# Patient Record
Sex: MALE | Race: ASIAN | NOT HISPANIC OR LATINO | URBAN - METROPOLITAN AREA
[De-identification: names, ages, dates, MRNs, and addresses within clinical notes are randomized per-mention and may not be internally consistent; named-entity substitution may affect disease eponyms.]

---

## 2017-01-15 ENCOUNTER — EMERGENCY (EMERGENCY)
Facility: HOSPITAL | Age: 57
LOS: 1 days | Discharge: HOME | End: 2017-01-15
Attending: EMERGENCY MEDICINE
Payer: COMMERCIAL

## 2017-01-15 VITALS
HEART RATE: 89 BPM | RESPIRATION RATE: 18 BRPM | SYSTOLIC BLOOD PRESSURE: 139 MMHG | OXYGEN SATURATION: 99 % | TEMPERATURE: 98 F | DIASTOLIC BLOOD PRESSURE: 89 MMHG

## 2017-01-15 VITALS
OXYGEN SATURATION: 100 % | RESPIRATION RATE: 20 BRPM | DIASTOLIC BLOOD PRESSURE: 79 MMHG | HEIGHT: 66 IN | WEIGHT: 229.94 LBS | SYSTOLIC BLOOD PRESSURE: 108 MMHG | TEMPERATURE: 97 F | HEART RATE: 83 BPM

## 2017-01-15 DIAGNOSIS — Z79.84 LONG TERM (CURRENT) USE OF ORAL HYPOGLYCEMIC DRUGS: ICD-10-CM

## 2017-01-15 DIAGNOSIS — E78.5 HYPERLIPIDEMIA, UNSPECIFIED: ICD-10-CM

## 2017-01-15 DIAGNOSIS — M79.89 OTHER SPECIFIED SOFT TISSUE DISORDERS: ICD-10-CM

## 2017-01-15 LAB
ALBUMIN SERPL ELPH-MCNC: 3.1 G/DL — LOW (ref 3.5–5)
ALP SERPL-CCNC: 96 U/L — SIGNIFICANT CHANGE UP (ref 40–120)
ALT FLD-CCNC: 22 U/L DA — SIGNIFICANT CHANGE UP (ref 10–60)
ANION GAP SERPL CALC-SCNC: 8 MMOL/L — SIGNIFICANT CHANGE UP (ref 5–17)
APPEARANCE UR: CLEAR — SIGNIFICANT CHANGE UP
AST SERPL-CCNC: 16 U/L — SIGNIFICANT CHANGE UP (ref 10–40)
BASOPHILS # BLD AUTO: 0.1 K/UL — SIGNIFICANT CHANGE UP (ref 0–0.2)
BASOPHILS NFR BLD AUTO: 0.8 % — SIGNIFICANT CHANGE UP (ref 0–2)
BILIRUB SERPL-MCNC: 0.5 MG/DL — SIGNIFICANT CHANGE UP (ref 0.2–1.2)
BILIRUB UR-MCNC: NEGATIVE — SIGNIFICANT CHANGE UP
BUN SERPL-MCNC: 16 MG/DL — SIGNIFICANT CHANGE UP (ref 7–18)
CALCIUM SERPL-MCNC: 8.6 MG/DL — SIGNIFICANT CHANGE UP (ref 8.4–10.5)
CHLORIDE SERPL-SCNC: 105 MMOL/L — SIGNIFICANT CHANGE UP (ref 96–108)
CO2 SERPL-SCNC: 26 MMOL/L — SIGNIFICANT CHANGE UP (ref 22–31)
COLOR SPEC: YELLOW — SIGNIFICANT CHANGE UP
CREAT SERPL-MCNC: 0.9 MG/DL — SIGNIFICANT CHANGE UP (ref 0.5–1.3)
DIFF PNL FLD: NEGATIVE — SIGNIFICANT CHANGE UP
EOSINOPHIL # BLD AUTO: 0.3 K/UL — SIGNIFICANT CHANGE UP (ref 0–0.5)
EOSINOPHIL NFR BLD AUTO: 2.8 % — SIGNIFICANT CHANGE UP (ref 0–6)
GLUCOSE SERPL-MCNC: 96 MG/DL — SIGNIFICANT CHANGE UP (ref 70–99)
GLUCOSE UR QL: NEGATIVE — SIGNIFICANT CHANGE UP
HCT VFR BLD CALC: 41 % — SIGNIFICANT CHANGE UP (ref 39–50)
HGB BLD-MCNC: 13.5 G/DL — SIGNIFICANT CHANGE UP (ref 13–17)
KETONES UR-MCNC: NEGATIVE — SIGNIFICANT CHANGE UP
LEUKOCYTE ESTERASE UR-ACNC: NEGATIVE — SIGNIFICANT CHANGE UP
LYMPHOCYTES # BLD AUTO: 1.8 K/UL — SIGNIFICANT CHANGE UP (ref 1–3.3)
LYMPHOCYTES # BLD AUTO: 20 % — SIGNIFICANT CHANGE UP (ref 13–44)
MCHC RBC-ENTMCNC: 29.7 PG — SIGNIFICANT CHANGE UP (ref 27–34)
MCHC RBC-ENTMCNC: 32.9 GM/DL — SIGNIFICANT CHANGE UP (ref 32–36)
MCV RBC AUTO: 90.3 FL — SIGNIFICANT CHANGE UP (ref 80–100)
MONOCYTES # BLD AUTO: 0.6 K/UL — SIGNIFICANT CHANGE UP (ref 0–0.9)
MONOCYTES NFR BLD AUTO: 6.9 % — SIGNIFICANT CHANGE UP (ref 2–14)
NEUTROPHILS # BLD AUTO: 6.1 K/UL — SIGNIFICANT CHANGE UP (ref 1.8–7.4)
NEUTROPHILS NFR BLD AUTO: 69.4 % — SIGNIFICANT CHANGE UP (ref 43–77)
NITRITE UR-MCNC: NEGATIVE — SIGNIFICANT CHANGE UP
PH UR: 6 — SIGNIFICANT CHANGE UP (ref 4.8–8)
PLATELET # BLD AUTO: 304 K/UL — SIGNIFICANT CHANGE UP (ref 150–400)
POTASSIUM SERPL-MCNC: 3.9 MMOL/L — SIGNIFICANT CHANGE UP (ref 3.5–5.3)
POTASSIUM SERPL-SCNC: 3.9 MMOL/L — SIGNIFICANT CHANGE UP (ref 3.5–5.3)
PROT SERPL-MCNC: 7.5 G/DL — SIGNIFICANT CHANGE UP (ref 6–8.3)
PROT UR-MCNC: NEGATIVE — SIGNIFICANT CHANGE UP
RBC # BLD: 4.55 M/UL — SIGNIFICANT CHANGE UP (ref 4.2–5.8)
RBC # FLD: 11.2 % — SIGNIFICANT CHANGE UP (ref 10.3–14.5)
SODIUM SERPL-SCNC: 139 MMOL/L — SIGNIFICANT CHANGE UP (ref 135–145)
SP GR SPEC: 1.01 — SIGNIFICANT CHANGE UP (ref 1.01–1.02)
UROBILINOGEN FLD QL: NEGATIVE — SIGNIFICANT CHANGE UP
WBC # BLD: 8.8 K/UL — SIGNIFICANT CHANGE UP (ref 3.8–10.5)
WBC # FLD AUTO: 8.8 K/UL — SIGNIFICANT CHANGE UP (ref 3.8–10.5)

## 2017-01-15 PROCEDURE — 85027 COMPLETE CBC AUTOMATED: CPT

## 2017-01-15 PROCEDURE — 81003 URINALYSIS AUTO W/O SCOPE: CPT

## 2017-01-15 PROCEDURE — 80053 COMPREHEN METABOLIC PANEL: CPT

## 2017-01-15 PROCEDURE — 93005 ELECTROCARDIOGRAM TRACING: CPT

## 2017-01-15 PROCEDURE — 99053 MED SERV 10PM-8AM 24 HR FAC: CPT

## 2017-01-15 PROCEDURE — 36000 PLACE NEEDLE IN VEIN: CPT

## 2017-01-15 PROCEDURE — 99284 EMERGENCY DEPT VISIT MOD MDM: CPT | Mod: 25

## 2017-01-15 PROCEDURE — 99283 EMERGENCY DEPT VISIT LOW MDM: CPT

## 2017-01-15 NOTE — ED PROVIDER NOTE - MEDICAL DECISION MAKING DETAILS
56 year-old male, presents with bilateral leg swelling x 3 weeks. Well-appearing, vital signs within normal limits, afebrile. Plan: labs, urine, re-assess.

## 2017-01-15 NOTE — ED PROVIDER NOTE - OBJECTIVE STATEMENT
56 year-old male, history of dyslipidemia, borderline DM (on Metformin) presents with cc bilateral leg swelling x 3 weeks. Gradual onset of bilateral lower extremity swelling (below knee), continuous, improved in the morning, worsened at the end of the day, associated with bilateral ankle sharp pain that worsens towards the end of the day. Denies chest pain, dizziness, palpitations, SOB, abdominal pain/bloating, fever, chills, diaphoresis or any other complaints. Was evaluated by his PMD 2 weeks ago, reports that has had blood work done and cardiac echo (which was normal as per patient), was started on a trial of diuretic which improved the swelling slightly but still having pain/swelling. Was started on Diclofenac for pain without relief.

## 2017-01-15 NOTE — ED ADULT NURSE NOTE - SKIN INTEGRITY
scar/discoloration to medial aspect of right ankle pt states that it is from healed old wound/intact

## 2017-01-15 NOTE — ED PROVIDER NOTE - ATTENDING CONTRIBUTION TO CARE
Case of a 56 year-old male with past medical history of dyslipidemia, borderline DM (on Metformin) presenting with bilateral leg swelling x 3 weeks. Well-appearing, vital signs within normal limits, afebrile. Will do labs, urine, re-assess. Agree with NP's physical exam, assessment and documentation

## 2017-01-15 NOTE — ED PROVIDER NOTE - PROGRESS NOTE DETAILS
Labs, urine unremarkable. ECG shows NSR. Patient already has a follow up with PMD. Presentation unlikely due to DVT or infection. Patient will need to follow up with the PMD in 1-2 days. Pt is well appearing walking with steady gait, stable for discharge and follow up without fail with medical doctor. I had a detailed discussion with the patient and/or guardian regarding the historical points, exam findings, and any diagnostic results supporting the discharge diagnosis. Pt educated on care and need for follow up. Strict return instructions and red flag signs and symptoms discussed with patient. Questions answered. Pt shows understanding of discharge information and agrees to follow.

## 2020-08-07 NOTE — ED PROVIDER NOTE - NSTIMEPROVIDERCAREINITIATE_GEN_ER
15-Javier-2017 17:07 Detail Level: Detailed Render Note In Bullet Format When Appropriate: No Consent: The patient's consent was obtained including but not limited to risks of crusting, scabbing, blistering, scarring, darker or lighter pigmentary change, recurrence, incomplete removal and infection. Post-Care Instructions: I reviewed with the patient in detail post-care instructions. Patient is to wear sunprotection, and avoid picking at any of the treated lesions. Pt may apply Vaseline to crusted or scabbing areas. Duration Of Freeze Thaw-Cycle (Seconds): 1 Number Of Freeze-Thaw Cycles: 2 freeze-thaw cycles Medical Necessity Clause: This procedure was medically necessary because the lesions that were treated were: Medical Necessity Information: It is in your best interest to select a reason for this procedure from the list below. All of these items fulfill various CMS LCD requirements except the new and changing color options.

## 2022-01-01 ENCOUNTER — NON-APPOINTMENT (OUTPATIENT)
Age: 62
End: 2022-01-01

## 2022-01-01 ENCOUNTER — APPOINTMENT (OUTPATIENT)
Dept: PULMONOLOGY | Facility: CLINIC | Age: 62
End: 2022-01-01
Payer: MEDICAID

## 2022-01-01 ENCOUNTER — APPOINTMENT (OUTPATIENT)
Dept: PULMONOLOGY | Facility: CLINIC | Age: 62
End: 2022-01-01

## 2022-01-01 ENCOUNTER — TRANSCRIPTION ENCOUNTER (OUTPATIENT)
Age: 62
End: 2022-01-01

## 2022-01-01 ENCOUNTER — LABORATORY RESULT (OUTPATIENT)
Age: 62
End: 2022-01-01

## 2022-01-01 ENCOUNTER — INPATIENT (INPATIENT)
Facility: HOSPITAL | Age: 62
LOS: 3 days | Discharge: HOME | End: 2022-10-13
Attending: INTERNAL MEDICINE | Admitting: INTERNAL MEDICINE

## 2022-01-01 ENCOUNTER — INPATIENT (INPATIENT)
Facility: HOSPITAL | Age: 62
LOS: 35 days | Discharge: HOME HEALTH | DRG: 196 | End: 2022-12-29
Attending: INTERNAL MEDICINE | Admitting: STUDENT IN AN ORGANIZED HEALTH CARE EDUCATION/TRAINING PROGRAM
Payer: MEDICAID

## 2022-01-01 VITALS
DIASTOLIC BLOOD PRESSURE: 80 MMHG | HEIGHT: 65 IN | OXYGEN SATURATION: 95 % | HEART RATE: 102 BPM | BODY MASS INDEX: 35.82 KG/M2 | SYSTOLIC BLOOD PRESSURE: 130 MMHG | TEMPERATURE: 98.1 F | WEIGHT: 215 LBS

## 2022-01-01 VITALS
SYSTOLIC BLOOD PRESSURE: 112 MMHG | OXYGEN SATURATION: 95 % | TEMPERATURE: 98 F | DIASTOLIC BLOOD PRESSURE: 76 MMHG | HEART RATE: 86 BPM

## 2022-01-01 VITALS
HEART RATE: 71 BPM | OXYGEN SATURATION: 98 % | RESPIRATION RATE: 18 BRPM | TEMPERATURE: 98 F | DIASTOLIC BLOOD PRESSURE: 66 MMHG | SYSTOLIC BLOOD PRESSURE: 103 MMHG

## 2022-01-01 VITALS
WEIGHT: 216.05 LBS | OXYGEN SATURATION: 97 % | RESPIRATION RATE: 44 BRPM | DIASTOLIC BLOOD PRESSURE: 84 MMHG | HEART RATE: 115 BPM | SYSTOLIC BLOOD PRESSURE: 132 MMHG | TEMPERATURE: 99 F

## 2022-01-01 VITALS
HEART RATE: 107 BPM | WEIGHT: 214 LBS | SYSTOLIC BLOOD PRESSURE: 109 MMHG | BODY MASS INDEX: 36.54 KG/M2 | OXYGEN SATURATION: 96 % | TEMPERATURE: 98.4 F | HEIGHT: 64 IN | DIASTOLIC BLOOD PRESSURE: 75 MMHG

## 2022-01-01 VITALS
SYSTOLIC BLOOD PRESSURE: 140 MMHG | WEIGHT: 234 LBS | OXYGEN SATURATION: 96 % | BODY MASS INDEX: 39.95 KG/M2 | TEMPERATURE: 97.8 F | DIASTOLIC BLOOD PRESSURE: 82 MMHG | HEART RATE: 103 BPM | HEIGHT: 64 IN

## 2022-01-01 VITALS
BODY MASS INDEX: 38.07 KG/M2 | TEMPERATURE: 98 F | DIASTOLIC BLOOD PRESSURE: 87 MMHG | RESPIRATION RATE: 18 BRPM | SYSTOLIC BLOOD PRESSURE: 143 MMHG | HEIGHT: 64 IN | HEART RATE: 87 BPM | WEIGHT: 223 LBS | OXYGEN SATURATION: 90 %

## 2022-01-01 VITALS
SYSTOLIC BLOOD PRESSURE: 123 MMHG | BODY MASS INDEX: 35.32 KG/M2 | OXYGEN SATURATION: 99 % | WEIGHT: 212 LBS | HEIGHT: 65 IN | DIASTOLIC BLOOD PRESSURE: 79 MMHG | TEMPERATURE: 98.4 F | HEART RATE: 98 BPM

## 2022-01-01 VITALS — OXYGEN SATURATION: 88 % | HEART RATE: 112 BPM | TEMPERATURE: 96.6 F

## 2022-01-01 VITALS — TEMPERATURE: 99 F

## 2022-01-01 VITALS
SYSTOLIC BLOOD PRESSURE: 116 MMHG | HEART RATE: 95 BPM | OXYGEN SATURATION: 97 % | TEMPERATURE: 96.6 F | DIASTOLIC BLOOD PRESSURE: 74 MMHG

## 2022-01-01 VITALS
TEMPERATURE: 97.1 F | DIASTOLIC BLOOD PRESSURE: 80 MMHG | OXYGEN SATURATION: 96 % | SYSTOLIC BLOOD PRESSURE: 116 MMHG | HEART RATE: 107 BPM

## 2022-01-01 VITALS — OXYGEN SATURATION: 90 % | HEART RATE: 98 BPM

## 2022-01-01 VITALS
SYSTOLIC BLOOD PRESSURE: 149 MMHG | OXYGEN SATURATION: 68 % | HEART RATE: 120 BPM | DIASTOLIC BLOOD PRESSURE: 71 MMHG | HEIGHT: 66 IN | RESPIRATION RATE: 26 BRPM

## 2022-01-01 DIAGNOSIS — J18.9 PNEUMONIA, UNSPECIFIED ORGANISM: ICD-10-CM

## 2022-01-01 DIAGNOSIS — D72.829 ELEVATED WHITE BLOOD CELL COUNT, UNSPECIFIED: ICD-10-CM

## 2022-01-01 DIAGNOSIS — J84.10 PULMONARY FIBROSIS, UNSPECIFIED: ICD-10-CM

## 2022-01-01 DIAGNOSIS — Z29.9 ENCOUNTER FOR PROPHYLACTIC MEASURES, UNSPECIFIED: ICD-10-CM

## 2022-01-01 DIAGNOSIS — Z98.890 OTHER SPECIFIED POSTPROCEDURAL STATES: Chronic | ICD-10-CM

## 2022-01-01 DIAGNOSIS — J84.9 INTERSTITIAL PULMONARY DISEASE, UNSPECIFIED: ICD-10-CM

## 2022-01-01 DIAGNOSIS — R06.02 SHORTNESS OF BREATH: ICD-10-CM

## 2022-01-01 DIAGNOSIS — Z78.9 OTHER SPECIFIED HEALTH STATUS: ICD-10-CM

## 2022-01-01 DIAGNOSIS — Z01.818 ENCOUNTER FOR OTHER PREPROCEDURAL EXAMINATION: ICD-10-CM

## 2022-01-01 DIAGNOSIS — J96.21 ACUTE AND CHRONIC RESPIRATORY FAILURE WITH HYPOXIA: ICD-10-CM

## 2022-01-01 DIAGNOSIS — J96.11 CHRONIC RESPIRATORY FAILURE WITH HYPOXIA: ICD-10-CM

## 2022-01-01 DIAGNOSIS — R05.9 COUGH, UNSPECIFIED: ICD-10-CM

## 2022-01-01 DIAGNOSIS — U09.9 POST COVID-19 CONDITION, UNSPECIFIED: ICD-10-CM

## 2022-01-01 DIAGNOSIS — E87.1 HYPO-OSMOLALITY AND HYPONATREMIA: ICD-10-CM

## 2022-01-01 DIAGNOSIS — B37.0 CANDIDAL STOMATITIS: ICD-10-CM

## 2022-01-01 DIAGNOSIS — R73.03 PREDIABETES: ICD-10-CM

## 2022-01-01 DIAGNOSIS — I27.20 PULMONARY HYPERTENSION, UNSPECIFIED: ICD-10-CM

## 2022-01-01 DIAGNOSIS — Z01.812 ENCOUNTER FOR PREPROCEDURAL LABORATORY EXAMINATION: ICD-10-CM

## 2022-01-01 LAB
A1C WITH ESTIMATED AVERAGE GLUCOSE RESULT: 6 % — HIGH (ref 4–5.6)
ACE BLD-CCNC: 34 U/L
ALBUMIN SERPL ELPH-MCNC: 3.1 G/DL — LOW (ref 3.3–5)
ALBUMIN SERPL ELPH-MCNC: 3.2 G/DL — LOW (ref 3.3–5)
ALBUMIN SERPL ELPH-MCNC: 3.3 G/DL — SIGNIFICANT CHANGE UP (ref 3.3–5)
ALBUMIN SERPL ELPH-MCNC: 3.4 G/DL — SIGNIFICANT CHANGE UP (ref 3.3–5)
ALBUMIN SERPL ELPH-MCNC: 3.5 G/DL — SIGNIFICANT CHANGE UP (ref 3.3–5)
ALBUMIN SERPL ELPH-MCNC: 3.7 G/DL — SIGNIFICANT CHANGE UP (ref 3.3–5)
ALBUMIN SERPL ELPH-MCNC: 3.8 G/DL — SIGNIFICANT CHANGE UP (ref 3.3–5)
ALBUMIN SERPL ELPH-MCNC: 4 G/DL
ALP BLD-CCNC: 103 U/L
ALP SERPL-CCNC: 103 U/L — SIGNIFICANT CHANGE UP (ref 40–120)
ALP SERPL-CCNC: 108 U/L — SIGNIFICANT CHANGE UP (ref 40–120)
ALP SERPL-CCNC: 115 U/L — SIGNIFICANT CHANGE UP (ref 40–120)
ALP SERPL-CCNC: 64 U/L — SIGNIFICANT CHANGE UP (ref 40–120)
ALP SERPL-CCNC: 69 U/L — SIGNIFICANT CHANGE UP (ref 40–120)
ALP SERPL-CCNC: 70 U/L — SIGNIFICANT CHANGE UP (ref 40–120)
ALP SERPL-CCNC: 71 U/L — SIGNIFICANT CHANGE UP (ref 40–120)
ALP SERPL-CCNC: 71 U/L — SIGNIFICANT CHANGE UP (ref 40–120)
ALP SERPL-CCNC: 72 U/L — SIGNIFICANT CHANGE UP (ref 40–120)
ALP SERPL-CCNC: 73 U/L — SIGNIFICANT CHANGE UP (ref 40–120)
ALP SERPL-CCNC: 74 U/L — SIGNIFICANT CHANGE UP (ref 40–120)
ALP SERPL-CCNC: 74 U/L — SIGNIFICANT CHANGE UP (ref 40–120)
ALP SERPL-CCNC: 76 U/L — SIGNIFICANT CHANGE UP (ref 40–120)
ALP SERPL-CCNC: 79 U/L — SIGNIFICANT CHANGE UP (ref 40–120)
ALP SERPL-CCNC: 84 U/L — SIGNIFICANT CHANGE UP (ref 40–120)
ALP SERPL-CCNC: 84 U/L — SIGNIFICANT CHANGE UP (ref 40–120)
ALP SERPL-CCNC: 86 U/L — SIGNIFICANT CHANGE UP (ref 40–120)
ALP SERPL-CCNC: 87 U/L — SIGNIFICANT CHANGE UP (ref 40–120)
ALP SERPL-CCNC: 90 U/L — SIGNIFICANT CHANGE UP (ref 40–120)
ALP SERPL-CCNC: 93 U/L — SIGNIFICANT CHANGE UP (ref 40–120)
ALP SERPL-CCNC: 95 U/L — SIGNIFICANT CHANGE UP (ref 40–120)
ALP SERPL-CCNC: 98 U/L — SIGNIFICANT CHANGE UP (ref 40–120)
ALT FLD-CCNC: 12 U/L — SIGNIFICANT CHANGE UP (ref 4–41)
ALT FLD-CCNC: 16 U/L — SIGNIFICANT CHANGE UP (ref 10–45)
ALT FLD-CCNC: 17 U/L — SIGNIFICANT CHANGE UP (ref 10–45)
ALT FLD-CCNC: 18 U/L — SIGNIFICANT CHANGE UP (ref 10–45)
ALT FLD-CCNC: 19 U/L — SIGNIFICANT CHANGE UP (ref 10–45)
ALT FLD-CCNC: 19 U/L — SIGNIFICANT CHANGE UP (ref 10–45)
ALT FLD-CCNC: 21 U/L — SIGNIFICANT CHANGE UP (ref 10–45)
ALT FLD-CCNC: 22 U/L — SIGNIFICANT CHANGE UP (ref 10–45)
ALT FLD-CCNC: 22 U/L — SIGNIFICANT CHANGE UP (ref 10–45)
ALT FLD-CCNC: 23 U/L — SIGNIFICANT CHANGE UP (ref 10–45)
ALT FLD-CCNC: 24 U/L — SIGNIFICANT CHANGE UP (ref 10–45)
ALT FLD-CCNC: 24 U/L — SIGNIFICANT CHANGE UP (ref 10–45)
ALT FLD-CCNC: 25 U/L — SIGNIFICANT CHANGE UP (ref 10–45)
ALT FLD-CCNC: 26 U/L — SIGNIFICANT CHANGE UP (ref 10–45)
ALT FLD-CCNC: 28 U/L — SIGNIFICANT CHANGE UP (ref 10–45)
ALT FLD-CCNC: 29 U/L — SIGNIFICANT CHANGE UP (ref 10–45)
ALT FLD-CCNC: 30 U/L — SIGNIFICANT CHANGE UP (ref 10–45)
ALT FLD-CCNC: 31 U/L — SIGNIFICANT CHANGE UP (ref 10–45)
ALT FLD-CCNC: 32 U/L — SIGNIFICANT CHANGE UP (ref 10–45)
ALT FLD-CCNC: 32 U/L — SIGNIFICANT CHANGE UP (ref 10–45)
ALT FLD-CCNC: 35 U/L — SIGNIFICANT CHANGE UP (ref 10–45)
ALT FLD-CCNC: 9 U/L — SIGNIFICANT CHANGE UP (ref 4–41)
ALT FLD-CCNC: SIGNIFICANT CHANGE UP (ref 10–45)
ALT FLD-CCNC: SIGNIFICANT CHANGE UP (ref 10–45)
ALT SERPL-CCNC: 13 U/L
ANA SER IF-ACNC: NEGATIVE
ANA TITR SER: NEGATIVE — SIGNIFICANT CHANGE UP
ANION GAP SERPL CALC-SCNC: 1 MMOL/L — LOW (ref 5–17)
ANION GAP SERPL CALC-SCNC: 10 MMOL/L — SIGNIFICANT CHANGE UP (ref 5–17)
ANION GAP SERPL CALC-SCNC: 11 MMOL/L — SIGNIFICANT CHANGE UP (ref 7–14)
ANION GAP SERPL CALC-SCNC: 11 MMOL/L — SIGNIFICANT CHANGE UP (ref 7–14)
ANION GAP SERPL CALC-SCNC: 12 MMOL/L
ANION GAP SERPL CALC-SCNC: 12 MMOL/L — SIGNIFICANT CHANGE UP (ref 7–14)
ANION GAP SERPL CALC-SCNC: 2 MMOL/L — LOW (ref 5–17)
ANION GAP SERPL CALC-SCNC: 2 MMOL/L — LOW (ref 5–17)
ANION GAP SERPL CALC-SCNC: 3 MMOL/L — LOW (ref 5–17)
ANION GAP SERPL CALC-SCNC: 3 MMOL/L — LOW (ref 5–17)
ANION GAP SERPL CALC-SCNC: 4 MMOL/L — LOW (ref 5–17)
ANION GAP SERPL CALC-SCNC: 5 MMOL/L — SIGNIFICANT CHANGE UP (ref 5–17)
ANION GAP SERPL CALC-SCNC: 6 MMOL/L — SIGNIFICANT CHANGE UP (ref 5–17)
ANION GAP SERPL CALC-SCNC: 7 MMOL/L — SIGNIFICANT CHANGE UP (ref 5–17)
ANION GAP SERPL CALC-SCNC: 9 MMOL/L — SIGNIFICANT CHANGE UP (ref 5–17)
ANION GAP SERPL CALC-SCNC: 9 MMOL/L — SIGNIFICANT CHANGE UP (ref 7–14)
APPEARANCE UR: ABNORMAL
APPEARANCE UR: CLEAR — SIGNIFICANT CHANGE UP
APTT BLD: 24.9 SEC — LOW (ref 27.5–35.5)
APTT BLD: 27.7 SEC — SIGNIFICANT CHANGE UP (ref 27.5–35.5)
APTT BLD: 29.6 SEC — SIGNIFICANT CHANGE UP (ref 27.5–35.5)
APTT BLD: 29.8 SEC — SIGNIFICANT CHANGE UP (ref 27.5–35.5)
APTT BLD: 35.9 SEC — SIGNIFICANT CHANGE UP (ref 27–36.3)
AST SERPL-CCNC: 10 U/L — SIGNIFICANT CHANGE UP (ref 10–40)
AST SERPL-CCNC: 13 U/L — SIGNIFICANT CHANGE UP (ref 10–40)
AST SERPL-CCNC: 15 U/L — SIGNIFICANT CHANGE UP (ref 10–40)
AST SERPL-CCNC: 16 U/L — SIGNIFICANT CHANGE UP (ref 10–40)
AST SERPL-CCNC: 16 U/L — SIGNIFICANT CHANGE UP (ref 10–40)
AST SERPL-CCNC: 17 U/L — SIGNIFICANT CHANGE UP (ref 10–40)
AST SERPL-CCNC: 17 U/L — SIGNIFICANT CHANGE UP (ref 4–40)
AST SERPL-CCNC: 18 U/L — SIGNIFICANT CHANGE UP (ref 10–40)
AST SERPL-CCNC: 18 U/L — SIGNIFICANT CHANGE UP (ref 4–40)
AST SERPL-CCNC: 19 U/L
AST SERPL-CCNC: 21 U/L — SIGNIFICANT CHANGE UP (ref 10–40)
AST SERPL-CCNC: 22 U/L — SIGNIFICANT CHANGE UP (ref 10–40)
AST SERPL-CCNC: 23 U/L — SIGNIFICANT CHANGE UP (ref 10–40)
AST SERPL-CCNC: 24 U/L — SIGNIFICANT CHANGE UP (ref 10–40)
AST SERPL-CCNC: 25 U/L — SIGNIFICANT CHANGE UP (ref 10–40)
AST SERPL-CCNC: 25 U/L — SIGNIFICANT CHANGE UP (ref 10–40)
AST SERPL-CCNC: 27 U/L — SIGNIFICANT CHANGE UP (ref 10–40)
AST SERPL-CCNC: 28 U/L — SIGNIFICANT CHANGE UP (ref 10–40)
AST SERPL-CCNC: 29 U/L — SIGNIFICANT CHANGE UP (ref 10–40)
AST SERPL-CCNC: 29 U/L — SIGNIFICANT CHANGE UP (ref 10–40)
AST SERPL-CCNC: 31 U/L — SIGNIFICANT CHANGE UP (ref 10–40)
AST SERPL-CCNC: 32 U/L — SIGNIFICANT CHANGE UP (ref 10–40)
AST SERPL-CCNC: SIGNIFICANT CHANGE UP (ref 10–40)
AST SERPL-CCNC: SIGNIFICANT CHANGE UP (ref 10–40)
B PERT DNA SPEC QL NAA+PROBE: SIGNIFICANT CHANGE UP
B PERT+PARAPERT DNA PNL SPEC NAA+PROBE: SIGNIFICANT CHANGE UP
BACTERIA # UR AUTO: SIGNIFICANT CHANGE UP /HPF
BASE EXCESS BLDA CALC-SCNC: 10.6 MMOL/L — HIGH (ref -2–3)
BASE EXCESS BLDA CALC-SCNC: 10.6 MMOL/L — HIGH (ref -2–3)
BASE EXCESS BLDA CALC-SCNC: 11 MMOL/L — HIGH (ref -2–3)
BASE EXCESS BLDA CALC-SCNC: 11.1 MMOL/L — HIGH (ref -2–3)
BASE EXCESS BLDA CALC-SCNC: 15 MMOL/L — HIGH (ref -2–3)
BASE EXCESS BLDA CALC-SCNC: 15.5 MMOL/L — HIGH (ref -2–3)
BASE EXCESS BLDA CALC-SCNC: 17.6 MMOL/L — HIGH (ref -2–3)
BASE EXCESS BLDA CALC-SCNC: 8 MMOL/L — HIGH (ref -2–3)
BASE EXCESS BLDMV CALC-SCNC: 3.2 MMOL/L — SIGNIFICANT CHANGE UP
BASOPHILS # BLD AUTO: 0 K/UL — SIGNIFICANT CHANGE UP (ref 0–0.2)
BASOPHILS # BLD AUTO: 0.01 K/UL — SIGNIFICANT CHANGE UP (ref 0–0.2)
BASOPHILS # BLD AUTO: 0.02 K/UL — SIGNIFICANT CHANGE UP (ref 0–0.2)
BASOPHILS # BLD AUTO: 0.03 K/UL — SIGNIFICANT CHANGE UP (ref 0–0.2)
BASOPHILS # BLD AUTO: 0.04 K/UL — SIGNIFICANT CHANGE UP (ref 0–0.2)
BASOPHILS # BLD AUTO: 0.05 K/UL
BASOPHILS NFR BLD AUTO: 0 % — SIGNIFICANT CHANGE UP (ref 0–2)
BASOPHILS NFR BLD AUTO: 0.1 % — SIGNIFICANT CHANGE UP (ref 0–2)
BASOPHILS NFR BLD AUTO: 0.2 % — SIGNIFICANT CHANGE UP (ref 0–2)
BASOPHILS NFR BLD AUTO: 0.3 % — SIGNIFICANT CHANGE UP (ref 0–2)
BASOPHILS NFR BLD AUTO: 0.5 %
BILIRUB DIRECT SERPL-MCNC: <0.2 MG/DL — SIGNIFICANT CHANGE UP (ref 0–0.3)
BILIRUB INDIRECT FLD-MCNC: SIGNIFICANT CHANGE UP MG/DL (ref 0.2–1)
BILIRUB SERPL-MCNC: 0.3 MG/DL — SIGNIFICANT CHANGE UP (ref 0.2–1.2)
BILIRUB SERPL-MCNC: 0.4 MG/DL — SIGNIFICANT CHANGE UP (ref 0.2–1.2)
BILIRUB SERPL-MCNC: 0.5 MG/DL — SIGNIFICANT CHANGE UP (ref 0.2–1.2)
BILIRUB SERPL-MCNC: 0.6 MG/DL — SIGNIFICANT CHANGE UP (ref 0.2–1.2)
BILIRUB SERPL-MCNC: 0.7 MG/DL
BILIRUB SERPL-MCNC: 0.7 MG/DL — SIGNIFICANT CHANGE UP (ref 0.2–1.2)
BILIRUB SERPL-MCNC: 0.8 MG/DL — SIGNIFICANT CHANGE UP (ref 0.2–1.2)
BILIRUB SERPL-MCNC: 0.9 MG/DL — SIGNIFICANT CHANGE UP (ref 0.2–1.2)
BILIRUB UR-MCNC: NEGATIVE — SIGNIFICANT CHANGE UP
BILIRUB UR-MCNC: NEGATIVE — SIGNIFICANT CHANGE UP
BLD GP AB SCN SERPL QL: NEGATIVE — SIGNIFICANT CHANGE UP
BLOOD GAS VENOUS COMPREHENSIVE RESULT: SIGNIFICANT CHANGE UP
BLOOD GAS VENOUS COMPREHENSIVE RESULT: SIGNIFICANT CHANGE UP
BORDETELLA PARAPERTUSSIS (RAPRVP): SIGNIFICANT CHANGE UP
BUN SERPL-MCNC: 10 MG/DL — SIGNIFICANT CHANGE UP (ref 7–23)
BUN SERPL-MCNC: 12 MG/DL
BUN SERPL-MCNC: 13 MG/DL — SIGNIFICANT CHANGE UP (ref 7–23)
BUN SERPL-MCNC: 13 MG/DL — SIGNIFICANT CHANGE UP (ref 7–23)
BUN SERPL-MCNC: 17 MG/DL — SIGNIFICANT CHANGE UP (ref 7–23)
BUN SERPL-MCNC: 18 MG/DL — SIGNIFICANT CHANGE UP (ref 7–23)
BUN SERPL-MCNC: 19 MG/DL — SIGNIFICANT CHANGE UP (ref 7–23)
BUN SERPL-MCNC: 19 MG/DL — SIGNIFICANT CHANGE UP (ref 7–23)
BUN SERPL-MCNC: 20 MG/DL — SIGNIFICANT CHANGE UP (ref 7–23)
BUN SERPL-MCNC: 21 MG/DL — SIGNIFICANT CHANGE UP (ref 7–23)
BUN SERPL-MCNC: 22 MG/DL — SIGNIFICANT CHANGE UP (ref 7–23)
BUN SERPL-MCNC: 23 MG/DL — SIGNIFICANT CHANGE UP (ref 7–23)
BUN SERPL-MCNC: 23 MG/DL — SIGNIFICANT CHANGE UP (ref 7–23)
BUN SERPL-MCNC: 24 MG/DL — HIGH (ref 7–23)
BUN SERPL-MCNC: 24 MG/DL — HIGH (ref 7–23)
BUN SERPL-MCNC: 25 MG/DL — HIGH (ref 7–23)
BUN SERPL-MCNC: 28 MG/DL — HIGH (ref 7–23)
BUN SERPL-MCNC: 8 MG/DL — SIGNIFICANT CHANGE UP (ref 7–23)
BUN SERPL-MCNC: 9 MG/DL — SIGNIFICANT CHANGE UP (ref 7–23)
C PNEUM DNA SPEC QL NAA+PROBE: SIGNIFICANT CHANGE UP
CALCIUM SERPL-MCNC: 8.3 MG/DL — LOW (ref 8.4–10.5)
CALCIUM SERPL-MCNC: 8.4 MG/DL — SIGNIFICANT CHANGE UP (ref 8.4–10.5)
CALCIUM SERPL-MCNC: 8.5 MG/DL — SIGNIFICANT CHANGE UP (ref 8.4–10.5)
CALCIUM SERPL-MCNC: 8.6 MG/DL — SIGNIFICANT CHANGE UP (ref 8.4–10.5)
CALCIUM SERPL-MCNC: 8.7 MG/DL — SIGNIFICANT CHANGE UP (ref 8.4–10.5)
CALCIUM SERPL-MCNC: 8.8 MG/DL — SIGNIFICANT CHANGE UP (ref 8.4–10.5)
CALCIUM SERPL-MCNC: 8.9 MG/DL — SIGNIFICANT CHANGE UP (ref 8.4–10.5)
CALCIUM SERPL-MCNC: 9 MG/DL
CALCIUM SERPL-MCNC: 9 MG/DL — SIGNIFICANT CHANGE UP (ref 8.4–10.5)
CALCIUM SERPL-MCNC: 9.1 MG/DL — SIGNIFICANT CHANGE UP (ref 8.4–10.5)
CALCIUM SERPL-MCNC: 9.2 MG/DL — SIGNIFICANT CHANGE UP (ref 8.4–10.5)
CCP IGG SERPL-ACNC: <8 UNITS — SIGNIFICANT CHANGE UP
CENTROMERE IGG SER-ACNC: <0.2 CD:130001892
CHLORIDE SERPL-SCNC: 88 MMOL/L — LOW (ref 96–108)
CHLORIDE SERPL-SCNC: 89 MMOL/L — LOW (ref 96–108)
CHLORIDE SERPL-SCNC: 89 MMOL/L — LOW (ref 96–108)
CHLORIDE SERPL-SCNC: 90 MMOL/L — LOW (ref 96–108)
CHLORIDE SERPL-SCNC: 91 MMOL/L — LOW (ref 96–108)
CHLORIDE SERPL-SCNC: 91 MMOL/L — LOW (ref 96–108)
CHLORIDE SERPL-SCNC: 92 MMOL/L — LOW (ref 96–108)
CHLORIDE SERPL-SCNC: 93 MMOL/L — LOW (ref 96–108)
CHLORIDE SERPL-SCNC: 94 MMOL/L — LOW (ref 96–108)
CHLORIDE SERPL-SCNC: 95 MMOL/L — LOW (ref 96–108)
CHLORIDE SERPL-SCNC: 95 MMOL/L — LOW (ref 96–108)
CHLORIDE SERPL-SCNC: 95 MMOL/L — LOW (ref 98–107)
CHLORIDE SERPL-SCNC: 95 MMOL/L — LOW (ref 98–107)
CHLORIDE SERPL-SCNC: 96 MMOL/L — LOW (ref 98–107)
CHLORIDE SERPL-SCNC: 96 MMOL/L — LOW (ref 98–107)
CHLORIDE SERPL-SCNC: 96 MMOL/L — SIGNIFICANT CHANGE UP (ref 96–108)
CHLORIDE SERPL-SCNC: 97 MMOL/L — SIGNIFICANT CHANGE UP (ref 96–108)
CHLORIDE SERPL-SCNC: 98 MMOL/L
CHOLEST SERPL-MCNC: 175 MG/DL — SIGNIFICANT CHANGE UP
CK MB CFR SERPL CALC: 5.5 NG/ML — SIGNIFICANT CHANGE UP (ref 0–6.7)
CK SERPL-CCNC: 82 U/L — SIGNIFICANT CHANGE UP (ref 30–200)
CMV IGG FLD QL: 4.6 U/ML — HIGH
CMV IGG FLD QL: 6.7 U/ML — HIGH
CMV IGG SERPL-IMP: POSITIVE
CMV IGG SERPL-IMP: POSITIVE
CMV IGM FLD-ACNC: <8 AU/ML — SIGNIFICANT CHANGE UP
CMV IGM SERPL QL: NEGATIVE — SIGNIFICANT CHANGE UP
CO2 BLDA-SCNC: 36 MMOL/L — HIGH (ref 19–24)
CO2 BLDA-SCNC: 38 MMOL/L — HIGH (ref 19–24)
CO2 BLDA-SCNC: 40 MMOL/L — HIGH (ref 19–24)
CO2 BLDA-SCNC: 41 MMOL/L — HIGH (ref 19–24)
CO2 BLDA-SCNC: 41 MMOL/L — HIGH (ref 19–24)
CO2 BLDA-SCNC: 45 MMOL/L — CRITICAL HIGH (ref 19–24)
CO2 BLDA-SCNC: 46 MMOL/L — CRITICAL HIGH (ref 19–24)
CO2 BLDA-SCNC: 48 MMOL/L — CRITICAL HIGH (ref 19–24)
CO2 BLDMV-SCNC: 31.5 MMOL/L — SIGNIFICANT CHANGE UP
CO2 SERPL-SCNC: 27 MMOL/L — SIGNIFICANT CHANGE UP (ref 22–31)
CO2 SERPL-SCNC: 28 MMOL/L — SIGNIFICANT CHANGE UP (ref 22–31)
CO2 SERPL-SCNC: 29 MMOL/L
CO2 SERPL-SCNC: 31 MMOL/L — SIGNIFICANT CHANGE UP (ref 22–31)
CO2 SERPL-SCNC: 32 MMOL/L — HIGH (ref 22–31)
CO2 SERPL-SCNC: 33 MMOL/L — HIGH (ref 22–31)
CO2 SERPL-SCNC: 33 MMOL/L — HIGH (ref 22–31)
CO2 SERPL-SCNC: 34 MMOL/L — HIGH (ref 22–31)
CO2 SERPL-SCNC: 35 MMOL/L — HIGH (ref 22–31)
CO2 SERPL-SCNC: 36 MMOL/L — HIGH (ref 22–31)
CO2 SERPL-SCNC: 37 MMOL/L — HIGH (ref 22–31)
CO2 SERPL-SCNC: 38 MMOL/L — HIGH (ref 22–31)
CO2 SERPL-SCNC: 38 MMOL/L — HIGH (ref 22–31)
CO2 SERPL-SCNC: 39 MMOL/L — HIGH (ref 22–31)
CO2 SERPL-SCNC: 42 MMOL/L — HIGH (ref 22–31)
CO2 SERPL-SCNC: 42 MMOL/L — HIGH (ref 22–31)
COHGB MFR BLDV: 1.6 % — SIGNIFICANT CHANGE UP
COHGB MFR BLDV: 1.9 % — SIGNIFICANT CHANGE UP
COHGB MFR BLDV: 2.2 % — SIGNIFICANT CHANGE UP
COLOR SPEC: ABNORMAL
COLOR SPEC: SIGNIFICANT CHANGE UP
CREAT SERPL-MCNC: 0.64 MG/DL — SIGNIFICANT CHANGE UP (ref 0.5–1.3)
CREAT SERPL-MCNC: 0.64 MG/DL — SIGNIFICANT CHANGE UP (ref 0.5–1.3)
CREAT SERPL-MCNC: 0.66 MG/DL — SIGNIFICANT CHANGE UP (ref 0.5–1.3)
CREAT SERPL-MCNC: 0.66 MG/DL — SIGNIFICANT CHANGE UP (ref 0.5–1.3)
CREAT SERPL-MCNC: 0.67 MG/DL — SIGNIFICANT CHANGE UP (ref 0.5–1.3)
CREAT SERPL-MCNC: 0.7 MG/DL — SIGNIFICANT CHANGE UP (ref 0.5–1.3)
CREAT SERPL-MCNC: 0.71 MG/DL — SIGNIFICANT CHANGE UP (ref 0.5–1.3)
CREAT SERPL-MCNC: 0.71 MG/DL — SIGNIFICANT CHANGE UP (ref 0.5–1.3)
CREAT SERPL-MCNC: 0.73 MG/DL — SIGNIFICANT CHANGE UP (ref 0.5–1.3)
CREAT SERPL-MCNC: 0.73 MG/DL — SIGNIFICANT CHANGE UP (ref 0.5–1.3)
CREAT SERPL-MCNC: 0.74 MG/DL — SIGNIFICANT CHANGE UP (ref 0.5–1.3)
CREAT SERPL-MCNC: 0.74 MG/DL — SIGNIFICANT CHANGE UP (ref 0.5–1.3)
CREAT SERPL-MCNC: 0.75 MG/DL — SIGNIFICANT CHANGE UP (ref 0.5–1.3)
CREAT SERPL-MCNC: 0.76 MG/DL — SIGNIFICANT CHANGE UP (ref 0.5–1.3)
CREAT SERPL-MCNC: 0.76 MG/DL — SIGNIFICANT CHANGE UP (ref 0.5–1.3)
CREAT SERPL-MCNC: 0.77 MG/DL — SIGNIFICANT CHANGE UP (ref 0.5–1.3)
CREAT SERPL-MCNC: 0.77 MG/DL — SIGNIFICANT CHANGE UP (ref 0.5–1.3)
CREAT SERPL-MCNC: 0.78 MG/DL — SIGNIFICANT CHANGE UP (ref 0.5–1.3)
CREAT SERPL-MCNC: 0.78 MG/DL — SIGNIFICANT CHANGE UP (ref 0.5–1.3)
CREAT SERPL-MCNC: 0.79 MG/DL — SIGNIFICANT CHANGE UP (ref 0.5–1.3)
CREAT SERPL-MCNC: 0.82 MG/DL — SIGNIFICANT CHANGE UP (ref 0.5–1.3)
CREAT SERPL-MCNC: 0.82 MG/DL — SIGNIFICANT CHANGE UP (ref 0.5–1.3)
CREAT SERPL-MCNC: 0.83 MG/DL — SIGNIFICANT CHANGE UP (ref 0.5–1.3)
CREAT SERPL-MCNC: 0.83 MG/DL — SIGNIFICANT CHANGE UP (ref 0.5–1.3)
CREAT SERPL-MCNC: 0.84 MG/DL
CREAT SERPL-MCNC: 0.84 MG/DL — SIGNIFICANT CHANGE UP (ref 0.5–1.3)
CREAT SERPL-MCNC: 0.86 MG/DL — SIGNIFICANT CHANGE UP (ref 0.5–1.3)
CREAT SERPL-MCNC: 0.88 MG/DL — SIGNIFICANT CHANGE UP (ref 0.5–1.3)
CREAT SERPL-MCNC: 0.9 MG/DL — SIGNIFICANT CHANGE UP (ref 0.5–1.3)
CREAT SERPL-MCNC: 0.91 MG/DL — SIGNIFICANT CHANGE UP (ref 0.5–1.3)
CREAT SERPL-MCNC: 0.91 MG/DL — SIGNIFICANT CHANGE UP (ref 0.5–1.3)
CREAT SERPL-MCNC: 0.97 MG/DL — SIGNIFICANT CHANGE UP (ref 0.5–1.3)
CREAT SERPL-MCNC: 1.04 MG/DL — SIGNIFICANT CHANGE UP (ref 0.5–1.3)
CREAT SERPL-MCNC: 1.06 MG/DL — SIGNIFICANT CHANGE UP (ref 0.5–1.3)
CRP SERPL-MCNC: 16 MG/L
CULTURE RESULTS: SIGNIFICANT CHANGE UP
D DIMER BLD IA.RAPID-MCNC: 291 NG/ML DDU — HIGH
DIFF PNL FLD: ABNORMAL
DIFF PNL FLD: NEGATIVE — SIGNIFICANT CHANGE UP
DSDNA AB SER-ACNC: <12 IU/ML
EBV EA AB SER IA-ACNC: <5 U/ML — SIGNIFICANT CHANGE UP
EBV EA AB TITR SER IF: POSITIVE
EBV EA IGG SER-ACNC: NEGATIVE — SIGNIFICANT CHANGE UP
EBV NA IGG SER IA-ACNC: 80.6 U/ML — HIGH
EBV PATRN SPEC IB-IMP: SIGNIFICANT CHANGE UP
EBV VCA IGG AVIDITY SER QL IA: POSITIVE
EBV VCA IGM SER IA-ACNC: 63.2 U/ML — HIGH
EBV VCA IGM SER IA-ACNC: <10 U/ML — SIGNIFICANT CHANGE UP
EBV VCA IGM TITR FLD: NEGATIVE — SIGNIFICANT CHANGE UP
EGFR: 100 ML/MIN/1.73M2 — SIGNIFICANT CHANGE UP
EGFR: 101 ML/MIN/1.73M2 — SIGNIFICANT CHANGE UP
EGFR: 102 ML/MIN/1.73M2 — SIGNIFICANT CHANGE UP
EGFR: 103 ML/MIN/1.73M2 — SIGNIFICANT CHANGE UP
EGFR: 104 ML/MIN/1.73M2 — SIGNIFICANT CHANGE UP
EGFR: 105 ML/MIN/1.73M2 — SIGNIFICANT CHANGE UP
EGFR: 106 ML/MIN/1.73M2 — SIGNIFICANT CHANGE UP
EGFR: 107 ML/MIN/1.73M2 — SIGNIFICANT CHANGE UP
EGFR: 108 ML/MIN/1.73M2 — SIGNIFICANT CHANGE UP
EGFR: 108 ML/MIN/1.73M2 — SIGNIFICANT CHANGE UP
EGFR: 80 ML/MIN/1.73M2 — SIGNIFICANT CHANGE UP
EGFR: 82 ML/MIN/1.73M2 — SIGNIFICANT CHANGE UP
EGFR: 89 ML/MIN/1.73M2 — SIGNIFICANT CHANGE UP
EGFR: 96 ML/MIN/1.73M2 — SIGNIFICANT CHANGE UP
EGFR: 96 ML/MIN/1.73M2 — SIGNIFICANT CHANGE UP
EGFR: 97 ML/MIN/1.73M2 — SIGNIFICANT CHANGE UP
EGFR: 98 ML/MIN/1.73M2 — SIGNIFICANT CHANGE UP
EGFR: 99 ML/MIN/1.73M2 — SIGNIFICANT CHANGE UP
EGFR: 99 ML/MIN/1.73M2 — SIGNIFICANT CHANGE UP
EOSINOPHIL # BLD AUTO: 0 K/UL — SIGNIFICANT CHANGE UP (ref 0–0.5)
EOSINOPHIL # BLD AUTO: 0.01 K/UL — SIGNIFICANT CHANGE UP (ref 0–0.5)
EOSINOPHIL # BLD AUTO: 0.02 K/UL — SIGNIFICANT CHANGE UP (ref 0–0.5)
EOSINOPHIL # BLD AUTO: 0.03 K/UL — SIGNIFICANT CHANGE UP (ref 0–0.5)
EOSINOPHIL # BLD AUTO: 0.03 K/UL — SIGNIFICANT CHANGE UP (ref 0–0.5)
EOSINOPHIL # BLD AUTO: 0.04 K/UL — SIGNIFICANT CHANGE UP (ref 0–0.5)
EOSINOPHIL # BLD AUTO: 0.16 K/UL — SIGNIFICANT CHANGE UP (ref 0–0.5)
EOSINOPHIL # BLD AUTO: 0.18 K/UL — SIGNIFICANT CHANGE UP (ref 0–0.5)
EOSINOPHIL # BLD AUTO: 0.25 K/UL — SIGNIFICANT CHANGE UP (ref 0–0.5)
EOSINOPHIL # BLD AUTO: 0.31 K/UL — SIGNIFICANT CHANGE UP (ref 0–0.5)
EOSINOPHIL # BLD AUTO: 0.38 K/UL — SIGNIFICANT CHANGE UP (ref 0–0.5)
EOSINOPHIL # BLD AUTO: 0.4 K/UL — SIGNIFICANT CHANGE UP (ref 0–0.5)
EOSINOPHIL # BLD AUTO: 0.46 K/UL — SIGNIFICANT CHANGE UP (ref 0–0.5)
EOSINOPHIL # BLD AUTO: 0.55 K/UL — HIGH (ref 0–0.5)
EOSINOPHIL # BLD AUTO: 0.57 K/UL — HIGH (ref 0–0.5)
EOSINOPHIL # BLD AUTO: 0.72 K/UL
EOSINOPHIL # BLD AUTO: 0.73 K/UL — HIGH (ref 0–0.5)
EOSINOPHIL NFR BLD AUTO: 0 % — SIGNIFICANT CHANGE UP (ref 0–6)
EOSINOPHIL NFR BLD AUTO: 0.1 % — SIGNIFICANT CHANGE UP (ref 0–6)
EOSINOPHIL NFR BLD AUTO: 0.2 % — SIGNIFICANT CHANGE UP (ref 0–6)
EOSINOPHIL NFR BLD AUTO: 0.4 % — SIGNIFICANT CHANGE UP (ref 0–6)
EOSINOPHIL NFR BLD AUTO: 1.4 % — SIGNIFICANT CHANGE UP (ref 0–6)
EOSINOPHIL NFR BLD AUTO: 1.7 % — SIGNIFICANT CHANGE UP (ref 0–6)
EOSINOPHIL NFR BLD AUTO: 2.1 % — SIGNIFICANT CHANGE UP (ref 0–6)
EOSINOPHIL NFR BLD AUTO: 2.4 % — SIGNIFICANT CHANGE UP (ref 0–6)
EOSINOPHIL NFR BLD AUTO: 2.8 % — SIGNIFICANT CHANGE UP (ref 0–6)
EOSINOPHIL NFR BLD AUTO: 2.9 % — SIGNIFICANT CHANGE UP (ref 0–6)
EOSINOPHIL NFR BLD AUTO: 3.1 % — SIGNIFICANT CHANGE UP (ref 0–6)
EOSINOPHIL NFR BLD AUTO: 4.1 % — SIGNIFICANT CHANGE UP (ref 0–6)
EOSINOPHIL NFR BLD AUTO: 5.8 % — SIGNIFICANT CHANGE UP (ref 0–6)
EOSINOPHIL NFR BLD AUTO: 6.3 % — HIGH (ref 0–6)
EOSINOPHIL NFR BLD AUTO: 7.1 %
EPI CELLS # UR: SIGNIFICANT CHANGE UP /HPF (ref 0–5)
ERYTHROCYTE [SEDIMENTATION RATE] IN BLOOD BY WESTERGREN METHOD: 88 MM/HR
ESTIMATED AVERAGE GLUCOSE: 126 MG/DL — HIGH (ref 68–114)
FLUAV SUBTYP SPEC NAA+PROBE: SIGNIFICANT CHANGE UP
FLUBV RNA SPEC QL NAA+PROBE: SIGNIFICANT CHANGE UP
FUNGITELL: <31 PG/ML — SIGNIFICANT CHANGE UP
GAMMA INTERFERON BACKGROUND BLD IA-ACNC: 0.01 IU/ML — SIGNIFICANT CHANGE UP
GAMMA INTERFERON BACKGROUND BLD IA-ACNC: 0.02 IU/ML — SIGNIFICANT CHANGE UP
GAS PNL BLDA: SIGNIFICANT CHANGE UP
GAS PNL BLDA: SIGNIFICANT CHANGE UP
GAS PNL BLDMV: SIGNIFICANT CHANGE UP
GLUCOSE BLDC GLUCOMTR-MCNC: 101 MG/DL — HIGH (ref 70–99)
GLUCOSE BLDC GLUCOMTR-MCNC: 102 MG/DL — HIGH (ref 70–99)
GLUCOSE BLDC GLUCOMTR-MCNC: 103 MG/DL — HIGH (ref 70–99)
GLUCOSE BLDC GLUCOMTR-MCNC: 107 MG/DL — HIGH (ref 70–99)
GLUCOSE BLDC GLUCOMTR-MCNC: 110 MG/DL — HIGH (ref 70–99)
GLUCOSE BLDC GLUCOMTR-MCNC: 110 MG/DL — HIGH (ref 70–99)
GLUCOSE BLDC GLUCOMTR-MCNC: 112 MG/DL — HIGH (ref 70–99)
GLUCOSE BLDC GLUCOMTR-MCNC: 113 MG/DL — HIGH (ref 70–99)
GLUCOSE BLDC GLUCOMTR-MCNC: 116 MG/DL — HIGH (ref 70–99)
GLUCOSE BLDC GLUCOMTR-MCNC: 117 MG/DL — HIGH (ref 70–99)
GLUCOSE BLDC GLUCOMTR-MCNC: 118 MG/DL — HIGH (ref 70–99)
GLUCOSE BLDC GLUCOMTR-MCNC: 118 MG/DL — HIGH (ref 70–99)
GLUCOSE BLDC GLUCOMTR-MCNC: 119 MG/DL — HIGH (ref 70–99)
GLUCOSE BLDC GLUCOMTR-MCNC: 119 MG/DL — HIGH (ref 70–99)
GLUCOSE BLDC GLUCOMTR-MCNC: 120 MG/DL — HIGH (ref 70–99)
GLUCOSE BLDC GLUCOMTR-MCNC: 120 MG/DL — HIGH (ref 70–99)
GLUCOSE BLDC GLUCOMTR-MCNC: 121 MG/DL — HIGH (ref 70–99)
GLUCOSE BLDC GLUCOMTR-MCNC: 123 MG/DL — HIGH (ref 70–99)
GLUCOSE BLDC GLUCOMTR-MCNC: 123 MG/DL — HIGH (ref 70–99)
GLUCOSE BLDC GLUCOMTR-MCNC: 124 MG/DL — HIGH (ref 70–99)
GLUCOSE BLDC GLUCOMTR-MCNC: 125 MG/DL — HIGH (ref 70–99)
GLUCOSE BLDC GLUCOMTR-MCNC: 127 MG/DL — HIGH (ref 70–99)
GLUCOSE BLDC GLUCOMTR-MCNC: 127 MG/DL — HIGH (ref 70–99)
GLUCOSE BLDC GLUCOMTR-MCNC: 128 MG/DL — HIGH (ref 70–99)
GLUCOSE BLDC GLUCOMTR-MCNC: 129 MG/DL — HIGH (ref 70–99)
GLUCOSE BLDC GLUCOMTR-MCNC: 130 MG/DL — HIGH (ref 70–99)
GLUCOSE BLDC GLUCOMTR-MCNC: 131 MG/DL — HIGH (ref 70–99)
GLUCOSE BLDC GLUCOMTR-MCNC: 131 MG/DL — HIGH (ref 70–99)
GLUCOSE BLDC GLUCOMTR-MCNC: 134 MG/DL — HIGH (ref 70–99)
GLUCOSE BLDC GLUCOMTR-MCNC: 137 MG/DL — HIGH (ref 70–99)
GLUCOSE BLDC GLUCOMTR-MCNC: 138 MG/DL — HIGH (ref 70–99)
GLUCOSE BLDC GLUCOMTR-MCNC: 138 MG/DL — HIGH (ref 70–99)
GLUCOSE BLDC GLUCOMTR-MCNC: 140 MG/DL — HIGH (ref 70–99)
GLUCOSE BLDC GLUCOMTR-MCNC: 141 MG/DL — HIGH (ref 70–99)
GLUCOSE BLDC GLUCOMTR-MCNC: 143 MG/DL — HIGH (ref 70–99)
GLUCOSE BLDC GLUCOMTR-MCNC: 144 MG/DL — HIGH (ref 70–99)
GLUCOSE BLDC GLUCOMTR-MCNC: 145 MG/DL — HIGH (ref 70–99)
GLUCOSE BLDC GLUCOMTR-MCNC: 149 MG/DL — HIGH (ref 70–99)
GLUCOSE BLDC GLUCOMTR-MCNC: 150 MG/DL — HIGH (ref 70–99)
GLUCOSE BLDC GLUCOMTR-MCNC: 151 MG/DL — HIGH (ref 70–99)
GLUCOSE BLDC GLUCOMTR-MCNC: 154 MG/DL — HIGH (ref 70–99)
GLUCOSE BLDC GLUCOMTR-MCNC: 155 MG/DL — HIGH (ref 70–99)
GLUCOSE BLDC GLUCOMTR-MCNC: 156 MG/DL — HIGH (ref 70–99)
GLUCOSE BLDC GLUCOMTR-MCNC: 156 MG/DL — HIGH (ref 70–99)
GLUCOSE BLDC GLUCOMTR-MCNC: 159 MG/DL — HIGH (ref 70–99)
GLUCOSE BLDC GLUCOMTR-MCNC: 159 MG/DL — HIGH (ref 70–99)
GLUCOSE BLDC GLUCOMTR-MCNC: 161 MG/DL — HIGH (ref 70–99)
GLUCOSE BLDC GLUCOMTR-MCNC: 161 MG/DL — HIGH (ref 70–99)
GLUCOSE BLDC GLUCOMTR-MCNC: 163 MG/DL — HIGH (ref 70–99)
GLUCOSE BLDC GLUCOMTR-MCNC: 164 MG/DL — HIGH (ref 70–99)
GLUCOSE BLDC GLUCOMTR-MCNC: 165 MG/DL — HIGH (ref 70–99)
GLUCOSE BLDC GLUCOMTR-MCNC: 166 MG/DL — HIGH (ref 70–99)
GLUCOSE BLDC GLUCOMTR-MCNC: 167 MG/DL — HIGH (ref 70–99)
GLUCOSE BLDC GLUCOMTR-MCNC: 168 MG/DL — HIGH (ref 70–99)
GLUCOSE BLDC GLUCOMTR-MCNC: 171 MG/DL — HIGH (ref 70–99)
GLUCOSE BLDC GLUCOMTR-MCNC: 173 MG/DL — HIGH (ref 70–99)
GLUCOSE BLDC GLUCOMTR-MCNC: 173 MG/DL — HIGH (ref 70–99)
GLUCOSE BLDC GLUCOMTR-MCNC: 176 MG/DL — HIGH (ref 70–99)
GLUCOSE BLDC GLUCOMTR-MCNC: 177 MG/DL — HIGH (ref 70–99)
GLUCOSE BLDC GLUCOMTR-MCNC: 179 MG/DL — HIGH (ref 70–99)
GLUCOSE BLDC GLUCOMTR-MCNC: 180 MG/DL — HIGH (ref 70–99)
GLUCOSE BLDC GLUCOMTR-MCNC: 180 MG/DL — HIGH (ref 70–99)
GLUCOSE BLDC GLUCOMTR-MCNC: 185 MG/DL — HIGH (ref 70–99)
GLUCOSE BLDC GLUCOMTR-MCNC: 186 MG/DL — HIGH (ref 70–99)
GLUCOSE BLDC GLUCOMTR-MCNC: 188 MG/DL — HIGH (ref 70–99)
GLUCOSE BLDC GLUCOMTR-MCNC: 188 MG/DL — HIGH (ref 70–99)
GLUCOSE BLDC GLUCOMTR-MCNC: 190 MG/DL — HIGH (ref 70–99)
GLUCOSE BLDC GLUCOMTR-MCNC: 191 MG/DL — HIGH (ref 70–99)
GLUCOSE BLDC GLUCOMTR-MCNC: 195 MG/DL — HIGH (ref 70–99)
GLUCOSE BLDC GLUCOMTR-MCNC: 196 MG/DL — HIGH (ref 70–99)
GLUCOSE BLDC GLUCOMTR-MCNC: 196 MG/DL — HIGH (ref 70–99)
GLUCOSE BLDC GLUCOMTR-MCNC: 197 MG/DL — HIGH (ref 70–99)
GLUCOSE BLDC GLUCOMTR-MCNC: 199 MG/DL — HIGH (ref 70–99)
GLUCOSE BLDC GLUCOMTR-MCNC: 200 MG/DL — HIGH (ref 70–99)
GLUCOSE BLDC GLUCOMTR-MCNC: 201 MG/DL — HIGH (ref 70–99)
GLUCOSE BLDC GLUCOMTR-MCNC: 201 MG/DL — HIGH (ref 70–99)
GLUCOSE BLDC GLUCOMTR-MCNC: 202 MG/DL — HIGH (ref 70–99)
GLUCOSE BLDC GLUCOMTR-MCNC: 203 MG/DL — HIGH (ref 70–99)
GLUCOSE BLDC GLUCOMTR-MCNC: 204 MG/DL — HIGH (ref 70–99)
GLUCOSE BLDC GLUCOMTR-MCNC: 205 MG/DL — HIGH (ref 70–99)
GLUCOSE BLDC GLUCOMTR-MCNC: 205 MG/DL — HIGH (ref 70–99)
GLUCOSE BLDC GLUCOMTR-MCNC: 208 MG/DL — HIGH (ref 70–99)
GLUCOSE BLDC GLUCOMTR-MCNC: 209 MG/DL — HIGH (ref 70–99)
GLUCOSE BLDC GLUCOMTR-MCNC: 214 MG/DL — HIGH (ref 70–99)
GLUCOSE BLDC GLUCOMTR-MCNC: 217 MG/DL — HIGH (ref 70–99)
GLUCOSE BLDC GLUCOMTR-MCNC: 219 MG/DL — HIGH (ref 70–99)
GLUCOSE BLDC GLUCOMTR-MCNC: 220 MG/DL — HIGH (ref 70–99)
GLUCOSE BLDC GLUCOMTR-MCNC: 223 MG/DL — HIGH (ref 70–99)
GLUCOSE BLDC GLUCOMTR-MCNC: 226 MG/DL — HIGH (ref 70–99)
GLUCOSE BLDC GLUCOMTR-MCNC: 227 MG/DL — HIGH (ref 70–99)
GLUCOSE BLDC GLUCOMTR-MCNC: 228 MG/DL — HIGH (ref 70–99)
GLUCOSE BLDC GLUCOMTR-MCNC: 234 MG/DL — HIGH (ref 70–99)
GLUCOSE BLDC GLUCOMTR-MCNC: 235 MG/DL — HIGH (ref 70–99)
GLUCOSE BLDC GLUCOMTR-MCNC: 235 MG/DL — HIGH (ref 70–99)
GLUCOSE BLDC GLUCOMTR-MCNC: 236 MG/DL — HIGH (ref 70–99)
GLUCOSE BLDC GLUCOMTR-MCNC: 236 MG/DL — HIGH (ref 70–99)
GLUCOSE BLDC GLUCOMTR-MCNC: 237 MG/DL — HIGH (ref 70–99)
GLUCOSE BLDC GLUCOMTR-MCNC: 238 MG/DL — HIGH (ref 70–99)
GLUCOSE BLDC GLUCOMTR-MCNC: 238 MG/DL — HIGH (ref 70–99)
GLUCOSE BLDC GLUCOMTR-MCNC: 239 MG/DL — HIGH (ref 70–99)
GLUCOSE BLDC GLUCOMTR-MCNC: 240 MG/DL — HIGH (ref 70–99)
GLUCOSE BLDC GLUCOMTR-MCNC: 242 MG/DL — HIGH (ref 70–99)
GLUCOSE BLDC GLUCOMTR-MCNC: 246 MG/DL — HIGH (ref 70–99)
GLUCOSE BLDC GLUCOMTR-MCNC: 250 MG/DL — HIGH (ref 70–99)
GLUCOSE BLDC GLUCOMTR-MCNC: 258 MG/DL — HIGH (ref 70–99)
GLUCOSE BLDC GLUCOMTR-MCNC: 258 MG/DL — HIGH (ref 70–99)
GLUCOSE BLDC GLUCOMTR-MCNC: 260 MG/DL — HIGH (ref 70–99)
GLUCOSE BLDC GLUCOMTR-MCNC: 267 MG/DL — HIGH (ref 70–99)
GLUCOSE BLDC GLUCOMTR-MCNC: 272 MG/DL — HIGH (ref 70–99)
GLUCOSE BLDC GLUCOMTR-MCNC: 281 MG/DL — HIGH (ref 70–99)
GLUCOSE BLDC GLUCOMTR-MCNC: 284 MG/DL — HIGH (ref 70–99)
GLUCOSE BLDC GLUCOMTR-MCNC: 285 MG/DL — HIGH (ref 70–99)
GLUCOSE BLDC GLUCOMTR-MCNC: 294 MG/DL — HIGH (ref 70–99)
GLUCOSE BLDC GLUCOMTR-MCNC: 308 MG/DL — HIGH (ref 70–99)
GLUCOSE BLDC GLUCOMTR-MCNC: 310 MG/DL — HIGH (ref 70–99)
GLUCOSE BLDC GLUCOMTR-MCNC: 312 MG/DL — HIGH (ref 70–99)
GLUCOSE BLDC GLUCOMTR-MCNC: 340 MG/DL — HIGH (ref 70–99)
GLUCOSE BLDC GLUCOMTR-MCNC: 357 MG/DL — HIGH (ref 70–99)
GLUCOSE BLDC GLUCOMTR-MCNC: 370 MG/DL — HIGH (ref 70–99)
GLUCOSE BLDC GLUCOMTR-MCNC: 87 MG/DL — SIGNIFICANT CHANGE UP (ref 70–99)
GLUCOSE BLDC GLUCOMTR-MCNC: 88 MG/DL — SIGNIFICANT CHANGE UP (ref 70–99)
GLUCOSE BLDC GLUCOMTR-MCNC: 94 MG/DL — SIGNIFICANT CHANGE UP (ref 70–99)
GLUCOSE BLDC GLUCOMTR-MCNC: 97 MG/DL — SIGNIFICANT CHANGE UP (ref 70–99)
GLUCOSE BLDC GLUCOMTR-MCNC: 98 MG/DL — SIGNIFICANT CHANGE UP (ref 70–99)
GLUCOSE SERPL-MCNC: 105 MG/DL — HIGH (ref 70–99)
GLUCOSE SERPL-MCNC: 107 MG/DL — HIGH (ref 70–99)
GLUCOSE SERPL-MCNC: 107 MG/DL — HIGH (ref 70–99)
GLUCOSE SERPL-MCNC: 108 MG/DL — HIGH (ref 70–99)
GLUCOSE SERPL-MCNC: 118 MG/DL
GLUCOSE SERPL-MCNC: 119 MG/DL — HIGH (ref 70–99)
GLUCOSE SERPL-MCNC: 121 MG/DL — HIGH (ref 70–99)
GLUCOSE SERPL-MCNC: 122 MG/DL — HIGH (ref 70–99)
GLUCOSE SERPL-MCNC: 123 MG/DL — HIGH (ref 70–99)
GLUCOSE SERPL-MCNC: 126 MG/DL — HIGH (ref 70–99)
GLUCOSE SERPL-MCNC: 128 MG/DL — HIGH (ref 70–99)
GLUCOSE SERPL-MCNC: 128 MG/DL — HIGH (ref 70–99)
GLUCOSE SERPL-MCNC: 131 MG/DL — HIGH (ref 70–99)
GLUCOSE SERPL-MCNC: 135 MG/DL — HIGH (ref 70–99)
GLUCOSE SERPL-MCNC: 137 MG/DL — HIGH (ref 70–99)
GLUCOSE SERPL-MCNC: 137 MG/DL — HIGH (ref 70–99)
GLUCOSE SERPL-MCNC: 140 MG/DL — HIGH (ref 70–99)
GLUCOSE SERPL-MCNC: 140 MG/DL — HIGH (ref 70–99)
GLUCOSE SERPL-MCNC: 144 MG/DL — HIGH (ref 70–99)
GLUCOSE SERPL-MCNC: 145 MG/DL — HIGH (ref 70–99)
GLUCOSE SERPL-MCNC: 150 MG/DL — HIGH (ref 70–99)
GLUCOSE SERPL-MCNC: 157 MG/DL — HIGH (ref 70–99)
GLUCOSE SERPL-MCNC: 158 MG/DL — HIGH (ref 70–99)
GLUCOSE SERPL-MCNC: 161 MG/DL — HIGH (ref 70–99)
GLUCOSE SERPL-MCNC: 162 MG/DL — HIGH (ref 70–99)
GLUCOSE SERPL-MCNC: 168 MG/DL — HIGH (ref 70–99)
GLUCOSE SERPL-MCNC: 175 MG/DL — HIGH (ref 70–99)
GLUCOSE SERPL-MCNC: 176 MG/DL — HIGH (ref 70–99)
GLUCOSE SERPL-MCNC: 196 MG/DL — HIGH (ref 70–99)
GLUCOSE SERPL-MCNC: 201 MG/DL — HIGH (ref 70–99)
GLUCOSE SERPL-MCNC: 216 MG/DL — HIGH (ref 70–99)
GLUCOSE SERPL-MCNC: 227 MG/DL — HIGH (ref 70–99)
GLUCOSE SERPL-MCNC: 227 MG/DL — HIGH (ref 70–99)
GLUCOSE SERPL-MCNC: 266 MG/DL — HIGH (ref 70–99)
GLUCOSE SERPL-MCNC: 354 MG/DL — HIGH (ref 70–99)
GLUCOSE SERPL-MCNC: 94 MG/DL — SIGNIFICANT CHANGE UP (ref 70–99)
GLUCOSE SERPL-MCNC: 95 MG/DL — SIGNIFICANT CHANGE UP (ref 70–99)
GLUCOSE SERPL-MCNC: 97 MG/DL — SIGNIFICANT CHANGE UP (ref 70–99)
GLUCOSE SERPL-MCNC: 98 MG/DL — SIGNIFICANT CHANGE UP (ref 70–99)
GLUCOSE UR QL: NEGATIVE — SIGNIFICANT CHANGE UP
GLUCOSE UR QL: NEGATIVE — SIGNIFICANT CHANGE UP
GRAM STN FLD: SIGNIFICANT CHANGE UP
GRAM STN FLD: SIGNIFICANT CHANGE UP
HADV DNA SPEC QL NAA+PROBE: SIGNIFICANT CHANGE UP
HAV IGM SER-ACNC: SIGNIFICANT CHANGE UP
HBV CORE IGM SER-ACNC: SIGNIFICANT CHANGE UP
HBV SURFACE AB SER-ACNC: SIGNIFICANT CHANGE UP
HBV SURFACE AG SER-ACNC: SIGNIFICANT CHANGE UP
HCO3 BLDA-SCNC: 35 MMOL/L — HIGH (ref 21–28)
HCO3 BLDA-SCNC: 36 MMOL/L — HIGH (ref 21–28)
HCO3 BLDA-SCNC: 38 MMOL/L — HIGH (ref 21–28)
HCO3 BLDA-SCNC: 39 MMOL/L — HIGH (ref 21–28)
HCO3 BLDA-SCNC: 39 MMOL/L — HIGH (ref 21–28)
HCO3 BLDA-SCNC: 43 MMOL/L — HIGH (ref 21–28)
HCO3 BLDA-SCNC: 44 MMOL/L — HIGH (ref 21–28)
HCO3 BLDA-SCNC: 46 MMOL/L — HIGH (ref 21–28)
HCO3 BLDMV-SCNC: 30 MMOL/L — SIGNIFICANT CHANGE UP
HCOV 229E RNA SPEC QL NAA+PROBE: SIGNIFICANT CHANGE UP
HCOV HKU1 RNA SPEC QL NAA+PROBE: SIGNIFICANT CHANGE UP
HCOV NL63 RNA SPEC QL NAA+PROBE: SIGNIFICANT CHANGE UP
HCOV OC43 RNA SPEC QL NAA+PROBE: SIGNIFICANT CHANGE UP
HCT VFR BLD CALC: 35.2 % — LOW (ref 39–50)
HCT VFR BLD CALC: 35.3 % — LOW (ref 39–50)
HCT VFR BLD CALC: 35.6 % — LOW (ref 39–50)
HCT VFR BLD CALC: 35.7 % — LOW (ref 39–50)
HCT VFR BLD CALC: 36.6 % — LOW (ref 39–50)
HCT VFR BLD CALC: 37.3 % — LOW (ref 39–50)
HCT VFR BLD CALC: 37.4 % — LOW (ref 39–50)
HCT VFR BLD CALC: 37.4 % — LOW (ref 39–50)
HCT VFR BLD CALC: 37.5 % — LOW (ref 39–50)
HCT VFR BLD CALC: 37.5 % — LOW (ref 39–50)
HCT VFR BLD CALC: 37.6 % — LOW (ref 39–50)
HCT VFR BLD CALC: 37.7 % — LOW (ref 39–50)
HCT VFR BLD CALC: 37.7 % — LOW (ref 39–50)
HCT VFR BLD CALC: 37.8 % — LOW (ref 39–50)
HCT VFR BLD CALC: 37.9 % — LOW (ref 39–50)
HCT VFR BLD CALC: 38.4 % — LOW (ref 39–50)
HCT VFR BLD CALC: 38.6 % — LOW (ref 39–50)
HCT VFR BLD CALC: 38.8 % — LOW (ref 39–50)
HCT VFR BLD CALC: 38.8 % — LOW (ref 39–50)
HCT VFR BLD CALC: 39 % — SIGNIFICANT CHANGE UP (ref 39–50)
HCT VFR BLD CALC: 39.2 % — SIGNIFICANT CHANGE UP (ref 39–50)
HCT VFR BLD CALC: 39.4 % — SIGNIFICANT CHANGE UP (ref 39–50)
HCT VFR BLD CALC: 39.7 % — SIGNIFICANT CHANGE UP (ref 39–50)
HCT VFR BLD CALC: 39.8 % — SIGNIFICANT CHANGE UP (ref 39–50)
HCT VFR BLD CALC: 39.9 % — SIGNIFICANT CHANGE UP (ref 39–50)
HCT VFR BLD CALC: 40.2 % — SIGNIFICANT CHANGE UP (ref 39–50)
HCT VFR BLD CALC: 40.2 % — SIGNIFICANT CHANGE UP (ref 39–50)
HCT VFR BLD CALC: 40.3 % — SIGNIFICANT CHANGE UP (ref 39–50)
HCT VFR BLD CALC: 40.4 % — SIGNIFICANT CHANGE UP (ref 39–50)
HCT VFR BLD CALC: 41.1 %
HCT VFR BLD CALC: 41.6 % — SIGNIFICANT CHANGE UP (ref 39–50)
HCV AB S/CO SERPL IA: 0.04 S/CO — SIGNIFICANT CHANGE UP
HCV AB S/CO SERPL IA: 0.26 S/CO — SIGNIFICANT CHANGE UP (ref 0–0.99)
HCV AB SERPL-IMP: SIGNIFICANT CHANGE UP
HCV AB SERPL-IMP: SIGNIFICANT CHANGE UP
HDLC SERPL-MCNC: 31 MG/DL — LOW
HGB BLD CALC-MCNC: 13.1 G/DL — SIGNIFICANT CHANGE UP (ref 12.6–17.4)
HGB BLD CALC-MCNC: 13.3 G/DL — SIGNIFICANT CHANGE UP (ref 12.6–17.4)
HGB BLD CALC-MCNC: 13.6 G/DL — SIGNIFICANT CHANGE UP (ref 12.6–17.4)
HGB BLD-MCNC: 11.4 G/DL — LOW (ref 13–17)
HGB BLD-MCNC: 11.4 G/DL — LOW (ref 13–17)
HGB BLD-MCNC: 11.5 G/DL — LOW (ref 13–17)
HGB BLD-MCNC: 11.5 G/DL — LOW (ref 13–17)
HGB BLD-MCNC: 11.8 G/DL — LOW (ref 13–17)
HGB BLD-MCNC: 12 G/DL — LOW (ref 13–17)
HGB BLD-MCNC: 12.1 G/DL — LOW (ref 13–17)
HGB BLD-MCNC: 12.3 G/DL — LOW (ref 13–17)
HGB BLD-MCNC: 12.3 G/DL — LOW (ref 13–17)
HGB BLD-MCNC: 12.4 G/DL — LOW (ref 13–17)
HGB BLD-MCNC: 12.5 G/DL — LOW (ref 13–17)
HGB BLD-MCNC: 12.7 G/DL — LOW (ref 13–17)
HGB BLD-MCNC: 12.9 G/DL — LOW (ref 13–17)
HGB BLD-MCNC: 13.1 G/DL — SIGNIFICANT CHANGE UP (ref 13–17)
HGB BLD-MCNC: 13.2 G/DL
HGB BLD-MCNC: 13.3 G/DL — SIGNIFICANT CHANGE UP (ref 13–17)
HGB BLD-MCNC: 13.3 G/DL — SIGNIFICANT CHANGE UP (ref 13–17)
HGB BLD-MCNC: 13.4 G/DL — SIGNIFICANT CHANGE UP (ref 13–17)
HIV 1+2 AB+HIV1 P24 AG SERPL QL IA: SIGNIFICANT CHANGE UP
HMPV RNA SPEC QL NAA+PROBE: SIGNIFICANT CHANGE UP
HPIV1 RNA SPEC QL NAA+PROBE: SIGNIFICANT CHANGE UP
HPIV2 RNA SPEC QL NAA+PROBE: SIGNIFICANT CHANGE UP
HPIV3 RNA SPEC QL NAA+PROBE: SIGNIFICANT CHANGE UP
HPIV4 RNA SPEC QL NAA+PROBE: SIGNIFICANT CHANGE UP
IANC: 6.9 K/UL — SIGNIFICANT CHANGE UP (ref 1.8–7.4)
IMM GRANULOCYTES NFR BLD AUTO: 0.4 %
IMM GRANULOCYTES NFR BLD AUTO: 0.4 % — SIGNIFICANT CHANGE UP (ref 0–0.9)
IMM GRANULOCYTES NFR BLD AUTO: 0.5 % — SIGNIFICANT CHANGE UP (ref 0–0.9)
IMM GRANULOCYTES NFR BLD AUTO: 0.5 % — SIGNIFICANT CHANGE UP (ref 0–0.9)
IMM GRANULOCYTES NFR BLD AUTO: 0.6 % — SIGNIFICANT CHANGE UP (ref 0–0.9)
IMM GRANULOCYTES NFR BLD AUTO: 0.7 % — SIGNIFICANT CHANGE UP (ref 0–0.9)
IMM GRANULOCYTES NFR BLD AUTO: 0.9 % — SIGNIFICANT CHANGE UP (ref 0–0.9)
IMM GRANULOCYTES NFR BLD AUTO: 1 % — HIGH (ref 0–0.9)
IMM GRANULOCYTES NFR BLD AUTO: 1.1 % — HIGH (ref 0–0.9)
IMM GRANULOCYTES NFR BLD AUTO: 1.3 % — HIGH (ref 0–0.9)
IMM GRANULOCYTES NFR BLD AUTO: 1.3 % — HIGH (ref 0–0.9)
IMM GRANULOCYTES NFR BLD AUTO: 1.4 % — HIGH (ref 0–0.9)
IMM GRANULOCYTES NFR BLD AUTO: 1.5 % — HIGH (ref 0–0.9)
IMM GRANULOCYTES NFR BLD AUTO: 1.6 % — HIGH (ref 0–0.9)
IMM GRANULOCYTES NFR BLD AUTO: 1.7 % — HIGH (ref 0–0.9)
INR BLD: 1.11 — SIGNIFICANT CHANGE UP (ref 0.88–1.16)
INR BLD: 1.19 RATIO — HIGH (ref 0.88–1.16)
INR BLD: 1.28 — HIGH (ref 0.88–1.16)
INR BLD: 1.41 — HIGH (ref 0.88–1.16)
INR BLD: 1.51 — HIGH (ref 0.88–1.16)
KETONES UR-MCNC: NEGATIVE — SIGNIFICANT CHANGE UP
KETONES UR-MCNC: NEGATIVE — SIGNIFICANT CHANGE UP
LACTATE SERPL-SCNC: 1.1 MMOL/L — SIGNIFICANT CHANGE UP (ref 0.5–2)
LACTATE SERPL-SCNC: 1.2 MMOL/L — SIGNIFICANT CHANGE UP (ref 0.5–2)
LACTATE SERPL-SCNC: 1.4 MMOL/L — SIGNIFICANT CHANGE UP (ref 0.5–2)
LACTATE SERPL-SCNC: 1.4 MMOL/L — SIGNIFICANT CHANGE UP (ref 0.5–2)
LACTATE SERPL-SCNC: 2.6 MMOL/L — HIGH (ref 0.5–2)
LACTATE SERPL-SCNC: 2.6 MMOL/L — HIGH (ref 0.5–2)
LDH SERPL L TO P-CCNC: 411 U/L — HIGH (ref 50–242)
LEGIONELLA AG UR QL: NEGATIVE — SIGNIFICANT CHANGE UP
LEUKOCYTE ESTERASE UR-ACNC: ABNORMAL
LEUKOCYTE ESTERASE UR-ACNC: NEGATIVE — SIGNIFICANT CHANGE UP
LIPID PNL WITH DIRECT LDL SERPL: 123 MG/DL — HIGH
LYMPHOCYTES # BLD AUTO: 0.56 K/UL — LOW (ref 1–3.3)
LYMPHOCYTES # BLD AUTO: 0.6 K/UL — LOW (ref 1–3.3)
LYMPHOCYTES # BLD AUTO: 0.78 K/UL — LOW (ref 1–3.3)
LYMPHOCYTES # BLD AUTO: 0.97 K/UL — LOW (ref 1–3.3)
LYMPHOCYTES # BLD AUTO: 0.98 K/UL — LOW (ref 1–3.3)
LYMPHOCYTES # BLD AUTO: 1.21 K/UL — SIGNIFICANT CHANGE UP (ref 1–3.3)
LYMPHOCYTES # BLD AUTO: 1.22 K/UL — SIGNIFICANT CHANGE UP (ref 1–3.3)
LYMPHOCYTES # BLD AUTO: 1.38 K/UL — SIGNIFICANT CHANGE UP (ref 1–3.3)
LYMPHOCYTES # BLD AUTO: 1.44 K/UL — SIGNIFICANT CHANGE UP (ref 1–3.3)
LYMPHOCYTES # BLD AUTO: 1.47 K/UL — SIGNIFICANT CHANGE UP (ref 1–3.3)
LYMPHOCYTES # BLD AUTO: 1.48 K/UL — SIGNIFICANT CHANGE UP (ref 1–3.3)
LYMPHOCYTES # BLD AUTO: 1.52 K/UL — SIGNIFICANT CHANGE UP (ref 1–3.3)
LYMPHOCYTES # BLD AUTO: 1.53 K/UL — SIGNIFICANT CHANGE UP (ref 1–3.3)
LYMPHOCYTES # BLD AUTO: 1.73 K/UL — SIGNIFICANT CHANGE UP (ref 1–3.3)
LYMPHOCYTES # BLD AUTO: 1.73 K/UL — SIGNIFICANT CHANGE UP (ref 1–3.3)
LYMPHOCYTES # BLD AUTO: 1.77 K/UL — SIGNIFICANT CHANGE UP (ref 1–3.3)
LYMPHOCYTES # BLD AUTO: 1.83 K/UL — SIGNIFICANT CHANGE UP (ref 1–3.3)
LYMPHOCYTES # BLD AUTO: 1.91 K/UL — SIGNIFICANT CHANGE UP (ref 1–3.3)
LYMPHOCYTES # BLD AUTO: 1.95 K/UL
LYMPHOCYTES # BLD AUTO: 10.5 % — LOW (ref 13–44)
LYMPHOCYTES # BLD AUTO: 10.5 % — LOW (ref 13–44)
LYMPHOCYTES # BLD AUTO: 11.6 % — LOW (ref 13–44)
LYMPHOCYTES # BLD AUTO: 12.6 % — LOW (ref 13–44)
LYMPHOCYTES # BLD AUTO: 13.3 % — SIGNIFICANT CHANGE UP (ref 13–44)
LYMPHOCYTES # BLD AUTO: 15.5 % — SIGNIFICANT CHANGE UP (ref 13–44)
LYMPHOCYTES # BLD AUTO: 16 % — SIGNIFICANT CHANGE UP (ref 13–44)
LYMPHOCYTES # BLD AUTO: 16.1 % — SIGNIFICANT CHANGE UP (ref 13–44)
LYMPHOCYTES # BLD AUTO: 17 % — SIGNIFICANT CHANGE UP (ref 13–44)
LYMPHOCYTES # BLD AUTO: 18.1 % — SIGNIFICANT CHANGE UP (ref 13–44)
LYMPHOCYTES # BLD AUTO: 18.2 % — SIGNIFICANT CHANGE UP (ref 13–44)
LYMPHOCYTES # BLD AUTO: 19.5 % — SIGNIFICANT CHANGE UP (ref 13–44)
LYMPHOCYTES # BLD AUTO: 2.1 K/UL — SIGNIFICANT CHANGE UP (ref 1–3.3)
LYMPHOCYTES # BLD AUTO: 2.18 K/UL — SIGNIFICANT CHANGE UP (ref 1–3.3)
LYMPHOCYTES # BLD AUTO: 2.35 K/UL — SIGNIFICANT CHANGE UP (ref 1–3.3)
LYMPHOCYTES # BLD AUTO: 2.54 K/UL — SIGNIFICANT CHANGE UP (ref 1–3.3)
LYMPHOCYTES # BLD AUTO: 2.69 K/UL — SIGNIFICANT CHANGE UP (ref 1–3.3)
LYMPHOCYTES # BLD AUTO: 2.81 K/UL — SIGNIFICANT CHANGE UP (ref 1–3.3)
LYMPHOCYTES # BLD AUTO: 21 % — SIGNIFICANT CHANGE UP (ref 13–44)
LYMPHOCYTES # BLD AUTO: 23 % — SIGNIFICANT CHANGE UP (ref 13–44)
LYMPHOCYTES # BLD AUTO: 23.2 % — SIGNIFICANT CHANGE UP (ref 13–44)
LYMPHOCYTES # BLD AUTO: 25.5 % — SIGNIFICANT CHANGE UP (ref 13–44)
LYMPHOCYTES # BLD AUTO: 3.1 K/UL — SIGNIFICANT CHANGE UP (ref 1–3.3)
LYMPHOCYTES # BLD AUTO: 3.21 K/UL — SIGNIFICANT CHANGE UP (ref 1–3.3)
LYMPHOCYTES # BLD AUTO: 5.6 % — LOW (ref 13–44)
LYMPHOCYTES # BLD AUTO: 6.2 % — LOW (ref 13–44)
LYMPHOCYTES # BLD AUTO: 7.3 % — LOW (ref 13–44)
LYMPHOCYTES # BLD AUTO: 7.7 % — LOW (ref 13–44)
LYMPHOCYTES # BLD AUTO: 8 % — LOW (ref 13–44)
LYMPHOCYTES # BLD AUTO: 8.2 % — LOW (ref 13–44)
LYMPHOCYTES # BLD AUTO: 9 % — LOW (ref 13–44)
LYMPHOCYTES # BLD AUTO: 9.3 % — LOW (ref 13–44)
LYMPHOCYTES # BLD AUTO: 9.8 % — LOW (ref 13–44)
LYMPHOCYTES # BLD AUTO: 9.8 % — LOW (ref 13–44)
LYMPHOCYTES NFR BLD AUTO: 19.3 %
M PNEUMO DNA SPEC QL NAA+PROBE: SIGNIFICANT CHANGE UP
M TB IFN-G BLD-IMP: ABNORMAL
M TB IFN-G BLD-IMP: ABNORMAL
M TB IFN-G CD4+ BCKGRND COR BLD-ACNC: 0 IU/ML — SIGNIFICANT CHANGE UP
M TB IFN-G CD4+ BCKGRND COR BLD-ACNC: 0 IU/ML — SIGNIFICANT CHANGE UP
M TB IFN-G CD4+CD8+ BCKGRND COR BLD-ACNC: 0 IU/ML — SIGNIFICANT CHANGE UP
M TB IFN-G CD4+CD8+ BCKGRND COR BLD-ACNC: 0.01 IU/ML — SIGNIFICANT CHANGE UP
MAGNESIUM SERPL-MCNC: 2 MG/DL — SIGNIFICANT CHANGE UP (ref 1.6–2.6)
MAGNESIUM SERPL-MCNC: 2 MG/DL — SIGNIFICANT CHANGE UP (ref 1.6–2.6)
MAGNESIUM SERPL-MCNC: 2.1 MG/DL — SIGNIFICANT CHANGE UP (ref 1.6–2.6)
MAGNESIUM SERPL-MCNC: 2.2 MG/DL — SIGNIFICANT CHANGE UP (ref 1.6–2.6)
MAGNESIUM SERPL-MCNC: 2.3 MG/DL — SIGNIFICANT CHANGE UP (ref 1.6–2.6)
MAGNESIUM SERPL-MCNC: 2.4 MG/DL — SIGNIFICANT CHANGE UP (ref 1.6–2.6)
MAGNESIUM SERPL-MCNC: 2.5 MG/DL — SIGNIFICANT CHANGE UP (ref 1.6–2.6)
MAGNESIUM SERPL-MCNC: 2.5 MG/DL — SIGNIFICANT CHANGE UP (ref 1.6–2.6)
MAGNESIUM SERPL-MCNC: 2.6 MG/DL — SIGNIFICANT CHANGE UP (ref 1.6–2.6)
MAN DIFF?: NORMAL
MCHC RBC-ENTMCNC: 29 PG — SIGNIFICANT CHANGE UP (ref 27–34)
MCHC RBC-ENTMCNC: 29.3 PG — SIGNIFICANT CHANGE UP (ref 27–34)
MCHC RBC-ENTMCNC: 29.3 PG — SIGNIFICANT CHANGE UP (ref 27–34)
MCHC RBC-ENTMCNC: 29.4 PG — SIGNIFICANT CHANGE UP (ref 27–34)
MCHC RBC-ENTMCNC: 29.5 PG — SIGNIFICANT CHANGE UP (ref 27–34)
MCHC RBC-ENTMCNC: 29.6 PG — SIGNIFICANT CHANGE UP (ref 27–34)
MCHC RBC-ENTMCNC: 29.7 PG — SIGNIFICANT CHANGE UP (ref 27–34)
MCHC RBC-ENTMCNC: 29.8 PG — SIGNIFICANT CHANGE UP (ref 27–34)
MCHC RBC-ENTMCNC: 29.9 PG — SIGNIFICANT CHANGE UP (ref 27–34)
MCHC RBC-ENTMCNC: 30 PG — SIGNIFICANT CHANGE UP (ref 27–34)
MCHC RBC-ENTMCNC: 30.2 PG — SIGNIFICANT CHANGE UP (ref 27–34)
MCHC RBC-ENTMCNC: 31.2 GM/DL — LOW (ref 32–36)
MCHC RBC-ENTMCNC: 31.2 PG
MCHC RBC-ENTMCNC: 31.3 GM/DL — LOW (ref 32–36)
MCHC RBC-ENTMCNC: 31.7 GM/DL — LOW (ref 32–36)
MCHC RBC-ENTMCNC: 31.8 GM/DL — LOW (ref 32–36)
MCHC RBC-ENTMCNC: 31.8 GM/DL — LOW (ref 32–36)
MCHC RBC-ENTMCNC: 31.9 GM/DL — LOW (ref 32–36)
MCHC RBC-ENTMCNC: 31.9 GM/DL — LOW (ref 32–36)
MCHC RBC-ENTMCNC: 32 GM/DL — SIGNIFICANT CHANGE UP (ref 32–36)
MCHC RBC-ENTMCNC: 32.1 GM/DL
MCHC RBC-ENTMCNC: 32.1 GM/DL — SIGNIFICANT CHANGE UP (ref 32–36)
MCHC RBC-ENTMCNC: 32.2 GM/DL — SIGNIFICANT CHANGE UP (ref 32–36)
MCHC RBC-ENTMCNC: 32.3 GM/DL — SIGNIFICANT CHANGE UP (ref 32–36)
MCHC RBC-ENTMCNC: 32.4 GM/DL — SIGNIFICANT CHANGE UP (ref 32–36)
MCHC RBC-ENTMCNC: 32.5 GM/DL — SIGNIFICANT CHANGE UP (ref 32–36)
MCHC RBC-ENTMCNC: 32.6 GM/DL — SIGNIFICANT CHANGE UP (ref 32–36)
MCHC RBC-ENTMCNC: 32.7 GM/DL — SIGNIFICANT CHANGE UP (ref 32–36)
MCHC RBC-ENTMCNC: 32.9 GM/DL — SIGNIFICANT CHANGE UP (ref 32–36)
MCHC RBC-ENTMCNC: 33.1 GM/DL — SIGNIFICANT CHANGE UP (ref 32–36)
MCHC RBC-ENTMCNC: 33.2 GM/DL — SIGNIFICANT CHANGE UP (ref 32–36)
MCV RBC AUTO: 90.2 FL — SIGNIFICANT CHANGE UP (ref 80–100)
MCV RBC AUTO: 90.3 FL — SIGNIFICANT CHANGE UP (ref 80–100)
MCV RBC AUTO: 91 FL — SIGNIFICANT CHANGE UP (ref 80–100)
MCV RBC AUTO: 91 FL — SIGNIFICANT CHANGE UP (ref 80–100)
MCV RBC AUTO: 91.1 FL — SIGNIFICANT CHANGE UP (ref 80–100)
MCV RBC AUTO: 91.2 FL — SIGNIFICANT CHANGE UP (ref 80–100)
MCV RBC AUTO: 91.2 FL — SIGNIFICANT CHANGE UP (ref 80–100)
MCV RBC AUTO: 91.4 FL — SIGNIFICANT CHANGE UP (ref 80–100)
MCV RBC AUTO: 91.6 FL — SIGNIFICANT CHANGE UP (ref 80–100)
MCV RBC AUTO: 91.9 FL — SIGNIFICANT CHANGE UP (ref 80–100)
MCV RBC AUTO: 92.1 FL — SIGNIFICANT CHANGE UP (ref 80–100)
MCV RBC AUTO: 92.2 FL — SIGNIFICANT CHANGE UP (ref 80–100)
MCV RBC AUTO: 92.2 FL — SIGNIFICANT CHANGE UP (ref 80–100)
MCV RBC AUTO: 92.3 FL — SIGNIFICANT CHANGE UP (ref 80–100)
MCV RBC AUTO: 92.4 FL — SIGNIFICANT CHANGE UP (ref 80–100)
MCV RBC AUTO: 92.6 FL — SIGNIFICANT CHANGE UP (ref 80–100)
MCV RBC AUTO: 92.6 FL — SIGNIFICANT CHANGE UP (ref 80–100)
MCV RBC AUTO: 92.7 FL — SIGNIFICANT CHANGE UP (ref 80–100)
MCV RBC AUTO: 92.8 FL — SIGNIFICANT CHANGE UP (ref 80–100)
MCV RBC AUTO: 93.1 FL — SIGNIFICANT CHANGE UP (ref 80–100)
MCV RBC AUTO: 93.3 FL — SIGNIFICANT CHANGE UP (ref 80–100)
MCV RBC AUTO: 93.4 FL — SIGNIFICANT CHANGE UP (ref 80–100)
MCV RBC AUTO: 93.8 FL — SIGNIFICANT CHANGE UP (ref 80–100)
MCV RBC AUTO: 94 FL — SIGNIFICANT CHANGE UP (ref 80–100)
MCV RBC AUTO: 97.2 FL
METHGB MFR BLDV: 0.7 % — SIGNIFICANT CHANGE UP
METHGB MFR BLDV: 0.9 % — SIGNIFICANT CHANGE UP
METHGB MFR BLDV: 1.1 % — SIGNIFICANT CHANGE UP
MEV IGG SER-ACNC: 285 AU/ML — SIGNIFICANT CHANGE UP
MEV IGG+IGM SER-IMP: POSITIVE — SIGNIFICANT CHANGE UP
MONOCYTES # BLD AUTO: 0.05 K/UL — SIGNIFICANT CHANGE UP (ref 0–0.9)
MONOCYTES # BLD AUTO: 0.28 K/UL — SIGNIFICANT CHANGE UP (ref 0–0.9)
MONOCYTES # BLD AUTO: 0.39 K/UL — SIGNIFICANT CHANGE UP (ref 0–0.9)
MONOCYTES # BLD AUTO: 0.45 K/UL — SIGNIFICANT CHANGE UP (ref 0–0.9)
MONOCYTES # BLD AUTO: 0.46 K/UL — SIGNIFICANT CHANGE UP (ref 0–0.9)
MONOCYTES # BLD AUTO: 0.54 K/UL — SIGNIFICANT CHANGE UP (ref 0–0.9)
MONOCYTES # BLD AUTO: 0.56 K/UL — SIGNIFICANT CHANGE UP (ref 0–0.9)
MONOCYTES # BLD AUTO: 0.57 K/UL — SIGNIFICANT CHANGE UP (ref 0–0.9)
MONOCYTES # BLD AUTO: 0.6 K/UL — SIGNIFICANT CHANGE UP (ref 0–0.9)
MONOCYTES # BLD AUTO: 0.61 K/UL — SIGNIFICANT CHANGE UP (ref 0–0.9)
MONOCYTES # BLD AUTO: 0.63 K/UL — SIGNIFICANT CHANGE UP (ref 0–0.9)
MONOCYTES # BLD AUTO: 0.64 K/UL
MONOCYTES # BLD AUTO: 0.75 K/UL — SIGNIFICANT CHANGE UP (ref 0–0.9)
MONOCYTES # BLD AUTO: 0.76 K/UL — SIGNIFICANT CHANGE UP (ref 0–0.9)
MONOCYTES # BLD AUTO: 0.77 K/UL — SIGNIFICANT CHANGE UP (ref 0–0.9)
MONOCYTES # BLD AUTO: 0.79 K/UL — SIGNIFICANT CHANGE UP (ref 0–0.9)
MONOCYTES # BLD AUTO: 0.79 K/UL — SIGNIFICANT CHANGE UP (ref 0–0.9)
MONOCYTES # BLD AUTO: 0.83 K/UL — SIGNIFICANT CHANGE UP (ref 0–0.9)
MONOCYTES # BLD AUTO: 0.83 K/UL — SIGNIFICANT CHANGE UP (ref 0–0.9)
MONOCYTES # BLD AUTO: 0.84 K/UL — SIGNIFICANT CHANGE UP (ref 0–0.9)
MONOCYTES # BLD AUTO: 0.84 K/UL — SIGNIFICANT CHANGE UP (ref 0–0.9)
MONOCYTES # BLD AUTO: 0.98 K/UL — HIGH (ref 0–0.9)
MONOCYTES # BLD AUTO: 1 K/UL — HIGH (ref 0–0.9)
MONOCYTES # BLD AUTO: 1.08 K/UL — HIGH (ref 0–0.9)
MONOCYTES # BLD AUTO: 1.09 K/UL — HIGH (ref 0–0.9)
MONOCYTES # BLD AUTO: 1.2 K/UL — HIGH (ref 0–0.9)
MONOCYTES # BLD AUTO: 1.21 K/UL — HIGH (ref 0–0.9)
MONOCYTES NFR BLD AUTO: 0.7 % — LOW (ref 2–14)
MONOCYTES NFR BLD AUTO: 3.2 % — SIGNIFICANT CHANGE UP (ref 2–14)
MONOCYTES NFR BLD AUTO: 3.7 % — SIGNIFICANT CHANGE UP (ref 2–14)
MONOCYTES NFR BLD AUTO: 4 % — SIGNIFICANT CHANGE UP (ref 2–14)
MONOCYTES NFR BLD AUTO: 4.7 % — SIGNIFICANT CHANGE UP (ref 2–14)
MONOCYTES NFR BLD AUTO: 4.8 % — SIGNIFICANT CHANGE UP (ref 2–14)
MONOCYTES NFR BLD AUTO: 5.1 % — SIGNIFICANT CHANGE UP (ref 2–14)
MONOCYTES NFR BLD AUTO: 5.3 % — SIGNIFICANT CHANGE UP (ref 2–14)
MONOCYTES NFR BLD AUTO: 5.4 % — SIGNIFICANT CHANGE UP (ref 2–14)
MONOCYTES NFR BLD AUTO: 5.4 % — SIGNIFICANT CHANGE UP (ref 2–14)
MONOCYTES NFR BLD AUTO: 5.5 % — SIGNIFICANT CHANGE UP (ref 2–14)
MONOCYTES NFR BLD AUTO: 5.5 % — SIGNIFICANT CHANGE UP (ref 2–14)
MONOCYTES NFR BLD AUTO: 5.6 % — SIGNIFICANT CHANGE UP (ref 2–14)
MONOCYTES NFR BLD AUTO: 5.7 % — SIGNIFICANT CHANGE UP (ref 2–14)
MONOCYTES NFR BLD AUTO: 5.8 % — SIGNIFICANT CHANGE UP (ref 2–14)
MONOCYTES NFR BLD AUTO: 6.2 % — SIGNIFICANT CHANGE UP (ref 2–14)
MONOCYTES NFR BLD AUTO: 6.3 %
MONOCYTES NFR BLD AUTO: 6.4 % — SIGNIFICANT CHANGE UP (ref 2–14)
MONOCYTES NFR BLD AUTO: 6.6 % — SIGNIFICANT CHANGE UP (ref 2–14)
MONOCYTES NFR BLD AUTO: 6.7 % — SIGNIFICANT CHANGE UP (ref 2–14)
MONOCYTES NFR BLD AUTO: 6.8 % — SIGNIFICANT CHANGE UP (ref 2–14)
MONOCYTES NFR BLD AUTO: 7 % — SIGNIFICANT CHANGE UP (ref 2–14)
MONOCYTES NFR BLD AUTO: 7.1 % — SIGNIFICANT CHANGE UP (ref 2–14)
MONOCYTES NFR BLD AUTO: 7.3 % — SIGNIFICANT CHANGE UP (ref 2–14)
MONOCYTES NFR BLD AUTO: 8 % — SIGNIFICANT CHANGE UP (ref 2–14)
MRSA PCR RESULT.: POSITIVE
MUV AB SER-ACNC: POSITIVE — SIGNIFICANT CHANGE UP
MUV IGG FLD-ACNC: 88.8 AU/ML — SIGNIFICANT CHANGE UP
NEUTROPHILS # BLD AUTO: 10.55 K/UL — HIGH (ref 1.8–7.4)
NEUTROPHILS # BLD AUTO: 10.56 K/UL — HIGH (ref 1.8–7.4)
NEUTROPHILS # BLD AUTO: 10.96 K/UL — HIGH (ref 1.8–7.4)
NEUTROPHILS # BLD AUTO: 11.01 K/UL — HIGH (ref 1.8–7.4)
NEUTROPHILS # BLD AUTO: 11.59 K/UL — HIGH (ref 1.8–7.4)
NEUTROPHILS # BLD AUTO: 11.61 K/UL — HIGH (ref 1.8–7.4)
NEUTROPHILS # BLD AUTO: 11.97 K/UL — HIGH (ref 1.8–7.4)
NEUTROPHILS # BLD AUTO: 12.04 K/UL — HIGH (ref 1.8–7.4)
NEUTROPHILS # BLD AUTO: 12.18 K/UL — HIGH (ref 1.8–7.4)
NEUTROPHILS # BLD AUTO: 12.66 K/UL — HIGH (ref 1.8–7.4)
NEUTROPHILS # BLD AUTO: 13.3 K/UL — HIGH (ref 1.8–7.4)
NEUTROPHILS # BLD AUTO: 13.81 K/UL — HIGH (ref 1.8–7.4)
NEUTROPHILS # BLD AUTO: 14.36 K/UL — HIGH (ref 1.8–7.4)
NEUTROPHILS # BLD AUTO: 15.57 K/UL — HIGH (ref 1.8–7.4)
NEUTROPHILS # BLD AUTO: 6.18 K/UL — SIGNIFICANT CHANGE UP (ref 1.8–7.4)
NEUTROPHILS # BLD AUTO: 6.71 K/UL
NEUTROPHILS # BLD AUTO: 6.9 K/UL — SIGNIFICANT CHANGE UP (ref 1.8–7.4)
NEUTROPHILS # BLD AUTO: 7.55 K/UL — HIGH (ref 1.8–7.4)
NEUTROPHILS # BLD AUTO: 7.68 K/UL — HIGH (ref 1.8–7.4)
NEUTROPHILS # BLD AUTO: 7.71 K/UL — HIGH (ref 1.8–7.4)
NEUTROPHILS # BLD AUTO: 7.86 K/UL — HIGH (ref 1.8–7.4)
NEUTROPHILS # BLD AUTO: 7.88 K/UL — HIGH (ref 1.8–7.4)
NEUTROPHILS # BLD AUTO: 8.04 K/UL — HIGH (ref 1.8–7.4)
NEUTROPHILS # BLD AUTO: 8.22 K/UL — HIGH (ref 1.8–7.4)
NEUTROPHILS # BLD AUTO: 8.37 K/UL — HIGH (ref 1.8–7.4)
NEUTROPHILS # BLD AUTO: 8.63 K/UL — HIGH (ref 1.8–7.4)
NEUTROPHILS # BLD AUTO: 9.19 K/UL — HIGH (ref 1.8–7.4)
NEUTROPHILS NFR BLD AUTO: 63.5 % — SIGNIFICANT CHANGE UP (ref 43–77)
NEUTROPHILS NFR BLD AUTO: 66.4 %
NEUTROPHILS NFR BLD AUTO: 66.5 % — SIGNIFICANT CHANGE UP (ref 43–77)
NEUTROPHILS NFR BLD AUTO: 67.1 % — SIGNIFICANT CHANGE UP (ref 43–77)
NEUTROPHILS NFR BLD AUTO: 70.5 % — SIGNIFICANT CHANGE UP (ref 43–77)
NEUTROPHILS NFR BLD AUTO: 70.9 % — SIGNIFICANT CHANGE UP (ref 43–77)
NEUTROPHILS NFR BLD AUTO: 71.9 % — SIGNIFICANT CHANGE UP (ref 43–77)
NEUTROPHILS NFR BLD AUTO: 72.3 % — SIGNIFICANT CHANGE UP (ref 43–77)
NEUTROPHILS NFR BLD AUTO: 72.6 % — SIGNIFICANT CHANGE UP (ref 43–77)
NEUTROPHILS NFR BLD AUTO: 73 % — SIGNIFICANT CHANGE UP (ref 43–77)
NEUTROPHILS NFR BLD AUTO: 73.3 % — SIGNIFICANT CHANGE UP (ref 43–77)
NEUTROPHILS NFR BLD AUTO: 76 % — SIGNIFICANT CHANGE UP (ref 43–77)
NEUTROPHILS NFR BLD AUTO: 80 % — HIGH (ref 43–77)
NEUTROPHILS NFR BLD AUTO: 80.1 % — HIGH (ref 43–77)
NEUTROPHILS NFR BLD AUTO: 80.7 % — HIGH (ref 43–77)
NEUTROPHILS NFR BLD AUTO: 81 % — HIGH (ref 43–77)
NEUTROPHILS NFR BLD AUTO: 81 % — HIGH (ref 43–77)
NEUTROPHILS NFR BLD AUTO: 81.9 % — HIGH (ref 43–77)
NEUTROPHILS NFR BLD AUTO: 82.1 % — HIGH (ref 43–77)
NEUTROPHILS NFR BLD AUTO: 82.4 % — HIGH (ref 43–77)
NEUTROPHILS NFR BLD AUTO: 86.5 % — HIGH (ref 43–77)
NEUTROPHILS NFR BLD AUTO: 87.1 % — HIGH (ref 43–77)
NEUTROPHILS NFR BLD AUTO: 87.2 % — HIGH (ref 43–77)
NEUTROPHILS NFR BLD AUTO: 87.3 % — HIGH (ref 43–77)
NEUTROPHILS NFR BLD AUTO: 88.8 % — HIGH (ref 43–77)
NEUTROPHILS NFR BLD AUTO: 89 % — HIGH (ref 43–77)
NEUTROPHILS NFR BLD AUTO: 90.5 % — HIGH (ref 43–77)
NITRITE UR-MCNC: NEGATIVE — SIGNIFICANT CHANGE UP
NITRITE UR-MCNC: NEGATIVE — SIGNIFICANT CHANGE UP
NON HDL CHOLESTEROL: 144 MG/DL — HIGH
NRBC # BLD: 0 /100 WBCS — SIGNIFICANT CHANGE UP (ref 0–0)
NRBC # FLD: 0 K/UL — SIGNIFICANT CHANGE UP (ref 0–0)
NT-PROBNP SERPL-SCNC: 260 PG/ML — SIGNIFICANT CHANGE UP (ref 0–300)
NT-PROBNP SERPL-SCNC: 5051 PG/ML — HIGH (ref 0–300)
NT-PROBNP SERPL-SCNC: 99 PG/ML — SIGNIFICANT CHANGE UP
O2 CT VFR BLD CALC: 46 MMHG — SIGNIFICANT CHANGE UP
P JIROVECII DNA L RESP QL NAA+NON-PROBE: NEGATIVE — SIGNIFICANT CHANGE UP
PCO2 BLDA: 51 MMHG — HIGH (ref 35–48)
PCO2 BLDA: 56 MMHG — HIGH (ref 35–48)
PCO2 BLDA: 62 MMHG — HIGH (ref 35–48)
PCO2 BLDA: 67 MMHG — HIGH (ref 35–48)
PCO2 BLDA: 68 MMHG — HIGH (ref 35–48)
PCO2 BLDA: 68 MMHG — HIGH (ref 35–48)
PCO2 BLDA: 71 MMHG — CRITICAL HIGH (ref 35–48)
PCO2 BLDA: 73 MMHG — CRITICAL HIGH (ref 35–48)
PCO2 BLDMV: 53 MMHG — SIGNIFICANT CHANGE UP
PH BLDA: 7.37 — SIGNIFICANT CHANGE UP (ref 7.35–7.45)
PH BLDA: 7.37 — SIGNIFICANT CHANGE UP (ref 7.35–7.45)
PH BLDA: 7.4 — SIGNIFICANT CHANGE UP (ref 7.35–7.45)
PH BLDA: 7.41 — SIGNIFICANT CHANGE UP (ref 7.35–7.45)
PH BLDA: 7.41 — SIGNIFICANT CHANGE UP (ref 7.35–7.45)
PH BLDA: 7.46 — HIGH (ref 7.35–7.45)
PH BLDMV: 7.36 — SIGNIFICANT CHANGE UP
PH UR: 6 — SIGNIFICANT CHANGE UP (ref 5–8)
PH UR: 7 — SIGNIFICANT CHANGE UP (ref 5–8)
PHOSPHATE SERPL-MCNC: 2.3 MG/DL — LOW (ref 2.5–4.5)
PHOSPHATE SERPL-MCNC: 2.5 MG/DL — SIGNIFICANT CHANGE UP (ref 2.5–4.5)
PHOSPHATE SERPL-MCNC: 2.5 MG/DL — SIGNIFICANT CHANGE UP (ref 2.5–4.5)
PHOSPHATE SERPL-MCNC: 2.6 MG/DL — SIGNIFICANT CHANGE UP (ref 2.5–4.5)
PHOSPHATE SERPL-MCNC: 2.6 MG/DL — SIGNIFICANT CHANGE UP (ref 2.5–4.5)
PHOSPHATE SERPL-MCNC: 2.7 MG/DL — SIGNIFICANT CHANGE UP (ref 2.5–4.5)
PHOSPHATE SERPL-MCNC: 2.7 MG/DL — SIGNIFICANT CHANGE UP (ref 2.5–4.5)
PHOSPHATE SERPL-MCNC: 2.8 MG/DL — SIGNIFICANT CHANGE UP (ref 2.5–4.5)
PHOSPHATE SERPL-MCNC: 2.9 MG/DL — SIGNIFICANT CHANGE UP (ref 2.5–4.5)
PHOSPHATE SERPL-MCNC: 3 MG/DL — SIGNIFICANT CHANGE UP (ref 2.5–4.5)
PHOSPHATE SERPL-MCNC: 3.1 MG/DL — SIGNIFICANT CHANGE UP (ref 2.5–4.5)
PHOSPHATE SERPL-MCNC: 3.2 MG/DL — SIGNIFICANT CHANGE UP (ref 2.5–4.5)
PHOSPHATE SERPL-MCNC: 3.2 MG/DL — SIGNIFICANT CHANGE UP (ref 2.5–4.5)
PHOSPHATE SERPL-MCNC: 3.3 MG/DL — SIGNIFICANT CHANGE UP (ref 2.5–4.5)
PHOSPHATE SERPL-MCNC: 3.3 MG/DL — SIGNIFICANT CHANGE UP (ref 2.5–4.5)
PHOSPHATE SERPL-MCNC: 3.4 MG/DL — SIGNIFICANT CHANGE UP (ref 2.5–4.5)
PHOSPHATE SERPL-MCNC: 3.5 MG/DL — SIGNIFICANT CHANGE UP (ref 2.5–4.5)
PHOSPHATE SERPL-MCNC: 3.6 MG/DL — SIGNIFICANT CHANGE UP (ref 2.5–4.5)
PHOSPHATE SERPL-MCNC: 3.7 MG/DL — SIGNIFICANT CHANGE UP (ref 2.5–4.5)
PHOSPHATE SERPL-MCNC: 3.8 MG/DL — SIGNIFICANT CHANGE UP (ref 2.5–4.5)
PHOSPHATE SERPL-MCNC: 3.9 MG/DL — SIGNIFICANT CHANGE UP (ref 2.5–4.5)
PHOSPHATE SERPL-MCNC: 4 MG/DL — SIGNIFICANT CHANGE UP (ref 2.5–4.5)
PHOSPHATE SERPL-MCNC: 4.5 MG/DL — SIGNIFICANT CHANGE UP (ref 2.5–4.5)
PLATELET # BLD AUTO: 145 K/UL — LOW (ref 150–400)
PLATELET # BLD AUTO: 152 K/UL — SIGNIFICANT CHANGE UP (ref 150–400)
PLATELET # BLD AUTO: 152 K/UL — SIGNIFICANT CHANGE UP (ref 150–400)
PLATELET # BLD AUTO: 153 K/UL — SIGNIFICANT CHANGE UP (ref 150–400)
PLATELET # BLD AUTO: 162 K/UL — SIGNIFICANT CHANGE UP (ref 150–400)
PLATELET # BLD AUTO: 167 K/UL — SIGNIFICANT CHANGE UP (ref 150–400)
PLATELET # BLD AUTO: 169 K/UL — SIGNIFICANT CHANGE UP (ref 150–400)
PLATELET # BLD AUTO: 175 K/UL — SIGNIFICANT CHANGE UP (ref 150–400)
PLATELET # BLD AUTO: 177 K/UL — SIGNIFICANT CHANGE UP (ref 150–400)
PLATELET # BLD AUTO: 185 K/UL — SIGNIFICANT CHANGE UP (ref 150–400)
PLATELET # BLD AUTO: 204 K/UL — SIGNIFICANT CHANGE UP (ref 150–400)
PLATELET # BLD AUTO: 210 K/UL — SIGNIFICANT CHANGE UP (ref 150–400)
PLATELET # BLD AUTO: 220 K/UL — SIGNIFICANT CHANGE UP (ref 150–400)
PLATELET # BLD AUTO: 225 K/UL — SIGNIFICANT CHANGE UP (ref 150–400)
PLATELET # BLD AUTO: 229 K/UL
PLATELET # BLD AUTO: 234 K/UL — SIGNIFICANT CHANGE UP (ref 150–400)
PLATELET # BLD AUTO: 235 K/UL — SIGNIFICANT CHANGE UP (ref 150–400)
PLATELET # BLD AUTO: 237 K/UL — SIGNIFICANT CHANGE UP (ref 150–400)
PLATELET # BLD AUTO: 250 K/UL — SIGNIFICANT CHANGE UP (ref 150–400)
PLATELET # BLD AUTO: 251 K/UL — SIGNIFICANT CHANGE UP (ref 150–400)
PLATELET # BLD AUTO: 252 K/UL — SIGNIFICANT CHANGE UP (ref 150–400)
PLATELET # BLD AUTO: 256 K/UL — SIGNIFICANT CHANGE UP (ref 150–400)
PLATELET # BLD AUTO: 260 K/UL — SIGNIFICANT CHANGE UP (ref 150–400)
PLATELET # BLD AUTO: 269 K/UL — SIGNIFICANT CHANGE UP (ref 150–400)
PLATELET # BLD AUTO: 282 K/UL — SIGNIFICANT CHANGE UP (ref 150–400)
PLATELET # BLD AUTO: 299 K/UL — SIGNIFICANT CHANGE UP (ref 150–400)
PLATELET # BLD AUTO: 299 K/UL — SIGNIFICANT CHANGE UP (ref 150–400)
PLATELET # BLD AUTO: 300 K/UL — SIGNIFICANT CHANGE UP (ref 150–400)
PLATELET # BLD AUTO: 312 K/UL — SIGNIFICANT CHANGE UP (ref 150–400)
PLATELET # BLD AUTO: 313 K/UL — SIGNIFICANT CHANGE UP (ref 150–400)
PLATELET # BLD AUTO: 347 K/UL — SIGNIFICANT CHANGE UP (ref 150–400)
PLATELET # BLD AUTO: 367 K/UL — SIGNIFICANT CHANGE UP (ref 150–400)
PLATELET # BLD AUTO: 372 K/UL — SIGNIFICANT CHANGE UP (ref 150–400)
PO2 BLDA: 107 MMHG — SIGNIFICANT CHANGE UP (ref 83–108)
PO2 BLDA: 115 MMHG — HIGH (ref 83–108)
PO2 BLDA: 116 MMHG — HIGH (ref 83–108)
PO2 BLDA: 116 MMHG — HIGH (ref 83–108)
PO2 BLDA: 124 MMHG — HIGH (ref 83–108)
PO2 BLDA: 125 MMHG — HIGH (ref 83–108)
PO2 BLDA: 143 MMHG — HIGH (ref 83–108)
PO2 BLDA: 90 MMHG — SIGNIFICANT CHANGE UP (ref 83–108)
POTASSIUM SERPL-MCNC: 3.8 MMOL/L — SIGNIFICANT CHANGE UP (ref 3.5–5.3)
POTASSIUM SERPL-MCNC: 3.9 MMOL/L — SIGNIFICANT CHANGE UP (ref 3.5–5.3)
POTASSIUM SERPL-MCNC: 4 MMOL/L — SIGNIFICANT CHANGE UP (ref 3.5–5.3)
POTASSIUM SERPL-MCNC: 4.1 MMOL/L — SIGNIFICANT CHANGE UP (ref 3.5–5.3)
POTASSIUM SERPL-MCNC: 4.2 MMOL/L — SIGNIFICANT CHANGE UP (ref 3.5–5.3)
POTASSIUM SERPL-MCNC: 4.3 MMOL/L — SIGNIFICANT CHANGE UP (ref 3.5–5.3)
POTASSIUM SERPL-MCNC: 4.4 MMOL/L — SIGNIFICANT CHANGE UP (ref 3.5–5.3)
POTASSIUM SERPL-MCNC: 4.4 MMOL/L — SIGNIFICANT CHANGE UP (ref 3.5–5.3)
POTASSIUM SERPL-MCNC: 4.5 MMOL/L — SIGNIFICANT CHANGE UP (ref 3.5–5.3)
POTASSIUM SERPL-MCNC: 4.6 MMOL/L — SIGNIFICANT CHANGE UP (ref 3.5–5.3)
POTASSIUM SERPL-MCNC: 4.7 MMOL/L — SIGNIFICANT CHANGE UP (ref 3.5–5.3)
POTASSIUM SERPL-MCNC: 4.7 MMOL/L — SIGNIFICANT CHANGE UP (ref 3.5–5.3)
POTASSIUM SERPL-MCNC: 4.8 MMOL/L — SIGNIFICANT CHANGE UP (ref 3.5–5.3)
POTASSIUM SERPL-MCNC: 4.9 MMOL/L — SIGNIFICANT CHANGE UP (ref 3.5–5.3)
POTASSIUM SERPL-MCNC: 5 MMOL/L — SIGNIFICANT CHANGE UP (ref 3.5–5.3)
POTASSIUM SERPL-MCNC: 5.6 MMOL/L — HIGH (ref 3.5–5.3)
POTASSIUM SERPL-MCNC: SIGNIFICANT CHANGE UP (ref 3.5–5.3)
POTASSIUM SERPL-MCNC: SIGNIFICANT CHANGE UP (ref 3.5–5.3)
POTASSIUM SERPL-SCNC: 3.8 MMOL/L — SIGNIFICANT CHANGE UP (ref 3.5–5.3)
POTASSIUM SERPL-SCNC: 3.9 MMOL/L — SIGNIFICANT CHANGE UP (ref 3.5–5.3)
POTASSIUM SERPL-SCNC: 4 MMOL/L — SIGNIFICANT CHANGE UP (ref 3.5–5.3)
POTASSIUM SERPL-SCNC: 4.1 MMOL/L
POTASSIUM SERPL-SCNC: 4.1 MMOL/L — SIGNIFICANT CHANGE UP (ref 3.5–5.3)
POTASSIUM SERPL-SCNC: 4.2 MMOL/L — SIGNIFICANT CHANGE UP (ref 3.5–5.3)
POTASSIUM SERPL-SCNC: 4.3 MMOL/L — SIGNIFICANT CHANGE UP (ref 3.5–5.3)
POTASSIUM SERPL-SCNC: 4.4 MMOL/L — SIGNIFICANT CHANGE UP (ref 3.5–5.3)
POTASSIUM SERPL-SCNC: 4.4 MMOL/L — SIGNIFICANT CHANGE UP (ref 3.5–5.3)
POTASSIUM SERPL-SCNC: 4.5 MMOL/L — SIGNIFICANT CHANGE UP (ref 3.5–5.3)
POTASSIUM SERPL-SCNC: 4.6 MMOL/L — SIGNIFICANT CHANGE UP (ref 3.5–5.3)
POTASSIUM SERPL-SCNC: 4.7 MMOL/L — SIGNIFICANT CHANGE UP (ref 3.5–5.3)
POTASSIUM SERPL-SCNC: 4.7 MMOL/L — SIGNIFICANT CHANGE UP (ref 3.5–5.3)
POTASSIUM SERPL-SCNC: 4.8 MMOL/L — SIGNIFICANT CHANGE UP (ref 3.5–5.3)
POTASSIUM SERPL-SCNC: 4.9 MMOL/L — SIGNIFICANT CHANGE UP (ref 3.5–5.3)
POTASSIUM SERPL-SCNC: 5 MMOL/L — SIGNIFICANT CHANGE UP (ref 3.5–5.3)
POTASSIUM SERPL-SCNC: 5.6 MMOL/L — HIGH (ref 3.5–5.3)
POTASSIUM SERPL-SCNC: SIGNIFICANT CHANGE UP (ref 3.5–5.3)
POTASSIUM SERPL-SCNC: SIGNIFICANT CHANGE UP (ref 3.5–5.3)
PROCALCITONIN SERPL-MCNC: 0.06 NG/ML — SIGNIFICANT CHANGE UP (ref 0.02–0.1)
PROCALCITONIN SERPL-MCNC: 0.69 NG/ML — HIGH (ref 0.02–0.1)
PROT SERPL-MCNC: 6.2 G/DL — SIGNIFICANT CHANGE UP (ref 6–8.3)
PROT SERPL-MCNC: 6.2 G/DL — SIGNIFICANT CHANGE UP (ref 6–8.3)
PROT SERPL-MCNC: 6.5 G/DL — SIGNIFICANT CHANGE UP (ref 6–8.3)
PROT SERPL-MCNC: 6.6 G/DL — SIGNIFICANT CHANGE UP (ref 6–8.3)
PROT SERPL-MCNC: 6.6 G/DL — SIGNIFICANT CHANGE UP (ref 6–8.3)
PROT SERPL-MCNC: 6.8 G/DL — SIGNIFICANT CHANGE UP (ref 6–8.3)
PROT SERPL-MCNC: 6.8 G/DL — SIGNIFICANT CHANGE UP (ref 6–8.3)
PROT SERPL-MCNC: 6.9 G/DL — SIGNIFICANT CHANGE UP (ref 6–8.3)
PROT SERPL-MCNC: 7 G/DL — SIGNIFICANT CHANGE UP (ref 6–8.3)
PROT SERPL-MCNC: 7.1 G/DL — SIGNIFICANT CHANGE UP (ref 6–8.3)
PROT SERPL-MCNC: 7.1 G/DL — SIGNIFICANT CHANGE UP (ref 6–8.3)
PROT SERPL-MCNC: 7.4 G/DL — SIGNIFICANT CHANGE UP (ref 6–8.3)
PROT SERPL-MCNC: 7.6 G/DL — SIGNIFICANT CHANGE UP (ref 6–8.3)
PROT SERPL-MCNC: 7.8 G/DL — SIGNIFICANT CHANGE UP (ref 6–8.3)
PROT SERPL-MCNC: 7.9 G/DL
PROT SERPL-MCNC: 7.9 G/DL — SIGNIFICANT CHANGE UP (ref 6–8.3)
PROT SERPL-MCNC: 8 G/DL — SIGNIFICANT CHANGE UP (ref 6–8.3)
PROT SERPL-MCNC: 8.1 G/DL — SIGNIFICANT CHANGE UP (ref 6–8.3)
PROT SERPL-MCNC: 8.2 G/DL — SIGNIFICANT CHANGE UP (ref 6–8.3)
PROT SERPL-MCNC: 8.4 G/DL — HIGH (ref 6–8.3)
PROT SERPL-MCNC: 8.7 G/DL — HIGH (ref 6–8.3)
PROT UR-MCNC: 30 MG/DL
PROT UR-MCNC: ABNORMAL
PROTHROM AB SERPL-ACNC: 13.2 SEC — SIGNIFICANT CHANGE UP (ref 10.5–13.4)
PROTHROM AB SERPL-ACNC: 13.8 SEC — HIGH (ref 10.5–13.4)
PROTHROM AB SERPL-ACNC: 15.3 SEC — HIGH (ref 10.5–13.4)
PROTHROM AB SERPL-ACNC: 16.8 SEC — HIGH (ref 10.5–13.4)
PROTHROM AB SERPL-ACNC: 18.1 SEC — HIGH (ref 10.5–13.4)
QUANT TB PLUS MITOGEN MINUS NIL: 0.11 IU/ML — SIGNIFICANT CHANGE UP
QUANT TB PLUS MITOGEN MINUS NIL: 0.18 IU/ML — SIGNIFICANT CHANGE UP
RAPID RVP RESULT: DETECTED
RAPID RVP RESULT: SIGNIFICANT CHANGE UP
RBC # BLD: 3.83 M/UL — LOW (ref 4.2–5.8)
RBC # BLD: 3.84 M/UL — LOW (ref 4.2–5.8)
RBC # BLD: 3.86 M/UL — LOW (ref 4.2–5.8)
RBC # BLD: 3.87 M/UL — LOW (ref 4.2–5.8)
RBC # BLD: 4.01 M/UL — LOW (ref 4.2–5.8)
RBC # BLD: 4.02 M/UL — LOW (ref 4.2–5.8)
RBC # BLD: 4.03 M/UL — LOW (ref 4.2–5.8)
RBC # BLD: 4.04 M/UL — LOW (ref 4.2–5.8)
RBC # BLD: 4.05 M/UL — LOW (ref 4.2–5.8)
RBC # BLD: 4.05 M/UL — LOW (ref 4.2–5.8)
RBC # BLD: 4.06 M/UL — LOW (ref 4.2–5.8)
RBC # BLD: 4.06 M/UL — LOW (ref 4.2–5.8)
RBC # BLD: 4.08 M/UL — LOW (ref 4.2–5.8)
RBC # BLD: 4.14 M/UL — LOW (ref 4.2–5.8)
RBC # BLD: 4.18 M/UL — LOW (ref 4.2–5.8)
RBC # BLD: 4.21 M/UL — SIGNIFICANT CHANGE UP (ref 4.2–5.8)
RBC # BLD: 4.22 M/UL — SIGNIFICANT CHANGE UP (ref 4.2–5.8)
RBC # BLD: 4.23 M/UL
RBC # BLD: 4.27 M/UL — SIGNIFICANT CHANGE UP (ref 4.2–5.8)
RBC # BLD: 4.27 M/UL — SIGNIFICANT CHANGE UP (ref 4.2–5.8)
RBC # BLD: 4.28 M/UL — SIGNIFICANT CHANGE UP (ref 4.2–5.8)
RBC # BLD: 4.3 M/UL — SIGNIFICANT CHANGE UP (ref 4.2–5.8)
RBC # BLD: 4.34 M/UL — SIGNIFICANT CHANGE UP (ref 4.2–5.8)
RBC # BLD: 4.36 M/UL — SIGNIFICANT CHANGE UP (ref 4.2–5.8)
RBC # BLD: 4.37 M/UL — SIGNIFICANT CHANGE UP (ref 4.2–5.8)
RBC # BLD: 4.4 M/UL — SIGNIFICANT CHANGE UP (ref 4.2–5.8)
RBC # BLD: 4.41 M/UL — SIGNIFICANT CHANGE UP (ref 4.2–5.8)
RBC # BLD: 4.57 M/UL — SIGNIFICANT CHANGE UP (ref 4.2–5.8)
RBC # FLD: 11.9 % — SIGNIFICANT CHANGE UP (ref 10.3–14.5)
RBC # FLD: 12 % — SIGNIFICANT CHANGE UP (ref 10.3–14.5)
RBC # FLD: 12.1 % — SIGNIFICANT CHANGE UP (ref 10.3–14.5)
RBC # FLD: 12.1 % — SIGNIFICANT CHANGE UP (ref 10.3–14.5)
RBC # FLD: 12.2 % — SIGNIFICANT CHANGE UP (ref 10.3–14.5)
RBC # FLD: 12.3 % — SIGNIFICANT CHANGE UP (ref 10.3–14.5)
RBC # FLD: 12.3 % — SIGNIFICANT CHANGE UP (ref 10.3–14.5)
RBC # FLD: 12.4 % — SIGNIFICANT CHANGE UP (ref 10.3–14.5)
RBC # FLD: 12.5 %
RBC # FLD: 12.5 % — SIGNIFICANT CHANGE UP (ref 10.3–14.5)
RBC # FLD: 12.6 % — SIGNIFICANT CHANGE UP (ref 10.3–14.5)
RBC # FLD: 12.7 % — SIGNIFICANT CHANGE UP (ref 10.3–14.5)
RBC # FLD: 12.7 % — SIGNIFICANT CHANGE UP (ref 10.3–14.5)
RBC # FLD: 12.8 % — SIGNIFICANT CHANGE UP (ref 10.3–14.5)
RBC # FLD: 12.9 % — SIGNIFICANT CHANGE UP (ref 10.3–14.5)
RBC # FLD: 13.1 % — SIGNIFICANT CHANGE UP (ref 10.3–14.5)
RBC # FLD: 13.2 % — SIGNIFICANT CHANGE UP (ref 10.3–14.5)
RBC # FLD: 13.3 % — SIGNIFICANT CHANGE UP (ref 10.3–14.5)
RBC # FLD: 13.4 % — SIGNIFICANT CHANGE UP (ref 10.3–14.5)
RBC # FLD: 13.4 % — SIGNIFICANT CHANGE UP (ref 10.3–14.5)
RBC # FLD: 13.6 % — SIGNIFICANT CHANGE UP (ref 10.3–14.5)
RBC # FLD: 13.7 % — SIGNIFICANT CHANGE UP (ref 10.3–14.5)
RBC # FLD: 13.7 % — SIGNIFICANT CHANGE UP (ref 10.3–14.5)
RBC # FLD: 13.9 % — SIGNIFICANT CHANGE UP (ref 10.3–14.5)
RBC CASTS # UR COMP ASSIST: ABNORMAL /HPF
RF+CCP IGG SER-IMP: NEGATIVE — SIGNIFICANT CHANGE UP
RH IG SCN BLD-IMP: POSITIVE — SIGNIFICANT CHANGE UP
RHEUMATOID FACT SER QL: <10 IU/ML
RHEUMATOID FACT SERPL-ACNC: <10 IU/ML — SIGNIFICANT CHANGE UP (ref 0–13)
RSV RNA SPEC QL NAA+PROBE: SIGNIFICANT CHANGE UP
RUBV IGG SER-ACNC: 13.7 INDEX — SIGNIFICANT CHANGE UP
RUBV IGG SER-IMP: POSITIVE — SIGNIFICANT CHANGE UP
RV+EV RNA SPEC QL NAA+PROBE: DETECTED
S AUREUS DNA NOSE QL NAA+PROBE: POSITIVE
SAO2 % BLDA: 98.2 % — HIGH (ref 94–98)
SAO2 % BLDA: 98.3 % — HIGH (ref 94–98)
SAO2 % BLDA: 99.1 % — HIGH (ref 94–98)
SAO2 % BLDA: 99.2 % — HIGH (ref 94–98)
SAO2 % BLDA: 99.3 % — HIGH (ref 94–98)
SAO2 % BLDA: 99.3 % — HIGH (ref 94–98)
SAO2 % BLDA: 99.5 % — HIGH (ref 94–98)
SAO2 % BLDA: 99.5 % — HIGH (ref 94–98)
SAO2 % BLDMV: 80 % — SIGNIFICANT CHANGE UP
SAO2 % BLDV: 78 % — SIGNIFICANT CHANGE UP (ref 67–88)
SAO2 % BLDV: 79 % — SIGNIFICANT CHANGE UP (ref 67–88)
SAO2 % BLDV: 83 % — SIGNIFICANT CHANGE UP (ref 67–88)
SARS-COV-2 N GENE NPH QL NAA+PROBE: NOT DETECTED
SARS-COV-2 N GENE NPH QL NAA+PROBE: NOT DETECTED
SARS-COV-2 RNA SPEC QL NAA+PROBE: SIGNIFICANT CHANGE UP
SODIUM SERPL-SCNC: 130 MMOL/L — LOW (ref 135–145)
SODIUM SERPL-SCNC: 131 MMOL/L — LOW (ref 135–145)
SODIUM SERPL-SCNC: 132 MMOL/L — LOW (ref 135–145)
SODIUM SERPL-SCNC: 133 MMOL/L — LOW (ref 135–145)
SODIUM SERPL-SCNC: 134 MMOL/L — LOW (ref 135–145)
SODIUM SERPL-SCNC: 135 MMOL/L — SIGNIFICANT CHANGE UP (ref 135–145)
SODIUM SERPL-SCNC: 136 MMOL/L — SIGNIFICANT CHANGE UP (ref 135–145)
SODIUM SERPL-SCNC: 139 MMOL/L — SIGNIFICANT CHANGE UP (ref 135–145)
SODIUM SERPL-SCNC: 140 MMOL/L
SP GR SPEC: 1.01 — SIGNIFICANT CHANGE UP (ref 1.01–1.05)
SP GR SPEC: 1.01 — SIGNIFICANT CHANGE UP (ref 1–1.03)
SPECIMEN SOURCE: SIGNIFICANT CHANGE UP
STRONGYLOIDES AB SER-ACNC: NEGATIVE — SIGNIFICANT CHANGE UP
T GONDII IGG SER QL: 10.9 IU/ML — HIGH
T GONDII IGG SER QL: POSITIVE
TRIGL SERPL-MCNC: 106 MG/DL — SIGNIFICANT CHANGE UP
TROPONIN T SERPL-MCNC: 0.01 NG/ML — SIGNIFICANT CHANGE UP (ref 0–0.01)
TSH SERPL-MCNC: 1.24 UIU/ML — SIGNIFICANT CHANGE UP (ref 0.27–4.2)
UROBILINOGEN FLD QL: 0.2 E.U./DL — SIGNIFICANT CHANGE UP
UROBILINOGEN FLD QL: SIGNIFICANT CHANGE UP
VANCOMYCIN TROUGH SERPL-MCNC: 28 UG/ML — CRITICAL HIGH (ref 10–20)
VZV IGG FLD QL IA: 1869 INDEX — SIGNIFICANT CHANGE UP
VZV IGG FLD QL IA: POSITIVE — SIGNIFICANT CHANGE UP
WBC # BLD: 10.52 K/UL — HIGH (ref 3.8–10.5)
WBC # BLD: 11.01 K/UL — HIGH (ref 3.8–10.5)
WBC # BLD: 11.18 K/UL — HIGH (ref 3.8–10.5)
WBC # BLD: 11.18 K/UL — HIGH (ref 3.8–10.5)
WBC # BLD: 11.36 K/UL — HIGH (ref 3.8–10.5)
WBC # BLD: 11.59 K/UL — HIGH (ref 3.8–10.5)
WBC # BLD: 11.71 K/UL — HIGH (ref 3.8–10.5)
WBC # BLD: 12.15 K/UL — HIGH (ref 3.8–10.5)
WBC # BLD: 12.19 K/UL — HIGH (ref 3.8–10.5)
WBC # BLD: 13.07 K/UL — HIGH (ref 3.8–10.5)
WBC # BLD: 13.7 K/UL — HIGH (ref 3.8–10.5)
WBC # BLD: 13.77 K/UL — HIGH (ref 3.8–10.5)
WBC # BLD: 13.83 K/UL — HIGH (ref 3.8–10.5)
WBC # BLD: 14.15 K/UL — HIGH (ref 3.8–10.5)
WBC # BLD: 14.54 K/UL — HIGH (ref 3.8–10.5)
WBC # BLD: 15.05 K/UL — HIGH (ref 3.8–10.5)
WBC # BLD: 15.43 K/UL — HIGH (ref 3.8–10.5)
WBC # BLD: 15.81 K/UL — HIGH (ref 3.8–10.5)
WBC # BLD: 15.83 K/UL — HIGH (ref 3.8–10.5)
WBC # BLD: 16.42 K/UL — HIGH (ref 3.8–10.5)
WBC # BLD: 16.48 K/UL — HIGH (ref 3.8–10.5)
WBC # BLD: 16.57 K/UL — HIGH (ref 3.8–10.5)
WBC # BLD: 17.11 K/UL — HIGH (ref 3.8–10.5)
WBC # BLD: 17.5 K/UL — HIGH (ref 3.8–10.5)
WBC # BLD: 6.83 K/UL — SIGNIFICANT CHANGE UP (ref 3.8–10.5)
WBC # BLD: 7.96 K/UL — SIGNIFICANT CHANGE UP (ref 3.8–10.5)
WBC # BLD: 8.66 K/UL — SIGNIFICANT CHANGE UP (ref 3.8–10.5)
WBC # BLD: 9.07 K/UL — SIGNIFICANT CHANGE UP (ref 3.8–10.5)
WBC # BLD: 9.54 K/UL — SIGNIFICANT CHANGE UP (ref 3.8–10.5)
WBC # BLD: 9.54 K/UL — SIGNIFICANT CHANGE UP (ref 3.8–10.5)
WBC # BLD: 9.67 K/UL — SIGNIFICANT CHANGE UP (ref 3.8–10.5)
WBC # BLD: 9.77 K/UL — SIGNIFICANT CHANGE UP (ref 3.8–10.5)
WBC # FLD AUTO: 10.11 K/UL
WBC # FLD AUTO: 10.52 K/UL — HIGH (ref 3.8–10.5)
WBC # FLD AUTO: 11.01 K/UL — HIGH (ref 3.8–10.5)
WBC # FLD AUTO: 11.18 K/UL — HIGH (ref 3.8–10.5)
WBC # FLD AUTO: 11.18 K/UL — HIGH (ref 3.8–10.5)
WBC # FLD AUTO: 11.36 K/UL — HIGH (ref 3.8–10.5)
WBC # FLD AUTO: 11.59 K/UL — HIGH (ref 3.8–10.5)
WBC # FLD AUTO: 11.71 K/UL — HIGH (ref 3.8–10.5)
WBC # FLD AUTO: 12.15 K/UL — HIGH (ref 3.8–10.5)
WBC # FLD AUTO: 12.19 K/UL — HIGH (ref 3.8–10.5)
WBC # FLD AUTO: 13.07 K/UL — HIGH (ref 3.8–10.5)
WBC # FLD AUTO: 13.7 K/UL — HIGH (ref 3.8–10.5)
WBC # FLD AUTO: 13.77 K/UL — HIGH (ref 3.8–10.5)
WBC # FLD AUTO: 13.83 K/UL — HIGH (ref 3.8–10.5)
WBC # FLD AUTO: 14.15 K/UL — HIGH (ref 3.8–10.5)
WBC # FLD AUTO: 14.54 K/UL — HIGH (ref 3.8–10.5)
WBC # FLD AUTO: 15.05 K/UL — HIGH (ref 3.8–10.5)
WBC # FLD AUTO: 15.43 K/UL — HIGH (ref 3.8–10.5)
WBC # FLD AUTO: 15.81 K/UL — HIGH (ref 3.8–10.5)
WBC # FLD AUTO: 15.83 K/UL — HIGH (ref 3.8–10.5)
WBC # FLD AUTO: 16.42 K/UL — HIGH (ref 3.8–10.5)
WBC # FLD AUTO: 16.48 K/UL — HIGH (ref 3.8–10.5)
WBC # FLD AUTO: 16.57 K/UL — HIGH (ref 3.8–10.5)
WBC # FLD AUTO: 17.11 K/UL — HIGH (ref 3.8–10.5)
WBC # FLD AUTO: 17.5 K/UL — HIGH (ref 3.8–10.5)
WBC # FLD AUTO: 6.83 K/UL — SIGNIFICANT CHANGE UP (ref 3.8–10.5)
WBC # FLD AUTO: 7.96 K/UL — SIGNIFICANT CHANGE UP (ref 3.8–10.5)
WBC # FLD AUTO: 8.66 K/UL — SIGNIFICANT CHANGE UP (ref 3.8–10.5)
WBC # FLD AUTO: 9.07 K/UL — SIGNIFICANT CHANGE UP (ref 3.8–10.5)
WBC # FLD AUTO: 9.54 K/UL — SIGNIFICANT CHANGE UP (ref 3.8–10.5)
WBC # FLD AUTO: 9.54 K/UL — SIGNIFICANT CHANGE UP (ref 3.8–10.5)
WBC # FLD AUTO: 9.67 K/UL — SIGNIFICANT CHANGE UP (ref 3.8–10.5)
WBC # FLD AUTO: 9.77 K/UL — SIGNIFICANT CHANGE UP (ref 3.8–10.5)
WBC UR QL: < 5 /HPF — SIGNIFICANT CHANGE UP

## 2022-01-01 PROCEDURE — 93306 TTE W/DOPPLER COMPLETE: CPT | Mod: 26

## 2022-01-01 PROCEDURE — 99223 1ST HOSP IP/OBS HIGH 75: CPT

## 2022-01-01 PROCEDURE — 82553 CREATINE MB FRACTION: CPT

## 2022-01-01 PROCEDURE — 93321 DOPPLER ECHO F-UP/LMTD STD: CPT

## 2022-01-01 PROCEDURE — 71045 X-RAY EXAM CHEST 1 VIEW: CPT | Mod: 26

## 2022-01-01 PROCEDURE — C1894: CPT

## 2022-01-01 PROCEDURE — 82550 ASSAY OF CK (CPK): CPT

## 2022-01-01 PROCEDURE — 71045 X-RAY EXAM CHEST 1 VIEW: CPT

## 2022-01-01 PROCEDURE — 80048 BASIC METABOLIC PNL TOTAL CA: CPT

## 2022-01-01 PROCEDURE — 99233 SBSQ HOSP IP/OBS HIGH 50: CPT | Mod: GC

## 2022-01-01 PROCEDURE — 99233 SBSQ HOSP IP/OBS HIGH 50: CPT

## 2022-01-01 PROCEDURE — 99232 SBSQ HOSP IP/OBS MODERATE 35: CPT

## 2022-01-01 PROCEDURE — 94660 CPAP INITIATION&MGMT: CPT

## 2022-01-01 PROCEDURE — 99233 SBSQ HOSP IP/OBS HIGH 50: CPT | Mod: GC,25

## 2022-01-01 PROCEDURE — 99232 SBSQ HOSP IP/OBS MODERATE 35: CPT | Mod: GC

## 2022-01-01 PROCEDURE — 94010 BREATHING CAPACITY TEST: CPT

## 2022-01-01 PROCEDURE — 84443 ASSAY THYROID STIM HORMONE: CPT

## 2022-01-01 PROCEDURE — 93321 DOPPLER ECHO F-UP/LMTD STD: CPT | Mod: 26

## 2022-01-01 PROCEDURE — 71045 X-RAY EXAM CHEST 1 VIEW: CPT | Mod: 26,77,76

## 2022-01-01 PROCEDURE — 83605 ASSAY OF LACTIC ACID: CPT

## 2022-01-01 PROCEDURE — 82803 BLOOD GASES ANY COMBINATION: CPT

## 2022-01-01 PROCEDURE — 97116 GAIT TRAINING THERAPY: CPT

## 2022-01-01 PROCEDURE — 86777 TOXOPLASMA ANTIBODY: CPT

## 2022-01-01 PROCEDURE — 94799 UNLISTED PULMONARY SVC/PX: CPT

## 2022-01-01 PROCEDURE — 93010 ELECTROCARDIOGRAM REPORT: CPT

## 2022-01-01 PROCEDURE — 86431 RHEUMATOID FACTOR QUANT: CPT

## 2022-01-01 PROCEDURE — 99291 CRITICAL CARE FIRST HOUR: CPT

## 2022-01-01 PROCEDURE — 93306 TTE W/DOPPLER COMPLETE: CPT

## 2022-01-01 PROCEDURE — 87640 STAPH A DNA AMP PROBE: CPT

## 2022-01-01 PROCEDURE — 86665 EPSTEIN-BARR CAPSID VCA: CPT

## 2022-01-01 PROCEDURE — 12345: CPT | Mod: NC

## 2022-01-01 PROCEDURE — 71250 CT THORAX DX C-: CPT | Mod: 26

## 2022-01-01 PROCEDURE — 94060 EVALUATION OF WHEEZING: CPT

## 2022-01-01 PROCEDURE — C1887: CPT

## 2022-01-01 PROCEDURE — U0005: CPT

## 2022-01-01 PROCEDURE — 84100 ASSAY OF PHOSPHORUS: CPT

## 2022-01-01 PROCEDURE — 97110 THERAPEUTIC EXERCISES: CPT

## 2022-01-01 PROCEDURE — 86762 RUBELLA ANTIBODY: CPT

## 2022-01-01 PROCEDURE — 99284 EMERGENCY DEPT VISIT MOD MDM: CPT

## 2022-01-01 PROCEDURE — 0225U NFCT DS DNA&RNA 21 SARSCOV2: CPT

## 2022-01-01 PROCEDURE — 93451 RIGHT HEART CATH: CPT | Mod: 26,GC

## 2022-01-01 PROCEDURE — 87040 BLOOD CULTURE FOR BACTERIA: CPT

## 2022-01-01 PROCEDURE — 99215 OFFICE O/P EST HI 40 MIN: CPT

## 2022-01-01 PROCEDURE — 86706 HEP B SURFACE ANTIBODY: CPT

## 2022-01-01 PROCEDURE — 99231 SBSQ HOSP IP/OBS SF/LOW 25: CPT | Mod: GC

## 2022-01-01 PROCEDURE — 93308 TTE F-UP OR LMTD: CPT | Mod: 26

## 2022-01-01 PROCEDURE — 86664 EPSTEIN-BARR NUCLEAR ANTIGEN: CPT

## 2022-01-01 PROCEDURE — 99231 SBSQ HOSP IP/OBS SF/LOW 25: CPT

## 2022-01-01 PROCEDURE — U0003: CPT

## 2022-01-01 PROCEDURE — 86900 BLOOD TYPING SEROLOGIC ABO: CPT

## 2022-01-01 PROCEDURE — 71045 X-RAY EXAM CHEST 1 VIEW: CPT | Mod: 26,77

## 2022-01-01 PROCEDURE — 86663 EPSTEIN-BARR ANTIBODY: CPT

## 2022-01-01 PROCEDURE — 80202 ASSAY OF VANCOMYCIN: CPT

## 2022-01-01 PROCEDURE — 99214 OFFICE O/P EST MOD 30 MIN: CPT | Mod: 25

## 2022-01-01 PROCEDURE — 99213 OFFICE O/P EST LOW 20 MIN: CPT

## 2022-01-01 PROCEDURE — 71275 CT ANGIOGRAPHY CHEST: CPT

## 2022-01-01 PROCEDURE — 99214 OFFICE O/P EST MOD 30 MIN: CPT

## 2022-01-01 PROCEDURE — 86038 ANTINUCLEAR ANTIBODIES: CPT

## 2022-01-01 PROCEDURE — 80061 LIPID PANEL: CPT

## 2022-01-01 PROCEDURE — 85730 THROMBOPLASTIN TIME PARTIAL: CPT

## 2022-01-01 PROCEDURE — 93005 ELECTROCARDIOGRAM TRACING: CPT

## 2022-01-01 PROCEDURE — 97535 SELF CARE MNGMENT TRAINING: CPT

## 2022-01-01 PROCEDURE — 80053 COMPREHEN METABOLIC PANEL: CPT

## 2022-01-01 PROCEDURE — 85027 COMPLETE CBC AUTOMATED: CPT

## 2022-01-01 PROCEDURE — 82248 BILIRUBIN DIRECT: CPT

## 2022-01-01 PROCEDURE — 87449 NOS EACH ORGANISM AG IA: CPT

## 2022-01-01 PROCEDURE — 86735 MUMPS ANTIBODY: CPT

## 2022-01-01 PROCEDURE — 80074 ACUTE HEPATITIS PANEL: CPT

## 2022-01-01 PROCEDURE — 97530 THERAPEUTIC ACTIVITIES: CPT

## 2022-01-01 PROCEDURE — 86901 BLOOD TYPING SEROLOGIC RH(D): CPT

## 2022-01-01 PROCEDURE — 86787 VARICELLA-ZOSTER ANTIBODY: CPT

## 2022-01-01 PROCEDURE — 36415 COLL VENOUS BLD VENIPUNCTURE: CPT

## 2022-01-01 PROCEDURE — 87389 HIV-1 AG W/HIV-1&-2 AB AG IA: CPT

## 2022-01-01 PROCEDURE — 97165 OT EVAL LOW COMPLEX 30 MIN: CPT

## 2022-01-01 PROCEDURE — 86850 RBC ANTIBODY SCREEN: CPT

## 2022-01-01 PROCEDURE — 94640 AIRWAY INHALATION TREATMENT: CPT | Mod: 59

## 2022-01-01 PROCEDURE — C8929: CPT

## 2022-01-01 PROCEDURE — 99203 OFFICE O/P NEW LOW 30 MIN: CPT

## 2022-01-01 PROCEDURE — 86200 CCP ANTIBODY: CPT

## 2022-01-01 PROCEDURE — 83880 ASSAY OF NATRIURETIC PEPTIDE: CPT

## 2022-01-01 PROCEDURE — 86645 CMV ANTIBODY IGM: CPT

## 2022-01-01 PROCEDURE — 86480 TB TEST CELL IMMUN MEASURE: CPT

## 2022-01-01 PROCEDURE — 87641 MR-STAPH DNA AMP PROBE: CPT

## 2022-01-01 PROCEDURE — 94640 AIRWAY INHALATION TREATMENT: CPT

## 2022-01-01 PROCEDURE — 70486 CT MAXILLOFACIAL W/O DYE: CPT | Mod: 26

## 2022-01-01 PROCEDURE — 99222 1ST HOSP IP/OBS MODERATE 55: CPT | Mod: GC,25

## 2022-01-01 PROCEDURE — 99223 1ST HOSP IP/OBS HIGH 75: CPT | Mod: GC

## 2022-01-01 PROCEDURE — 99152 MOD SED SAME PHYS/QHP 5/>YRS: CPT | Mod: GC

## 2022-01-01 PROCEDURE — 86682 HELMINTH ANTIBODY: CPT

## 2022-01-01 PROCEDURE — 70486 CT MAXILLOFACIAL W/O DYE: CPT

## 2022-01-01 PROCEDURE — 87070 CULTURE OTHR SPECIMN AEROBIC: CPT

## 2022-01-01 PROCEDURE — 86765 RUBEOLA ANTIBODY: CPT

## 2022-01-01 PROCEDURE — 94618 PULMONARY STRESS TESTING: CPT

## 2022-01-01 PROCEDURE — 99221 1ST HOSP IP/OBS SF/LOW 40: CPT

## 2022-01-01 PROCEDURE — 99239 HOSP IP/OBS DSCHRG MGMT >30: CPT

## 2022-01-01 PROCEDURE — 71275 CT ANGIOGRAPHY CHEST: CPT | Mod: 26

## 2022-01-01 PROCEDURE — 83735 ASSAY OF MAGNESIUM: CPT

## 2022-01-01 PROCEDURE — 87594 PNEUMCYSTS JIROVECII AMP PRB: CPT

## 2022-01-01 PROCEDURE — 93010 ELECTROCARDIOGRAM REPORT: CPT | Mod: 76

## 2022-01-01 PROCEDURE — C1889: CPT

## 2022-01-01 PROCEDURE — 82962 GLUCOSE BLOOD TEST: CPT

## 2022-01-01 PROCEDURE — 83036 HEMOGLOBIN GLYCOSYLATED A1C: CPT

## 2022-01-01 PROCEDURE — 97162 PT EVAL MOD COMPLEX 30 MIN: CPT

## 2022-01-01 PROCEDURE — 84484 ASSAY OF TROPONIN QUANT: CPT

## 2022-01-01 PROCEDURE — 83615 LACTATE (LD) (LDH) ENZYME: CPT

## 2022-01-01 PROCEDURE — 86644 CMV ANTIBODY: CPT

## 2022-01-01 PROCEDURE — 84145 PROCALCITONIN (PCT): CPT

## 2022-01-01 PROCEDURE — 85025 COMPLETE CBC W/AUTO DIFF WBC: CPT

## 2022-01-01 PROCEDURE — 87635 SARS-COV-2 COVID-19 AMP PRB: CPT

## 2022-01-01 PROCEDURE — 81001 URINALYSIS AUTO W/SCOPE: CPT

## 2022-01-01 PROCEDURE — C1769: CPT

## 2022-01-01 PROCEDURE — 85610 PROTHROMBIN TIME: CPT

## 2022-01-01 RX ORDER — FUROSEMIDE 40 MG
40 TABLET ORAL ONCE
Refills: 0 | Status: COMPLETED | OUTPATIENT
Start: 2022-01-01 | End: 2022-01-01

## 2022-01-01 RX ORDER — VANCOMYCIN HCL 1 G
1500 VIAL (EA) INTRAVENOUS EVERY 24 HOURS
Refills: 0 | Status: DISCONTINUED | OUTPATIENT
Start: 2022-01-01 | End: 2022-01-01

## 2022-01-01 RX ORDER — DEXTROSE 50 % IN WATER 50 %
15 SYRINGE (ML) INTRAVENOUS ONCE
Refills: 0 | Status: ACTIVE | OUTPATIENT
Start: 2022-01-01 | End: 2023-10-22

## 2022-01-01 RX ORDER — FUROSEMIDE 40 MG
80 TABLET ORAL ONCE
Refills: 0 | Status: COMPLETED | OUTPATIENT
Start: 2022-01-01 | End: 2022-01-01

## 2022-01-01 RX ORDER — BUDESONIDE, MICRONIZED 100 %
0.5 POWDER (GRAM) MISCELLANEOUS
Refills: 0 | Status: DISCONTINUED | OUTPATIENT
Start: 2022-01-01 | End: 2022-01-01

## 2022-01-01 RX ORDER — INSULIN GLARGINE 100 [IU]/ML
4 INJECTION, SOLUTION SUBCUTANEOUS AT BEDTIME
Refills: 0 | Status: DISCONTINUED | OUTPATIENT
Start: 2022-01-01 | End: 2022-01-01

## 2022-01-01 RX ORDER — PIPERACILLIN AND TAZOBACTAM 4; .5 G/20ML; G/20ML
4.5 INJECTION, POWDER, LYOPHILIZED, FOR SOLUTION INTRAVENOUS ONCE
Refills: 0 | Status: COMPLETED | OUTPATIENT
Start: 2022-01-01 | End: 2022-01-01

## 2022-01-01 RX ORDER — ALBUTEROL SULFATE 90 UG/1
108 (90 BASE) INHALANT RESPIRATORY (INHALATION)
Qty: 1 | Refills: 3 | Status: ACTIVE | COMMUNITY
Start: 2022-01-01 | End: 1900-01-01

## 2022-01-01 RX ORDER — LANOLIN ALCOHOL/MO/W.PET/CERES
3 CREAM (GRAM) TOPICAL ONCE
Refills: 0 | Status: COMPLETED | OUTPATIENT
Start: 2022-01-01 | End: 2022-01-01

## 2022-01-01 RX ORDER — FLUTICASONE PROPIONATE 50 MCG
2 SPRAY, SUSPENSION NASAL
Refills: 0 | Status: ACTIVE | OUTPATIENT
Start: 2022-01-01 | End: 2023-10-25

## 2022-01-01 RX ORDER — DEXTROSE 50 % IN WATER 50 %
25 SYRINGE (ML) INTRAVENOUS ONCE
Refills: 0 | Status: ACTIVE | OUTPATIENT
Start: 2022-01-01

## 2022-01-01 RX ORDER — ALBUTEROL 90 UG/1
2.5 AEROSOL, METERED ORAL EVERY 6 HOURS
Refills: 0 | Status: DISCONTINUED | OUTPATIENT
Start: 2022-01-01 | End: 2022-01-01

## 2022-01-01 RX ORDER — SODIUM ZIRCONIUM CYCLOSILICATE 10 G/10G
5 POWDER, FOR SUSPENSION ORAL ONCE
Refills: 0 | Status: COMPLETED | OUTPATIENT
Start: 2022-01-01 | End: 2022-01-01

## 2022-01-01 RX ORDER — SODIUM CHLORIDE 9 MG/ML
4 INJECTION INTRAMUSCULAR; INTRAVENOUS; SUBCUTANEOUS ONCE
Refills: 0 | Status: COMPLETED | OUTPATIENT
Start: 2022-01-01 | End: 2022-01-01

## 2022-01-01 RX ORDER — TREPROSTINIL 16-32-48
16 & 32 & 48 KIT INHALATION
Qty: 1 | Refills: 0 | Status: ACTIVE | COMMUNITY
Start: 2022-01-01

## 2022-01-01 RX ORDER — FUROSEMIDE 40 MG
80 TABLET ORAL ONCE
Refills: 0 | Status: DISCONTINUED | OUTPATIENT
Start: 2022-01-01 | End: 2022-01-01

## 2022-01-01 RX ORDER — ALBUTEROL 90 UG/1
0 AEROSOL, METERED ORAL
Qty: 0 | Refills: 0 | DISCHARGE

## 2022-01-01 RX ORDER — PETROLATUM,WHITE
1 JELLY (GRAM) TOPICAL
Refills: 0 | Status: ACTIVE | OUTPATIENT
Start: 2022-01-01 | End: 2023-10-26

## 2022-01-01 RX ORDER — ALBUTEROL 90 UG/1
3 AEROSOL, METERED ORAL
Qty: 180 | Refills: 0
Start: 2022-01-01 | End: 2022-01-01

## 2022-01-01 RX ORDER — BUDESONIDE, MICRONIZED 100 %
0 POWDER (GRAM) MISCELLANEOUS
Qty: 0 | Refills: 0 | DISCHARGE

## 2022-01-01 RX ORDER — ENOXAPARIN SODIUM 100 MG/ML
40 INJECTION SUBCUTANEOUS EVERY 24 HOURS
Refills: 0 | Status: DISCONTINUED | OUTPATIENT
Start: 2022-01-01 | End: 2022-01-01

## 2022-01-01 RX ORDER — FUROSEMIDE 40 MG
20 TABLET ORAL ONCE
Refills: 0 | Status: COMPLETED | OUTPATIENT
Start: 2022-01-01 | End: 2022-01-01

## 2022-01-01 RX ORDER — LANOLIN ALCOHOL/MO/W.PET/CERES
5 CREAM (GRAM) TOPICAL AT BEDTIME
Refills: 0 | Status: ACTIVE | OUTPATIENT
Start: 2022-01-01 | End: 2023-11-02

## 2022-01-01 RX ORDER — ALBUTEROL 90 UG/1
3 AEROSOL, METERED ORAL
Qty: 360 | Refills: 0
Start: 2022-01-01 | End: 2022-01-01

## 2022-01-01 RX ORDER — BENZOCAINE AND MENTHOL 5; 1 G/100ML; G/100ML
2 LIQUID ORAL
Qty: 30 | Refills: 0
Start: 2022-01-01

## 2022-01-01 RX ORDER — LACTULOSE 10 G/15ML
10 SOLUTION ORAL ONCE
Refills: 0 | Status: COMPLETED | OUTPATIENT
Start: 2022-01-01 | End: 2022-01-01

## 2022-01-01 RX ORDER — IPRATROPIUM BROMIDE 0.2 MG/ML
500 SOLUTION, NON-ORAL INHALATION EVERY 6 HOURS
Refills: 0 | Status: ACTIVE | OUTPATIENT
Start: 2022-01-01 | End: 2023-10-25

## 2022-01-01 RX ORDER — TAMSULOSIN HYDROCHLORIDE 0.4 MG/1
0.4 CAPSULE ORAL AT BEDTIME
Refills: 0 | Status: ACTIVE | OUTPATIENT
Start: 2022-01-01 | End: 2023-10-28

## 2022-01-01 RX ORDER — BUDESONIDE 0.5 MG/2ML
0.5 INHALANT ORAL
Qty: 180 | Refills: 3 | Status: ACTIVE | COMMUNITY
Start: 2022-01-01 | End: 1900-01-01

## 2022-01-01 RX ORDER — PANTOPRAZOLE SODIUM 20 MG/1
40 TABLET, DELAYED RELEASE ORAL DAILY
Refills: 0 | Status: DISCONTINUED | OUTPATIENT
Start: 2022-01-01 | End: 2022-01-01

## 2022-01-01 RX ORDER — FUROSEMIDE 40 MG
60 TABLET ORAL ONCE
Refills: 0 | Status: COMPLETED | OUTPATIENT
Start: 2022-01-01 | End: 2022-01-01

## 2022-01-01 RX ORDER — BUDESONIDE, MICRONIZED 100 %
0.5 POWDER (GRAM) MISCELLANEOUS ONCE
Refills: 0 | Status: COMPLETED | OUTPATIENT
Start: 2022-01-01 | End: 2022-01-01

## 2022-01-01 RX ORDER — TAMSULOSIN HYDROCHLORIDE 0.4 MG/1
1 CAPSULE ORAL
Qty: 0 | Refills: 0 | DISCHARGE
Start: 2022-01-01

## 2022-01-01 RX ORDER — OXYMETAZOLINE HYDROCHLORIDE 0.5 MG/ML
1 SPRAY NASAL
Refills: 0 | Status: COMPLETED | OUTPATIENT
Start: 2022-01-01 | End: 2022-01-01

## 2022-01-01 RX ORDER — PANTOPRAZOLE SODIUM 20 MG/1
1 TABLET, DELAYED RELEASE ORAL
Qty: 30 | Refills: 0
Start: 2022-01-01

## 2022-01-01 RX ORDER — SODIUM CHLORIDE 9 MG/ML
4 INJECTION INTRAMUSCULAR; INTRAVENOUS; SUBCUTANEOUS EVERY 6 HOURS
Refills: 0 | Status: DISCONTINUED | OUTPATIENT
Start: 2022-01-01 | End: 2022-01-01

## 2022-01-01 RX ORDER — ENOXAPARIN SODIUM 100 MG/ML
40 INJECTION SUBCUTANEOUS EVERY 24 HOURS
Refills: 0 | Status: ACTIVE | OUTPATIENT
Start: 2022-01-01 | End: 2023-10-27

## 2022-01-01 RX ORDER — VANCOMYCIN HCL 1 G
1500 VIAL (EA) INTRAVENOUS EVERY 24 HOURS
Refills: 0 | Status: COMPLETED | OUTPATIENT
Start: 2022-01-01 | End: 2022-01-01

## 2022-01-01 RX ORDER — BUDESONIDE, MICRONIZED 100 %
2 POWDER (GRAM) MISCELLANEOUS
Qty: 0 | Refills: 0 | DISCHARGE

## 2022-01-01 RX ORDER — BUDESONIDE, MICRONIZED 100 %
4 POWDER (GRAM) MISCELLANEOUS
Qty: 240 | Refills: 0
Start: 2022-01-01 | End: 2022-01-01

## 2022-01-01 RX ORDER — CHLORHEXIDINE GLUCONATE 213 G/1000ML
1 SOLUTION TOPICAL DAILY
Refills: 0 | Status: DISCONTINUED | OUTPATIENT
Start: 2022-01-01 | End: 2022-01-01

## 2022-01-01 RX ORDER — INSULIN LISPRO 100/ML
VIAL (ML) SUBCUTANEOUS
Refills: 0 | Status: DISCONTINUED | OUTPATIENT
Start: 2022-01-01 | End: 2022-01-01

## 2022-01-01 RX ORDER — ACETAZOLAMIDE 250 MG/1
250 TABLET ORAL ONCE
Refills: 0 | Status: COMPLETED | OUTPATIENT
Start: 2022-01-01 | End: 2022-01-01

## 2022-01-01 RX ORDER — IPRATROPIUM/ALBUTEROL SULFATE 18-103MCG
3 AEROSOL WITH ADAPTER (GRAM) INHALATION ONCE
Refills: 0 | Status: COMPLETED | OUTPATIENT
Start: 2022-01-01 | End: 2022-01-01

## 2022-01-01 RX ORDER — SODIUM CHLORIDE 9 MG/ML
250 INJECTION INTRAMUSCULAR; INTRAVENOUS; SUBCUTANEOUS
Refills: 0 | Status: DISCONTINUED | OUTPATIENT
Start: 2022-01-01 | End: 2022-01-01

## 2022-01-01 RX ORDER — GLUCAGON INJECTION, SOLUTION 0.5 MG/.1ML
1 INJECTION, SOLUTION SUBCUTANEOUS ONCE
Refills: 0 | Status: ACTIVE | OUTPATIENT
Start: 2022-01-01 | End: 2023-10-22

## 2022-01-01 RX ORDER — HYDROCODONE BITARTRATE AND HOMATROPINE METHYLBROMIDE 5; 1.5 MG/5ML; MG/5ML
5 SOLUTION ORAL
Qty: 100 | Refills: 0
Start: 2022-01-01 | End: 2022-01-01

## 2022-01-01 RX ORDER — PIPERACILLIN AND TAZOBACTAM 4; .5 G/20ML; G/20ML
4.5 INJECTION, POWDER, LYOPHILIZED, FOR SOLUTION INTRAVENOUS EVERY 8 HOURS
Refills: 0 | Status: DISCONTINUED | OUTPATIENT
Start: 2022-01-01 | End: 2022-01-01

## 2022-01-01 RX ORDER — SODIUM CHLORIDE 9 MG/ML
1000 INJECTION, SOLUTION INTRAVENOUS
Refills: 0 | Status: ACTIVE | OUTPATIENT
Start: 2022-01-01 | End: 2023-10-22

## 2022-01-01 RX ORDER — PANTOPRAZOLE SODIUM 20 MG/1
40 TABLET, DELAYED RELEASE ORAL
Refills: 0 | Status: ACTIVE | OUTPATIENT
Start: 2022-01-01 | End: 2023-10-25

## 2022-01-01 RX ORDER — ASPIRIN/CALCIUM CARB/MAGNESIUM 324 MG
1 TABLET ORAL
Qty: 0 | Refills: 0 | DISCHARGE

## 2022-01-01 RX ORDER — POTASSIUM CHLORIDE 20 MEQ
20 PACKET (EA) ORAL ONCE
Refills: 0 | Status: COMPLETED | OUTPATIENT
Start: 2022-01-01 | End: 2022-01-01

## 2022-01-01 RX ORDER — MILRINONE LACTATE 1 MG/ML
0.12 INJECTION, SOLUTION INTRAVENOUS
Qty: 20 | Refills: 0 | Status: DISCONTINUED | OUTPATIENT
Start: 2022-01-01 | End: 2022-01-01

## 2022-01-01 RX ORDER — NYSTATIN 500MM UNIT
500000 POWDER (EA) MISCELLANEOUS THREE TIMES A DAY
Refills: 0 | Status: DISCONTINUED | OUTPATIENT
Start: 2022-01-01 | End: 2022-01-01

## 2022-01-01 RX ORDER — BUDESONIDE, GLYCOPYRROLATE, AND FORMOTEROL FUMARATE 160; 9; 4.8 UG/1; UG/1; UG/1
160-9-4.8 AEROSOL, METERED RESPIRATORY (INHALATION)
Refills: 0 | Status: ACTIVE | COMMUNITY
Start: 2022-01-01

## 2022-01-01 RX ORDER — DEXTROSE 50 % IN WATER 50 %
12.5 SYRINGE (ML) INTRAVENOUS ONCE
Refills: 0 | Status: ACTIVE | OUTPATIENT
Start: 2022-01-01

## 2022-01-01 RX ORDER — SENNA PLUS 8.6 MG/1
2 TABLET ORAL AT BEDTIME
Refills: 0 | Status: ACTIVE | OUTPATIENT
Start: 2022-01-01 | End: 2023-10-25

## 2022-01-01 RX ORDER — ALBUTEROL SULFATE 2.5 MG/3ML
(2.5 MG/3ML) SOLUTION RESPIRATORY (INHALATION)
Refills: 0 | Status: ACTIVE | COMMUNITY
Start: 2022-01-01

## 2022-01-01 RX ORDER — VANCOMYCIN HCL 1 G
1500 VIAL (EA) INTRAVENOUS EVERY 12 HOURS
Refills: 0 | Status: COMPLETED | OUTPATIENT
Start: 2022-01-01 | End: 2022-01-01

## 2022-01-01 RX ORDER — BENZOCAINE AND MENTHOL 5; 1 G/100ML; G/100ML
2 LIQUID ORAL EVERY 6 HOURS
Refills: 0 | Status: ACTIVE | OUTPATIENT
Start: 2022-01-01 | End: 2023-10-25

## 2022-01-01 RX ORDER — ALBUTEROL SULFATE 2.5 MG/3ML
(2.5 MG/3ML) SOLUTION RESPIRATORY (INHALATION)
Qty: 3 | Refills: 3 | Status: ACTIVE | COMMUNITY
Start: 2022-01-01 | End: 1900-01-01

## 2022-01-01 RX ORDER — INSULIN LISPRO 100/ML
6 VIAL (ML) SUBCUTANEOUS ONCE
Refills: 0 | Status: COMPLETED | OUTPATIENT
Start: 2022-01-01 | End: 2022-01-01

## 2022-01-01 RX ORDER — HYDROCODONE BITARTRATE AND HOMATROPINE METHYLBROMIDE 1.5; 5 MG/5ML; MG/5ML
5-1.5 SOLUTION ORAL 3 TIMES DAILY
Qty: 200 | Refills: 0 | Status: ACTIVE | COMMUNITY
Start: 2022-01-01 | End: 1900-01-01

## 2022-01-01 RX ORDER — INSULIN LISPRO 100/ML
VIAL (ML) SUBCUTANEOUS
Refills: 0 | Status: ACTIVE | OUTPATIENT
Start: 2022-01-01 | End: 2023-10-22

## 2022-01-01 RX ORDER — POLYETHYLENE GLYCOL 3350 17 G/17G
17 POWDER, FOR SOLUTION ORAL DAILY
Refills: 0 | Status: ACTIVE | OUTPATIENT
Start: 2022-01-01 | End: 2023-10-25

## 2022-01-01 RX ORDER — FUROSEMIDE 40 MG
60 TABLET ORAL ONCE
Refills: 0 | Status: DISCONTINUED | OUTPATIENT
Start: 2022-01-01 | End: 2022-01-01

## 2022-01-01 RX ORDER — FUROSEMIDE 40 MG
20 TABLET ORAL EVERY 24 HOURS
Refills: 0 | Status: ACTIVE | OUTPATIENT
Start: 2022-01-01 | End: 2023-11-21

## 2022-01-01 RX ORDER — IPRATROPIUM/ALBUTEROL SULFATE 18-103MCG
3 AEROSOL WITH ADAPTER (GRAM) INHALATION EVERY 6 HOURS
Refills: 0 | Status: DISCONTINUED | OUTPATIENT
Start: 2022-01-01 | End: 2022-01-01

## 2022-01-01 RX ORDER — INFLUENZA VIRUS VACCINE 15; 15; 15; 15 UG/.5ML; UG/.5ML; UG/.5ML; UG/.5ML
0.5 SUSPENSION INTRAMUSCULAR ONCE
Refills: 0 | Status: ACTIVE | OUTPATIENT
Start: 2022-01-01 | End: 2023-10-22

## 2022-01-01 RX ORDER — ACETAMINOPHEN 500 MG
650 TABLET ORAL EVERY 6 HOURS
Refills: 0 | Status: ACTIVE | OUTPATIENT
Start: 2022-01-01 | End: 2023-10-29

## 2022-01-01 RX ADMIN — Medication 30 MILLIGRAM(S): at 18:01

## 2022-01-01 RX ADMIN — PIPERACILLIN AND TAZOBACTAM 25 GRAM(S): 4; .5 INJECTION, POWDER, LYOPHILIZED, FOR SOLUTION INTRAVENOUS at 21:13

## 2022-01-01 RX ADMIN — Medication 2 SPRAY(S): at 06:22

## 2022-01-01 RX ADMIN — Medication 30 MILLIGRAM(S): at 05:11

## 2022-01-01 RX ADMIN — Medication 2 SPRAY(S): at 05:40

## 2022-01-01 RX ADMIN — SENNA PLUS 2 TABLET(S): 8.6 TABLET ORAL at 21:09

## 2022-01-01 RX ADMIN — Medication 500 MICROGRAM(S): at 06:30

## 2022-01-01 RX ADMIN — Medication 4: at 11:47

## 2022-01-01 RX ADMIN — Medication 1 APPLICATION(S): at 17:14

## 2022-01-01 RX ADMIN — Medication 1 APPLICATION(S): at 05:56

## 2022-01-01 RX ADMIN — Medication 500 MICROGRAM(S): at 11:45

## 2022-01-01 RX ADMIN — Medication 500 MICROGRAM(S): at 04:06

## 2022-01-01 RX ADMIN — Medication 200 MILLIGRAM(S): at 14:48

## 2022-01-01 RX ADMIN — Medication 0.5 MILLIGRAM(S): at 22:59

## 2022-01-01 RX ADMIN — Medication 200 MILLIGRAM(S): at 19:18

## 2022-01-01 RX ADMIN — Medication 0.5 MILLIGRAM(S): at 21:50

## 2022-01-01 RX ADMIN — Medication 500 MICROGRAM(S): at 07:18

## 2022-01-01 RX ADMIN — Medication 2: at 12:06

## 2022-01-01 RX ADMIN — Medication 500 MICROGRAM(S): at 03:25

## 2022-01-01 RX ADMIN — BENZOCAINE AND MENTHOL 2 LOZENGE: 5; 1 LIQUID ORAL at 17:59

## 2022-01-01 RX ADMIN — BENZOCAINE AND MENTHOL 2 LOZENGE: 5; 1 LIQUID ORAL at 13:13

## 2022-01-01 RX ADMIN — Medication 50 MILLIGRAM(S): at 18:16

## 2022-01-01 RX ADMIN — Medication 200 MILLIGRAM(S): at 12:14

## 2022-01-01 RX ADMIN — PIPERACILLIN AND TAZOBACTAM 25 GRAM(S): 4; .5 INJECTION, POWDER, LYOPHILIZED, FOR SOLUTION INTRAVENOUS at 05:09

## 2022-01-01 RX ADMIN — Medication 500 MICROGRAM(S): at 22:05

## 2022-01-01 RX ADMIN — Medication 200 MILLIGRAM(S): at 21:26

## 2022-01-01 RX ADMIN — BENZOCAINE AND MENTHOL 2 LOZENGE: 5; 1 LIQUID ORAL at 00:24

## 2022-01-01 RX ADMIN — Medication 1 TABLET(S): at 19:18

## 2022-01-01 RX ADMIN — Medication 500 MICROGRAM(S): at 17:02

## 2022-01-01 RX ADMIN — Medication 500 MICROGRAM(S): at 09:55

## 2022-01-01 RX ADMIN — BENZOCAINE AND MENTHOL 2 LOZENGE: 5; 1 LIQUID ORAL at 11:12

## 2022-01-01 RX ADMIN — Medication 4: at 21:11

## 2022-01-01 RX ADMIN — Medication 1 TABLET(S): at 12:03

## 2022-01-01 RX ADMIN — Medication 2 SPRAY(S): at 18:36

## 2022-01-01 RX ADMIN — Medication 20 MILLIGRAM(S): at 08:46

## 2022-01-01 RX ADMIN — Medication 200 MILLIGRAM(S): at 22:32

## 2022-01-01 RX ADMIN — BENZOCAINE AND MENTHOL 2 LOZENGE: 5; 1 LIQUID ORAL at 12:49

## 2022-01-01 RX ADMIN — Medication 2: at 17:41

## 2022-01-01 RX ADMIN — BENZOCAINE AND MENTHOL 2 LOZENGE: 5; 1 LIQUID ORAL at 05:07

## 2022-01-01 RX ADMIN — Medication 200 MILLIGRAM(S): at 11:20

## 2022-01-01 RX ADMIN — Medication 2 SPRAY(S): at 05:21

## 2022-01-01 RX ADMIN — Medication 2: at 17:50

## 2022-01-01 RX ADMIN — CHLORHEXIDINE GLUCONATE 1 APPLICATION(S): 213 SOLUTION TOPICAL at 11:23

## 2022-01-01 RX ADMIN — Medication 200 MILLIGRAM(S): at 22:07

## 2022-01-01 RX ADMIN — TAMSULOSIN HYDROCHLORIDE 0.4 MILLIGRAM(S): 0.4 CAPSULE ORAL at 22:41

## 2022-01-01 RX ADMIN — Medication 30 MILLIGRAM(S): at 05:51

## 2022-01-01 RX ADMIN — Medication 500 MICROGRAM(S): at 10:35

## 2022-01-01 RX ADMIN — Medication 40 MILLIGRAM(S): at 11:32

## 2022-01-01 RX ADMIN — Medication 200 MILLIGRAM(S): at 18:33

## 2022-01-01 RX ADMIN — Medication 200 MILLIGRAM(S): at 17:03

## 2022-01-01 RX ADMIN — Medication 1 APPLICATION(S): at 05:55

## 2022-01-01 RX ADMIN — Medication 40 MILLIGRAM(S): at 00:11

## 2022-01-01 RX ADMIN — Medication 40 MILLIGRAM(S): at 20:01

## 2022-01-01 RX ADMIN — SENNA PLUS 2 TABLET(S): 8.6 TABLET ORAL at 21:12

## 2022-01-01 RX ADMIN — Medication 40 MILLIGRAM(S): at 10:42

## 2022-01-01 RX ADMIN — INSULIN GLARGINE 4 UNIT(S): 100 INJECTION, SOLUTION SUBCUTANEOUS at 21:42

## 2022-01-01 RX ADMIN — Medication 500 MICROGRAM(S): at 06:40

## 2022-01-01 RX ADMIN — Medication 500 MICROGRAM(S): at 11:31

## 2022-01-01 RX ADMIN — Medication 50 MILLIGRAM(S): at 05:54

## 2022-01-01 RX ADMIN — PANTOPRAZOLE SODIUM 40 MILLIGRAM(S): 20 TABLET, DELAYED RELEASE ORAL at 07:12

## 2022-01-01 RX ADMIN — BENZOCAINE AND MENTHOL 2 LOZENGE: 5; 1 LIQUID ORAL at 05:18

## 2022-01-01 RX ADMIN — SENNA PLUS 2 TABLET(S): 8.6 TABLET ORAL at 21:18

## 2022-01-01 RX ADMIN — Medication 1 APPLICATION(S): at 06:39

## 2022-01-01 RX ADMIN — Medication 80 MILLIGRAM(S): at 18:16

## 2022-01-01 RX ADMIN — Medication 2 SPRAY(S): at 06:30

## 2022-01-01 RX ADMIN — Medication 1 APPLICATION(S): at 06:24

## 2022-01-01 RX ADMIN — PANTOPRAZOLE SODIUM 40 MILLIGRAM(S): 20 TABLET, DELAYED RELEASE ORAL at 06:32

## 2022-01-01 RX ADMIN — PANTOPRAZOLE SODIUM 40 MILLIGRAM(S): 20 TABLET, DELAYED RELEASE ORAL at 06:03

## 2022-01-01 RX ADMIN — Medication 200 MILLIGRAM(S): at 06:39

## 2022-01-01 RX ADMIN — Medication 500 MICROGRAM(S): at 09:07

## 2022-01-01 RX ADMIN — BENZOCAINE AND MENTHOL 2 LOZENGE: 5; 1 LIQUID ORAL at 23:03

## 2022-01-01 RX ADMIN — Medication 500 MICROGRAM(S): at 16:24

## 2022-01-01 RX ADMIN — Medication 2 SPRAY(S): at 17:59

## 2022-01-01 RX ADMIN — Medication 500 MICROGRAM(S): at 05:43

## 2022-01-01 RX ADMIN — Medication 500 MICROGRAM(S): at 18:33

## 2022-01-01 RX ADMIN — Medication 50 MILLIGRAM(S): at 17:04

## 2022-01-01 RX ADMIN — Medication 500 MICROGRAM(S): at 22:32

## 2022-01-01 RX ADMIN — BENZOCAINE AND MENTHOL 2 LOZENGE: 5; 1 LIQUID ORAL at 00:11

## 2022-01-01 RX ADMIN — Medication 40 MILLIGRAM(S): at 06:33

## 2022-01-01 RX ADMIN — BENZOCAINE AND MENTHOL 2 LOZENGE: 5; 1 LIQUID ORAL at 17:49

## 2022-01-01 RX ADMIN — BENZOCAINE AND MENTHOL 2 LOZENGE: 5; 1 LIQUID ORAL at 12:44

## 2022-01-01 RX ADMIN — Medication 4: at 22:34

## 2022-01-01 RX ADMIN — MILRINONE LACTATE 3.68 MICROGRAM(S)/KG/MIN: 1 INJECTION, SOLUTION INTRAVENOUS at 19:12

## 2022-01-01 RX ADMIN — SENNA PLUS 2 TABLET(S): 8.6 TABLET ORAL at 22:24

## 2022-01-01 RX ADMIN — Medication 500 MICROGRAM(S): at 06:21

## 2022-01-01 RX ADMIN — Medication 200 MILLIGRAM(S): at 01:23

## 2022-01-01 RX ADMIN — Medication 200 MILLIGRAM(S): at 00:05

## 2022-01-01 RX ADMIN — PIPERACILLIN AND TAZOBACTAM 25 GRAM(S): 4; .5 INJECTION, POWDER, LYOPHILIZED, FOR SOLUTION INTRAVENOUS at 21:16

## 2022-01-01 RX ADMIN — Medication 2 SPRAY(S): at 19:00

## 2022-01-01 RX ADMIN — Medication 500 MICROGRAM(S): at 09:50

## 2022-01-01 RX ADMIN — Medication 4: at 11:58

## 2022-01-01 RX ADMIN — ALBUTEROL 2.5 MILLIGRAM(S): 90 AEROSOL, METERED ORAL at 23:35

## 2022-01-01 RX ADMIN — Medication 500 MICROGRAM(S): at 16:00

## 2022-01-01 RX ADMIN — Medication 100 MILLIGRAM(S): at 12:31

## 2022-01-01 RX ADMIN — Medication 200 MILLIGRAM(S): at 06:20

## 2022-01-01 RX ADMIN — Medication 500 MICROGRAM(S): at 10:02

## 2022-01-01 RX ADMIN — Medication 40 MILLIGRAM(S): at 05:16

## 2022-01-01 RX ADMIN — Medication 20 MILLIGRAM(S): at 05:05

## 2022-01-01 RX ADMIN — Medication 1 APPLICATION(S): at 18:20

## 2022-01-01 RX ADMIN — BENZOCAINE AND MENTHOL 2 LOZENGE: 5; 1 LIQUID ORAL at 11:29

## 2022-01-01 RX ADMIN — Medication 1 APPLICATION(S): at 05:10

## 2022-01-01 RX ADMIN — POLYETHYLENE GLYCOL 3350 17 GRAM(S): 17 POWDER, FOR SOLUTION ORAL at 11:56

## 2022-01-01 RX ADMIN — ENOXAPARIN SODIUM 40 MILLIGRAM(S): 100 INJECTION SUBCUTANEOUS at 22:09

## 2022-01-01 RX ADMIN — Medication 4: at 12:35

## 2022-01-01 RX ADMIN — Medication 2 SPRAY(S): at 06:16

## 2022-01-01 RX ADMIN — Medication 500 MICROGRAM(S): at 17:42

## 2022-01-01 RX ADMIN — Medication 2: at 06:53

## 2022-01-01 RX ADMIN — PANTOPRAZOLE SODIUM 40 MILLIGRAM(S): 20 TABLET, DELAYED RELEASE ORAL at 06:26

## 2022-01-01 RX ADMIN — Medication 2 SPRAY(S): at 17:02

## 2022-01-01 RX ADMIN — BENZOCAINE AND MENTHOL 2 LOZENGE: 5; 1 LIQUID ORAL at 23:27

## 2022-01-01 RX ADMIN — TAMSULOSIN HYDROCHLORIDE 0.4 MILLIGRAM(S): 0.4 CAPSULE ORAL at 21:10

## 2022-01-01 RX ADMIN — Medication 2: at 16:48

## 2022-01-01 RX ADMIN — Medication 40 MILLIGRAM(S): at 19:17

## 2022-01-01 RX ADMIN — Medication 1 TABLET(S): at 18:27

## 2022-01-01 RX ADMIN — Medication 4: at 11:53

## 2022-01-01 RX ADMIN — SODIUM ZIRCONIUM CYCLOSILICATE 5 GRAM(S): 10 POWDER, FOR SUSPENSION ORAL at 07:04

## 2022-01-01 RX ADMIN — BENZOCAINE AND MENTHOL 2 LOZENGE: 5; 1 LIQUID ORAL at 12:12

## 2022-01-01 RX ADMIN — Medication 500000 UNIT(S): at 05:58

## 2022-01-01 RX ADMIN — Medication 500000 UNIT(S): at 21:20

## 2022-01-01 RX ADMIN — Medication 2 SPRAY(S): at 06:45

## 2022-01-01 RX ADMIN — Medication 200 MILLIGRAM(S): at 22:01

## 2022-01-01 RX ADMIN — Medication 40 MILLIGRAM(S): at 06:42

## 2022-01-01 RX ADMIN — Medication 1 APPLICATION(S): at 17:19

## 2022-01-01 RX ADMIN — Medication 500 MICROGRAM(S): at 06:38

## 2022-01-01 RX ADMIN — Medication 2: at 22:56

## 2022-01-01 RX ADMIN — Medication 2: at 11:00

## 2022-01-01 RX ADMIN — Medication 500 MICROGRAM(S): at 17:38

## 2022-01-01 RX ADMIN — Medication 500 MICROGRAM(S): at 16:11

## 2022-01-01 RX ADMIN — Medication 200 MILLIGRAM(S): at 11:42

## 2022-01-01 RX ADMIN — Medication 2 SPRAY(S): at 06:03

## 2022-01-01 RX ADMIN — CHLORHEXIDINE GLUCONATE 1 APPLICATION(S): 213 SOLUTION TOPICAL at 11:07

## 2022-01-01 RX ADMIN — Medication 200 MILLIGRAM(S): at 00:09

## 2022-01-01 RX ADMIN — BENZOCAINE AND MENTHOL 2 LOZENGE: 5; 1 LIQUID ORAL at 06:07

## 2022-01-01 RX ADMIN — Medication 200 MILLIGRAM(S): at 12:09

## 2022-01-01 RX ADMIN — Medication 200 MILLIGRAM(S): at 02:28

## 2022-01-01 RX ADMIN — PANTOPRAZOLE SODIUM 40 MILLIGRAM(S): 20 TABLET, DELAYED RELEASE ORAL at 06:25

## 2022-01-01 RX ADMIN — Medication 1 APPLICATION(S): at 18:01

## 2022-01-01 RX ADMIN — Medication 500 MICROGRAM(S): at 09:05

## 2022-01-01 RX ADMIN — Medication 500 MICROGRAM(S): at 21:32

## 2022-01-01 RX ADMIN — Medication 500 MICROGRAM(S): at 16:35

## 2022-01-01 RX ADMIN — Medication 40 MILLIGRAM(S): at 00:16

## 2022-01-01 RX ADMIN — Medication 200 MILLIGRAM(S): at 00:00

## 2022-01-01 RX ADMIN — Medication 500 MICROGRAM(S): at 21:59

## 2022-01-01 RX ADMIN — Medication 200 MILLIGRAM(S): at 06:14

## 2022-01-01 RX ADMIN — Medication 200 MILLIGRAM(S): at 04:39

## 2022-01-01 RX ADMIN — Medication 200 MILLIGRAM(S): at 20:15

## 2022-01-01 RX ADMIN — ENOXAPARIN SODIUM 40 MILLIGRAM(S): 100 INJECTION SUBCUTANEOUS at 21:31

## 2022-01-01 RX ADMIN — Medication 1 APPLICATION(S): at 18:03

## 2022-01-01 RX ADMIN — ENOXAPARIN SODIUM 40 MILLIGRAM(S): 100 INJECTION SUBCUTANEOUS at 22:05

## 2022-01-01 RX ADMIN — TAMSULOSIN HYDROCHLORIDE 0.4 MILLIGRAM(S): 0.4 CAPSULE ORAL at 22:33

## 2022-01-01 RX ADMIN — ENOXAPARIN SODIUM 40 MILLIGRAM(S): 100 INJECTION SUBCUTANEOUS at 21:11

## 2022-01-01 RX ADMIN — Medication 200 MILLIGRAM(S): at 17:36

## 2022-01-01 RX ADMIN — Medication 500 MICROGRAM(S): at 05:51

## 2022-01-01 RX ADMIN — Medication 1 TABLET(S): at 01:22

## 2022-01-01 RX ADMIN — Medication 500000 UNIT(S): at 21:07

## 2022-01-01 RX ADMIN — Medication 500 MICROGRAM(S): at 22:24

## 2022-01-01 RX ADMIN — TAMSULOSIN HYDROCHLORIDE 0.4 MILLIGRAM(S): 0.4 CAPSULE ORAL at 21:28

## 2022-01-01 RX ADMIN — Medication 20 MILLIGRAM(S): at 12:16

## 2022-01-01 RX ADMIN — Medication 500 MICROGRAM(S): at 04:38

## 2022-01-01 RX ADMIN — TAMSULOSIN HYDROCHLORIDE 0.4 MILLIGRAM(S): 0.4 CAPSULE ORAL at 22:09

## 2022-01-01 RX ADMIN — Medication 2 SPRAY(S): at 18:20

## 2022-01-01 RX ADMIN — Medication 40 MILLIGRAM(S): at 11:50

## 2022-01-01 RX ADMIN — Medication 2 SPRAY(S): at 17:31

## 2022-01-01 RX ADMIN — Medication 1 TABLET(S): at 12:44

## 2022-01-01 RX ADMIN — BENZOCAINE AND MENTHOL 2 LOZENGE: 5; 1 LIQUID ORAL at 00:10

## 2022-01-01 RX ADMIN — PIPERACILLIN AND TAZOBACTAM 25 GRAM(S): 4; .5 INJECTION, POWDER, LYOPHILIZED, FOR SOLUTION INTRAVENOUS at 21:05

## 2022-01-01 RX ADMIN — Medication 40 MILLIGRAM(S): at 17:58

## 2022-01-01 RX ADMIN — Medication 3 MILLIGRAM(S): at 04:45

## 2022-01-01 RX ADMIN — BENZOCAINE AND MENTHOL 2 LOZENGE: 5; 1 LIQUID ORAL at 12:08

## 2022-01-01 RX ADMIN — Medication 500 MICROGRAM(S): at 01:54

## 2022-01-01 RX ADMIN — Medication 4: at 17:03

## 2022-01-01 RX ADMIN — Medication 200 MILLIGRAM(S): at 12:44

## 2022-01-01 RX ADMIN — BENZOCAINE AND MENTHOL 2 LOZENGE: 5; 1 LIQUID ORAL at 23:08

## 2022-01-01 RX ADMIN — Medication 1 TABLET(S): at 11:50

## 2022-01-01 RX ADMIN — Medication 2 SPRAY(S): at 18:34

## 2022-01-01 RX ADMIN — SENNA PLUS 2 TABLET(S): 8.6 TABLET ORAL at 22:01

## 2022-01-01 RX ADMIN — Medication 2 SPRAY(S): at 17:00

## 2022-01-01 RX ADMIN — Medication 2 SPRAY(S): at 06:35

## 2022-01-01 RX ADMIN — TAMSULOSIN HYDROCHLORIDE 0.4 MILLIGRAM(S): 0.4 CAPSULE ORAL at 21:47

## 2022-01-01 RX ADMIN — Medication 2 SPRAY(S): at 05:19

## 2022-01-01 RX ADMIN — SODIUM CHLORIDE 4 MILLILITER(S): 9 INJECTION INTRAMUSCULAR; INTRAVENOUS; SUBCUTANEOUS at 23:49

## 2022-01-01 RX ADMIN — Medication 500 MICROGRAM(S): at 04:03

## 2022-01-01 RX ADMIN — Medication 6: at 18:02

## 2022-01-01 RX ADMIN — TAMSULOSIN HYDROCHLORIDE 0.4 MILLIGRAM(S): 0.4 CAPSULE ORAL at 21:45

## 2022-01-01 RX ADMIN — Medication 8: at 11:49

## 2022-01-01 RX ADMIN — Medication 2: at 07:23

## 2022-01-01 RX ADMIN — Medication 200 MILLIGRAM(S): at 23:30

## 2022-01-01 RX ADMIN — Medication 650 MILLIGRAM(S): at 21:51

## 2022-01-01 RX ADMIN — Medication 200 MILLIGRAM(S): at 17:58

## 2022-01-01 RX ADMIN — MILRINONE LACTATE 3.68 MICROGRAM(S)/KG/MIN: 1 INJECTION, SOLUTION INTRAVENOUS at 21:14

## 2022-01-01 RX ADMIN — BENZOCAINE AND MENTHOL 2 LOZENGE: 5; 1 LIQUID ORAL at 17:47

## 2022-01-01 RX ADMIN — ENOXAPARIN SODIUM 40 MILLIGRAM(S): 100 INJECTION SUBCUTANEOUS at 22:01

## 2022-01-01 RX ADMIN — Medication 500000 UNIT(S): at 21:27

## 2022-01-01 RX ADMIN — Medication 200 MILLIGRAM(S): at 04:19

## 2022-01-01 RX ADMIN — Medication 500 MICROGRAM(S): at 05:19

## 2022-01-01 RX ADMIN — Medication 1 APPLICATION(S): at 17:36

## 2022-01-01 RX ADMIN — POLYETHYLENE GLYCOL 3350 17 GRAM(S): 17 POWDER, FOR SOLUTION ORAL at 11:31

## 2022-01-01 RX ADMIN — Medication 2: at 17:26

## 2022-01-01 RX ADMIN — ENOXAPARIN SODIUM 40 MILLIGRAM(S): 100 INJECTION SUBCUTANEOUS at 21:47

## 2022-01-01 RX ADMIN — Medication 500 MICROGRAM(S): at 17:50

## 2022-01-01 RX ADMIN — ENOXAPARIN SODIUM 40 MILLIGRAM(S): 100 INJECTION SUBCUTANEOUS at 22:41

## 2022-01-01 RX ADMIN — Medication 20 MILLIGRAM(S): at 09:25

## 2022-01-01 RX ADMIN — Medication 500 MICROGRAM(S): at 05:09

## 2022-01-01 RX ADMIN — POLYETHYLENE GLYCOL 3350 17 GRAM(S): 17 POWDER, FOR SOLUTION ORAL at 11:38

## 2022-01-01 RX ADMIN — Medication 500 MICROGRAM(S): at 04:26

## 2022-01-01 RX ADMIN — Medication 1 TABLET(S): at 11:49

## 2022-01-01 RX ADMIN — Medication 500000 UNIT(S): at 14:20

## 2022-01-01 RX ADMIN — BENZOCAINE AND MENTHOL 2 LOZENGE: 5; 1 LIQUID ORAL at 06:42

## 2022-01-01 RX ADMIN — MILRINONE LACTATE 3.68 MICROGRAM(S)/KG/MIN: 1 INJECTION, SOLUTION INTRAVENOUS at 19:28

## 2022-01-01 RX ADMIN — Medication 500 MICROGRAM(S): at 15:40

## 2022-01-01 RX ADMIN — Medication 500 MICROGRAM(S): at 06:05

## 2022-01-01 RX ADMIN — Medication 200 MILLIGRAM(S): at 05:56

## 2022-01-01 RX ADMIN — Medication 1 APPLICATION(S): at 17:32

## 2022-01-01 RX ADMIN — Medication 60 MILLIGRAM(S): at 06:32

## 2022-01-01 RX ADMIN — BENZOCAINE AND MENTHOL 2 LOZENGE: 5; 1 LIQUID ORAL at 18:02

## 2022-01-01 RX ADMIN — BENZOCAINE AND MENTHOL 2 LOZENGE: 5; 1 LIQUID ORAL at 17:50

## 2022-01-01 RX ADMIN — Medication 500 MICROGRAM(S): at 00:29

## 2022-01-01 RX ADMIN — Medication 2: at 07:13

## 2022-01-01 RX ADMIN — BENZOCAINE AND MENTHOL 2 LOZENGE: 5; 1 LIQUID ORAL at 23:26

## 2022-01-01 RX ADMIN — Medication 200 MILLIGRAM(S): at 06:18

## 2022-01-01 RX ADMIN — Medication 200 MILLIGRAM(S): at 11:32

## 2022-01-01 RX ADMIN — Medication 2: at 11:11

## 2022-01-01 RX ADMIN — BENZOCAINE AND MENTHOL 2 LOZENGE: 5; 1 LIQUID ORAL at 17:22

## 2022-01-01 RX ADMIN — Medication 40 MILLIGRAM(S): at 17:47

## 2022-01-01 RX ADMIN — BENZOCAINE AND MENTHOL 2 LOZENGE: 5; 1 LIQUID ORAL at 17:18

## 2022-01-01 RX ADMIN — POLYETHYLENE GLYCOL 3350 17 GRAM(S): 17 POWDER, FOR SOLUTION ORAL at 13:13

## 2022-01-01 RX ADMIN — BENZOCAINE AND MENTHOL 2 LOZENGE: 5; 1 LIQUID ORAL at 05:58

## 2022-01-01 RX ADMIN — ENOXAPARIN SODIUM 40 MILLIGRAM(S): 100 INJECTION SUBCUTANEOUS at 21:41

## 2022-01-01 RX ADMIN — Medication 4: at 11:57

## 2022-01-01 RX ADMIN — Medication 40 MILLIGRAM(S): at 00:00

## 2022-01-01 RX ADMIN — Medication 200 MILLIGRAM(S): at 19:04

## 2022-01-01 RX ADMIN — CHLORHEXIDINE GLUCONATE 1 APPLICATION(S): 213 SOLUTION TOPICAL at 11:20

## 2022-01-01 RX ADMIN — Medication 2 SPRAY(S): at 05:14

## 2022-01-01 RX ADMIN — Medication 20 MILLIGRAM(S): at 06:45

## 2022-01-01 RX ADMIN — POLYETHYLENE GLYCOL 3350 17 GRAM(S): 17 POWDER, FOR SOLUTION ORAL at 10:30

## 2022-01-01 RX ADMIN — Medication 500 MICROGRAM(S): at 22:07

## 2022-01-01 RX ADMIN — TAMSULOSIN HYDROCHLORIDE 0.4 MILLIGRAM(S): 0.4 CAPSULE ORAL at 21:20

## 2022-01-01 RX ADMIN — Medication 1 TABLET(S): at 17:02

## 2022-01-01 RX ADMIN — SENNA PLUS 2 TABLET(S): 8.6 TABLET ORAL at 22:33

## 2022-01-01 RX ADMIN — Medication 200 MILLIGRAM(S): at 17:22

## 2022-01-01 RX ADMIN — Medication 200 MILLIGRAM(S): at 19:05

## 2022-01-01 RX ADMIN — BENZOCAINE AND MENTHOL 2 LOZENGE: 5; 1 LIQUID ORAL at 11:47

## 2022-01-01 RX ADMIN — Medication 6: at 10:39

## 2022-01-01 RX ADMIN — BENZOCAINE AND MENTHOL 2 LOZENGE: 5; 1 LIQUID ORAL at 11:42

## 2022-01-01 RX ADMIN — Medication 500000 UNIT(S): at 05:13

## 2022-01-01 RX ADMIN — Medication 2 SPRAY(S): at 05:05

## 2022-01-01 RX ADMIN — Medication 6: at 12:07

## 2022-01-01 RX ADMIN — Medication 2: at 21:34

## 2022-01-01 RX ADMIN — Medication 500 MICROGRAM(S): at 17:24

## 2022-01-01 RX ADMIN — BENZOCAINE AND MENTHOL 2 LOZENGE: 5; 1 LIQUID ORAL at 20:38

## 2022-01-01 RX ADMIN — Medication 500 MICROGRAM(S): at 05:18

## 2022-01-01 RX ADMIN — Medication 200 MILLIGRAM(S): at 13:37

## 2022-01-01 RX ADMIN — ALBUTEROL 2.5 MILLIGRAM(S): 90 AEROSOL, METERED ORAL at 17:16

## 2022-01-01 RX ADMIN — Medication 1 TABLET(S): at 17:49

## 2022-01-01 RX ADMIN — Medication 40 MILLIGRAM(S): at 10:30

## 2022-01-01 RX ADMIN — Medication 2 SPRAY(S): at 06:52

## 2022-01-01 RX ADMIN — PANTOPRAZOLE SODIUM 40 MILLIGRAM(S): 20 TABLET, DELAYED RELEASE ORAL at 06:35

## 2022-01-01 RX ADMIN — Medication 500 MICROGRAM(S): at 17:23

## 2022-01-01 RX ADMIN — Medication 200 MILLIGRAM(S): at 06:40

## 2022-01-01 RX ADMIN — BENZOCAINE AND MENTHOL 2 LOZENGE: 5; 1 LIQUID ORAL at 11:52

## 2022-01-01 RX ADMIN — ENOXAPARIN SODIUM 40 MILLIGRAM(S): 100 INJECTION SUBCUTANEOUS at 21:33

## 2022-01-01 RX ADMIN — Medication 3 MILLILITER(S): at 11:25

## 2022-01-01 RX ADMIN — ENOXAPARIN SODIUM 40 MILLIGRAM(S): 100 INJECTION SUBCUTANEOUS at 21:36

## 2022-01-01 RX ADMIN — Medication 100 MILLIGRAM(S): at 12:32

## 2022-01-01 RX ADMIN — Medication 6: at 21:28

## 2022-01-01 RX ADMIN — Medication 2: at 22:52

## 2022-01-01 RX ADMIN — BENZOCAINE AND MENTHOL 2 LOZENGE: 5; 1 LIQUID ORAL at 11:30

## 2022-01-01 RX ADMIN — SODIUM CHLORIDE 4 MILLILITER(S): 9 INJECTION INTRAMUSCULAR; INTRAVENOUS; SUBCUTANEOUS at 21:55

## 2022-01-01 RX ADMIN — Medication 8: at 12:12

## 2022-01-01 RX ADMIN — Medication 40 MILLIGRAM(S): at 05:50

## 2022-01-01 RX ADMIN — Medication 500 MICROGRAM(S): at 12:07

## 2022-01-01 RX ADMIN — BENZOCAINE AND MENTHOL 2 LOZENGE: 5; 1 LIQUID ORAL at 00:55

## 2022-01-01 RX ADMIN — PANTOPRAZOLE SODIUM 40 MILLIGRAM(S): 20 TABLET, DELAYED RELEASE ORAL at 06:49

## 2022-01-01 RX ADMIN — Medication 4: at 22:21

## 2022-01-01 RX ADMIN — Medication 200 MILLIGRAM(S): at 11:49

## 2022-01-01 RX ADMIN — OXYMETAZOLINE HYDROCHLORIDE 1 SPRAY(S): 0.5 SPRAY NASAL at 06:39

## 2022-01-01 RX ADMIN — BENZOCAINE AND MENTHOL 2 LOZENGE: 5; 1 LIQUID ORAL at 17:25

## 2022-01-01 RX ADMIN — Medication 500 MICROGRAM(S): at 09:09

## 2022-01-01 RX ADMIN — BENZOCAINE AND MENTHOL 2 LOZENGE: 5; 1 LIQUID ORAL at 06:38

## 2022-01-01 RX ADMIN — PANTOPRAZOLE SODIUM 40 MILLIGRAM(S): 20 TABLET, DELAYED RELEASE ORAL at 11:25

## 2022-01-01 RX ADMIN — BENZOCAINE AND MENTHOL 2 LOZENGE: 5; 1 LIQUID ORAL at 06:32

## 2022-01-01 RX ADMIN — Medication 200 MILLIGRAM(S): at 13:18

## 2022-01-01 RX ADMIN — Medication 2 SPRAY(S): at 17:30

## 2022-01-01 RX ADMIN — Medication 200 MILLIGRAM(S): at 17:49

## 2022-01-01 RX ADMIN — PANTOPRAZOLE SODIUM 40 MILLIGRAM(S): 20 TABLET, DELAYED RELEASE ORAL at 06:01

## 2022-01-01 RX ADMIN — Medication 1 APPLICATION(S): at 17:49

## 2022-01-01 RX ADMIN — ENOXAPARIN SODIUM 40 MILLIGRAM(S): 100 INJECTION SUBCUTANEOUS at 21:13

## 2022-01-01 RX ADMIN — Medication 200 MILLIGRAM(S): at 19:51

## 2022-01-01 RX ADMIN — Medication 500 MICROGRAM(S): at 04:41

## 2022-01-01 RX ADMIN — Medication 500 MICROGRAM(S): at 05:56

## 2022-01-01 RX ADMIN — Medication 1 APPLICATION(S): at 06:02

## 2022-01-01 RX ADMIN — ALBUTEROL 2.5 MILLIGRAM(S): 90 AEROSOL, METERED ORAL at 09:27

## 2022-01-01 RX ADMIN — Medication 2 SPRAY(S): at 17:07

## 2022-01-01 RX ADMIN — Medication 200 MILLIGRAM(S): at 12:12

## 2022-01-01 RX ADMIN — Medication 30 MILLIGRAM(S): at 09:19

## 2022-01-01 RX ADMIN — Medication 4: at 11:55

## 2022-01-01 RX ADMIN — BENZOCAINE AND MENTHOL 2 LOZENGE: 5; 1 LIQUID ORAL at 00:04

## 2022-01-01 RX ADMIN — INSULIN GLARGINE 4 UNIT(S): 100 INJECTION, SOLUTION SUBCUTANEOUS at 22:15

## 2022-01-01 RX ADMIN — Medication 20 MILLIGRAM(S): at 05:55

## 2022-01-01 RX ADMIN — BENZOCAINE AND MENTHOL 2 LOZENGE: 5; 1 LIQUID ORAL at 00:03

## 2022-01-01 RX ADMIN — BENZOCAINE AND MENTHOL 2 LOZENGE: 5; 1 LIQUID ORAL at 18:03

## 2022-01-01 RX ADMIN — Medication 20 MILLIGRAM(S): at 06:31

## 2022-01-01 RX ADMIN — Medication 6: at 22:13

## 2022-01-01 RX ADMIN — Medication 1 APPLICATION(S): at 06:45

## 2022-01-01 RX ADMIN — Medication 2 SPRAY(S): at 17:42

## 2022-01-01 RX ADMIN — Medication 500 MICROGRAM(S): at 18:58

## 2022-01-01 RX ADMIN — Medication 2: at 22:35

## 2022-01-01 RX ADMIN — Medication 1 TABLET(S): at 17:58

## 2022-01-01 RX ADMIN — BENZOCAINE AND MENTHOL 2 LOZENGE: 5; 1 LIQUID ORAL at 18:58

## 2022-01-01 RX ADMIN — Medication 200 MILLIGRAM(S): at 05:50

## 2022-01-01 RX ADMIN — PANTOPRAZOLE SODIUM 40 MILLIGRAM(S): 20 TABLET, DELAYED RELEASE ORAL at 07:19

## 2022-01-01 RX ADMIN — Medication 500 MICROGRAM(S): at 09:46

## 2022-01-01 RX ADMIN — Medication 6: at 11:00

## 2022-01-01 RX ADMIN — Medication 30 MILLIGRAM(S): at 05:59

## 2022-01-01 RX ADMIN — Medication 200 MILLIGRAM(S): at 19:25

## 2022-01-01 RX ADMIN — Medication 500 MICROGRAM(S): at 04:14

## 2022-01-01 RX ADMIN — Medication 200 MILLIGRAM(S): at 16:13

## 2022-01-01 RX ADMIN — BENZOCAINE AND MENTHOL 2 LOZENGE: 5; 1 LIQUID ORAL at 17:01

## 2022-01-01 RX ADMIN — PANTOPRAZOLE SODIUM 40 MILLIGRAM(S): 20 TABLET, DELAYED RELEASE ORAL at 06:42

## 2022-01-01 RX ADMIN — Medication 500 MICROGRAM(S): at 21:47

## 2022-01-01 RX ADMIN — Medication 10 MILLIGRAM(S): at 05:08

## 2022-01-01 RX ADMIN — Medication 40 MILLIGRAM(S): at 18:28

## 2022-01-01 RX ADMIN — Medication 200 MILLIGRAM(S): at 14:20

## 2022-01-01 RX ADMIN — Medication 40 MILLIGRAM(S): at 23:52

## 2022-01-01 RX ADMIN — BENZOCAINE AND MENTHOL 2 LOZENGE: 5; 1 LIQUID ORAL at 17:20

## 2022-01-01 RX ADMIN — BENZOCAINE AND MENTHOL 2 LOZENGE: 5; 1 LIQUID ORAL at 06:25

## 2022-01-01 RX ADMIN — SENNA PLUS 2 TABLET(S): 8.6 TABLET ORAL at 21:21

## 2022-01-01 RX ADMIN — Medication 40 MILLIGRAM(S): at 11:48

## 2022-01-01 RX ADMIN — Medication 200 MILLIGRAM(S): at 23:03

## 2022-01-01 RX ADMIN — Medication 2: at 06:21

## 2022-01-01 RX ADMIN — Medication 2 SPRAY(S): at 06:26

## 2022-01-01 RX ADMIN — Medication 40 MILLIGRAM(S): at 05:03

## 2022-01-01 RX ADMIN — Medication 200 MILLIGRAM(S): at 11:47

## 2022-01-01 RX ADMIN — Medication 30 MILLIGRAM(S): at 05:18

## 2022-01-01 RX ADMIN — Medication 500 MICROGRAM(S): at 11:58

## 2022-01-01 RX ADMIN — Medication 500 MICROGRAM(S): at 16:14

## 2022-01-01 RX ADMIN — BENZOCAINE AND MENTHOL 2 LOZENGE: 5; 1 LIQUID ORAL at 05:55

## 2022-01-01 RX ADMIN — Medication 200 MILLIGRAM(S): at 06:15

## 2022-01-01 RX ADMIN — Medication 20 MILLIGRAM(S): at 10:38

## 2022-01-01 RX ADMIN — PANTOPRAZOLE SODIUM 40 MILLIGRAM(S): 20 TABLET, DELAYED RELEASE ORAL at 06:40

## 2022-01-01 RX ADMIN — SODIUM CHLORIDE 4 MILLILITER(S): 9 INJECTION INTRAMUSCULAR; INTRAVENOUS; SUBCUTANEOUS at 16:08

## 2022-01-01 RX ADMIN — SENNA PLUS 2 TABLET(S): 8.6 TABLET ORAL at 21:48

## 2022-01-01 RX ADMIN — Medication 40 MILLIGRAM(S): at 18:16

## 2022-01-01 RX ADMIN — Medication 20 MILLIGRAM(S): at 07:13

## 2022-01-01 RX ADMIN — Medication 500 MICROGRAM(S): at 16:23

## 2022-01-01 RX ADMIN — Medication 200 MILLIGRAM(S): at 11:59

## 2022-01-01 RX ADMIN — Medication 200 MILLIGRAM(S): at 04:58

## 2022-01-01 RX ADMIN — Medication 2 SPRAY(S): at 19:17

## 2022-01-01 RX ADMIN — PANTOPRAZOLE SODIUM 40 MILLIGRAM(S): 20 TABLET, DELAYED RELEASE ORAL at 06:31

## 2022-01-01 RX ADMIN — POLYETHYLENE GLYCOL 3350 17 GRAM(S): 17 POWDER, FOR SOLUTION ORAL at 12:10

## 2022-01-01 RX ADMIN — Medication 200 MILLIGRAM(S): at 12:10

## 2022-01-01 RX ADMIN — BENZOCAINE AND MENTHOL 2 LOZENGE: 5; 1 LIQUID ORAL at 06:20

## 2022-01-01 RX ADMIN — Medication 2 SPRAY(S): at 18:07

## 2022-01-01 RX ADMIN — Medication 2 SPRAY(S): at 14:12

## 2022-01-01 RX ADMIN — Medication 2 SPRAY(S): at 06:24

## 2022-01-01 RX ADMIN — BENZOCAINE AND MENTHOL 2 LOZENGE: 5; 1 LIQUID ORAL at 11:45

## 2022-01-01 RX ADMIN — PANTOPRAZOLE SODIUM 40 MILLIGRAM(S): 20 TABLET, DELAYED RELEASE ORAL at 06:36

## 2022-01-01 RX ADMIN — Medication 4: at 23:20

## 2022-01-01 RX ADMIN — Medication 200 MILLIGRAM(S): at 16:29

## 2022-01-01 RX ADMIN — Medication 200 MILLIGRAM(S): at 05:20

## 2022-01-01 RX ADMIN — Medication 2: at 12:08

## 2022-01-01 RX ADMIN — Medication 2 SPRAY(S): at 17:05

## 2022-01-01 RX ADMIN — ALBUTEROL 2.5 MILLIGRAM(S): 90 AEROSOL, METERED ORAL at 22:45

## 2022-01-01 RX ADMIN — Medication 500000 UNIT(S): at 05:50

## 2022-01-01 RX ADMIN — Medication 200 MILLIGRAM(S): at 05:00

## 2022-01-01 RX ADMIN — Medication 200 MILLIGRAM(S): at 18:48

## 2022-01-01 RX ADMIN — Medication 200 MILLIGRAM(S): at 06:31

## 2022-01-01 RX ADMIN — Medication 500 MICROGRAM(S): at 17:44

## 2022-01-01 RX ADMIN — ALBUTEROL 2.5 MILLIGRAM(S): 90 AEROSOL, METERED ORAL at 04:30

## 2022-01-01 RX ADMIN — Medication 200 MILLIGRAM(S): at 14:01

## 2022-01-01 RX ADMIN — BENZOCAINE AND MENTHOL 2 LOZENGE: 5; 1 LIQUID ORAL at 11:59

## 2022-01-01 RX ADMIN — Medication 40 MILLIGRAM(S): at 18:49

## 2022-01-01 RX ADMIN — Medication 2: at 12:35

## 2022-01-01 RX ADMIN — Medication 1 APPLICATION(S): at 18:56

## 2022-01-01 RX ADMIN — PIPERACILLIN AND TAZOBACTAM 25 GRAM(S): 4; .5 INJECTION, POWDER, LYOPHILIZED, FOR SOLUTION INTRAVENOUS at 13:17

## 2022-01-01 RX ADMIN — Medication 20 MILLIGRAM(S): at 14:22

## 2022-01-01 RX ADMIN — Medication 100 MILLIGRAM(S): at 07:13

## 2022-01-01 RX ADMIN — SODIUM CHLORIDE 250 MILLILITER(S): 9 INJECTION INTRAMUSCULAR; INTRAVENOUS; SUBCUTANEOUS at 10:19

## 2022-01-01 RX ADMIN — TAMSULOSIN HYDROCHLORIDE 0.4 MILLIGRAM(S): 0.4 CAPSULE ORAL at 21:36

## 2022-01-01 RX ADMIN — Medication 2: at 21:53

## 2022-01-01 RX ADMIN — Medication 200 MILLIGRAM(S): at 05:18

## 2022-01-01 RX ADMIN — Medication 200 MILLIGRAM(S): at 19:27

## 2022-01-01 RX ADMIN — Medication 4: at 12:08

## 2022-01-01 RX ADMIN — Medication 3 MILLILITER(S): at 05:03

## 2022-01-01 RX ADMIN — Medication 500 MICROGRAM(S): at 21:48

## 2022-01-01 RX ADMIN — BENZOCAINE AND MENTHOL 2 LOZENGE: 5; 1 LIQUID ORAL at 13:09

## 2022-01-01 RX ADMIN — Medication 4: at 12:52

## 2022-01-01 RX ADMIN — BENZOCAINE AND MENTHOL 2 LOZENGE: 5; 1 LIQUID ORAL at 06:26

## 2022-01-01 RX ADMIN — Medication 200 MILLIGRAM(S): at 04:16

## 2022-01-01 RX ADMIN — Medication 200 MILLIGRAM(S): at 12:17

## 2022-01-01 RX ADMIN — PANTOPRAZOLE SODIUM 40 MILLIGRAM(S): 20 TABLET, DELAYED RELEASE ORAL at 06:20

## 2022-01-01 RX ADMIN — BENZOCAINE AND MENTHOL 2 LOZENGE: 5; 1 LIQUID ORAL at 06:39

## 2022-01-01 RX ADMIN — Medication 1 TABLET(S): at 17:14

## 2022-01-01 RX ADMIN — Medication 200 MILLIGRAM(S): at 04:04

## 2022-01-01 RX ADMIN — BENZOCAINE AND MENTHOL 2 LOZENGE: 5; 1 LIQUID ORAL at 00:15

## 2022-01-01 RX ADMIN — TAMSULOSIN HYDROCHLORIDE 0.4 MILLIGRAM(S): 0.4 CAPSULE ORAL at 21:49

## 2022-01-01 RX ADMIN — Medication 500 MICROGRAM(S): at 22:04

## 2022-01-01 RX ADMIN — Medication 1 APPLICATION(S): at 05:21

## 2022-01-01 RX ADMIN — BENZOCAINE AND MENTHOL 2 LOZENGE: 5; 1 LIQUID ORAL at 06:24

## 2022-01-01 RX ADMIN — Medication 500000 UNIT(S): at 14:48

## 2022-01-01 RX ADMIN — Medication 2: at 23:23

## 2022-01-01 RX ADMIN — PIPERACILLIN AND TAZOBACTAM 25 GRAM(S): 4; .5 INJECTION, POWDER, LYOPHILIZED, FOR SOLUTION INTRAVENOUS at 13:15

## 2022-01-01 RX ADMIN — TAMSULOSIN HYDROCHLORIDE 0.4 MILLIGRAM(S): 0.4 CAPSULE ORAL at 21:27

## 2022-01-01 RX ADMIN — Medication 2 SPRAY(S): at 17:20

## 2022-01-01 RX ADMIN — Medication 200 MILLIGRAM(S): at 23:51

## 2022-01-01 RX ADMIN — ENOXAPARIN SODIUM 40 MILLIGRAM(S): 100 INJECTION SUBCUTANEOUS at 22:26

## 2022-01-01 RX ADMIN — PIPERACILLIN AND TAZOBACTAM 25 GRAM(S): 4; .5 INJECTION, POWDER, LYOPHILIZED, FOR SOLUTION INTRAVENOUS at 13:35

## 2022-01-01 RX ADMIN — Medication 1 APPLICATION(S): at 17:53

## 2022-01-01 RX ADMIN — MILRINONE LACTATE 3.68 MICROGRAM(S)/KG/MIN: 1 INJECTION, SOLUTION INTRAVENOUS at 20:23

## 2022-01-01 RX ADMIN — Medication 6: at 11:33

## 2022-01-01 RX ADMIN — SENNA PLUS 2 TABLET(S): 8.6 TABLET ORAL at 21:31

## 2022-01-01 RX ADMIN — Medication 4: at 16:26

## 2022-01-01 RX ADMIN — Medication 4: at 12:11

## 2022-01-01 RX ADMIN — Medication 2: at 06:50

## 2022-01-01 RX ADMIN — POLYETHYLENE GLYCOL 3350 17 GRAM(S): 17 POWDER, FOR SOLUTION ORAL at 11:12

## 2022-01-01 RX ADMIN — BENZOCAINE AND MENTHOL 2 LOZENGE: 5; 1 LIQUID ORAL at 11:58

## 2022-01-01 RX ADMIN — Medication 2 SPRAY(S): at 05:43

## 2022-01-01 RX ADMIN — Medication 200 MILLIGRAM(S): at 12:49

## 2022-01-01 RX ADMIN — Medication 200 MILLIGRAM(S): at 11:29

## 2022-01-01 RX ADMIN — ENOXAPARIN SODIUM 40 MILLIGRAM(S): 100 INJECTION SUBCUTANEOUS at 22:42

## 2022-01-01 RX ADMIN — Medication 500 MICROGRAM(S): at 23:00

## 2022-01-01 RX ADMIN — Medication 30 MILLIGRAM(S): at 05:08

## 2022-01-01 RX ADMIN — ENOXAPARIN SODIUM 40 MILLIGRAM(S): 100 INJECTION SUBCUTANEOUS at 22:20

## 2022-01-01 RX ADMIN — Medication 5 MILLIGRAM(S): at 21:47

## 2022-01-01 RX ADMIN — Medication 4: at 18:35

## 2022-01-01 RX ADMIN — Medication 500 MICROGRAM(S): at 21:34

## 2022-01-01 RX ADMIN — Medication 5 MILLIGRAM(S): at 21:09

## 2022-01-01 RX ADMIN — Medication 30 MILLIGRAM(S): at 12:10

## 2022-01-01 RX ADMIN — PIPERACILLIN AND TAZOBACTAM 25 GRAM(S): 4; .5 INJECTION, POWDER, LYOPHILIZED, FOR SOLUTION INTRAVENOUS at 05:15

## 2022-01-01 RX ADMIN — Medication 1 TABLET(S): at 11:57

## 2022-01-01 RX ADMIN — BENZOCAINE AND MENTHOL 2 LOZENGE: 5; 1 LIQUID ORAL at 17:00

## 2022-01-01 RX ADMIN — Medication 200 MILLIGRAM(S): at 19:42

## 2022-01-01 RX ADMIN — MILRINONE LACTATE 3.68 MICROGRAM(S)/KG/MIN: 1 INJECTION, SOLUTION INTRAVENOUS at 23:13

## 2022-01-01 RX ADMIN — Medication 500 MICROGRAM(S): at 21:43

## 2022-01-01 RX ADMIN — BENZOCAINE AND MENTHOL 2 LOZENGE: 5; 1 LIQUID ORAL at 00:21

## 2022-01-01 RX ADMIN — Medication 3 MILLILITER(S): at 11:00

## 2022-01-01 RX ADMIN — BENZOCAINE AND MENTHOL 2 LOZENGE: 5; 1 LIQUID ORAL at 00:17

## 2022-01-01 RX ADMIN — Medication 2 SPRAY(S): at 06:33

## 2022-01-01 RX ADMIN — Medication 500 MICROGRAM(S): at 00:03

## 2022-01-01 RX ADMIN — Medication 40 MILLIGRAM(S): at 06:40

## 2022-01-01 RX ADMIN — Medication 500 MICROGRAM(S): at 10:45

## 2022-01-01 RX ADMIN — Medication 2 SPRAY(S): at 05:54

## 2022-01-01 RX ADMIN — Medication 1 APPLICATION(S): at 18:57

## 2022-01-01 RX ADMIN — BENZOCAINE AND MENTHOL 2 LOZENGE: 5; 1 LIQUID ORAL at 18:16

## 2022-01-01 RX ADMIN — POLYETHYLENE GLYCOL 3350 17 GRAM(S): 17 POWDER, FOR SOLUTION ORAL at 11:45

## 2022-01-01 RX ADMIN — Medication 200 MILLIGRAM(S): at 00:26

## 2022-01-01 RX ADMIN — Medication 100 MILLIGRAM(S): at 06:53

## 2022-01-01 RX ADMIN — Medication 4: at 12:03

## 2022-01-01 RX ADMIN — Medication 0.5 MILLIGRAM(S): at 21:55

## 2022-01-01 RX ADMIN — Medication 2 SPRAY(S): at 06:17

## 2022-01-01 RX ADMIN — Medication 200 MILLIGRAM(S): at 13:13

## 2022-01-01 RX ADMIN — Medication 1 TABLET(S): at 13:13

## 2022-01-01 RX ADMIN — Medication 1 APPLICATION(S): at 16:51

## 2022-01-01 RX ADMIN — Medication 200 MILLIGRAM(S): at 06:04

## 2022-01-01 RX ADMIN — BENZOCAINE AND MENTHOL 2 LOZENGE: 5; 1 LIQUID ORAL at 06:04

## 2022-01-01 RX ADMIN — Medication 500 MICROGRAM(S): at 03:51

## 2022-01-01 RX ADMIN — SENNA PLUS 2 TABLET(S): 8.6 TABLET ORAL at 22:03

## 2022-01-01 RX ADMIN — SODIUM CHLORIDE 4 MILLILITER(S): 9 INJECTION INTRAMUSCULAR; INTRAVENOUS; SUBCUTANEOUS at 17:16

## 2022-01-01 RX ADMIN — Medication 1 TABLET(S): at 17:42

## 2022-01-01 RX ADMIN — PIPERACILLIN AND TAZOBACTAM 25 GRAM(S): 4; .5 INJECTION, POWDER, LYOPHILIZED, FOR SOLUTION INTRAVENOUS at 13:07

## 2022-01-01 RX ADMIN — Medication 3 MILLILITER(S): at 23:45

## 2022-01-01 RX ADMIN — BENZOCAINE AND MENTHOL 2 LOZENGE: 5; 1 LIQUID ORAL at 23:54

## 2022-01-01 RX ADMIN — ENOXAPARIN SODIUM 40 MILLIGRAM(S): 100 INJECTION SUBCUTANEOUS at 22:25

## 2022-01-01 RX ADMIN — Medication 300 MILLIGRAM(S): at 06:38

## 2022-01-01 RX ADMIN — BENZOCAINE AND MENTHOL 2 LOZENGE: 5; 1 LIQUID ORAL at 06:34

## 2022-01-01 RX ADMIN — Medication 2 SPRAY(S): at 17:51

## 2022-01-01 RX ADMIN — Medication 500 MICROGRAM(S): at 09:28

## 2022-01-01 RX ADMIN — OXYMETAZOLINE HYDROCHLORIDE 1 SPRAY(S): 0.5 SPRAY NASAL at 17:30

## 2022-01-01 RX ADMIN — Medication 200 MILLIGRAM(S): at 06:38

## 2022-01-01 RX ADMIN — PANTOPRAZOLE SODIUM 40 MILLIGRAM(S): 20 TABLET, DELAYED RELEASE ORAL at 06:16

## 2022-01-01 RX ADMIN — Medication 2 SPRAY(S): at 17:26

## 2022-01-01 RX ADMIN — Medication 1 APPLICATION(S): at 17:38

## 2022-01-01 RX ADMIN — Medication 3 MILLILITER(S): at 23:08

## 2022-01-01 RX ADMIN — Medication 500 MICROGRAM(S): at 04:57

## 2022-01-01 RX ADMIN — Medication 500 MICROGRAM(S): at 05:14

## 2022-01-01 RX ADMIN — Medication 500000 UNIT(S): at 13:07

## 2022-01-01 RX ADMIN — TAMSULOSIN HYDROCHLORIDE 0.4 MILLIGRAM(S): 0.4 CAPSULE ORAL at 21:48

## 2022-01-01 RX ADMIN — POLYETHYLENE GLYCOL 3350 17 GRAM(S): 17 POWDER, FOR SOLUTION ORAL at 11:49

## 2022-01-01 RX ADMIN — Medication 200 MILLIGRAM(S): at 13:08

## 2022-01-01 RX ADMIN — Medication 60 MILLIGRAM(S): at 06:53

## 2022-01-01 RX ADMIN — Medication 1 APPLICATION(S): at 06:26

## 2022-01-01 RX ADMIN — TAMSULOSIN HYDROCHLORIDE 0.4 MILLIGRAM(S): 0.4 CAPSULE ORAL at 21:43

## 2022-01-01 RX ADMIN — Medication 500 MICROGRAM(S): at 21:33

## 2022-01-01 RX ADMIN — Medication 200 MILLIGRAM(S): at 05:48

## 2022-01-01 RX ADMIN — Medication 500 MICROGRAM(S): at 10:13

## 2022-01-01 RX ADMIN — Medication 1 TABLET(S): at 12:34

## 2022-01-01 RX ADMIN — Medication 500 MICROGRAM(S): at 17:03

## 2022-01-01 RX ADMIN — Medication 500 MICROGRAM(S): at 05:54

## 2022-01-01 RX ADMIN — Medication 2 SPRAY(S): at 18:33

## 2022-01-01 RX ADMIN — ENOXAPARIN SODIUM 40 MILLIGRAM(S): 100 INJECTION SUBCUTANEOUS at 21:27

## 2022-01-01 RX ADMIN — Medication 500 MICROGRAM(S): at 16:19

## 2022-01-01 RX ADMIN — TAMSULOSIN HYDROCHLORIDE 0.4 MILLIGRAM(S): 0.4 CAPSULE ORAL at 22:03

## 2022-01-01 RX ADMIN — SENNA PLUS 2 TABLET(S): 8.6 TABLET ORAL at 21:28

## 2022-01-01 RX ADMIN — Medication 500 MICROGRAM(S): at 22:41

## 2022-01-01 RX ADMIN — PANTOPRAZOLE SODIUM 40 MILLIGRAM(S): 20 TABLET, DELAYED RELEASE ORAL at 07:00

## 2022-01-01 RX ADMIN — Medication 500 MICROGRAM(S): at 17:58

## 2022-01-01 RX ADMIN — PIPERACILLIN AND TAZOBACTAM 25 GRAM(S): 4; .5 INJECTION, POWDER, LYOPHILIZED, FOR SOLUTION INTRAVENOUS at 14:09

## 2022-01-01 RX ADMIN — Medication 50 MILLIGRAM(S): at 06:54

## 2022-01-01 RX ADMIN — Medication 20 MILLIEQUIVALENT(S): at 06:47

## 2022-01-01 RX ADMIN — Medication 500 MICROGRAM(S): at 21:37

## 2022-01-01 RX ADMIN — Medication 500 MICROGRAM(S): at 11:48

## 2022-01-01 RX ADMIN — Medication 4: at 12:54

## 2022-01-01 RX ADMIN — BENZOCAINE AND MENTHOL 2 LOZENGE: 5; 1 LIQUID ORAL at 02:40

## 2022-01-01 RX ADMIN — PIPERACILLIN AND TAZOBACTAM 25 GRAM(S): 4; .5 INJECTION, POWDER, LYOPHILIZED, FOR SOLUTION INTRAVENOUS at 23:52

## 2022-01-01 RX ADMIN — Medication 1 TABLET(S): at 12:17

## 2022-01-01 RX ADMIN — Medication 30 MILLIGRAM(S): at 18:20

## 2022-01-01 RX ADMIN — Medication 200 MILLIGRAM(S): at 00:15

## 2022-01-01 RX ADMIN — Medication 4: at 17:10

## 2022-01-01 RX ADMIN — Medication 500 MICROGRAM(S): at 12:08

## 2022-01-01 RX ADMIN — Medication 500 MICROGRAM(S): at 01:22

## 2022-01-01 RX ADMIN — PANTOPRAZOLE SODIUM 40 MILLIGRAM(S): 20 TABLET, DELAYED RELEASE ORAL at 11:02

## 2022-01-01 RX ADMIN — Medication 2 SPRAY(S): at 16:48

## 2022-01-01 RX ADMIN — Medication 200 MILLIGRAM(S): at 12:07

## 2022-01-01 RX ADMIN — ENOXAPARIN SODIUM 40 MILLIGRAM(S): 100 INJECTION SUBCUTANEOUS at 21:18

## 2022-01-01 RX ADMIN — Medication 200 MILLIGRAM(S): at 00:11

## 2022-01-01 RX ADMIN — PANTOPRAZOLE SODIUM 40 MILLIGRAM(S): 20 TABLET, DELAYED RELEASE ORAL at 05:20

## 2022-01-01 RX ADMIN — Medication 500 MICROGRAM(S): at 13:17

## 2022-01-01 RX ADMIN — BENZOCAINE AND MENTHOL 2 LOZENGE: 5; 1 LIQUID ORAL at 05:52

## 2022-01-01 RX ADMIN — Medication 6: at 17:02

## 2022-01-01 RX ADMIN — Medication 4: at 22:13

## 2022-01-01 RX ADMIN — Medication 3 MILLILITER(S): at 18:17

## 2022-01-01 RX ADMIN — ENOXAPARIN SODIUM 40 MILLIGRAM(S): 100 INJECTION SUBCUTANEOUS at 09:27

## 2022-01-01 RX ADMIN — Medication 40 MILLIGRAM(S): at 10:01

## 2022-01-01 RX ADMIN — Medication 30 MILLIGRAM(S): at 18:40

## 2022-01-01 RX ADMIN — ALBUTEROL 2.5 MILLIGRAM(S): 90 AEROSOL, METERED ORAL at 10:28

## 2022-01-01 RX ADMIN — ENOXAPARIN SODIUM 40 MILLIGRAM(S): 100 INJECTION SUBCUTANEOUS at 21:43

## 2022-01-01 RX ADMIN — Medication 0.5 MILLIGRAM(S): at 22:45

## 2022-01-01 RX ADMIN — Medication 1 APPLICATION(S): at 06:53

## 2022-01-01 RX ADMIN — Medication 4: at 12:14

## 2022-01-01 RX ADMIN — BENZOCAINE AND MENTHOL 2 LOZENGE: 5; 1 LIQUID ORAL at 18:27

## 2022-01-01 RX ADMIN — Medication 200 MILLIGRAM(S): at 05:08

## 2022-01-01 RX ADMIN — Medication 2 SPRAY(S): at 17:29

## 2022-01-01 RX ADMIN — Medication 4: at 16:31

## 2022-01-01 RX ADMIN — Medication 50 MILLIGRAM(S): at 05:24

## 2022-01-01 RX ADMIN — SENNA PLUS 2 TABLET(S): 8.6 TABLET ORAL at 21:34

## 2022-01-01 RX ADMIN — ENOXAPARIN SODIUM 40 MILLIGRAM(S): 100 INJECTION SUBCUTANEOUS at 09:48

## 2022-01-01 RX ADMIN — Medication 40 MILLIGRAM(S): at 18:03

## 2022-01-01 RX ADMIN — Medication 5 MILLIGRAM(S): at 00:09

## 2022-01-01 RX ADMIN — Medication 500 MICROGRAM(S): at 09:18

## 2022-01-01 RX ADMIN — Medication 1 TABLET(S): at 12:14

## 2022-01-01 RX ADMIN — PIPERACILLIN AND TAZOBACTAM 200 GRAM(S): 4; .5 INJECTION, POWDER, LYOPHILIZED, FOR SOLUTION INTRAVENOUS at 17:41

## 2022-01-01 RX ADMIN — PIPERACILLIN AND TAZOBACTAM 25 GRAM(S): 4; .5 INJECTION, POWDER, LYOPHILIZED, FOR SOLUTION INTRAVENOUS at 05:03

## 2022-01-01 RX ADMIN — Medication 1 APPLICATION(S): at 17:07

## 2022-01-01 RX ADMIN — Medication 200 MILLIGRAM(S): at 00:03

## 2022-01-01 RX ADMIN — BENZOCAINE AND MENTHOL 2 LOZENGE: 5; 1 LIQUID ORAL at 06:49

## 2022-01-01 RX ADMIN — Medication 2 SPRAY(S): at 17:18

## 2022-01-01 RX ADMIN — Medication 1 APPLICATION(S): at 18:33

## 2022-01-01 RX ADMIN — MILRINONE LACTATE 3.68 MICROGRAM(S)/KG/MIN: 1 INJECTION, SOLUTION INTRAVENOUS at 20:17

## 2022-01-01 RX ADMIN — Medication 500 MICROGRAM(S): at 21:20

## 2022-01-01 RX ADMIN — Medication 300 MILLIGRAM(S): at 22:57

## 2022-01-01 RX ADMIN — PANTOPRAZOLE SODIUM 40 MILLIGRAM(S): 20 TABLET, DELAYED RELEASE ORAL at 06:21

## 2022-01-01 RX ADMIN — SENNA PLUS 2 TABLET(S): 8.6 TABLET ORAL at 22:41

## 2022-01-01 RX ADMIN — Medication 1 APPLICATION(S): at 05:14

## 2022-01-01 RX ADMIN — Medication 2: at 21:19

## 2022-01-01 RX ADMIN — Medication 40 MILLIGRAM(S): at 12:09

## 2022-01-01 RX ADMIN — Medication 2 SPRAY(S): at 06:32

## 2022-01-01 RX ADMIN — Medication 8: at 12:06

## 2022-01-01 RX ADMIN — PANTOPRAZOLE SODIUM 40 MILLIGRAM(S): 20 TABLET, DELAYED RELEASE ORAL at 07:05

## 2022-01-01 RX ADMIN — Medication 50 MILLIGRAM(S): at 17:26

## 2022-01-01 RX ADMIN — Medication 200 MILLIGRAM(S): at 17:44

## 2022-01-01 RX ADMIN — Medication 1 TABLET(S): at 14:01

## 2022-01-01 RX ADMIN — Medication 200 MILLIGRAM(S): at 05:14

## 2022-01-01 RX ADMIN — Medication 1 TABLET(S): at 17:27

## 2022-01-01 RX ADMIN — BENZOCAINE AND MENTHOL 2 LOZENGE: 5; 1 LIQUID ORAL at 12:50

## 2022-01-01 RX ADMIN — Medication 1 APPLICATION(S): at 06:55

## 2022-01-01 RX ADMIN — Medication 40 MILLIGRAM(S): at 17:42

## 2022-01-01 RX ADMIN — Medication 500 MICROGRAM(S): at 23:30

## 2022-01-01 RX ADMIN — Medication 500 MICROGRAM(S): at 12:17

## 2022-01-01 RX ADMIN — SENNA PLUS 2 TABLET(S): 8.6 TABLET ORAL at 22:07

## 2022-01-01 RX ADMIN — Medication 500000 UNIT(S): at 12:50

## 2022-01-01 RX ADMIN — Medication 40 MILLIGRAM(S): at 06:03

## 2022-01-01 RX ADMIN — Medication 200 MILLIGRAM(S): at 19:46

## 2022-01-01 RX ADMIN — Medication 50 MILLIGRAM(S): at 06:03

## 2022-01-01 RX ADMIN — SODIUM CHLORIDE 4 MILLILITER(S): 9 INJECTION INTRAMUSCULAR; INTRAVENOUS; SUBCUTANEOUS at 10:29

## 2022-01-01 RX ADMIN — Medication 1 APPLICATION(S): at 09:39

## 2022-01-01 RX ADMIN — Medication 200 MILLIGRAM(S): at 21:09

## 2022-01-01 RX ADMIN — Medication 1 TABLET(S): at 11:38

## 2022-01-01 RX ADMIN — Medication 200 MILLIGRAM(S): at 12:50

## 2022-01-01 RX ADMIN — BENZOCAINE AND MENTHOL 2 LOZENGE: 5; 1 LIQUID ORAL at 05:40

## 2022-01-01 RX ADMIN — Medication 200 MILLIGRAM(S): at 19:39

## 2022-01-01 RX ADMIN — Medication 500 MICROGRAM(S): at 05:05

## 2022-01-01 RX ADMIN — Medication 200 MILLIGRAM(S): at 17:01

## 2022-01-01 RX ADMIN — Medication 40 MILLIGRAM(S): at 00:13

## 2022-01-01 RX ADMIN — BENZOCAINE AND MENTHOL 2 LOZENGE: 5; 1 LIQUID ORAL at 23:23

## 2022-01-01 RX ADMIN — Medication 40 MILLIGRAM(S): at 18:04

## 2022-01-01 RX ADMIN — Medication 2 SPRAY(S): at 16:03

## 2022-01-01 RX ADMIN — Medication 200 MILLIGRAM(S): at 05:07

## 2022-01-01 RX ADMIN — SENNA PLUS 2 TABLET(S): 8.6 TABLET ORAL at 23:03

## 2022-01-01 RX ADMIN — Medication 500 MICROGRAM(S): at 15:55

## 2022-01-01 RX ADMIN — Medication 200 MILLIGRAM(S): at 17:29

## 2022-01-01 RX ADMIN — Medication 500 MICROGRAM(S): at 22:53

## 2022-01-01 RX ADMIN — Medication 2 SPRAY(S): at 06:39

## 2022-01-01 RX ADMIN — ENOXAPARIN SODIUM 40 MILLIGRAM(S): 100 INJECTION SUBCUTANEOUS at 22:03

## 2022-01-01 RX ADMIN — Medication 500 MICROGRAM(S): at 16:42

## 2022-01-01 RX ADMIN — Medication 2 SPRAY(S): at 06:11

## 2022-01-01 RX ADMIN — Medication 2 SPRAY(S): at 05:59

## 2022-01-01 RX ADMIN — Medication 500 MICROGRAM(S): at 22:00

## 2022-01-01 RX ADMIN — Medication 1 APPLICATION(S): at 17:29

## 2022-01-01 RX ADMIN — Medication 20 MILLIGRAM(S): at 05:20

## 2022-01-01 RX ADMIN — SODIUM CHLORIDE 4 MILLILITER(S): 9 INJECTION INTRAMUSCULAR; INTRAVENOUS; SUBCUTANEOUS at 07:13

## 2022-01-01 RX ADMIN — ACETAZOLAMIDE 250 MILLIGRAM(S): 250 TABLET ORAL at 11:53

## 2022-01-01 RX ADMIN — TAMSULOSIN HYDROCHLORIDE 0.4 MILLIGRAM(S): 0.4 CAPSULE ORAL at 22:18

## 2022-01-01 RX ADMIN — Medication 1 APPLICATION(S): at 18:16

## 2022-01-01 RX ADMIN — Medication 1 TABLET(S): at 17:44

## 2022-01-01 RX ADMIN — Medication 3 MILLILITER(S): at 12:32

## 2022-01-01 RX ADMIN — Medication 500 MICROGRAM(S): at 12:06

## 2022-01-01 RX ADMIN — Medication 200 MILLIGRAM(S): at 21:47

## 2022-01-01 RX ADMIN — Medication 40 MILLIGRAM(S): at 05:13

## 2022-01-01 RX ADMIN — Medication 40 MILLIGRAM(S): at 05:09

## 2022-01-01 RX ADMIN — Medication 2 SPRAY(S): at 06:46

## 2022-01-01 RX ADMIN — SENNA PLUS 2 TABLET(S): 8.6 TABLET ORAL at 21:27

## 2022-01-01 RX ADMIN — PANTOPRAZOLE SODIUM 40 MILLIGRAM(S): 20 TABLET, DELAYED RELEASE ORAL at 05:05

## 2022-01-01 RX ADMIN — BENZOCAINE AND MENTHOL 2 LOZENGE: 5; 1 LIQUID ORAL at 12:15

## 2022-01-01 RX ADMIN — ENOXAPARIN SODIUM 40 MILLIGRAM(S): 100 INJECTION SUBCUTANEOUS at 23:02

## 2022-01-01 RX ADMIN — Medication 1 APPLICATION(S): at 06:20

## 2022-01-01 RX ADMIN — Medication 200 MILLIGRAM(S): at 11:48

## 2022-01-01 RX ADMIN — Medication 200 MILLIGRAM(S): at 00:04

## 2022-01-01 RX ADMIN — INSULIN GLARGINE 4 UNIT(S): 100 INJECTION, SOLUTION SUBCUTANEOUS at 23:54

## 2022-01-01 RX ADMIN — TAMSULOSIN HYDROCHLORIDE 0.4 MILLIGRAM(S): 0.4 CAPSULE ORAL at 22:01

## 2022-01-01 RX ADMIN — Medication 40 MILLIGRAM(S): at 11:59

## 2022-01-01 RX ADMIN — Medication 650 MILLIGRAM(S): at 21:21

## 2022-01-01 RX ADMIN — BENZOCAINE AND MENTHOL 2 LOZENGE: 5; 1 LIQUID ORAL at 11:48

## 2022-01-01 RX ADMIN — Medication 3 MILLILITER(S): at 23:58

## 2022-01-01 RX ADMIN — Medication 2: at 21:26

## 2022-01-01 RX ADMIN — BENZOCAINE AND MENTHOL 2 LOZENGE: 5; 1 LIQUID ORAL at 18:36

## 2022-01-01 RX ADMIN — OXYMETAZOLINE HYDROCHLORIDE 1 SPRAY(S): 0.5 SPRAY NASAL at 06:24

## 2022-01-01 RX ADMIN — Medication 20 MILLIGRAM(S): at 05:41

## 2022-01-01 RX ADMIN — Medication 1 APPLICATION(S): at 17:02

## 2022-01-01 RX ADMIN — Medication 2 SPRAY(S): at 05:10

## 2022-01-01 RX ADMIN — Medication 1 APPLICATION(S): at 05:19

## 2022-01-01 RX ADMIN — Medication 200 MILLIGRAM(S): at 03:37

## 2022-01-01 RX ADMIN — BENZOCAINE AND MENTHOL 2 LOZENGE: 5; 1 LIQUID ORAL at 06:21

## 2022-01-01 RX ADMIN — ALBUTEROL 2.5 MILLIGRAM(S): 90 AEROSOL, METERED ORAL at 16:07

## 2022-01-01 RX ADMIN — Medication 500000 UNIT(S): at 06:41

## 2022-01-01 RX ADMIN — Medication 500 MICROGRAM(S): at 10:46

## 2022-01-01 RX ADMIN — Medication 50 MILLIGRAM(S): at 06:30

## 2022-01-01 RX ADMIN — POLYETHYLENE GLYCOL 3350 17 GRAM(S): 17 POWDER, FOR SOLUTION ORAL at 11:29

## 2022-01-01 RX ADMIN — Medication 500 MICROGRAM(S): at 23:02

## 2022-01-01 RX ADMIN — Medication 4: at 18:01

## 2022-01-01 RX ADMIN — Medication 200 MILLIGRAM(S): at 19:01

## 2022-01-01 RX ADMIN — Medication 1 APPLICATION(S): at 17:33

## 2022-01-01 RX ADMIN — Medication 200 MILLIGRAM(S): at 11:55

## 2022-01-01 RX ADMIN — PANTOPRAZOLE SODIUM 40 MILLIGRAM(S): 20 TABLET, DELAYED RELEASE ORAL at 05:59

## 2022-01-01 RX ADMIN — Medication 1 TABLET(S): at 17:22

## 2022-01-01 RX ADMIN — BENZOCAINE AND MENTHOL 2 LOZENGE: 5; 1 LIQUID ORAL at 11:49

## 2022-01-01 RX ADMIN — Medication 40 MILLIGRAM(S): at 06:26

## 2022-01-01 RX ADMIN — Medication 200 MILLIGRAM(S): at 00:14

## 2022-01-01 RX ADMIN — Medication 40 MILLIGRAM(S): at 17:00

## 2022-01-01 RX ADMIN — ENOXAPARIN SODIUM 40 MILLIGRAM(S): 100 INJECTION SUBCUTANEOUS at 21:05

## 2022-01-01 RX ADMIN — PIPERACILLIN AND TAZOBACTAM 25 GRAM(S): 4; .5 INJECTION, POWDER, LYOPHILIZED, FOR SOLUTION INTRAVENOUS at 22:24

## 2022-01-01 RX ADMIN — BENZOCAINE AND MENTHOL 2 LOZENGE: 5; 1 LIQUID ORAL at 13:17

## 2022-01-01 RX ADMIN — TAMSULOSIN HYDROCHLORIDE 0.4 MILLIGRAM(S): 0.4 CAPSULE ORAL at 22:20

## 2022-01-01 RX ADMIN — POLYETHYLENE GLYCOL 3350 17 GRAM(S): 17 POWDER, FOR SOLUTION ORAL at 12:06

## 2022-01-01 RX ADMIN — Medication 40 MILLIGRAM(S): at 00:04

## 2022-01-01 RX ADMIN — Medication 10 MILLIGRAM(S): at 12:16

## 2022-01-01 RX ADMIN — TAMSULOSIN HYDROCHLORIDE 0.4 MILLIGRAM(S): 0.4 CAPSULE ORAL at 21:18

## 2022-01-01 RX ADMIN — ALBUTEROL 2.5 MILLIGRAM(S): 90 AEROSOL, METERED ORAL at 21:56

## 2022-01-01 RX ADMIN — Medication 1 TABLET(S): at 17:07

## 2022-01-01 RX ADMIN — ALBUTEROL 2.5 MILLIGRAM(S): 90 AEROSOL, METERED ORAL at 18:39

## 2022-01-01 RX ADMIN — Medication 2 SPRAY(S): at 18:58

## 2022-01-01 RX ADMIN — Medication 30 MILLIGRAM(S): at 10:27

## 2022-01-01 RX ADMIN — Medication 500 MICROGRAM(S): at 10:27

## 2022-01-01 RX ADMIN — OXYMETAZOLINE HYDROCHLORIDE 1 SPRAY(S): 0.5 SPRAY NASAL at 17:50

## 2022-01-01 RX ADMIN — Medication 2 SPRAY(S): at 17:21

## 2022-01-01 RX ADMIN — Medication 500 MICROGRAM(S): at 21:27

## 2022-01-01 RX ADMIN — Medication 4: at 17:14

## 2022-01-01 RX ADMIN — BENZOCAINE AND MENTHOL 2 LOZENGE: 5; 1 LIQUID ORAL at 11:21

## 2022-01-01 RX ADMIN — Medication 1 APPLICATION(S): at 18:07

## 2022-01-01 RX ADMIN — Medication 2: at 21:35

## 2022-01-01 RX ADMIN — Medication 2 SPRAY(S): at 21:32

## 2022-01-01 RX ADMIN — BENZOCAINE AND MENTHOL 2 LOZENGE: 5; 1 LIQUID ORAL at 17:57

## 2022-01-01 RX ADMIN — Medication 200 MILLIGRAM(S): at 06:21

## 2022-01-01 RX ADMIN — Medication 500 MICROGRAM(S): at 11:49

## 2022-01-01 RX ADMIN — BENZOCAINE AND MENTHOL 2 LOZENGE: 5; 1 LIQUID ORAL at 05:05

## 2022-01-01 RX ADMIN — OXYMETAZOLINE HYDROCHLORIDE 1 SPRAY(S): 0.5 SPRAY NASAL at 17:40

## 2022-01-01 RX ADMIN — Medication 500 MICROGRAM(S): at 17:15

## 2022-01-01 RX ADMIN — Medication 200 MILLIGRAM(S): at 11:36

## 2022-01-01 RX ADMIN — ENOXAPARIN SODIUM 40 MILLIGRAM(S): 100 INJECTION SUBCUTANEOUS at 22:07

## 2022-01-01 RX ADMIN — SENNA PLUS 2 TABLET(S): 8.6 TABLET ORAL at 21:06

## 2022-01-01 RX ADMIN — Medication 2: at 16:55

## 2022-01-01 RX ADMIN — BENZOCAINE AND MENTHOL 2 LOZENGE: 5; 1 LIQUID ORAL at 17:40

## 2022-01-01 RX ADMIN — Medication 500 MICROGRAM(S): at 23:20

## 2022-01-01 RX ADMIN — BENZOCAINE AND MENTHOL 2 LOZENGE: 5; 1 LIQUID ORAL at 06:47

## 2022-01-01 RX ADMIN — Medication 3 MILLILITER(S): at 21:05

## 2022-01-01 RX ADMIN — ALBUTEROL 2.5 MILLIGRAM(S): 90 AEROSOL, METERED ORAL at 05:27

## 2022-01-01 RX ADMIN — ENOXAPARIN SODIUM 40 MILLIGRAM(S): 100 INJECTION SUBCUTANEOUS at 08:39

## 2022-01-01 RX ADMIN — Medication 1 TABLET(S): at 12:49

## 2022-01-01 RX ADMIN — Medication 4: at 17:36

## 2022-01-01 RX ADMIN — ENOXAPARIN SODIUM 40 MILLIGRAM(S): 100 INJECTION SUBCUTANEOUS at 22:34

## 2022-01-01 RX ADMIN — Medication 10: at 21:33

## 2022-01-01 RX ADMIN — BENZOCAINE AND MENTHOL 2 LOZENGE: 5; 1 LIQUID ORAL at 17:03

## 2022-01-01 RX ADMIN — Medication 500 MICROGRAM(S): at 04:44

## 2022-01-01 RX ADMIN — Medication 200 MILLIGRAM(S): at 22:52

## 2022-01-01 RX ADMIN — Medication 200 MILLIGRAM(S): at 19:02

## 2022-01-01 RX ADMIN — Medication 30 MILLIGRAM(S): at 10:09

## 2022-01-01 RX ADMIN — TAMSULOSIN HYDROCHLORIDE 0.4 MILLIGRAM(S): 0.4 CAPSULE ORAL at 21:33

## 2022-01-01 RX ADMIN — Medication 1 APPLICATION(S): at 17:17

## 2022-01-01 RX ADMIN — Medication 500 MICROGRAM(S): at 05:38

## 2022-01-01 RX ADMIN — Medication 1 APPLICATION(S): at 06:08

## 2022-01-01 RX ADMIN — SENNA PLUS 2 TABLET(S): 8.6 TABLET ORAL at 22:00

## 2022-01-01 RX ADMIN — POLYETHYLENE GLYCOL 3350 17 GRAM(S): 17 POWDER, FOR SOLUTION ORAL at 13:19

## 2022-01-01 RX ADMIN — MILRINONE LACTATE 3.68 MICROGRAM(S)/KG/MIN: 1 INJECTION, SOLUTION INTRAVENOUS at 22:21

## 2022-01-01 RX ADMIN — Medication 0.5 MILLIGRAM(S): at 09:28

## 2022-01-01 RX ADMIN — Medication 200 MILLIGRAM(S): at 19:38

## 2022-01-01 RX ADMIN — SENNA PLUS 2 TABLET(S): 8.6 TABLET ORAL at 21:45

## 2022-01-01 RX ADMIN — BENZOCAINE AND MENTHOL 2 LOZENGE: 5; 1 LIQUID ORAL at 06:37

## 2022-01-01 RX ADMIN — ALBUTEROL 2.5 MILLIGRAM(S): 90 AEROSOL, METERED ORAL at 16:05

## 2022-01-01 RX ADMIN — Medication 3 MILLILITER(S): at 05:24

## 2022-01-01 RX ADMIN — Medication 200 MILLIGRAM(S): at 12:16

## 2022-01-01 RX ADMIN — Medication 500 MICROGRAM(S): at 17:26

## 2022-01-01 RX ADMIN — BENZOCAINE AND MENTHOL 2 LOZENGE: 5; 1 LIQUID ORAL at 23:58

## 2022-01-01 RX ADMIN — Medication 1 APPLICATION(S): at 05:59

## 2022-01-01 RX ADMIN — ALBUTEROL 2.5 MILLIGRAM(S): 90 AEROSOL, METERED ORAL at 11:19

## 2022-01-01 RX ADMIN — SENNA PLUS 2 TABLET(S): 8.6 TABLET ORAL at 21:47

## 2022-01-01 RX ADMIN — Medication 40 MILLIGRAM(S): at 06:20

## 2022-01-01 RX ADMIN — Medication 500 MICROGRAM(S): at 05:15

## 2022-01-01 RX ADMIN — Medication 20 MILLIGRAM(S): at 09:14

## 2022-01-01 RX ADMIN — Medication 200 MILLIGRAM(S): at 05:55

## 2022-01-01 RX ADMIN — Medication 200 MILLIGRAM(S): at 04:26

## 2022-01-01 RX ADMIN — Medication 2: at 22:18

## 2022-01-01 RX ADMIN — BENZOCAINE AND MENTHOL 2 LOZENGE: 5; 1 LIQUID ORAL at 17:48

## 2022-01-01 RX ADMIN — Medication 2: at 17:00

## 2022-01-01 RX ADMIN — INSULIN GLARGINE 4 UNIT(S): 100 INJECTION, SOLUTION SUBCUTANEOUS at 22:24

## 2022-01-01 RX ADMIN — BENZOCAINE AND MENTHOL 2 LOZENGE: 5; 1 LIQUID ORAL at 05:50

## 2022-01-01 RX ADMIN — Medication 2 SPRAY(S): at 17:14

## 2022-01-01 RX ADMIN — BENZOCAINE AND MENTHOL 2 LOZENGE: 5; 1 LIQUID ORAL at 05:19

## 2022-01-01 RX ADMIN — Medication 500 MICROGRAM(S): at 16:26

## 2022-01-01 RX ADMIN — Medication 2 SPRAY(S): at 06:27

## 2022-01-01 RX ADMIN — Medication 200 MILLIGRAM(S): at 11:52

## 2022-01-01 RX ADMIN — BENZOCAINE AND MENTHOL 2 LOZENGE: 5; 1 LIQUID ORAL at 00:14

## 2022-01-01 RX ADMIN — Medication 1 APPLICATION(S): at 18:04

## 2022-01-01 RX ADMIN — PANTOPRAZOLE SODIUM 40 MILLIGRAM(S): 20 TABLET, DELAYED RELEASE ORAL at 06:38

## 2022-01-01 RX ADMIN — Medication 1 APPLICATION(S): at 18:00

## 2022-01-01 RX ADMIN — BENZOCAINE AND MENTHOL 2 LOZENGE: 5; 1 LIQUID ORAL at 23:50

## 2022-01-01 RX ADMIN — Medication 200 MILLIGRAM(S): at 06:27

## 2022-01-01 RX ADMIN — LACTULOSE 10 GRAM(S): 10 SOLUTION ORAL at 12:03

## 2022-01-01 RX ADMIN — TAMSULOSIN HYDROCHLORIDE 0.4 MILLIGRAM(S): 0.4 CAPSULE ORAL at 21:31

## 2022-01-01 RX ADMIN — Medication 200 MILLIGRAM(S): at 21:04

## 2022-01-01 RX ADMIN — BENZOCAINE AND MENTHOL 2 LOZENGE: 5; 1 LIQUID ORAL at 22:53

## 2022-01-01 RX ADMIN — Medication 200 MILLIGRAM(S): at 19:28

## 2022-01-01 RX ADMIN — Medication 2: at 11:20

## 2022-01-01 RX ADMIN — BENZOCAINE AND MENTHOL 2 LOZENGE: 5; 1 LIQUID ORAL at 01:26

## 2022-01-01 RX ADMIN — Medication 1 APPLICATION(S): at 06:18

## 2022-01-01 RX ADMIN — Medication 1 APPLICATION(S): at 06:31

## 2022-01-01 RX ADMIN — Medication 500000 UNIT(S): at 22:07

## 2022-01-01 RX ADMIN — Medication 5 MILLIGRAM(S): at 21:12

## 2022-01-01 RX ADMIN — Medication 200 MILLIGRAM(S): at 18:27

## 2022-01-01 RX ADMIN — Medication 20 MILLIGRAM(S): at 06:25

## 2022-01-01 RX ADMIN — BENZOCAINE AND MENTHOL 2 LOZENGE: 5; 1 LIQUID ORAL at 11:50

## 2022-01-01 RX ADMIN — Medication 200 MILLIGRAM(S): at 17:50

## 2022-01-01 RX ADMIN — PIPERACILLIN AND TAZOBACTAM 25 GRAM(S): 4; .5 INJECTION, POWDER, LYOPHILIZED, FOR SOLUTION INTRAVENOUS at 05:13

## 2022-01-01 RX ADMIN — Medication 6: at 22:23

## 2022-01-01 RX ADMIN — Medication 2 SPRAY(S): at 06:55

## 2022-01-01 RX ADMIN — Medication 200 MILLIGRAM(S): at 06:03

## 2022-01-01 RX ADMIN — Medication 500000 UNIT(S): at 21:09

## 2022-01-01 RX ADMIN — Medication 200 MILLIGRAM(S): at 17:34

## 2022-01-01 RX ADMIN — Medication 500 MICROGRAM(S): at 22:42

## 2022-01-01 RX ADMIN — ENOXAPARIN SODIUM 40 MILLIGRAM(S): 100 INJECTION SUBCUTANEOUS at 21:45

## 2022-01-01 RX ADMIN — Medication 200 MILLIGRAM(S): at 18:03

## 2022-01-01 RX ADMIN — Medication 200 MILLIGRAM(S): at 05:58

## 2022-01-01 RX ADMIN — Medication 4: at 11:29

## 2022-01-01 RX ADMIN — Medication 500 MICROGRAM(S): at 11:29

## 2022-01-01 RX ADMIN — Medication 2 SPRAY(S): at 12:07

## 2022-01-01 RX ADMIN — Medication 300 MILLIGRAM(S): at 23:23

## 2022-01-01 RX ADMIN — Medication 200 MILLIGRAM(S): at 04:06

## 2022-01-01 RX ADMIN — Medication 500 MICROGRAM(S): at 15:24

## 2022-01-01 RX ADMIN — Medication 1 TABLET(S): at 18:03

## 2022-01-01 RX ADMIN — Medication 200 MILLIGRAM(S): at 00:10

## 2022-01-01 RX ADMIN — Medication 5 MILLIGRAM(S): at 00:23

## 2022-01-01 RX ADMIN — Medication 2: at 17:15

## 2022-01-01 RX ADMIN — POLYETHYLENE GLYCOL 3350 17 GRAM(S): 17 POWDER, FOR SOLUTION ORAL at 12:34

## 2022-01-01 RX ADMIN — BENZOCAINE AND MENTHOL 2 LOZENGE: 5; 1 LIQUID ORAL at 12:07

## 2022-01-01 RX ADMIN — Medication 500 MICROGRAM(S): at 06:52

## 2022-01-01 RX ADMIN — Medication 200 MILLIGRAM(S): at 03:59

## 2022-01-01 RX ADMIN — PANTOPRAZOLE SODIUM 40 MILLIGRAM(S): 20 TABLET, DELAYED RELEASE ORAL at 05:09

## 2022-01-01 RX ADMIN — PANTOPRAZOLE SODIUM 40 MILLIGRAM(S): 20 TABLET, DELAYED RELEASE ORAL at 05:48

## 2022-01-01 RX ADMIN — TAMSULOSIN HYDROCHLORIDE 0.4 MILLIGRAM(S): 0.4 CAPSULE ORAL at 21:42

## 2022-01-01 RX ADMIN — Medication 2 SPRAY(S): at 18:04

## 2022-01-01 RX ADMIN — SENNA PLUS 2 TABLET(S): 8.6 TABLET ORAL at 22:05

## 2022-01-01 RX ADMIN — Medication 200 MILLIGRAM(S): at 19:30

## 2022-01-01 RX ADMIN — Medication 200 MILLIGRAM(S): at 05:57

## 2022-01-01 RX ADMIN — Medication 500 MICROGRAM(S): at 10:57

## 2022-01-01 RX ADMIN — Medication 40 MILLIGRAM(S): at 17:22

## 2022-01-01 RX ADMIN — BENZOCAINE AND MENTHOL 2 LOZENGE: 5; 1 LIQUID ORAL at 23:30

## 2022-01-01 RX ADMIN — Medication 200 MILLIGRAM(S): at 17:25

## 2022-01-01 RX ADMIN — Medication 1 APPLICATION(S): at 06:47

## 2022-01-01 RX ADMIN — INSULIN GLARGINE 4 UNIT(S): 100 INJECTION, SOLUTION SUBCUTANEOUS at 21:48

## 2022-01-01 RX ADMIN — ENOXAPARIN SODIUM 40 MILLIGRAM(S): 100 INJECTION SUBCUTANEOUS at 21:16

## 2022-01-01 RX ADMIN — Medication 20 MILLIGRAM(S): at 05:08

## 2022-01-01 RX ADMIN — Medication 200 MILLIGRAM(S): at 19:14

## 2022-01-01 RX ADMIN — Medication 4: at 22:25

## 2022-01-01 RX ADMIN — Medication 500 MICROGRAM(S): at 09:36

## 2022-01-01 RX ADMIN — Medication 200 MILLIGRAM(S): at 05:19

## 2022-01-01 RX ADMIN — TAMSULOSIN HYDROCHLORIDE 0.4 MILLIGRAM(S): 0.4 CAPSULE ORAL at 21:11

## 2022-01-01 RX ADMIN — Medication 500 MICROGRAM(S): at 16:15

## 2022-01-01 RX ADMIN — Medication 2: at 17:39

## 2022-01-01 RX ADMIN — Medication 40 MILLIGRAM(S): at 06:24

## 2022-01-01 RX ADMIN — Medication 2: at 21:31

## 2022-01-01 RX ADMIN — Medication 500 MICROGRAM(S): at 21:04

## 2022-01-01 RX ADMIN — Medication 200 MILLIGRAM(S): at 20:59

## 2022-01-01 RX ADMIN — PANTOPRAZOLE SODIUM 40 MILLIGRAM(S): 20 TABLET, DELAYED RELEASE ORAL at 06:45

## 2022-01-01 RX ADMIN — POLYETHYLENE GLYCOL 3350 17 GRAM(S): 17 POWDER, FOR SOLUTION ORAL at 11:57

## 2022-01-01 RX ADMIN — Medication 500000 UNIT(S): at 14:01

## 2022-01-01 RX ADMIN — BENZOCAINE AND MENTHOL 2 LOZENGE: 5; 1 LIQUID ORAL at 11:37

## 2022-01-01 RX ADMIN — Medication 200 MILLIGRAM(S): at 11:50

## 2022-01-01 RX ADMIN — TAMSULOSIN HYDROCHLORIDE 0.4 MILLIGRAM(S): 0.4 CAPSULE ORAL at 21:06

## 2022-01-01 RX ADMIN — Medication 2: at 18:02

## 2022-01-01 RX ADMIN — Medication 2: at 06:23

## 2022-01-01 RX ADMIN — Medication 300 MILLIGRAM(S): at 18:17

## 2022-01-01 RX ADMIN — Medication 200 MILLIGRAM(S): at 19:00

## 2022-01-01 RX ADMIN — Medication 5 MILLIGRAM(S): at 00:15

## 2022-01-01 RX ADMIN — Medication 3 MILLILITER(S): at 17:04

## 2022-01-01 RX ADMIN — Medication 1 APPLICATION(S): at 18:28

## 2022-01-01 RX ADMIN — Medication 200 MILLIGRAM(S): at 05:41

## 2022-01-01 RX ADMIN — BENZOCAINE AND MENTHOL 2 LOZENGE: 5; 1 LIQUID ORAL at 11:32

## 2022-01-01 RX ADMIN — Medication 500 MICROGRAM(S): at 23:03

## 2022-01-01 RX ADMIN — BENZOCAINE AND MENTHOL 2 LOZENGE: 5; 1 LIQUID ORAL at 17:42

## 2022-01-01 RX ADMIN — POLYETHYLENE GLYCOL 3350 17 GRAM(S): 17 POWDER, FOR SOLUTION ORAL at 12:45

## 2022-01-01 RX ADMIN — Medication 500000 UNIT(S): at 05:07

## 2022-01-01 RX ADMIN — Medication 50 MILLIGRAM(S): at 05:55

## 2022-01-01 RX ADMIN — Medication 500 MICROGRAM(S): at 00:05

## 2022-01-01 RX ADMIN — Medication 200 MILLIGRAM(S): at 00:28

## 2022-01-01 RX ADMIN — Medication 500 MICROGRAM(S): at 10:10

## 2022-01-01 RX ADMIN — PANTOPRAZOLE SODIUM 40 MILLIGRAM(S): 20 TABLET, DELAYED RELEASE ORAL at 06:39

## 2022-01-01 RX ADMIN — Medication 40 MILLIGRAM(S): at 17:03

## 2022-01-01 RX ADMIN — Medication 1 TABLET(S): at 11:00

## 2022-01-01 RX ADMIN — TAMSULOSIN HYDROCHLORIDE 0.4 MILLIGRAM(S): 0.4 CAPSULE ORAL at 23:03

## 2022-01-01 RX ADMIN — Medication 1 TABLET(S): at 11:31

## 2022-01-01 RX ADMIN — Medication 40 MILLIGRAM(S): at 17:32

## 2022-01-01 RX ADMIN — Medication 60 MILLIGRAM(S): at 05:41

## 2022-01-01 RX ADMIN — Medication 200 MILLIGRAM(S): at 06:24

## 2022-01-01 RX ADMIN — Medication 30 MILLIGRAM(S): at 17:20

## 2022-01-01 RX ADMIN — Medication 500 MICROGRAM(S): at 06:32

## 2022-01-01 RX ADMIN — Medication 2 SPRAY(S): at 05:57

## 2022-01-01 RX ADMIN — BENZOCAINE AND MENTHOL 2 LOZENGE: 5; 1 LIQUID ORAL at 12:17

## 2022-01-01 RX ADMIN — Medication 1 APPLICATION(S): at 16:03

## 2022-01-01 RX ADMIN — Medication 2: at 15:58

## 2022-01-01 RX ADMIN — Medication 2: at 11:44

## 2022-01-01 RX ADMIN — BENZOCAINE AND MENTHOL 2 LOZENGE: 5; 1 LIQUID ORAL at 18:47

## 2022-01-01 RX ADMIN — Medication 1 APPLICATION(S): at 17:21

## 2022-01-01 RX ADMIN — POLYETHYLENE GLYCOL 3350 17 GRAM(S): 17 POWDER, FOR SOLUTION ORAL at 12:07

## 2022-01-01 RX ADMIN — PANTOPRAZOLE SODIUM 40 MILLIGRAM(S): 20 TABLET, DELAYED RELEASE ORAL at 06:04

## 2022-01-01 RX ADMIN — PIPERACILLIN AND TAZOBACTAM 25 GRAM(S): 4; .5 INJECTION, POWDER, LYOPHILIZED, FOR SOLUTION INTRAVENOUS at 05:25

## 2022-01-01 RX ADMIN — BENZOCAINE AND MENTHOL 2 LOZENGE: 5; 1 LIQUID ORAL at 23:53

## 2022-01-01 RX ADMIN — ENOXAPARIN SODIUM 40 MILLIGRAM(S): 100 INJECTION SUBCUTANEOUS at 21:48

## 2022-01-01 RX ADMIN — Medication 1 APPLICATION(S): at 17:40

## 2022-01-01 RX ADMIN — PANTOPRAZOLE SODIUM 40 MILLIGRAM(S): 20 TABLET, DELAYED RELEASE ORAL at 06:09

## 2022-01-01 RX ADMIN — MILRINONE LACTATE 3.68 MICROGRAM(S)/KG/MIN: 1 INJECTION, SOLUTION INTRAVENOUS at 21:58

## 2022-01-01 RX ADMIN — Medication 200 MILLIGRAM(S): at 20:01

## 2022-01-01 RX ADMIN — Medication 500 MICROGRAM(S): at 04:50

## 2022-01-01 RX ADMIN — TAMSULOSIN HYDROCHLORIDE 0.4 MILLIGRAM(S): 0.4 CAPSULE ORAL at 22:05

## 2022-01-01 RX ADMIN — Medication 200 MILLIGRAM(S): at 22:25

## 2022-01-01 RX ADMIN — Medication 20 MILLIGRAM(S): at 05:14

## 2022-01-01 RX ADMIN — Medication 2: at 17:27

## 2022-01-01 RX ADMIN — Medication 3 MILLILITER(S): at 05:08

## 2022-01-01 RX ADMIN — BENZOCAINE AND MENTHOL 2 LOZENGE: 5; 1 LIQUID ORAL at 06:44

## 2022-01-01 RX ADMIN — Medication 200 MILLIGRAM(S): at 06:07

## 2022-01-01 RX ADMIN — Medication 200 MILLIGRAM(S): at 13:17

## 2022-01-01 RX ADMIN — Medication 40 MILLIGRAM(S): at 09:10

## 2022-01-01 RX ADMIN — Medication 200 MILLIGRAM(S): at 20:14

## 2022-01-01 RX ADMIN — Medication 500000 UNIT(S): at 13:37

## 2022-01-01 RX ADMIN — Medication 200 MILLIGRAM(S): at 11:43

## 2022-01-01 RX ADMIN — BENZOCAINE AND MENTHOL 2 LOZENGE: 5; 1 LIQUID ORAL at 18:20

## 2022-01-01 RX ADMIN — TAMSULOSIN HYDROCHLORIDE 0.4 MILLIGRAM(S): 0.4 CAPSULE ORAL at 22:42

## 2022-01-01 RX ADMIN — BENZOCAINE AND MENTHOL 2 LOZENGE: 5; 1 LIQUID ORAL at 17:26

## 2022-01-01 RX ADMIN — Medication 100 MILLIGRAM(S): at 00:06

## 2022-01-01 RX ADMIN — Medication 200 MILLIGRAM(S): at 00:17

## 2022-01-01 RX ADMIN — Medication 500 MICROGRAM(S): at 11:36

## 2022-01-01 RX ADMIN — BENZOCAINE AND MENTHOL 2 LOZENGE: 5; 1 LIQUID ORAL at 01:23

## 2022-01-01 RX ADMIN — PANTOPRAZOLE SODIUM 40 MILLIGRAM(S): 20 TABLET, DELAYED RELEASE ORAL at 06:55

## 2022-01-01 RX ADMIN — Medication 200 MILLIGRAM(S): at 19:26

## 2022-01-01 RX ADMIN — PIPERACILLIN AND TAZOBACTAM 25 GRAM(S): 4; .5 INJECTION, POWDER, LYOPHILIZED, FOR SOLUTION INTRAVENOUS at 21:09

## 2022-01-01 RX ADMIN — Medication 2 SPRAY(S): at 06:25

## 2022-01-01 RX ADMIN — Medication 0.5 MILLIGRAM(S): at 10:29

## 2022-01-01 RX ADMIN — Medication 1 TABLET(S): at 11:20

## 2022-01-01 RX ADMIN — ENOXAPARIN SODIUM 40 MILLIGRAM(S): 100 INJECTION SUBCUTANEOUS at 21:09

## 2022-01-01 RX ADMIN — PANTOPRAZOLE SODIUM 40 MILLIGRAM(S): 20 TABLET, DELAYED RELEASE ORAL at 05:58

## 2022-01-01 RX ADMIN — SENNA PLUS 2 TABLET(S): 8.6 TABLET ORAL at 22:20

## 2022-01-01 RX ADMIN — MILRINONE LACTATE 3.68 MICROGRAM(S)/KG/MIN: 1 INJECTION, SOLUTION INTRAVENOUS at 17:11

## 2022-01-01 RX ADMIN — CHLORHEXIDINE GLUCONATE 1 APPLICATION(S): 213 SOLUTION TOPICAL at 11:22

## 2022-01-01 RX ADMIN — POLYETHYLENE GLYCOL 3350 17 GRAM(S): 17 POWDER, FOR SOLUTION ORAL at 11:47

## 2022-01-01 RX ADMIN — Medication 200 MILLIGRAM(S): at 01:08

## 2022-01-01 RX ADMIN — Medication 2: at 12:19

## 2022-01-01 RX ADMIN — SODIUM CHLORIDE 4 MILLILITER(S): 9 INJECTION INTRAMUSCULAR; INTRAVENOUS; SUBCUTANEOUS at 04:30

## 2022-01-01 RX ADMIN — Medication 300 MILLIGRAM(S): at 23:37

## 2022-01-01 RX ADMIN — Medication 20 MILLIGRAM(S): at 06:30

## 2022-01-01 RX ADMIN — Medication 200 MILLIGRAM(S): at 11:53

## 2022-01-01 RX ADMIN — Medication 200 MILLIGRAM(S): at 06:43

## 2022-01-01 RX ADMIN — Medication 300 MILLIGRAM(S): at 19:07

## 2022-01-01 RX ADMIN — Medication 4: at 21:42

## 2022-01-01 RX ADMIN — BENZOCAINE AND MENTHOL 2 LOZENGE: 5; 1 LIQUID ORAL at 00:44

## 2022-01-01 RX ADMIN — Medication 1 APPLICATION(S): at 05:42

## 2022-01-01 RX ADMIN — Medication 500 MICROGRAM(S): at 16:29

## 2022-01-01 RX ADMIN — Medication 40 MILLIGRAM(S): at 17:38

## 2022-01-01 RX ADMIN — BENZOCAINE AND MENTHOL 2 LOZENGE: 5; 1 LIQUID ORAL at 06:03

## 2022-01-01 RX ADMIN — Medication 2: at 16:12

## 2022-01-01 RX ADMIN — Medication 1 APPLICATION(S): at 17:23

## 2022-01-01 RX ADMIN — Medication 200 MILLIGRAM(S): at 06:41

## 2022-01-01 RX ADMIN — Medication 200 MILLIGRAM(S): at 17:17

## 2022-01-01 RX ADMIN — Medication 2 SPRAY(S): at 05:56

## 2022-01-01 RX ADMIN — Medication 500 MICROGRAM(S): at 06:16

## 2022-01-01 RX ADMIN — Medication 0.5 MILLIGRAM(S): at 08:39

## 2022-01-01 RX ADMIN — Medication 2 SPRAY(S): at 18:16

## 2022-01-01 RX ADMIN — Medication 500 MICROGRAM(S): at 17:04

## 2022-01-01 RX ADMIN — Medication 40 MILLIGRAM(S): at 12:12

## 2022-01-01 RX ADMIN — Medication 2: at 06:06

## 2022-01-01 RX ADMIN — BENZOCAINE AND MENTHOL 2 LOZENGE: 5; 1 LIQUID ORAL at 05:08

## 2022-01-01 RX ADMIN — Medication 500 MICROGRAM(S): at 05:13

## 2022-01-01 RX ADMIN — Medication 10: at 11:41

## 2022-01-01 RX ADMIN — ENOXAPARIN SODIUM 40 MILLIGRAM(S): 100 INJECTION SUBCUTANEOUS at 21:20

## 2022-01-01 RX ADMIN — Medication 1 APPLICATION(S): at 06:37

## 2022-01-01 RX ADMIN — Medication 2: at 11:21

## 2022-01-01 RX ADMIN — Medication 4: at 16:52

## 2022-01-01 RX ADMIN — Medication 40 MILLIGRAM(S): at 06:16

## 2022-01-01 RX ADMIN — Medication 1 TABLET(S): at 17:26

## 2022-01-01 RX ADMIN — Medication 500000 UNIT(S): at 05:18

## 2022-01-01 RX ADMIN — Medication 500 MICROGRAM(S): at 06:03

## 2022-01-01 RX ADMIN — Medication 500000 UNIT(S): at 22:34

## 2022-01-01 RX ADMIN — Medication 2 SPRAY(S): at 17:49

## 2022-01-01 RX ADMIN — Medication 2 SPRAY(S): at 17:33

## 2022-01-01 RX ADMIN — Medication 40 MILLIGRAM(S): at 13:18

## 2022-01-01 RX ADMIN — Medication 200 MILLIGRAM(S): at 05:01

## 2022-01-01 RX ADMIN — Medication 100 MILLIGRAM(S): at 18:17

## 2022-01-01 RX ADMIN — PANTOPRAZOLE SODIUM 40 MILLIGRAM(S): 20 TABLET, DELAYED RELEASE ORAL at 07:15

## 2022-01-01 RX ADMIN — POLYETHYLENE GLYCOL 3350 17 GRAM(S): 17 POWDER, FOR SOLUTION ORAL at 12:09

## 2022-01-01 RX ADMIN — Medication 500 MICROGRAM(S): at 11:52

## 2022-01-01 RX ADMIN — Medication 4: at 22:07

## 2022-01-01 RX ADMIN — Medication 500 MICROGRAM(S): at 10:00

## 2022-01-01 RX ADMIN — Medication 500 MICROGRAM(S): at 17:25

## 2022-01-01 RX ADMIN — Medication 200 MILLIGRAM(S): at 06:30

## 2022-01-01 RX ADMIN — Medication 2: at 12:07

## 2022-01-01 RX ADMIN — Medication 6 UNIT(S): at 11:50

## 2022-01-01 RX ADMIN — Medication 40 MILLIGRAM(S): at 06:07

## 2022-01-01 RX ADMIN — Medication 2: at 06:27

## 2022-01-01 RX ADMIN — ENOXAPARIN SODIUM 40 MILLIGRAM(S): 100 INJECTION SUBCUTANEOUS at 08:37

## 2022-01-01 RX ADMIN — Medication 200 MILLIGRAM(S): at 19:16

## 2022-01-01 RX ADMIN — POLYETHYLENE GLYCOL 3350 17 GRAM(S): 17 POWDER, FOR SOLUTION ORAL at 12:50

## 2022-01-26 PROBLEM — E78.5 HYPERLIPIDEMIA, UNSPECIFIED: Chronic | Status: ACTIVE | Noted: 2017-01-15

## 2022-01-26 PROBLEM — R73.03 PREDIABETES: Chronic | Status: ACTIVE | Noted: 2017-01-15

## 2022-01-28 PROBLEM — Z00.00 ENCOUNTER FOR PREVENTIVE HEALTH EXAMINATION: Status: ACTIVE | Noted: 2022-01-01

## 2022-01-29 PROBLEM — Z78.9 NON-SMOKER: Status: ACTIVE | Noted: 2022-01-01

## 2022-01-29 NOTE — DISCUSSION/SUMMARY
[FreeTextEntry1] : 62 yo male with ILD, post covid 19 infection one year ago, oxygen dependent. It is unclear if there were pulmonary problems prior to covid 19 infection. PFT and HRCT of the chest will be performed. Labs were drawn for evaluation of ILD. Future intervention will depend on the results. Need for biopsy and referral for transplant was discussed with the patient and his family member who was present. Nebulized steroids BID prescribed with continued use of ICS/LABA/LAMA.He is to follow up with his PMD as before.

## 2022-01-29 NOTE — HISTORY OF PRESENT ILLNESS
[Never] : never [TextBox_4] : 60 yo male with hx of pulmonary fibrosis presents for evaluation. The patient was covid 19 positive February 2021 treated at [facility] for two weeks with steroids and remdesivir, subsequently discharged on supplemental oxygen. The patient denies prior pulmonary problems! He has been followed by pulmonary, treated with Ofev, which was discontinued because of GI complaints. Presently he is on breztri BID with albuterol MDI. He denies fever, chest pain or hemoptysis. [TextBox_29] : Denies snoring, daytime somnolence, apneic episodes, AM headaches

## 2022-01-29 NOTE — PHYSICAL EXAM
[No Acute Distress] : no acute distress [Normal Oropharynx] : normal oropharynx [Normal Appearance] : normal appearance [No Neck Mass] : no neck mass [Normal Rate/Rhythm] : normal rate/rhythm [Normal S1, S2] : normal s1, s2 [No Murmurs] : no murmurs [No Resp Distress] : no resp distress [Wheeze] : wheeze [Rales] : rales [Rhonchi] : rhonchi [No Abnormalities] : no abnormalities [Benign] : benign [No Cyanosis] : no cyanosis [No Edema] : no edema [FROM] : FROM [Normal Color/ Pigmentation] : normal color/ pigmentation [No Focal Deficits] : no focal deficits [Oriented x3] : oriented x3 [Normal Affect] : normal affect [TextBox_105] : digital clubbing

## 2022-02-17 NOTE — DISCUSSION/SUMMARY
[FreeTextEntry1] : 60 yo male with hx of ILD, post covid 19 infection. I reviewed the PFT results with the patient and his daughter who was present.Limited information was obtained from this study given limited effort. A repeat study will be performed in the future. I also reviewed the lab results which failed to reveal any significant connective tissue disease abnormality but revealed elevated inflammatory markers. A repeat chest CT will be performed at [facility] as planned. Further intervention with possible lung biopsy will be considered. Use of oral steroids will also be considered.He is to continue supplemental oxygen at 4 l/m at all times. He is to follow up with his PMD as before.

## 2022-02-17 NOTE — HISTORY OF PRESENT ILLNESS
[Never] : never [TextBox_4] : 60 yo male with hx of ILD, post covid 19 infection presents for follow up. He feels "better" complaining of PRN productive cough and BURGESS.He is on BID nebulized steroids and albuterol PRN.He remains on continuous supplemental oxygen. [TextBox_29] : Denies snoring, daytime somnolence, apneic episodes, AM headaches

## 2022-04-16 NOTE — PHYSICAL EXAM
[No Acute Distress] : no acute distress [Normal Oropharynx] : normal oropharynx [Normal Appearance] : normal appearance [No Neck Mass] : no neck mass [Normal Rate/Rhythm] : normal rate/rhythm [Normal S1, S2] : normal s1, s2 [No Murmurs] : no murmurs [No Resp Distress] : no resp distress [Rales] : rales [No Abnormalities] : no abnormalities [Benign] : benign [No Cyanosis] : no cyanosis [No Edema] : no edema [FROM] : FROM [Normal Color/ Pigmentation] : normal color/ pigmentation [No Focal Deficits] : no focal deficits [Oriented x3] : oriented x3 [Normal Affect] : normal affect [TextBox_105] : digital clubbing

## 2022-04-16 NOTE — DISCUSSION/SUMMARY
[FreeTextEntry1] : 60 yo male with ILD, post covid 19 infection, at baseline since last visit. I reviewed the chest CT images on line and discussed the findings with the patient and his daughter who was present.He is to continue nebulized steroids and bronchodilators as before with supplemental oxygen. PFT will be performed in the future. He will likely be referred for transplant after.

## 2022-04-16 NOTE — HISTORY OF PRESENT ILLNESS
[Never] : never [TextBox_4] : 62 yo male with hx of ILD, post covid 19 infection presents for follow up. The patient continues to have productive cough without fever, chest pain or hemoptysis. He continues to use supplemental oxygen at all times with twice daily nebulized steroids and bronchodilators. A chest CT was performed since last visit. [TextBox_29] : Denies snoring, daytime somnolence, apneic episodes, AM headaches

## 2022-06-18 NOTE — DISCUSSION/SUMMARY
[FreeTextEntry1] : 62 yo male with hx of ILD post covid 19 infection, oxygen dependent. PFT was attempted but patient could not perform due to cough, treated with nebulized bronchodilator in the office. He is to continue nebulized steroids and bronchodilators as before. He will be referred to lung transplant evaluation at [facility].He is to follow up with his PMS as before. His daughter was present.

## 2022-06-18 NOTE — HISTORY OF PRESENT ILLNESS
[Never] : never [TextBox_4] : 60 yo male with ILD, post covid 19 infection, presents for follow up. The patient complains of recent increase in BURGESS associated with productive cough without fever, chest pain or hemoptysis. He uses supplemental oxygen at all times with BID nebulized steroids and PRN duoneb. [TextBox_29] : Denies snoring, daytime somnolence, apneic episodes, AM headaches

## 2022-08-05 NOTE — END OF VISIT
[Time Spent: ___ minutes] : I have spent [unfilled] minutes of time on the encounter. [FreeTextEntry3] : We went over the risks and benefits of lung transplantation including one and five year survival. The median survival after a double lung transplant is about 6.5 years and this was explained in detail. We also went over the risks of opportunistic infections and the risks of calcineurin inhibitor use after the transplant with inherent risks of neoplasms and opportunistic infections and other complications. The post-operative recovery period was explained in detail including the need for mechanical ventilation and other means of cardiopulmonary support including extracorporal membrane oxygenation use and the types of incisions and chest tubes that are required. \par \par At this time, the patient is not an optimal candidate given his BMI and poor overall functional status.  He was referred to pulmonary rehab and Dr. Duke for weight management.  Will follow up in 4-6 weeks.

## 2022-08-05 NOTE — ASSESSMENT
[FreeTextEntry1] : 62y/o male with ILD/pulmonary fibrosis , post covid 19 infection (diagnosed in Feb 2021, treated at [facility] for two weeks with steroids and remdesivir, d/cd on supplemental oxygen). Pt was on Ofev previously, but discontinued due to GI complaints. \par \par lung transplant evaluation\par -High BMI @ 38 and poor functional status\par -weight loss\par -pulmonary rehab\par -Family would like to think about moving forward with lung transplant, to be discussed with wife and son\par -Given lung transplant educational packet\par -Continue close follow up with Dr. Garza\par -Return to clinic in 4- 6 weeks\par \par above discused with Dr. Dubois

## 2022-08-05 NOTE — HISTORY OF PRESENT ILLNESS
[Former] : former [< 20 pack-years] : < 20 pack-years [TextBox_4] : 60y/o male with ILD/pulmonary fibrosis , post covid 19 infection (diagnosed in 2021, treated at [facility] for two weeks with steroids and remdesivir, d/cd on supplemental oxygen). Pt was on Ofev previously, but discontinued due to GI complaints. \par \par When/how diagnosed with primary pulm dx? \par \par Patient presents to clinic for lung transplant evaluation.  Finds it very difficult to complete daily tasks such as showering.  Uses 4-5L at rest, portable oxygen only goes up to 4L. Does not currently exercise, uses a wheelchair for longer trips/distances. 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 \par Prior hospitalizations, ICU admission or intubations: no \par \par Hx covid infection  \par \par Health maintenance/vaccines:\par COVID vax: vaccinated with pfizer and booster \par Colonoscopy x 5 years ago, normal  \par \par Family History: no family history of cancer or lung disease\par \par Social History:\par Lives with: wife (on HD x  10 years) and son (25 years), who are current caregivers \par ETOH/tobacco use: denies alcohol and illicit drug use.\par \par DATA REVIEWED:\par BMI 40 in 2022\par \par PFTS\par 2/10/22:\par FVC:o.67L/18%, FEV1:0.61L/23%, T.21L, DLCO: 24.8\par \par 6MWT\par \par \par CT CHEST : 4/15/2022\par B/L upper, mid and lower lung severe pulmonary fibrosis with diffuse traction bronchiectasis within fibrotic areas. patter unchanged from prior studies\par scattered calcified granulomas\par stable miidly enlarged mediastinal lymph nodes\par \par ECHO- not in Allscripts\par \par RHC/LHC: not in [employer]scripts\par \par Labs: \par H/h: 13/41 (22) \par ESR: 88 (22)\par BUN/Cr: 12/0.84 (22)\par LFTs normal (22)\par CRP: 16 (22)\par ACE: 34 (22)\par c-ANCA, p-ANCA, ANCA- negative ((22) \par Anti dsDNA - negative (22)\par DIANA neg (22)

## 2022-08-05 NOTE — REASON FOR VISIT
[Consultation] : a consultation [ILD] : ILD [TextBox_44] : Post covid pulmonary fibrosis, Pre-lung transplant Evaluation  [TextBox_13] : Dr. Kaiser Daly

## 2022-09-14 PROBLEM — Z01.812 ENCOUNTER FOR PREPROCEDURAL LABORATORY EXAMINATION: Status: ACTIVE | Noted: 2022-01-01

## 2022-09-14 NOTE — PHYSICAL EXAM
[No Acute Distress] : no acute distress [Normal Appearance] : normal appearance [Normal Rate/Rhythm] : normal rate/rhythm [Normal S1, S2] : normal s1, s2 [No Resp Distress] : no resp distress [No Abnormalities] : no abnormalities [Benign] : benign [Normal Gait] : normal gait [No Clubbing] : no clubbing [No Focal Deficits] : no focal deficits [Oriented x3] : oriented x3 [Normal Affect] : normal affect [TextBox_2] : overweight, on 4lpm  [TextBox_68] : Coarse crackles [TextBox_105] : 1+ Edema b/l lower extremity

## 2022-09-14 NOTE — HISTORY OF PRESENT ILLNESS
[Former] : former [< 20 pack-years] : < 20 pack-years [TextBox_4] : 62y/o male with ILD/pulmonary fibrosis , post covid 19 infection (diagnosed in 2021, treated at [facility] for two weeks with steroids and remdesivir, d/cd on supplemental oxygen). Pt was on Ofev previously, but discontinued due to GI complaints. \par \par When/how diagnosed with primary pulm dx? \par \par Patient presents to clinic for follow up after initial  lung transplant evaluation on 2022, where it was recommended for him for weight loss, pulm rehab. Pt/ family wanted to consider lung transplant evaluation prior to moving forward. Continues to use  4-5L at rest, portable oxygen only goes up to 4L. Does not currently exercise, uses a wheelchair for longer trips/distances. Pt states breathing is getting worse with associated congestion and sputum production, takes mucinex for some relief. He has appt with Pulm Rehab this Friday at [address] [place] [address]. Pt has lost 9 lbs since 22, with diet modifications. eager to continue to loose weight and join Pulm Rehab program. Pt accompanied by his daughter. \par \par Has not seen PMD in years for Healthcare Maintenance. 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 \par Prior hospitalizations, ICU admission or intubations: no \par \par Hx covid infection  \par \par Health maintenance/vaccines:\par COVID vax: vaccinated with pfizer and booster \par Colonoscopy x 5 years ago, normal  \par \par Family History: no family history of cancer or lung disease\par \par Social History:\par Lives with: wife (on HD x  10 years) and son (25 years), who are current caregivers \par ETOH/tobacco use: denies alcohol and illicit drug use.\par \par DATA REVIEWED:\par BMI 40 in 2022\par BMI 36.73 Sep 2022 \par \par PFTS\par 2/10/22:\par FVC:o.67L/18%, FEV1:0.61L/23%, T.21L, DLCO: 24.8\par \par 6MWT\par \par \par CT CHEST : 4/15/2022\par B/L upper, mid and lower lung severe pulmonary fibrosis with diffuse traction bronchiectasis within fibrotic areas. patter unchanged from prior studies\par scattered calcified granulomas\par stable miidly enlarged mediastinal lymph nodes\par \par ECHO- not in [employer]scripts\par \par RHC/LHC: not in [employer]\par \par Labs: \par H/h: 13/41 (22) \par ESR: 88 (22)\par BUN/Cr: 12/0.84 (22)\par LFTs normal (22)\par CRP: 16 (22)\par ACE: 34 (22)\par c-ANCA, p-ANCA, ANCA- negative ((22) \par Anti dsDNA - negative (22)\par DIANA neg (22)

## 2022-09-14 NOTE — ASSESSMENT
[FreeTextEntry1] : 62y/o male with ILD/pulmonary fibrosis , post covid 19 infection (diagnosed in Feb 2021, treated at [facility] for two weeks with steroids and remdesivir, d/cd on supplemental oxygen). Pt was on Ofev previously, but discontinued due to GI complaints. \par \par lung transplant evaluation- pt currently not candidate due to high BMI and limited functional status. \par - Pt losing weight since last visit (9llbs), discussed with pt and daughter short term goal of weight to be 200lbs (6 weeks),long term 185-190 lbs. \par -pulmonary rehab- appt on 9/16\par -Family would like to be assessed and work thru Pulm Rehab prior to lung transplant evaluation\par - recommend OTC Flonase (inhaled corticosteroids 2 puffs both nostrils BID), for possible sinus/lung congestion\par - recommended not to take Afrin\par - recommend patient return primary pulmonologist for any optimization of current regimen (2-4 weeks) \par - recommend patient see PMD for Health care maintenance- i.e baseline blood work, Echo, flu vaccine (2- 4 weeks) \par -Return to clinic in 6 weeks\par \par above discussed with Dr. Dubois

## 2022-10-09 NOTE — ED PROVIDER NOTE - PROGRESS NOTE DETAILS
Jai Barth DO (PGY1)  Tried to contact pulmonologist regarding starting potential steroid treatment. Unable to reach pulm at this time. Plan to continue with sepsis w/u, breathing treatments, reassessment. Jai Barth DO (PGY1)  Triage was unable to assess for temp due to SOB. Rectal temp 98.4. Jai Barth DO (PGY1)  Patient re-examined. Slightly improved respirations after budesonide and duoneb. Patient still coughing - plan for robitussin. Jai Barth DO (PGY1)  Patient re-examined transitioned from NB to NC. Saturating well. Discussed labs/CXR with patient and daughter. Discussed pending RVP panel and potential start of abx and admission  Daughter Jj: [phone] Jai Barth DO (PGY1)  Patient to be admitted to medicine. Family requesting something to help sleep - will Rx melatonin.

## 2022-10-09 NOTE — ED PROVIDER NOTE - OBJECTIVE STATEMENT
60 y/o M with PMHx HLD, prediabetic and pulmonary fibrosis 2/2 covid infection Feb 2022 presenting today with SOB associated with cough. States three days of productive cough with yellow/green sputum and nasal congestion. Denies any chest pain, abdominal pain, nausea, vomiting, diarrhea, urinary complaints. States he has taken NyQuil OTC for minimal relief. Accompanied by daughter who states patient follows up with pulmonologist Dr. Cesar Daly ([phone]). States at home patient is on budesonide breathing treatments and albuterol inhaler. States no breathing treatments were used today.

## 2022-10-09 NOTE — ED PROVIDER NOTE - ATTENDING CONTRIBUTION TO CARE
Pt w/ h/o pulm fibrosis from prior covid infection on 4-5L NC 02 at home p/w incresaing cough and sputum production. No increasing 02 requirement, afeb- suspect viral URI- plan for labs, CXR, RVP, reassess. Pt is in no resp distress on my exam. Has not used nebs today- will order.

## 2022-10-09 NOTE — ED PROVIDER NOTE - CLINICAL SUMMARY MEDICAL DECISION MAKING FREE TEXT BOX
60 y/o M with PMHx HLD, prediabetic and pulmonary fibrosis 2/2 covid infection Feb 2022 presenting today with SOB associated with cough.    Patient with hx of pulmonary fibrosis and coarse breath sounds on exam. Patient with increased work of breathing with no budesonide or albuterol treatments done.   Due to subjective fever, productive cough DDx includes pneumonia vs. viral syndrome vs. bronchitis  Plan for ED sepsis w/u + breathing treatments

## 2022-10-09 NOTE — ED ADULT TRIAGE NOTE - CHIEF COMPLAINT QUOTE
pt c/o increased SOB, fever, cough. Baseline 4L NC at home. Arrives with O2 sat 68% on 4L NC. Placed on NRB O2 sat 100%. Pt tachypneic with difficulty completing sentences in triage. PMHX Pulmonary fibrosis secondary to covid, borderline DM, HLD. pt c/o increased SOB, fever, cough. Baseline 4L NC at home. Arrives with O2 sat 68% on 4L NC. Placed on NRB O2 sat 100%. Pt tachypneic with difficulty completing sentences in triage. PMHX Pulmonary fibrosis secondary to covid, borderline DM, HLD. Charge RN aware. pt c/o increased SOB, fever, cough. Baseline 4L NC at home. Arrives with O2 sat 68% on 4L NC. Placed on NRB O2 sat 100%. Pt tachypneic with difficulty completing sentences in triage. Unable to obtain temp. PMHX Pulmonary fibrosis secondary to covid, borderline DM, HLD. Charge RN aware.

## 2022-10-09 NOTE — ED PROVIDER NOTE - NS ED ROS FT
CONSTITUTIONAL: +SUBJECTIVE FEVER   EYES: no visual changes, no eye pain  EARS: no ear drainage, no ear pain, no change in hearing  NOSE: +NASAL CONGESTION   MOUTH/THROAT: no sore throat  CV: No chest pain, no palpitations  RESP: +SOB, COUGH   GI: No n/v/d, no abd pain  : no dysuria, no hematuria, no flank pain  MSK: no back pain, no extremity pain  SKIN: no rashes CONSTITUTIONAL: +SUBJECTIVE FEVER   EYES: no visual changes, no eye pain  EARS: no ear drainage, no ear pain, no change in hearing  NOSE: +NASAL CONGESTION   MOUTH/THROAT: no sore throat  CV: No chest pain, no palpitations  RESP: +SOB, +COUGH   GI: No n/v/d, no abd pain  : no dysuria, no hematuria, no flank pain  MSK: no back pain, no extremity pain  SKIN: no rashes

## 2022-10-09 NOTE — ED PROVIDER NOTE - PHYSICAL EXAMINATION
Physical Exam:    Gen: AOx3, patient tachypneic on examination   Head: NCAT  HEENT: EOMI, PEERLA, normal conjunctiva, tongue midline, oral mucosa moist  Lung: +coarse breath sounds heard B/L lung fields   CV: RRR, no murmurs, rubs or gallops  Abd: soft, NT, ND, no guarding, no rigidity, no rebound tenderness, no CVA tenderness   MSK: no visible deformities, ROM normal in UE/LE, no back pain  Neuro: No focal sensory or motor deficits  Skin: +1 pitting edema B/L lower extremities Physical Exam:    Gen: AOx3, patient tachypneic on examination   Head: NCAT  HEENT: EOMI, PEERLA, normal conjunctiva, tongue midline, oral mucosa moist  Lung: +coarse breath sounds heard B/L lung fields   CV: RRR, no murmurs, rubs or gallops  Abd: soft, NT, ND, no guarding, no rigidity, no rebound tenderness, no CVA tenderness   MSK: no visible deformities, ROM normal in UE/LE, no back pain  Neuro: CN 2-12 intact, no sensory deficits on exam   Skin: +1 pitting edema B/L lower extremities

## 2022-10-10 NOTE — ED ADULT NURSE NOTE - CHIEF COMPLAINT QUOTE
pt c/o increased SOB, fever, cough. Baseline 4L NC at home. Arrives with O2 sat 68% on 4L NC. Placed on NRB O2 sat 100%. Pt tachypneic with difficulty completing sentences in triage. Unable to obtain temp. PMHX Pulmonary fibrosis secondary to covid, borderline DM, HLD. Charge RN aware.

## 2022-10-10 NOTE — H&P ADULT - HISTORY OF PRESENT ILLNESS
61-year-old male with HLD, pre-DM, pulmonary fibrosis secondary to COVID, presenting from home with worsening shortness of breath and cough productive of yellow/green sputum. He notes cough is worse when supine. He also notes some nasal congestion and sore throat. Denies sick contacts or recent travel. No recent abx or steroid use. Believes portable oxygen compressor not functioning properly, on arrival SpO2 68%, improved w/NRB now on 5L NC. He is on chronic, around the clock 4.5L home O2. He is largely wheelchair bound due to BURGESS with exertion.     In the ED VS:  98.6  120-122  147-132/  12-26  % 4L NC NRB; received guaifenesin 200mg PO x1, melatonin 3mg PO x1, albuterol/ipratropium 3mL neb and budesonide 0.5mg inhaler

## 2022-10-10 NOTE — H&P ADULT - NSHPPHYSICALEXAM_GEN_ALL_CORE
Vital Signs Last 24 Hrs  T(C): 37 (09 Oct 2022 23:43), Max: 37 (09 Oct 2022 23:43)  T(F): 98.6 (09 Oct 2022 23:43), Max: 98.6 (09 Oct 2022 23:43)  HR: 122 (09 Oct 2022 23:43) (120 - 122)  BP: 132/86 (09 Oct 2022 23:43) (132/86 - 149/71)  RR: 12 (09 Oct 2022 23:43) (12 - 26)  SpO2: 100% (09 Oct 2022 23:43) (68% - 100%)    Parameters below as of 09 Oct 2022 23:43  Patient On (Oxygen Delivery Method): nasal cannula  O2 Flow (L/min): 4    PHYSICAL EXAM:  GENERAL: NAD, well-developed, well-nourished  HEAD:  Atraumatic, Normocephalic  EYES: EOMI, PERRL, conjunctiva and sclera clear  NECK: Supple, No JVD  CHEST/LUNG: tachypneic, dry crackles b/l; No wheezes or rhonchi; unable to speak in full sentences  HEART: Regular rate and rhythm; No murmurs, rubs, or gallops, (+)S1, S2  ABDOMEN: Soft, Nontender, Nondistended; Normal Bowel sounds; umbilical hernia  EXTREMITIES:  2+ Peripheral Pulses, No clubbing, cyanosis, or edema  PSYCH: normal mood and affect, A&Ox3  NEUROLOGY: no focal neuro deficits  SKIN: No rashes or lesions

## 2022-10-10 NOTE — H&P ADULT - NSHPLABSRESULTS_GEN_ALL_CORE
12.9   9.54  )-----------( 225      ( 09 Oct 2022 23:10 )             39.8     10    135  |  96<L>  |  9   ----------------------------<  107<H>  4.3   |  27  |  0.67    Ca    8.8      09 Oct 2022 23:10    TPro  8.7<H>  /  Alb  3.5  /  TBili  0.6  /  DBili  x   /  AST  18  /  ALT  12  /  AlkPhos  98  1009    PT/INR - ( 09 Oct 2022 23:10 )   PT: 13.8 sec;   INR: 1.19 ratio    PTT - ( 09 Oct 2022 23:10 )  PTT:35.9 sec    23:10 - VBG - pH: 7.36  | pCO2: 60    | pO2: 99    | Lactate: 0.8      Urinalysis Basic - ( 10 Oct 2022 01:10 )  Color: Light Yellow / Appearance: Clear / S.013 / pH: x  Gluc: x / Ketone: Negative  / Bili: Negative / Urobili: <2 mg/dL   Blood: x / Protein: Trace / Nitrite: Negative   Leuk Esterase: Negative / RBC: x / WBC x   Sq Epi: x / Non Sq Epi: x / Bacteria: x    Entero/Rhinovirus (RapRVP): Detected (10.10.22 @ 00:11)  SARS-CoV-2: NotDetec (10.10.22 @ 00:11)    CXR personally reviewed - bilateral patchy airspace opacities    EKG personally reviewed - ST 119bpm, LAD, LVH, QTc 422ms

## 2022-10-10 NOTE — PROGRESS NOTE ADULT - SUBJECTIVE AND OBJECTIVE BOX
[facility] Division of Hospital Medicine  Jose R BaileyIsiah) MD Cir[place]  Pager [phone]    SUBJECTIVE:  Chief complaint: SOB.    Pt seen and evaluated at bedside this AM. No o/n events. Reports persistent cough w/ sputum. States he remains SOB, no significant improvement since arrival.  No f/c, no sore throat, no sick contacts.      ROS: All systems negative except as noted.      Vital Signs Last 24 Hrs  T(C): 36 (10 Oct 2022 13:04), Max: 37 (09 Oct 2022 23:43)  T(F): 96.8 (10 Oct 2022 13:04), Max: 98.6 (09 Oct 2022 23:43)  HR: 85 (10 Oct 2022 13:04) (85 - 122)  BP: 120/74 (10 Oct 2022 13:04) (120/74 - 149/71)  BP(mean): --  RR: 18 (10 Oct 2022 13:04) (12 - 26)  SpO2: 100% (10 Oct 2022 13:04) (68% - 100%)    Parameters below as of 10 Oct 2022 13:04  Patient On (Oxygen Delivery Method): nasal cannula  O2 Flow (L/min): 5        PHYSICAL EXAM:  Gen- In bed, comfortable, NAD  Eyes- EOMI, PERRLA, nonicteric.  EMNT- Fair dentition. MMM. No tonsilar exudates. No posterior pharynx erythema.  Neck- Supple. No masses. No tracheal deviation.  Resp- Fine crackles at bases. Limited/diminished air entry bilaterally. No accessory muscle use.  CVS- RRR, S1S2, no g/r/m. No LE edema.  GI- Soft abd, NT, ND, +BSx4. No HSM.  MSK- No C/C. ROM intact. No crepitus.  Neuro- CN II-XII intact. Speech fluent/face symmetric. Sensation intact.  Skin- No rashes/ulcers. Warm/moist.  Psych- AAOx3. Appropriate mood/affect.      MEDICATION:  MEDICATIONS  (STANDING):  ALBUTerol    0.083% 2.5 milliGRAM(s) Nebulizer every 6 hours  buDESOnide    Inhalation Suspension 0.5 milliGRAM(s) Inhalation two times a day  enoxaparin Injectable 40 milliGRAM(s) SubCutaneous every 24 hours  predniSONE   Tablet 20 milliGRAM(s) Oral daily    MEDICATIONS  (PRN):            LABORATORY:                          12.4   9.07  )-----------( 220      ( 10 Oct 2022 09:45 )             39.0     10-10    134<L>  |  95<L>  |  8   ----------------------------<  108<H>  4.2   |  28  |  0.71    Ca    8.9      10 Oct 2022 09:45  Phos  3.7     10-10  Mg     2.10     10-10    TPro  8.4<H>  /  Alb  3.5  /  TBili  0.8  /  DBili  x   /  AST  17  /  ALT  9   /  AlkPhos  93  10-10    PT/INR - ( 09 Oct 2022 23:10 )   PT: 13.8 sec;   INR: 1.19 ratio         PTT - ( 09 Oct 2022 23:10 )  PTT:35.9 sec  Urinalysis Basic - ( 10 Oct 2022 01:10 )    Color: Light Yellow / Appearance: Clear / S.013 / pH: x  Gluc: x / Ketone: Negative  / Bili: Negative / Urobili: <2 mg/dL   Blood: x / Protein: Trace / Nitrite: Negative   Leuk Esterase: Negative / RBC: x / WBC x   Sq Epi: x / Non Sq Epi: x / Bacteria: x            SARS-CoV-2: NotDetec (10 Oct 2022 00:11)    Serum Pro-Brain Natriuretic Peptide: 99 pg/mL (10-10 @ 09:45)

## 2022-10-10 NOTE — H&P ADULT - PROBLEM SELECTOR PLAN 1
likely secondary to pulmonary fibrosis/ILD exacerbation secondary to enterovirus infection  c/w oxygen  monitor pulse ox   pulm consult in AM re steroids  nebs atc  incentive spirometry   patient believes home O2 concentrator not functioning properly would assess prior to d/c  check d-dimer, pro-BNP, and procalcitonin with AM labs

## 2022-10-10 NOTE — CONSULT NOTE ADULT - ATTENDING COMMENTS
61-year-old male with HLD, pre-DM, pulmonary fibrosis secondary to COVID, presenting with worsening shortness of breath and cough productive of yellow/green sputum, found to have sepsis secondary to enterovirus and acute on chronic hypoxic respiratory failure.     Patient with some wheezing on exam and continues to be dyspnic. At this time, would suggest giving steroids to help with the inflammation of the enterovirus. Please obtain CT chest to determine if there is any worsening of his pulmonary fibrosis. Would not start antibiotics at this time given no leukocytosis and no fevers.     Lung transplant team notified, but not a transplant candidate at this time.     Thank you for your consult. We will continue to follow the patient's care with you.

## 2022-10-10 NOTE — H&P ADULT - NSHPREVIEWOFSYSTEMS_GEN_ALL_CORE
REVIEW OF SYSTEMS:    CONSTITUTIONAL: No weakness, (+)subjective fevers; No chills  EYES/ENT: No visual changes; No dysphagia; (+) sore throat; (+)nasal congestion; No sinus pain/pressure  NECK: No pain or stiffness  RESPIRATORY: (+) cough, wheezing; No hemoptysis; (+) shortness of breath  CARDIOVASCULAR: No chest pain or palpitations; No lower extremity edema  GASTROINTESTINAL: No abdominal or epigastric pain. No nausea, vomiting, or hematemesis; No diarrhea or constipation. No melena or hematochezia.  GENITOURINARY: No dysuria, frequency or hematuria  NEUROLOGICAL: No numbness, paresthesias, or weakness; No HA; No LH/dizziness  MSK: wheelchair bound on exertion due to BURGESS, no falls  SKIN: No itching, burning, rashes, or lesions   All other review of systems is negative unless indicated above.

## 2022-10-10 NOTE — CONSULT NOTE ADULT - SUBJECTIVE AND OBJECTIVE BOX
CHIEF COMPLAINT: shortness of breath     HPI: 61 year old male with HLD, pre-DM, pulmonary fibrosis secondary to COVID, presenting from home with worsening shortness of breath and a productive cough. Patient states that when he coughs he notices  yellow/green sputum. Aside from the cough, patient states he has some nasal congestion and a a sore throat. Patient states that he cannot lie flat because his shortness of breath and cough becomes worse.  Patient states symptoms began approximately 3 to 4 days ago. Patient denies sick contacts or recent travel. No recent abx or steroid use. Believes portable oxygen compressor not functioning properly, on arrival SpO2 68%, improved w/NRB now on 5L NC. He is on chronic, around the clock 4.5L home O2. He is largely wheelchair bound due to BURGESS with exertion. Patient denies having any fever, chills, chest pain, palpitations, abdominal pain, nausea, vomiting, constipation, or diarrhea.     In the ED VS:  98.6  120-122  147-132/  12-26  % 4L NC NRB; received guaifenesin 200mg PO x1, melatonin 3mg PO x1, albuterol/ipratropium 3mL neb and budesonide 0.5mg inhaler       PAST MEDICAL & SURGICAL HISTORY:  Dyslipidemia      Borderline diabetes      Pulmonary fibrosis      S/P knee surgery          FAMILY HISTORY:  No pertinent family history in first degree relatives        SOCIAL HISTORY:  Smoking: [x] Never Smoked [ ] Former Smoker (__ packs x ___ years) [ ] Current Smoker  (__ packs x ___ years)  Substance Use: [x] Never Used [ ] Used ____  EtOH Use: none   Marital Status: [ ] Single [x]  [ ]  [ ]   Occupation: retired   Recent Travel: none     Allergies    No Known Allergies      HOME MEDICATIONS:  Home Medications:  ALBUTER 3ML 0.083% NEB: milliliter(s) inhaled 2 times a day (10 Oct 2022 05:26)  aspirin 325 mg oral delayed release tablet: 1 tab(s) orally once a day, As Needed (10 Oct 2022 05:26)  BUDESONIDE .5MG/2ML RES: milliliter(s) inhaled 2 times a day (10 Oct 2022 05:26)      REVIEW OF SYSTEMS:  Constitutional: [ ] negative [ ] fevers [ ] chills [x] weight loss [ ] weight gain [x] general weakness  HEENT: [ ] negative [ ] dry eyes [ ] eye irritation [x] postnasal drip [x] nasal congestion [x] sore throat  CV: [ ] negative  [ ] chest pain [x] orthopnea [ ] palpitations [ ] murmur  Resp: [ ] negative [x] cough [x] shortness of breath [x] dyspnea [ ] wheezing [x] sputum [ ] hemoptysis  GI: [x] negative [ ] nausea [ ] vomiting [ ] diarrhea [ ] constipation [ ] abd pain [ ] dysphagia   : [x] negative [ ] dysuria [ ] nocturia [ ] hematuria [ ] increased urinary frequency  Musculoskeletal: [x] negative [ ] back pain [ ] myalgias [ ] arthralgias [ ] fracture  Skin: [x] negative [] rash [ ] itch  Neurological: [ ] negative [ ] headache [ ] dizziness [ ] syncope [x] weakness [ ] numbness  Psychiatric: [x] negative [ ] anxiety [ ] depression  Endocrine: [x] negative [ ] diabetes [ ] thyroid problem  Hematologic/Lymphatic: [x] negative [ ] anemia [ ] bleeding problem  Allergic/Immunologic: [x] negative [ ] itchy eyes [ ] nasal discharge [ ] hives [ ] angioedema  [ ] All other systems negative    OBJECTIVE:  ICU Vital Signs Last 24 Hrs  T(C): 36.7 (10 Oct 2022 06:13), Max: 37 (09 Oct 2022 23:43)  T(F): 98 (10 Oct 2022 06:13), Max: 98.6 (09 Oct 2022 23:43)  HR: 91 (10 Oct 2022 06:13) (91 - 122)  BP: 131/85 (10 Oct 2022 06:13) (131/85 - 149/71)  BP(mean): --  ABP: --  ABP(mean): --  RR: 20 (10 Oct 2022 06:13) (12 - 26)  SpO2: 100% (10 Oct 2022 06:13) (68% - 100%)    O2 Parameters below as of 10 Oct 2022 06:13  Patient On (Oxygen Delivery Method): nasal cannula  O2 Flow (L/min): 4            CAPILLARY BLOOD GLUCOSE          PHYSICAL EXAM:  General: mild distress, well groomed, well developed   Head: atraumatic, normocephalic  Eyes: pupils equal, round, reactive to light, extraocular muscles intact, anicteric sclera    Neck: supple, no tender lymphadenopathy, could not assess JVD as patient could no tolerate lying flat    Respiratory: tachypneic, coarse crackles bilaterally, more prominent at lung bases, no wheezes, rales, or rhonchi, no accessory muscle use for respirations   Cardiovascular: regular rate and rhythm, normal s1,s2 no murmurs rubs, or gallops  Abdomen: soft, non-distended, non-tender, normoactive bowel sounds   Extremities: warm, well perfused, 1+ bilateral lower extremity edema   Skin: no rashes or lesions noted  Neurological: AOx3, no focal neurological deficits    LINES:     HOSPITAL MEDICATIONS:  Standing Meds:  ALBUTerol    0.083% 2.5 milliGRAM(s) Nebulizer every 6 hours  buDESOnide    Inhalation Suspension 0.5 milliGRAM(s) Inhalation two times a day  enoxaparin Injectable 40 milliGRAM(s) SubCutaneous every 24 hours      PRN Meds:      LABS:                        12.9   9.54  )-----------( 225      ( 09 Oct 2022 23:10 )             39.8     Hgb Trend: 12.9<--  10-09    135  |  96<L>  |  9   ----------------------------<  107<H>  4.3   |  27  |  0.67    Ca    8.8      09 Oct 2022 23:10    TPro  8.7<H>  /  Alb  3.5  /  TBili  0.6  /  DBili  x   /  AST  18  /  ALT  12  /  AlkPhos  98  1009    Creatinine Trend: 0.67<--  PT/INR - ( 09 Oct 2022 23:10 )   PT: 13.8 sec;   INR: 1.19 ratio         PTT - ( 09 Oct 2022 23:10 )  PTT:35.9 sec  Urinalysis Basic - ( 10 Oct 2022 01:10 )    Color: Light Yellow / Appearance: Clear / S.013 / pH: x  Gluc: x / Ketone: Negative  / Bili: Negative / Urobili: <2 mg/dL   Blood: x / Protein: Trace / Nitrite: Negative   Leuk Esterase: Negative / RBC: x / WBC x   Sq Epi: x / Non Sq Epi: x / Bacteria: x        Venous Blood Gas:  10-09 @ 23:10  7.36/60/99/34/INCALCUABLE  VBG Lactate: 0.8       CHIEF COMPLAINT: shortness of breath     HPI: 61 year old male with HLD, pre-DM, pulmonary fibrosis secondary to COVID, presenting from home with worsening shortness of breath and a productive cough. Patient states that when he coughs he notices  yellow/green sputum. Aside from the cough, patient states he has some nasal congestion and a a sore throat. Patient states that he cannot lie flat because his shortness of breath and cough becomes worse.  Patient states symptoms began approximately 3 to 4 days ago. Patient denies sick contacts or recent travel. No recent abx or steroid use. Believes portable oxygen compressor not functioning properly, on arrival SpO2 68%, improved w/NRB now on 5L NC. He is on chronic, around the clock 4.5L home O2. He is largely wheelchair bound due to BURGESS with exertion. Patient denies having any fever, chills, chest pain, palpitations, abdominal pain, nausea, vomiting, constipation, or diarrhea.     In the ED VS:  98.6  120-122  147-132/  12-26  % 4L NC NRB; received guaifenesin 200mg PO x1, melatonin 3mg PO x1, albuterol/ipratropium 3mL neb and budesonide 0.5mg inhaler       PAST MEDICAL & SURGICAL HISTORY:  Dyslipidemia      Borderline diabetes      Pulmonary fibrosis      S/P knee surgery          FAMILY HISTORY:  No pertinent family history in first degree relatives        SOCIAL HISTORY:  Smoking: [x] Never Smoked [ ] Former Smoker (__ packs x ___ years) [ ] Current Smoker  (__ packs x ___ years)  Substance Use: [x] Never Used [ ] Used ____  EtOH Use: none   Marital Status: [ ] Single [x]  [ ]  [ ]   Occupation: retired   Recent Travel: none     Allergies    No Known Allergies      HOME MEDICATIONS:  Home Medications:  ALBUTER 3ML 0.083% NEB: milliliter(s) inhaled 2 times a day (10 Oct 2022 05:26)  aspirin 325 mg oral delayed release tablet: 1 tab(s) orally once a day, As Needed (10 Oct 2022 05:26)  BUDESONIDE .5MG/2ML RES: milliliter(s) inhaled 2 times a day (10 Oct 2022 05:26)      REVIEW OF SYSTEMS:  Constitutional: [ ] negative [ ] fevers [ ] chills [x] weight loss [ ] weight gain [x] general weakness  HEENT: [ ] negative [ ] dry eyes [ ] eye irritation [x] postnasal drip [x] nasal congestion [x] sore throat  CV: [ ] negative  [ ] chest pain [x] orthopnea [ ] palpitations [ ] murmur  Resp: [ ] negative [x] cough [x] shortness of breath [x] dyspnea [ ] wheezing [x] sputum [ ] hemoptysis  GI: [x] negative [ ] nausea [ ] vomiting [ ] diarrhea [ ] constipation [ ] abd pain [ ] dysphagia   : [x] negative [ ] dysuria [ ] nocturia [ ] hematuria [ ] increased urinary frequency  Musculoskeletal: [x] negative [ ] back pain [ ] myalgias [ ] arthralgias [ ] fracture  Skin: [x] negative [] rash [ ] itch  Neurological: [ ] negative [ ] headache [ ] dizziness [ ] syncope [x] weakness [ ] numbness  Psychiatric: [x] negative [ ] anxiety [ ] depression  Endocrine: [x] negative [ ] diabetes [ ] thyroid problem  Hematologic/Lymphatic: [x] negative [ ] anemia [ ] bleeding problem  Allergic/Immunologic: [x] negative [ ] itchy eyes [ ] nasal discharge [ ] hives [ ] angioedema  [ ] All other systems negative    OBJECTIVE:  ICU Vital Signs Last 24 Hrs  T(C): 36.7 (10 Oct 2022 06:13), Max: 37 (09 Oct 2022 23:43)  T(F): 98 (10 Oct 2022 06:13), Max: 98.6 (09 Oct 2022 23:43)  HR: 91 (10 Oct 2022 06:13) (91 - 122)  BP: 131/85 (10 Oct 2022 06:13) (131/85 - 149/71)  BP(mean): --  ABP: --  ABP(mean): --  RR: 20 (10 Oct 2022 06:13) (12 - 26)  SpO2: 100% (10 Oct 2022 06:13) (68% - 100%)    O2 Parameters below as of 10 Oct 2022 06:13  Patient On (Oxygen Delivery Method): nasal cannula  O2 Flow (L/min): 4            CAPILLARY BLOOD GLUCOSE          PHYSICAL EXAM:  General: mild distress, well groomed, well developed   Head: atraumatic, normocephalic  Eyes: pupils equal, round, reactive to light, extraocular muscles intact, anicteric sclera    Neck: supple, no tender lymphadenopathy, could not assess JVD as patient could no tolerate lying flat    Respiratory: tachypneic, coarse crackles bilaterally, more prominent at lung bases, no wheezes, rales, or rhonchi, no accessory muscle use for respirations   Cardiovascular: regular rate and rhythm, normal s1,s2 no murmurs rubs, or gallops  Abdomen: soft, non-distended, non-tender, normoactive bowel sounds   Extremities: warm, well perfused, 1+ bilateral lower extremity edema   Skin: no rashes or lesions noted  Neurological: AOx3, no focal neurological deficits    LINES:     HOSPITAL MEDICATIONS:  Standing Meds:  ALBUTerol    0.083% 2.5 milliGRAM(s) Nebulizer every 6 hours  buDESOnide    Inhalation Suspension 0.5 milliGRAM(s) Inhalation two times a day  enoxaparin Injectable 40 milliGRAM(s) SubCutaneous every 24 hours      PRN Meds:      LABS:                        12.9   9.54  )-----------( 225      ( 09 Oct 2022 23:10 )             39.8     Hgb Trend: 12.9<--  10-09    135  |  96<L>  |  9   ----------------------------<  107<H>  4.3   |  27  |  0.67    Ca    8.8      09 Oct 2022 23:10    TPro  8.7<H>  /  Alb  3.5  /  TBili  0.6  /  DBili  x   /  AST  18  /  ALT  12  /  AlkPhos  98  1009    Creatinine Trend: 0.67<--  PT/INR - ( 09 Oct 2022 23:10 )   PT: 13.8 sec;   INR: 1.19 ratio         PTT - ( 09 Oct 2022 23:10 )  PTT:35.9 sec  Urinalysis Basic - ( 10 Oct 2022 01:10 )    Color: Light Yellow / Appearance: Clear / S.013 / pH: x  Gluc: x / Ketone: Negative  / Bili: Negative / Urobili: <2 mg/dL   Blood: x / Protein: Trace / Nitrite: Negative   Leuk Esterase: Negative / RBC: x / WBC x   Sq Epi: x / Non Sq Epi: x / Bacteria: x        Venous Blood Gas:  10-09 @ 23:10  7.36/60/99/34/INCALCUABLE  VBG Lactate: 0.8    Radiology:  [ ] Reviewed and interpreted by me         CHIEF COMPLAINT: shortness of breath     HPI: 61 year old male with HLD, pre-DM, pulmonary fibrosis secondary to COVID, presenting from home with worsening shortness of breath and a productive cough. Patient states that when he coughs he notices  yellow/green sputum. Aside from the cough, patient states he has some nasal congestion and a a sore throat. Patient states that he cannot lie flat because his shortness of breath and cough becomes worse.  Patient states symptoms began approximately 3 to 4 days ago. Patient denies sick contacts or recent travel. No recent abx or steroid use. Believes portable oxygen compressor not functioning properly, on arrival SpO2 68%, improved w/NRB now on 5L NC. He is on chronic, around the clock 4.5L home O2. He is largely wheelchair bound due to BURGESS with exertion. Patient denies having any fever, chills, chest pain, palpitations, abdominal pain, nausea, vomiting, constipation, or diarrhea.     In the ED VS:  98.6  120-122  147-132/  12-26  % 4L NC NRB; received guaifenesin 200mg PO x1, melatonin 3mg PO x1, albuterol/ipratropium 3mL neb and budesonide 0.5mg inhaler       PAST MEDICAL & SURGICAL HISTORY:  Dyslipidemia      Borderline diabetes      Pulmonary fibrosis      S/P knee surgery          FAMILY HISTORY:  No pertinent family history in first degree relatives        SOCIAL HISTORY:  Smoking: [x] Never Smoked [ ] Former Smoker (__ packs x ___ years) [ ] Current Smoker  (__ packs x ___ years)  Substance Use: [x] Never Used [ ] Used ____  EtOH Use: none   Marital Status: [ ] Single [x]  [ ]  [ ]   Occupation: retired   Recent Travel: none     Allergies    No Known Allergies      HOME MEDICATIONS:  Home Medications:  ALBUTER 3ML 0.083% NEB: milliliter(s) inhaled 2 times a day (10 Oct 2022 05:26)  aspirin 325 mg oral delayed release tablet: 1 tab(s) orally once a day, As Needed (10 Oct 2022 05:26)  BUDESONIDE .5MG/2ML RES: milliliter(s) inhaled 2 times a day (10 Oct 2022 05:26)      REVIEW OF SYSTEMS:  Constitutional: [ ] negative [ ] fevers [ ] chills [x] weight loss [ ] weight gain [x] general weakness  HEENT: [ ] negative [ ] dry eyes [ ] eye irritation [x] postnasal drip [x] nasal congestion [x] sore throat  CV: [ ] negative  [ ] chest pain [x] orthopnea [ ] palpitations [ ] murmur  Resp: [ ] negative [x] cough [x] shortness of breath [x] dyspnea [ ] wheezing [x] sputum [ ] hemoptysis  GI: [x] negative [ ] nausea [ ] vomiting [ ] diarrhea [ ] constipation [ ] abd pain [ ] dysphagia   : [x] negative [ ] dysuria [ ] nocturia [ ] hematuria [ ] increased urinary frequency  Musculoskeletal: [x] negative [ ] back pain [ ] myalgias [ ] arthralgias [ ] fracture  Skin: [x] negative [] rash [ ] itch  Neurological: [ ] negative [ ] headache [ ] dizziness [ ] syncope [x] weakness [ ] numbness  Psychiatric: [x] negative [ ] anxiety [ ] depression  Endocrine: [x] negative [ ] diabetes [ ] thyroid problem  Hematologic/Lymphatic: [x] negative [ ] anemia [ ] bleeding problem  Allergic/Immunologic: [x] negative [ ] itchy eyes [ ] nasal discharge [ ] hives [ ] angioedema  [ ] All other systems negative    OBJECTIVE:  ICU Vital Signs Last 24 Hrs  T(C): 36.7 (10 Oct 2022 06:13), Max: 37 (09 Oct 2022 23:43)  T(F): 98 (10 Oct 2022 06:13), Max: 98.6 (09 Oct 2022 23:43)  HR: 91 (10 Oct 2022 06:13) (91 - 122)  BP: 131/85 (10 Oct 2022 06:13) (131/85 - 149/71)  BP(mean): --  ABP: --  ABP(mean): --  RR: 20 (10 Oct 2022 06:13) (12 - 26)  SpO2: 100% (10 Oct 2022 06:13) (68% - 100%)    O2 Parameters below as of 10 Oct 2022 06:13  Patient On (Oxygen Delivery Method): nasal cannula  O2 Flow (L/min): 4            CAPILLARY BLOOD GLUCOSE          PHYSICAL EXAM:  General: mild distress, well groomed, well developed   Head: atraumatic, normocephalic  Eyes: pupils equal, round, reactive to light, extraocular muscles intact, anicteric sclera    Neck: supple, no tender lymphadenopathy, could not assess JVD as patient could no tolerate lying flat    Respiratory: tachypneic, coarse crackles bilaterally, more prominent at lung bases, expiratory wheezes appreciated, no rales or rhonchi, no accessory muscle use for respirations   Cardiovascular: regular rate and rhythm, normal s1,s2 no murmurs rubs, or gallops  Abdomen: soft, non-distended, non-tender, normoactive bowel sounds   Extremities: warm, well perfused, 1+ bilateral lower extremity edema   Skin: no rashes or lesions noted  Neurological: AOx3, no focal neurological deficits    LINES:     HOSPITAL MEDICATIONS:  Standing Meds:  ALBUTerol    0.083% 2.5 milliGRAM(s) Nebulizer every 6 hours  buDESOnide    Inhalation Suspension 0.5 milliGRAM(s) Inhalation two times a day  enoxaparin Injectable 40 milliGRAM(s) SubCutaneous every 24 hours      PRN Meds:      LABS:                        12.9   9.54  )-----------( 225      ( 09 Oct 2022 23:10 )             39.8     Hgb Trend: 12.9<--  10-09    135  |  96<L>  |  9   ----------------------------<  107<H>  4.3   |  27  |  0.67    Ca    8.8      09 Oct 2022 23:10    TPro  8.7<H>  /  Alb  3.5  /  TBili  0.6  /  DBili  x   /  AST  18  /  ALT  12  /  AlkPhos  98  1009    Creatinine Trend: 0.67<--  PT/INR - ( 09 Oct 2022 23:10 )   PT: 13.8 sec;   INR: 1.19 ratio         PTT - ( 09 Oct 2022 23:10 )  PTT:35.9 sec  Urinalysis Basic - ( 10 Oct 2022 01:10 )    Color: Light Yellow / Appearance: Clear / S.013 / pH: x  Gluc: x / Ketone: Negative  / Bili: Negative / Urobili: <2 mg/dL   Blood: x / Protein: Trace / Nitrite: Negative   Leuk Esterase: Negative / RBC: x / WBC x   Sq Epi: x / Non Sq Epi: x / Bacteria: x        Venous Blood Gas:  10-09 @ 23:10  7.36/60/99/34/INCALCUABLE  VBG Lactate: 0.8    Radiology:  [ ] Reviewed and interpreted by me

## 2022-10-10 NOTE — ED ADULT NURSE NOTE - OBJECTIVE STATEMENT
Patient received in room 14, A/Ox4, ambulatory, c/o SOB. Hx of pulmonary fibrosis. Patient arrives today with SOB and associated cough, States he is on 4L NC as needed. Endorses 3 days of productive cough and nasal congestion. Denies chest pain, fever, chills, N/V, and urinary symptoms. Placed on cardiac monitor, sinus tach. 20G IV placed to left and right forearm. Patient on 10L nonrebreather mask. Respirations even and labored. Safety measures maintained, call bell within reach.

## 2022-10-10 NOTE — H&P ADULT - ASSESSMENT
61-year-old male with HLD, pre-DM, pulmonary fibrosis secondary to COVID, presenting from home with worsening shortness of breath and cough productive of yellow/green sputum, found to have sepsis secondary to enterovirus and acute on chronic hypoxic respiratory failure

## 2022-10-10 NOTE — CONSULT NOTE ADULT - ASSESSMENT
61-year-old male with HLD, pre-DM, pulmonary fibrosis secondary to COVID, presenting from home with worsening shortness of breath and cough productive of yellow/green sputum, found to have sepsis secondary to enterovirus and acute on chronic hypoxic respiratory failure.  61-year-old male with HLD, pre-DM, pulmonary fibrosis secondary to COVID, presenting ith worsening shortness of breath and cough productive of yellow/green sputum, found to have sepsis secondary to enterovirus and acute on chronic hypoxic respiratory failure.     Plan:  - CT chest without contrast  - Consider echo to determine if there is any cardiac component   - Can consider starting Prednisone 20mg for 5 days, followed by 10mg for another 5 days     Case discussed with   61-year-old male with HLD, pre-DM, pulmonary fibrosis secondary to COVID, presenting ith worsening shortness of breath and cough productive of yellow/green sputum, found to have sepsis secondary to enterovirus and acute on chronic hypoxic respiratory failure.     Plan:    1. Pulmonary Fibrosis  - CT chest without contrast  - Consider echo to determine if there is any cardiac component   - Can consider starting Prednisone 20mg for 5 days, followed by 10mg for another 5 days     Case discussed with Dr. Palomo  61-year-old male with HLD, pre-DM, pulmonary fibrosis secondary to COVID, presenting ith worsening shortness of breath and cough productive of yellow/green sputum, found to have sepsis secondary to enterovirus and acute on chronic hypoxic respiratory failure. Given patient's increased hypoxia differential includes     Plan:    1. Pulmonary Fibrosis  - CT chest without contrast  - Consider echo to determine if there is any cardiac component   - Can consider starting Prednisone 20mg for 5 days, followed by 10mg for another 5 days     Case discussed with Dr. Palomo  61-year-old male with HLD, pre-DM, pulmonary fibrosis secondary to COVID, presenting with worsening shortness of breath and cough productive of yellow/green sputum, found to have sepsis secondary to enterovirus and acute on chronic hypoxic respiratory failure.     Plan:    1. Hypoxia  - CT chest without contrast  - Can consider echo to determine if there is any cardiac component   - Can consider starting Prednisone 20mg for 5 days, followed by 10mg for another 5 days   - Continue with albuterol and ICS    Case discussed with Dr. Palomo

## 2022-10-11 NOTE — PROGRESS NOTE ADULT - ASSESSMENT
61-year-old male with HLD, pre-DM, pulmonary fibrosis secondary to COVID, presenting with worsening shortness of breath and cough productive of yellow/green sputum, found to have sepsis secondary to enterovirus and acute on chronic hypoxic respiratory failure.     Plan:    1. Hypoxia  - CT chest without contrast completed, pending radiology read   - Can consider echo to determine if there is any cardiac component   - Would increase prednisone regimen of 40mg for 5 days, followed by 20mg for another 5 days, followed by 10mg for another 5 days   - Would consider increasing albuterol nebulizer to q6h   - continue ICS   - Would consider using hypertonic saline following nebulizer treatment and then using Acapella to help patient clear mucus   - Would consider sending sputum cultures    ***incomplete*** plan not finalized until case discussed with attending           61-year-old male with HLD, pre-DM, pulmonary fibrosis secondary to COVID, presenting with worsening shortness of breath and cough productive of yellow/green sputum, found to have sepsis secondary to enterovirus and acute on chronic hypoxic respiratory failure.     Plan:    1. Hypoxia  - CT chest without contrast completed, pending radiology read   - Can consider echo to determine if there is any cardiac component   - Would increase prednisone regimen of 40mg for 5 days, followed by 20mg for another 5 days, followed by 10mg for another 5 days   - Would consider increasing albuterol nebulizer to q6h   - continue ICS   - Would consider using hypertonic saline after nebulizer treatment and then using Acapella to help patient clear mucus   - Would consider sending sputum cultures    ***incomplete*** plan not finalized until case discussed with attending           61-year-old male with HLD, pre-DM, pulmonary fibrosis secondary to COVID, presenting with worsening shortness of breath and cough productive of yellow/green sputum, found to have sepsis secondary to enterovirus and acute on chronic hypoxic respiratory failure.     Plan:    1. Hypoxia  - CT chest without contrast completed, pending radiology read   - Can consider echo to determine if there is any cardiac component   - Would increase prednisone regimen of 40mg for 5 days, followed by 20mg for another 5 days, followed by 10mg for another 5 days   - Would consider increasing albuterol nebulizer to q6h   - continue ICS   - Would consider using hypertonic saline after nebulizer treatment and then using Acapella to help patient clear mucus   - Would consider sending sputum cultures  - Would check urine legionella, urine strep, and nasal MRSA swab   - Would consider giving 1 dose of hycodan qhs to help him sleep      Case and plan discussed with Dr. Palomo          61-year-old male with HLD, pre-DM, pulmonary fibrosis secondary to COVID, presenting with worsening shortness of breath and cough productive of yellow/green sputum, found to have sepsis secondary to enterovirus and acute on chronic hypoxic respiratory failure.     Plan:    1. Hypoxia  - CT chest without contrast completed, pending radiology read   - Can consider echo to determine if there is any cardiac component   - Would start solumedrol 30mg BID  - Would consider increasing albuterol nebulizer to q6h   - continue ICS   - Would consider using hypertonic saline after nebulizer treatment and then using Acapella to help patient clear mucus   - Would consider sending sputum cultures  - Would check urine legionella, urine strep, and nasal MRSA swab   - Would consider giving 1 dose of hycodan qhs to help him sleep      Case and plan discussed with Dr. Palomo

## 2022-10-11 NOTE — ED ADULT NURSE REASSESSMENT NOTE - NS ED NURSE REASSESS COMMENT FT1
AAOx4, no signs of distress, coughing with exp wheeze, able to speak in full sentences, endorsing SOB, pending bed placement, fall precautions
AAOx4, no signs of distress, pending bed placement, fall precautions maintained
Break RN: AAOx4, no signs of distress, on 4L NC, pending bed placement, fall precautions maintained
Patient offers no complaints at this time. VSS. Respirations even and unlabored.
received pt in room, breathing heavy, c/o SOB, on continuos cardiac monitor, BP stable, HR elevated and pt desats to 80s on 6L/NC, O2 source changed to NRM at 15L/min at this time. pt is now sat'ing at 100%.
Break coverage RN. @0530.Patient A&Ox4, resting on stretcher, continues on 5L nasal canula, spO2 100%, vitals stable. Continues on cardiac monitor- NS. Medicated per orders. Awaiting a bed assignment. Stretcher in lowest position, wheels locked, appropriate side rails in place, call bell in reach.   @0600 patient observed to be at spO2 88% patient coughing and spitting up sputum, placed on non-rebreather, spO2 100% after cough attack. ACP called, spoke with NIR Will, came down to patient bedside, ok to place patient back on 5L nasal canula spO2 100%. Continues A&Ox4 Vitals continue to be stable. Primary RN Fernandez aware of event.

## 2022-10-11 NOTE — PROGRESS NOTE ADULT - ASSESSMENT
61-year-old male with HLD, pre-DM, pulmonary fibrosis secondary to COVID, presenting from home with worsening shortness of breath and cough productive of yellow/green sputum, found to have sepsis secondary to enterovirus and acute on chronic hypoxic respiratory failure. Pulmonology on board.

## 2022-10-11 NOTE — PROGRESS NOTE ADULT - SUBJECTIVE AND OBJECTIVE BOX
CHIEF COMPLAINT:Patient is a 61y old  Male who presents with a chief complaint of SOB (10 Oct 2022 14:17)      Interval Events: Overnight patient had a coughing fit and he desaturated to 88% while on 5L of NC. Patient was temporarily placed on a non-rebreather and his oxygen saturation improved to 100%. Patient was then put on 7L of NC. This morning, patient was saturating at 95% while on 5L. Patient states that he continues to have a hard time bringing up his cough. Patient states that he was unable to sleep last night because of his cough and shortness of breath. Patient denies fever, chills, chest pain, palpitations, abdominal pain, nausea, vomiting, constipation or diarrhea     OBJECTIVE:  ICU Vital Signs Last 24 Hrs  T(C): 37 (11 Oct 2022 10:05), Max: 37 (11 Oct 2022 10:05)  T(F): 98.6 (11 Oct 2022 10:05), Max: 98.6 (11 Oct 2022 10:05)  HR: 103 (11 Oct 2022 10:05) (84 - 112)  BP: 138/82 (11 Oct 2022 10:05) (114/71 - 138/82)  BP(mean): --  ABP: --  ABP(mean): --  RR: 28 (11 Oct 2022 10:05) (18 - 28)  SpO2: 98% (11 Oct 2022 10:05) (96% - 100%)    O2 Parameters below as of 11 Oct 2022 10:05  Patient On (Oxygen Delivery Method): nasal cannula  O2 Flow (L/min): 5              PHYSICAL EXAM:  General: mild distress, well groomed, well developed   Head: atraumatic, normocephalic  Eyes: pupils equal, round, reactive to light, extraocular muscles intact, anicteric sclera    Neck: supple, no tender lymphadenopathy, could not assess JVD as patient could no tolerate lying flat    Respiratory: tachypneic, coarse crackles bilaterally, more prominent at lung bases, expiratory wheezes appreciated, no rales or rhonchi, no accessory muscle use for respirations   Cardiovascular: regular rate and rhythm, normal s1,s2 no murmurs rubs, or gallops  Abdomen: soft, non-distended, non-tender, normoactive bowel sounds   Extremities: warm, well perfused, 1+ bilateral lower extremity edema   Skin: no rashes or lesions noted  Neurological: AOx3, no focal neurological deficits      HOSPITAL MEDICATIONS:  MEDICATIONS  (STANDING):  ALBUTerol    0.083% 2.5 milliGRAM(s) Nebulizer every 6 hours  buDESOnide    Inhalation Suspension 0.5 milliGRAM(s) Inhalation two times a day  enoxaparin Injectable 40 milliGRAM(s) SubCutaneous every 24 hours  predniSONE   Tablet 20 milliGRAM(s) Oral daily    MEDICATIONS  (PRN):  benzonatate 100 milliGRAM(s) Oral every 8 hours PRN Cough      LABS:    The Labs were reviewed by me   The Radiology was reviewed by me    EKG tracing reviewed by me    10-11    139  |  96<L>  |  10  ----------------------------<  105<H>  4.0   |  32<H>  |  0.74  10-10    134<L>  |  95<L>  |  8   ----------------------------<  108<H>  4.2   |  28  |  0.71  10-09    135  |  96<L>  |  9   ----------------------------<  107<H>  4.3   |  27  |  0.67    Ca    9.2      11 Oct 2022 08:05  Ca    8.9      10 Oct 2022 09:45  Ca    8.8      09 Oct 2022 23:10  Phos  3.5     10-11  Mg     2.30     10-11    TPro  8.4<H>  /  Alb  3.5  /  TBili  0.8  /  DBili  x   /  AST  17  /  ALT  9   /  AlkPhos  93  10-10  TPro  8.7<H>  /  Alb  3.5  /  TBili  0.6  /  DBili  x   /  AST  18  /  ALT  12  /  AlkPhos  98  10-09    Magnesium, Serum: 2.30 mg/dL (10-11-22 @ 08:05)  Magnesium, Serum: 2.10 mg/dL (10-10-22 @ 09:45)    Phosphorus Level, Serum: 3.5 mg/dL (10-11-22 @ 08:05)  Phosphorus Level, Serum: 3.7 mg/dL (10-10-22 @ 09:45)      PT/INR - ( 09 Oct 2022 23:10 )   PT: 13.8 sec;   INR: 1.19 ratio         PTT - ( 09 Oct 2022 23:10 )  PTT:35.9 sec              Urinalysis Basic - ( 10 Oct 2022 01:10 )    Color: Light Yellow / Appearance: Clear / S.013 / pH: x  Gluc: x / Ketone: Negative  / Bili: Negative / Urobili: <2 mg/dL   Blood: x / Protein: Trace / Nitrite: Negative   Leuk Esterase: Negative / RBC: x / WBC x   Sq Epi: x / Non Sq Epi: x / Bacteria: x                              12.5   9.77  )-----------( 260      ( 11 Oct 2022 08:05 )             39.9                         12.4   9.07  )-----------( 220      ( 10 Oct 2022 09:45 )             39.0                         12.9   9.54  )-----------( 225      ( 09 Oct 2022 23:10 )             39.8     CAPILLARY BLOOD GLUCOSE    RADIOLOGY:  [x] Reviewed and interpreted by me     CHIEF COMPLAINT:Patient is a 61y old  Male who presents with a chief complaint of SOB (10 Oct 2022 14:17)      Interval Events: Overnight patient had a coughing fit and he desaturated to 88% while on 5L of NC. Patient was temporarily placed on a non-rebreather and his oxygen saturation improved to 100%. Patient was then put on 7L of NC. This morning, patient was saturating at 95% while on 5L. Patient states that he continues to have a hard time bringing up his cough. Patient states that he was unable to sleep last night because of his cough and shortness of breath. Patient denies fever, chills, chest pain, palpitations, abdominal pain, nausea, vomiting, constipation or diarrhea     OBJECTIVE:  ICU Vital Signs Last 24 Hrs  T(C): 37 (11 Oct 2022 10:05), Max: 37 (11 Oct 2022 10:05)  T(F): 98.6 (11 Oct 2022 10:05), Max: 98.6 (11 Oct 2022 10:05)  HR: 103 (11 Oct 2022 10:05) (84 - 112)  BP: 138/82 (11 Oct 2022 10:05) (114/71 - 138/82)  BP(mean): --  ABP: --  ABP(mean): --  RR: 28 (11 Oct 2022 10:05) (18 - 28)  SpO2: 98% (11 Oct 2022 10:05) (96% - 100%)    O2 Parameters below as of 11 Oct 2022 10:05  Patient On (Oxygen Delivery Method): nasal cannula  O2 Flow (L/min): 5              PHYSICAL EXAM:  General: mild distress, well groomed, well developed   Head: atraumatic, normocephalic  Eyes: pupils equal, round, reactive to light, extraocular muscles intact, anicteric sclera    Neck: supple, no tender lymphadenopathy, could not assess JVD as patient could no tolerate lying flat    Respiratory: tachypneic, coarse crackles bilaterally, more prominent at lung bases, expiratory wheezes appreciated, no rales or rhonchi, no accessory muscle use for respirations   Cardiovascular: regular rate and rhythm, normal s1,s2 no murmurs rubs, or gallops  Abdomen: soft, non-distended, non-tender, normoactive bowel sounds   Extremities: warm, well perfused, trace bilateral lower extremity edema   Skin: no rashes or lesions noted  Neurological: AOx3, no focal neurological deficits      HOSPITAL MEDICATIONS:  MEDICATIONS  (STANDING):  ALBUTerol    0.083% 2.5 milliGRAM(s) Nebulizer every 6 hours  buDESOnide    Inhalation Suspension 0.5 milliGRAM(s) Inhalation two times a day  enoxaparin Injectable 40 milliGRAM(s) SubCutaneous every 24 hours  predniSONE   Tablet 20 milliGRAM(s) Oral daily    MEDICATIONS  (PRN):  benzonatate 100 milliGRAM(s) Oral every 8 hours PRN Cough      LABS:    The Labs were reviewed by me   The Radiology was reviewed by me    EKG tracing reviewed by me    10-11    139  |  96<L>  |  10  ----------------------------<  105<H>  4.0   |  32<H>  |  0.74  10-10    134<L>  |  95<L>  |  8   ----------------------------<  108<H>  4.2   |  28  |  0.71  10-09    135  |  96<L>  |  9   ----------------------------<  107<H>  4.3   |  27  |  0.67    Ca    9.2      11 Oct 2022 08:05  Ca    8.9      10 Oct 2022 09:45  Ca    8.8      09 Oct 2022 23:10  Phos  3.5     10-11  Mg     2.30     10-11    TPro  8.4<H>  /  Alb  3.5  /  TBili  0.8  /  DBili  x   /  AST  17  /  ALT  9   /  AlkPhos  93  10-10  TPro  8.7<H>  /  Alb  3.5  /  TBili  0.6  /  DBili  x   /  AST  18  /  ALT  12  /  AlkPhos  98  10-09    Magnesium, Serum: 2.30 mg/dL (10-11-22 @ 08:05)  Magnesium, Serum: 2.10 mg/dL (10-10-22 @ 09:45)    Phosphorus Level, Serum: 3.5 mg/dL (10-11-22 @ 08:05)  Phosphorus Level, Serum: 3.7 mg/dL (10-10-22 @ 09:45)      PT/INR - ( 09 Oct 2022 23:10 )   PT: 13.8 sec;   INR: 1.19 ratio         PTT - ( 09 Oct 2022 23:10 )  PTT:35.9 sec              Urinalysis Basic - ( 10 Oct 2022 01:10 )    Color: Light Yellow / Appearance: Clear / S.013 / pH: x  Gluc: x / Ketone: Negative  / Bili: Negative / Urobili: <2 mg/dL   Blood: x / Protein: Trace / Nitrite: Negative   Leuk Esterase: Negative / RBC: x / WBC x   Sq Epi: x / Non Sq Epi: x / Bacteria: x                              12.5   9.77  )-----------( 260      ( 11 Oct 2022 08:05 )             39.9                         12.4   9.07  )-----------( 220      ( 10 Oct 2022 09:45 )             39.0                         12.9   9.54  )-----------( 225      ( 09 Oct 2022 23:10 )             39.8     CAPILLARY BLOOD GLUCOSE    RADIOLOGY:  [x] Reviewed and interpreted by me

## 2022-10-11 NOTE — PROGRESS NOTE ADULT - SUBJECTIVE AND OBJECTIVE BOX
[facility] Division of Hospital Medicine  Jose R Brown) MD Cirilo  Pager [phone]    SUBJECTIVE:  Chief complaint: SOB.    Pt seen and evaluated at bedside this AM. O/n hypoxia noted. Still no sig improvement. Still w/ cough and sputum. No f/c. Still w/ SOB.      ROS: All systems negative except as noted.      Vital Signs Last 24 Hrs  T(C): 36.1 (11 Oct 2022 12:14), Max: 37 (11 Oct 2022 10:05)  T(F): 96.9 (11 Oct 2022 12:14), Max: 98.6 (11 Oct 2022 10:05)  HR: 98 (11 Oct 2022 12:14) (84 - 112)  BP: 137/62 (11 Oct 2022 12:14) (114/71 - 138/82)  BP(mean): --  RR: 22 (11 Oct 2022 12:14) (20 - 28)  SpO2: 99% (11 Oct 2022 12:14) (96% - 100%)    Parameters below as of 11 Oct 2022 12:14  Patient On (Oxygen Delivery Method): nasal cannula  O2 Flow (L/min): 5        PHYSICAL EXAM:  Gen- In bed, comfortable, NAD  Eyes- EOMI, PERRLA, nonicteric.  EMNT- Fair dentition. MMM. No tonsilar exudates. No posterior pharynx erythema.  Neck- Supple. No masses. No tracheal deviation.  Resp- Limited/diminished air entry bilaterally. No accessory muscle use.  CVS- RRR, S1S2, no g/r/m. No LE edema.  GI- Soft abd, NT, ND, +BSx4. No HSM.  MSK- No C/C. ROM intact. No crepitus.  Neuro- CN II-XII intact. Speech fluent/face symmetric. Sensation intact.  Skin- No rashes/ulcers. Warm/moist.  Psych- AAOx3. Appropriate mood/affect.      MEDICATION:  MEDICATIONS  (STANDING):  ALBUTerol    0.083% 2.5 milliGRAM(s) Nebulizer every 6 hours  buDESOnide    Inhalation Suspension 0.5 milliGRAM(s) Inhalation two times a day  enoxaparin Injectable 40 milliGRAM(s) SubCutaneous every 24 hours  hydrocodone/homatropine Syrup 5 milliLiter(s) Oral once  methylPREDNISolone sodium succinate Injectable 30 milliGRAM(s) IV Push every 12 hours  sodium chloride 7% Inhalation 4 milliLiter(s) Inhalation every 6 hours    MEDICATIONS  (PRN):  benzonatate 100 milliGRAM(s) Oral every 8 hours PRN Cough      LABORATORY:                          12.5   9.77  )-----------( 260      ( 11 Oct 2022 08:05 )             39.9     10-11    139  |  96<L>  |  10  ----------------------------<  105<H>  4.0   |  32<H>  |  0.74    Ca    9.2      11 Oct 2022 08:05  Phos  3.5     10-11  Mg     2.30     10-11    TPro  8.4<H>  /  Alb  3.5  /  TBili  0.8  /  DBili  x   /  AST  17  /  ALT  9   /  AlkPhos  93  10-10    PT/INR - ( 09 Oct 2022 23:10 )   PT: 13.8 sec;   INR: 1.19 ratio         PTT - ( 09 Oct 2022 23:10 )  PTT:35.9 sec  Urinalysis Basic - ( 10 Oct 2022 01:10 )    Color: Light Yellow / Appearance: Clear / S.013 / pH: x  Gluc: x / Ketone: Negative  / Bili: Negative / Urobili: <2 mg/dL   Blood: x / Protein: Trace / Nitrite: Negative   Leuk Esterase: Negative / RBC: x / WBC x   Sq Epi: x / Non Sq Epi: x / Bacteria: x            SARS-CoV-2: NotDetec (10 Oct 2022 00:11)

## 2022-10-11 NOTE — CHART NOTE - NSCHARTNOTEFT_GEN_A_CORE
[facility] NIGHT MEDICINE COVERAGE    Notified by RN that pt had fit of coughing and desatted to 88% while on 5L NC, pt placed on NRB w/ improvement to 100%.  Pt assessed at bedside, pt said he was having a hard time w/ coughing up mucus, pt was deescalated back to NC 7L/min prior to assessment.  Denies chest pain, or difficulty breathing.  Pt A&Ox3, no increased work of breathing noted, lung exam reveals soft inspiratory bibasilar rales.  No rales or rhonchi noted.  Pt satting 100% while on NC, pt uses 4.5L/min at home.    Plan:  -Pt just got albuterol, agreeable to saline nebulizer x1  -Trial Tessalon Perle PRN for cough  -Hemodynamically stable  -On NC 6L presently, wean back to 5L as pt tolerates.  -RN aware of plan, all of pt's questions answered at bedside.    Will endorse to day provider, pt stable at this time, will continue to monitor.    Suman Sweeney PA-C  Department of Medicine - [facility]  In-House Pager: #[phone]

## 2022-10-12 NOTE — PATIENT PROFILE ADULT - FUNCTIONAL ASSESSMENT - BASIC MOBILITY 6.
3-calculated by average/Not able to assess (calculate score using [facility] averaging method)

## 2022-10-12 NOTE — PROGRESS NOTE ADULT - SUBJECTIVE AND OBJECTIVE BOX
CHIEF COMPLAINT:Patient is a 61y old  Male who presents with a chief complaint of SOB (11 Oct 2022 15:45)      Interval Events: Patient seen and evaluated at bedside. No acute events overnight. Patient states that his shortness of breath and cough have slightly improved. Patient states that he was able to sleep a little bit last night. Patient states that with the hypertonic saline he was able to bring up some secretions. Patient denies having any fever, chills, chest pain, palpitations, abdominal pain, nausea, vomiting, diarrhea, or constipation.     REVIEW OF SYSTEMS:  [x] All other systems negative except per HPI     OBJECTIVE:  ICU Vital Signs Last 24 Hrs  T(C): 37.1 (12 Oct 2022 05:10), Max: 37.1 (11 Oct 2022 18:21)  T(F): 98.7 (12 Oct 2022 05:10), Max: 98.7 (11 Oct 2022 18:21)  HR: 90 (12 Oct 2022 05:10) (90 - 110)  BP: 140/95 (12 Oct 2022 05:10) (123/87 - 152/96)  BP(mean): --  ABP: --  ABP(mean): --  RR: 18 (12 Oct 2022 05:10) (18 - 31)  SpO2: 97% (12 Oct 2022 05:10) (95% - 100%)    O2 Parameters below as of 12 Oct 2022 05:10  Patient On (Oxygen Delivery Method): nasal cannula  O2 Flow (L/min): 3              PHYSICAL EXAM:  General: not in any acute distress, well groomed, well developed   Head: atraumatic, normocephalic  Eyes: pupils equal, round, reactive to light, extraocular muscles intact, anicteric sclera    Neck: supple, no tender lymphadenopathy,    Respiratory: tachypneic, coarse crackles bilaterally, more prominent at lung bases, no wheezes, no rales, no rhonchi, no accessory muscle use for respirations  Cardiovascular: regular rate and rhythm, normal s1,s2 no murmurs, rubs, or gallops  Abdomen: soft, non-distended, non-tender, normoactive bowel sounds   Extremities: warm, well perfused, trace bilateral lower extremity edema   Skin: no rashes or lesions noted  Neurological: AOx3, no focal neurological deficits    HOSPITAL MEDICATIONS:  MEDICATIONS  (STANDING):  ALBUTerol    0.083% 2.5 milliGRAM(s) Nebulizer every 6 hours  buDESOnide    Inhalation Suspension 0.5 milliGRAM(s) Inhalation two times a day  enoxaparin Injectable 40 milliGRAM(s) SubCutaneous every 24 hours  methylPREDNISolone sodium succinate Injectable 30 milliGRAM(s) IV Push every 12 hours  sodium chloride 7% Inhalation 4 milliLiter(s) Inhalation every 6 hours    MEDICATIONS  (PRN):  benzonatate 100 milliGRAM(s) Oral every 8 hours PRN Cough      LABS:    The Labs were reviewed by me   The Radiology was reviewed by me    EKG tracing reviewed by me    10-11    139  |  96<L>  |  10  ----------------------------<  105<H>  4.0   |  32<H>  |  0.74  10-10    134<L>  |  95<L>  |  8   ----------------------------<  108<H>  4.2   |  28  |  0.71  10-09    135  |  96<L>  |  9   ----------------------------<  107<H>  4.3   |  27  |  0.67    Ca    9.2      11 Oct 2022 08:05  Ca    8.9      10 Oct 2022 09:45  Ca    8.8      09 Oct 2022 23:10  Phos  3.5     10-11  Mg     2.30     10-11    TPro  8.4<H>  /  Alb  3.5  /  TBili  0.8  /  DBili  x   /  AST  17  /  ALT  9   /  AlkPhos  93  10-10  TPro  8.7<H>  /  Alb  3.5  /  TBili  0.6  /  DBili  x   /  AST  18  /  ALT  12  /  AlkPhos  98  10-09    Magnesium, Serum: 2.30 mg/dL (10-11-22 @ 08:05)  Magnesium, Serum: 2.10 mg/dL (10-10-22 @ 09:45)    Phosphorus Level, Serum: 3.5 mg/dL (10-11-22 @ 08:05)  Phosphorus Level, Serum: 3.7 mg/dL (10-10-22 @ 09:45)                                              12.5   9.77  )-----------( 260      ( 11 Oct 2022 08:05 )             39.9                         12.4   9.07  )-----------( 220      ( 10 Oct 2022 09:45 )             39.0                         12.9   9.54  )-----------( 225      ( 09 Oct 2022 23:10 )             39.8     CAPILLARY BLOOD GLUCOSE            MICROBIOLOGY:     RADIOLOGY:  [ ] Reviewed and interpreted by me    Point of Care Ultrasound Findings:    PFT:    EKG:

## 2022-10-12 NOTE — PROGRESS NOTE ADULT - ASSESSMENT
61-year-old male with HLD, pre-DM, pulmonary fibrosis secondary to COVID, presenting with worsening shortness of breath and cough productive of yellow/green sputum, found to have sepsis secondary to enterovirus and acute on chronic hypoxic respiratory failure.     Plan:    1. Hypoxia  - CT chest without contrast completed, compared to prior imaging CT appears similar    - Echo ordered, pending completion and results   - Would considering transitioning from solumedrol to oral prednisone  - Continue albuterol nebulizer to q6h   - Continue ICS   - Continue hypertonic saline after nebulizer treatment and then using Acapella to help patient clear mucus   - Sputum cultures pending collection   - Would check urine legionella, urine strep, and nasal MRSA swab   - Can continue giving 1 dose of hycodan qhs to help him sleep    ***incomplete***** 61-year-old male with HLD, pre-DM, pulmonary fibrosis secondary to COVID, presenting with worsening shortness of breath and cough productive of yellow/green sputum, found to have sepsis secondary to enterovirus and acute on chronic hypoxic respiratory failure.     Plan:    1. Hypoxia  - CT chest without contrast completed, compared to prior imaging CT appears similar    - Echo ordered, pending completion and results   - Would considering transitioning from solumedrol to oral prednisone. 40mg for 4 days, 30mg for 4 days, 20mg for 4 days, 10mg for another 4 days   - Continue albuterol nebulizer to q6h   - Continue ICS   - Continue hypertonic saline after nebulizer treatment and then using Acapella to help patient clear mucus   - Sputum cultures pending collection   - Would check urine legionella, urine strep, and nasal MRSA swab   - Can continue giving 1 dose of hycodan qhs to help him sleep    Case and plan discussed with Dr. Palomo

## 2022-10-12 NOTE — PATIENT PROFILE ADULT - FALL HARM RISK - HARM RISK INTERVENTIONS

## 2022-10-12 NOTE — PROGRESS NOTE ADULT - SUBJECTIVE AND OBJECTIVE BOX
[facility] Division of Hospital Medicine  Jose R Brown) MD Cirilo  Pager [phone]    SUBJECTIVE:  Chief complaint: SOB.    Pt seen and evaluated at bedside this AM. Feels better slightly. Slept better. Still w/ cough. No f/c, NV.      ROS: All systems negative except as noted.      Vital Signs Last 24 Hrs  T(C): 37.1 (12 Oct 2022 05:10), Max: 37.1 (11 Oct 2022 18:21)  T(F): 98.7 (12 Oct 2022 05:10), Max: 98.7 (11 Oct 2022 18:21)  HR: 90 (12 Oct 2022 05:10) (90 - 110)  BP: 140/95 (12 Oct 2022 05:10) (123/87 - 152/96)  BP(mean): --  RR: 18 (12 Oct 2022 05:10) (18 - 31)  SpO2: 97% (12 Oct 2022 05:10) (95% - 100%)    Parameters below as of 12 Oct 2022 05:10  Patient On (Oxygen Delivery Method): nasal cannula  O2 Flow (L/min): 3      PHYSICAL EXAM:  Gen- In bed, comfortable, NAD  Eyes- EOMI, PERRLA, nonicteric.  EMNT- Fair dentition. MMM. No tonsilar exudates. No posterior pharynx erythema.  Neck- Supple. No masses. No tracheal deviation.  Resp- Improved air entry. Scattered crackles. No accessory muscle use.  CVS- RRR, S1S2, no g/r/m. No LE edema.  GI- Soft abd, NT, ND, +BSx4. No HSM.  MSK- No C/C. ROM intact. No crepitus.  Neuro- CN II-XII intact. Speech fluent/face symmetric. Sensation intact.  Skin- No rashes/ulcers. Warm/moist.  Psych- AAOx3. Appropriate mood/affect.      MEDICATION:  MEDICATIONS  (STANDING):  ALBUTerol    0.083% 2.5 milliGRAM(s) Nebulizer every 6 hours  buDESOnide    Inhalation Suspension 0.5 milliGRAM(s) Inhalation two times a day  enoxaparin Injectable 40 milliGRAM(s) SubCutaneous every 24 hours  hydrocodone/homatropine Syrup 5 milliLiter(s) Oral once  methylPREDNISolone sodium succinate Injectable 30 milliGRAM(s) IV Push every 12 hours  sodium chloride 7% Inhalation 4 milliLiter(s) Inhalation every 6 hours    MEDICATIONS  (PRN):  benzonatate 100 milliGRAM(s) Oral every 8 hours PRN Cough      LABORATORY:  No labs today.

## 2022-10-12 NOTE — PROGRESS NOTE ADULT - NUTRITIONAL ASSESSMENT
61-year-old male with HLD, pre-DM, pulmonary fibrosis secondary to COVID, presenting with worsening shortness of breath and cough productive of yellow/green sputum, found to have sepsis secondary to enterovirus and acute on chronic hypoxic respiratory failure.     Plan:    1. Hypoxia  - CT chest without contrast completed, compared to prior imaging CT appears similar    - Echo ordered, pending completion and results   - Would continue solumedrol 30mg BID for now   - Continue albuterol nebulizer to q6h   - Continue ICS   - Continue hypertonic saline after nebulizer treatment and then using Acapella to help patient clear mucus   - Sputum cultures pending collection   - Would check urine legionella, urine strep, and nasal MRSA swab   - Can continue giving 1 dose of hycodan qhs to help him sleep    ***incomplete*****

## 2022-10-13 NOTE — PROGRESS NOTE ADULT - PROBLEM SELECTOR PLAN 3
Pulm recs appreciated -Plan as above.

## 2022-10-13 NOTE — DISCHARGE NOTE PROVIDER - NSFOLLOWUPCLINICS_GEN_ALL_ED_FT
[facility] Pulmonolgy and Sleep Medicine  Pulmonology  [address] [place] [address], [place] [address]  Phone: [phone]  Fax:

## 2022-10-13 NOTE — PROGRESS NOTE ADULT - SUBJECTIVE AND OBJECTIVE BOX
[facility] Division of Hospital Medicine  Jose R Brown) MD [place]  Pager [phone]    SUBJECTIVE:  Chief complaint: pulmonary fibrosis.    Pt seen and evaluated at bedside this AM. No o/n events. Denies any SOB while at rest. Still having cough, but usually w/ activities. States he got mild relief from antitussive. Overall slightly improved. No f/c.       ROS: All systems negative except as noted.      Vital Signs Last 24 Hrs  T(C): 36.9 (13 Oct 2022 12:30), Max: 36.9 (12 Oct 2022 21:46)  T(F): 98.5 (13 Oct 2022 12:30), Max: 98.5 (13 Oct 2022 12:30)  HR: 97 (13 Oct 2022 12:30) (90 - 117)  BP: 139/72 (13 Oct 2022 12:30) (112/74 - 139/72)  BP(mean): --  RR: 19 (13 Oct 2022 12:30) (18 - 19)  SpO2: 100% (13 Oct 2022 12:30) (95% - 100%)    Parameters below as of 13 Oct 2022 12:30  Patient On (Oxygen Delivery Method): nasal cannula  O2 Flow (L/min): 3        PHYSICAL EXAM:  Gen- In bed, comfortable, NAD  Eyes- EOMI, PERRLA, nonicteric.  EMNT- Fair dentition. MMM. No tonsilar exudates. No posterior pharynx erythema.  Neck- Supple. No masses. No tracheal deviation.  Resp- Improved air entry. Scattered crackles. No accessory muscle use.  CVS- RRR, S1S2, no g/r/m. No LE edema.  GI- Soft abd, NT, ND, +BSx4. No HSM.  MSK- No C/C. ROM intact. No crepitus.  Neuro- CN II-XII intact. Speech fluent/face symmetric. Sensation intact.  Skin- No rashes/ulcers. Warm/moist.  Psych- AAOx3. Appropriate mood/affect.      MEDICATION:  MEDICATIONS  (STANDING):  ALBUTerol    0.083% 2.5 milliGRAM(s) Nebulizer every 6 hours  buDESOnide    Inhalation Suspension 0.5 milliGRAM(s) Inhalation two times a day  enoxaparin Injectable 40 milliGRAM(s) SubCutaneous every 24 hours  hydrocodone/homatropine Syrup 5 milliLiter(s) Oral at bedtime  predniSONE   Tablet 40 milliGRAM(s) Oral every 24 hours  sodium chloride 3%  Inhalation 4 milliLiter(s) Inhalation every 6 hours    MEDICATIONS  (PRN):  benzonatate 100 milliGRAM(s) Oral every 8 hours PRN Cough            LABORATORY:                          12.4   11.36 )-----------( 299      ( 13 Oct 2022 05:38 )             39.7     10-13    135  |  95<L>  |  22  ----------------------------<  126<H>  4.4   |  31  |  0.86    Ca    9.0      13 Oct 2022 05:38  Phos  3.8     10-13  Mg     2.30     10-13                SARS-CoV-2: NotDetec (10 Oct 2022 00:11)

## 2022-10-13 NOTE — DISCHARGE NOTE PROVIDER - NSDCCPCAREPLAN_GEN_ALL_CORE_FT
PRINCIPAL DISCHARGE DIAGNOSIS  Diagnosis: Acute on chronic respiratory failure with hypoxia  Assessment and Plan of Treatment: You came to the hospital with shortness of breath and required increased oxygen while in the hospital. You have improved. You were found to have a respiratory virus called enterovirus and were treated with nebulizer treatments and steroids. You have improved. Please follow up with your primary care provider or pulmonologist within 1-2 weeks. Return to the hospital if you experience worsening shortness of breath.

## 2022-10-13 NOTE — DISCHARGE NOTE PROVIDER - NSDCMRMEDTOKEN_GEN_ALL_CORE_FT
ALBUTER 3ML 0.083% NEB: milliliter(s) inhaled 2 times a day  BUDESONIDE .5MG/2ML RES: milliliter(s) inhaled 2 times a day   albuterol 0.63 mg/3 mL (0.021%) inhalation solution: 3 milliliter(s) by nebulizer 2 times a day   budesonide 0.25 mg/2 mL inhalation suspension: 4 milliliter(s) by nebulizer 2 times a day   Hycodan 5 mg-1.5 mg/5 mL oral syrup: 5 milliliter(s) orally every 6 hours as needed for cough MDD:20mL  predniSONE 10 mg oral tablet: Take 40mg (4 pills) x2 days, then 30mg (3 pills) x4 days, then 20mg (2 pills) x4 days, then 10mg (1 pill) x4 days and then stop    albuterol 0.63 mg/3 mL (0.021%) inhalation solution: 3 milliliter(s) by nebulizer every 6 hours   budesonide 0.25 mg/2 mL inhalation suspension: 4 milliliter(s) by nebulizer 2 times a day   Hycodan 5 mg-1.5 mg/5 mL oral syrup: 5 milliliter(s) orally every 6 hours as needed for cough MDD:20mL  predniSONE 10 mg oral tablet: Take 40mg (4 pills) x2 days, then 30mg (3 pills) x4 days, then 20mg (2 pills) x4 days, then 10mg (1 pill) x4 days and then stop

## 2022-10-13 NOTE — PROGRESS NOTE ADULT - PROBLEM SELECTOR PLAN 4
enoxaparin for DVT ppx    Dispo- Pending course.
enoxaparin for DVT ppx
enoxaparin for DVT ppx    Dispo- Pending course.
enoxaparin for DVT ppx    Dispo- To home w/ family. 35 min spent preparing DC, counseling pt, and coordination of care. Pt already called fam for . He will have family assistance for transport to office visits.

## 2022-10-13 NOTE — PROGRESS NOTE ADULT - SUBJECTIVE AND OBJECTIVE BOX
CHIEF COMPLAINT:Patient is a 61y old  Male who presents with a chief complaint of SOB (12 Oct 2022 13:13)      Interval Events: overnight felt like he was not breathing well but felt better when increased to home oxygen settings. cough is improved although still there. Patient denies fevers, chills, chest pain,  nausea, abdominal pain, diarrhea, constipation, dysuria, leg swelling, headache, light headedness    REVIEW OF SYSTEMS:  [x] All other systems negative except per HPI   [ ] Unable to assess ROS because ________    OBJECTIVE:  ICU Vital Signs Last 24 Hrs  T(C): 36.8 (13 Oct 2022 05:56), Max: 36.9 (12 Oct 2022 13:00)  T(F): 98.2 (13 Oct 2022 05:56), Max: 98.4 (12 Oct 2022 13:00)  HR: 93 (13 Oct 2022 05:56) (90 - 117)  BP: 112/74 (13 Oct 2022 05:56) (112/74 - 125/85)  BP(mean): --  ABP: --  ABP(mean): --  RR: 18 (13 Oct 2022 05:56) (18 - 18)  SpO2: 95% (13 Oct 2022 05:56) (95% - 100%)    O2 Parameters below as of 13 Oct 2022 05:56  Patient On (Oxygen Delivery Method): nasal cannula  O2 Flow (L/min): 3            10-12 @ 07:01  -  10-13 @ 07:00  --------------------------------------------------------  IN: 50 mL / OUT: 300 mL / NET: -250 mL        PHYSICAL EXAM:  GENERAL: NAD, well-groomed, well-developed  HEAD:  Atraumatic, Normocephalic  EYES: EOMI, PERRLA, conjunctiva and sclera clear  ENMT: No tonsillar erythema, exudates, or enlargement; Moist mucous membranes, Good dentition, No lesions  NECK: Supple, No JVD, Normal thyroid  CHEST/LUNG: sparse wheezing No rales, rhonchi, wheezing, or rubs  HEART: Regular rate and rhythm; No murmurs, rubs, or gallops  ABDOMEN: Soft, Nontender, Nondistended; Bowel sounds present  VASCULAR:  2+ Peripheral Pulses, No clubbing, cyanosis, or edema  LYMPH: No lymphadenopathy noted  SKIN: No rashes or lesions  NERVOUS SYSTEM:  Alert & Oriented X3, Good concentration;  HOSPITAL MEDICATIONS:  MEDICATIONS  (STANDING):  ALBUTerol    0.083% 2.5 milliGRAM(s) Nebulizer every 6 hours  buDESOnide    Inhalation Suspension 0.5 milliGRAM(s) Inhalation two times a day  enoxaparin Injectable 40 milliGRAM(s) SubCutaneous every 24 hours  hydrocodone/homatropine Syrup 5 milliLiter(s) Oral at bedtime  predniSONE   Tablet 40 milliGRAM(s) Oral every 24 hours  sodium chloride 3%  Inhalation 4 milliLiter(s) Inhalation every 6 hours    MEDICATIONS  (PRN):  benzonatate 100 milliGRAM(s) Oral every 8 hours PRN Cough      LABS:    The Labs were reviewed by me   The Radiology was reviewed by me    EKG tracing reviewed by me    10-13    135  |  95<L>  |  22  ----------------------------<  126<H>  4.4   |  31  |  0.86  10-11    139  |  96<L>  |  10  ----------------------------<  105<H>  4.0   |  32<H>  |  0.74  10-10    134<L>  |  95<L>  |  8   ----------------------------<  108<H>  4.2   |  28  |  0.71    Ca    9.0      13 Oct 2022 05:38  Ca    9.2      11 Oct 2022 08:05  Ca    8.9      10 Oct 2022 09:45  Phos  3.8     10-13  Mg     2.30     10-13    TPro  8.4<H>  /  Alb  3.5  /  TBili  0.8  /  DBili  x   /  AST  17  /  ALT  9   /  AlkPhos  93  10-10    Magnesium, Serum: 2.30 mg/dL (10-13-22 @ 05:38)  Magnesium, Serum: 2.30 mg/dL (10-11-22 @ 08:05)  Magnesium, Serum: 2.10 mg/dL (10-10-22 @ 09:45)    Phosphorus Level, Serum: 3.8 mg/dL (10-13-22 @ 05:38)  Phosphorus Level, Serum: 3.5 mg/dL (10-11-22 @ 08:05)  Phosphorus Level, Serum: 3.7 mg/dL (10-10-22 @ 09:45)                                              12.4   11.36 )-----------( 299      ( 13 Oct 2022 05:38 )             39.7                         12.5   9.77  )-----------( 260      ( 11 Oct 2022 08:05 )             39.9                         12.4   9.07  )-----------( 220      ( 10 Oct 2022 09:45 )             39.0     CAPILLARY BLOOD GLUCOSE            MICROBIOLOGY:     RADIOLOGY:  [ ] Reviewed and interpreted by me    Point of Care Ultrasound Findings:    PFT:    EKG:

## 2022-10-13 NOTE — PROGRESS NOTE ADULT - ATTENDING COMMENTS
Agree with above.     Stable to be discharged home. Prednisone taper as described above. Please discharge with hycodan for cough at night. Will need follow up in pulm clinic.     Rest of the care as per above.
61-year-old male with HLD, pre-DM, pulmonary fibrosis secondary to COVID, presenting with worsening shortness of breath and cough productive of yellow/green sputum, found to have sepsis secondary to enterovirus and acute on chronic hypoxic respiratory failure.     Patient appears to have improved with IV steroids and benefited from Hycodan at night.  Please attempt to get sputum culture if possible (cont hypertonic saline with duonebs). Can transition to PO prednisone as per above. Patient is back to home (baseline) oxygen.     Please increase steroids to IV as above. Respiratory care as per above.
61-year-old male with HLD, pre-DM, pulmonary fibrosis secondary to COVID, presenting with worsening shortness of breath and cough productive of yellow/green sputum, found to have sepsis secondary to enterovirus and acute on chronic hypoxic respiratory failure.     Patient with some wheezing on exam and continues to be dyspneic. Ct scan appears similar if not slightly worse then his previous CT scan done at [facility].  Would not start antibiotics at this time given no leukocytosis and no fevers.     Please increase steroids to IV as above. Respiratory care as per above.     Lung transplant team notified, but not a transplant candidate at this time.

## 2022-10-13 NOTE — PROGRESS NOTE ADULT - PROBLEM SELECTOR PLAN 2
Sepsis resolved.   secondary to Enteroviral pna  supportive care  Management as above.
enteroviral pna  supportive care  nebs atc
enteroviral pna  supportive care  Management as above.
enteroviral pna  supportive care  Management as above.

## 2022-10-13 NOTE — DISCHARGE NOTE PROVIDER - NSDCFUSCHEDAPPT_GEN_ALL_CORE_FT
Martir Adams  [facility]  [address]  Scheduled Appointment: 10/26/2022

## 2022-10-13 NOTE — PROGRESS NOTE ADULT - PROBLEM SELECTOR PLAN 1
Likely secondary to pulmonary fibrosis/ILD exacerbation secondary to enterovirus infection  -Required NRB - now back on NC.  -F/u CM re: home O2 concentrator issue.   -CT reviewed - consistent w/ fibrotic lung dz. patchiness may also represent infectious process.   -Continue albuterol, ICS.  -Add hypertonic saline.  -DC prednisone - escalate to solumedrol 30 BID.  -Hold off abx for now - low threshold to start empiric coverage should be spike or decompensate. Lower suspicion for bacterial process at this point.  -Pending ECHO.  -Check urine legionella/strep. Sputum Cx.  -Pulm recs appreciated. Spoke w/ fellow.
likely secondary to pulmonary fibrosis/ILD exacerbation secondary to enterovirus infection  c/w oxygen  -Continue albuterol, ICS.  -Will start prednisone 20mg x5d then 10 x5d.  -F/u CM re: home O2 concentrator issue.   -Check CT chest.  -Check ECHO.  -Pulm recs appreciated.
Likely secondary to pulmonary fibrosis/ILD exacerbation secondary to enterovirus infection  -Briefly on NRB. Remains stable on NC.   -Home O2 addressed - pt has working O2.  -CT reviewed - consistent w/ fibrotic lung dz. patchiness may also represent infectious process. D/w pulm - no fever, no WBC - suspect viral process - no abx indicated.   -Continue albuterol, hypertonic saline, ICS,   -Prednisone taper: 40mg for 4 days, 30mg for 4 days, 20mg for 4 days, 10mg for another 4 days - to continue on DC.  -ECHO noted - no acute process noted.  -Legionella negative. Sputum cx in progress.   -Hycodan Rx, nebulizer Rx to be sent.   -Pulm recs appreciated. D/w fellow.
Likely secondary to pulmonary fibrosis/ILD exacerbation secondary to enterovirus infection  -Required NRB - now back on NC.  -F/u CM re: home O2 concentrator issue.   -CT reviewed - consistent w/ fibrotic lung dz. patchiness may also represent infectious process.   -Continue albuterol, hypertonic saline, ICS,   -DC solumedrol, start prednisone taper: 40mg for 4 days, 30mg for 4 days, 20mg for 4 days, 10mg for another 4 days   -Hold off abx for now - low threshold to start empiric coverage should be spike or decompensate. Lower suspicion for bacterial process at this point.  -ECHO noted - no acute process noted.  -Check urine legionella/strep. Sputum Cx. Ordered  -Pulm recs appreciated.

## 2022-10-13 NOTE — DISCHARGE NOTE PROVIDER - NSDCQMPCI_CARD_ALL_CORE
Sepsis Week 4 Survey      Responses   [facility] patient discharged from?  [place]   Does the patient have one of the following disease processes/diagnoses(primary or secondary)?  Sepsis   Week 4 attempt successful?  Yes   Call start time  [phone]   Call end time  1040   Discharge diagnosis  Sepsis, Pneumonia of left lower lobe    Is patient permission given to speak with other caregiver?  Yes   List who call center can speak with  , Peter   Person spoke with today (if not patient) and relationship     Meds reviewed with patient/caregiver?  Yes   Is the patient taking all medications as directed (includes completed medication regime)?  Yes   Has the patient kept scheduled appointments due by today?  Yes [Patient has had f/u with PCP and urology. ]   Is the patient still receiving Home Health Services?  No   Home health comments   reports home nurse or therapy not needed.    Psychosocial issues?  No   What is the patient's perception of their health status since discharge?  Improving   If the patient is a current smoker, are they able to teach back resources for cessation?  Not a smoker   Is the patient/caregiver able to teach back the hierarchy of who to call/visit for symptoms/problems? PCP, Specialist, Home health nurse, Urgent Care, ED, 911  Yes   Week 4 call completed?  Yes   Would the patient like one additional call?  No   Graduated  Yes   Is the patient interested in additional calls from an ambulatory ?  NOTE:  applies to high risk patients requiring additional follow-up.  No   Did the patient feel the follow up calls were helpful during their recovery period?  Yes   Was the number of calls appropriate?  Yes   Wrap up additional comments   reports patient doing well. Denies any new questions or needs today.           Keily Sheets RN   No

## 2022-10-13 NOTE — DISCHARGE NOTE NURSING/CASE MANAGEMENT/SOCIAL WORK - PATIENT PORTAL LINK FT
You can access the FollowMyHealth Patient Portal offered by [facility] by registering at the following website: [email or web address]. By joining [facility]’s FollowMyHealth portal, you will also be able to view your health information using other applications (apps) compatible with our system.

## 2022-10-13 NOTE — DISCHARGE NOTE PROVIDER - HOSPITAL COURSE
61-year-old male with HLD, pre-DM, pulmonary fibrosis secondary to COVID (on home O2 4L NC), presenting from home with worsening shortness of breath and cough productive of yellow/green sputum, found to have sepsis secondary to enterovirus and acute on chronic hypoxic respiratory failure. Required NRB for O2 supplementation however now back to baseline O2 requirements. CT consistent with fibrotic lung disease and questionable superimposed infection however low suspicion for bacterial process at this time and patient monitored off ABX. Treated with steroids. TTE without acute findings.     Case discussed with _______________ 61-year-old male with HLD, pre-DM, pulmonary fibrosis secondary to COVID (on home O2 4L NC), presenting from home with worsening shortness of breath and cough productive of yellow/green sputum, found to have sepsis secondary to enterovirus and acute on chronic hypoxic respiratory failure. Required NRB for O2 supplementation however now back to baseline O2 requirements. CT consistent with fibrotic lung disease and questionable superimposed infection however low suspicion for bacterial process at this time and patient monitored off ABX. Treated with steroids. TTE without acute findings.     Case discussed with Dr. Kerr on 10/13/22. The patient is medically stable for discharge. 61-year-old male with HLD, pre-DM, pulmonary fibrosis secondary to COVID (on home O2 4L NC), presenting from home with worsening shortness of breath and cough productive of yellow/green sputum, found to have sepsis secondary to enterovirus and acute on chronic hypoxic respiratory failure. Required NRB for O2 supplementation however now back to baseline O2 requirements. CT consistent with fibrotic lung disease and questionable superimposed infection however low suspicion for bacterial process at this time and patient monitored off ABX. Treated with steroids. TTE without acute findings. Leukocytosis now sen most likely 2' steroid. Planfor DC home on nebs/supportive measures as well as pred taper.     Case discussed with Dr. Kerr on 10/13/22. The patient is medically stable for discharge.

## 2022-10-13 NOTE — PROGRESS NOTE ADULT - ASSESSMENT
61-year-old male with HLD, pre-DM, pulmonary fibrosis secondary to COVID, presenting with worsening shortness of breath and cough productive of yellow/green sputum, found to have sepsis secondary to enterovirus and acute on chronic hypoxic respiratory failure.     Plan:    1. Hypoxia  - CT chest without contrast completed, compared to prior imaging CT appears similar    - Echo poor study, grossly normal LV  - swtiched to oral prednisone. 40mg for 4 days, 30mg for 4 days, 20mg for 4 days, 10mg for another 4 days   - Continue albuterol nebulizer to q6h   - Continue ICS   - Continue hypertonic saline after nebulizer treatment and then using Acapella to help patient clear mucus   - Sputum cultures collected  - check urine legionella in lab, urine strep, and nasal MRSA swab   - can continue short course of hycodan primarily before sleep  - as patient is improved and if patient wants to go home as he was endorsing to the team patient can be discharged with close follow up with his pulmnologist or with our clinic within 2 weeks    Prior to discharge:  Please email: [email or web address] to setup an appointment prior to discharge. Include the patient's name, , MRN and contact information in the email.      Pulmonary/Sleep Clinic  [address] [place] [address]  [phone]    Syd Zheng MD  [facility] PGY4  [facility] [address], [facility] [phone]   61-year-old male with HLD, pre-DM, pulmonary fibrosis secondary to COVID on 4.5 L NC ATC , presenting with worsening shortness of breath and cough productive of yellow/green sputum, found to have sepsis secondary to enterovirus and acute on chronic hypoxic respiratory failure.     Plan:    1. Hypoxia  - CT chest without contrast completed, compared to prior imaging CT appears similar    - Echo poor study, grossly normal LV  - swtiched to oral prednisone. 40mg for 4 days, 30mg for 4 days, 20mg for 4 days, 10mg for another 4 days   - Continue albuterol nebulizer to q6h   - Continue ICS   - Continue hypertonic saline after nebulizer treatment and then using Acapella to help patient clear mucus   - Sputum cultures collected  - check urine legionella in lab, urine strep, and nasal MRSA swab   - can continue short course of hycodan primarily before sleep  - as patient is improved and if patient wants to go home as he was endorsing to the team patient can be discharged with close follow up with his pulmnologist or with our clinic within 2 weeks    Prior to discharge:  Please email: [email or web address] to setup an appointment prior to discharge. Include the patient's name, , MRN and contact information in the email.      Pulmonary/Sleep Clinic  [address] [place] [address]  [phone]    Syd Zheng MD  PCCM PGY4  [facility] [address], [facility] [phone]

## 2022-10-13 NOTE — DISCHARGE NOTE NURSING/CASE MANAGEMENT/SOCIAL WORK - NSDCPEFALRISK_GEN_ALL_CORE
For information on Fall & Injury Prevention, visit: [email or web address] OR  [email or web address] OR  https://www.cdc.gov/steadi/patient.html

## 2022-10-25 PROBLEM — U09.9 POST-ACUTE COVID-19 SYNDROME: Status: ACTIVE | Noted: 2022-01-01

## 2022-10-26 PROBLEM — J84.10 PULMONARY FIBROSIS, UNSPECIFIED: Chronic | Status: ACTIVE | Noted: 2022-01-01

## 2022-10-26 PROBLEM — Z01.818 PRE-TRANSPLANT EVALUATION FOR LUNG TRANSPLANT: Status: ACTIVE | Noted: 2022-01-01

## 2022-10-26 NOTE — HISTORY OF PRESENT ILLNESS
[Former] : former [< 20 pack-years] : < 20 pack-years [TextBox_4] : 62y/o male with ILD/pulmonary fibrosis , post covid 19 infection (diagnosed in 2021, treated at [facility] for two weeks with steroids and remdesivir, d/cd on supplemental oxygen). Pt was on Ofev previously, but discontinued due to GI complaints. \par \par When/how diagnosed with primary pulm dx? \par \par Patient presents to clinic for follow up after lung transplant evaluations on 2022 and 22, where it was recommended for him for weight loss, pulm rehab. Pt/ family wanted to consider lung transplant evaluation prior to moving forward. \par \par 10/9-10/14 was admitted to [facility] for actue SOB, was treated with steroids, was found rhinovirus.  Continues to use  4-5L at rest, portable oxygen only goes up to 4L. Does not currently exercise, uses a wheelchair for longer trips/distances.Pt accompanied by his daughter. \par CT chest: Reticulation, architectural distortion, and traction bronchiectasis compatible with history of fibrotic lung disease.Pt continues to have symptoms of nasal and sinus congestion. currently on prednisone 10mg once daily, was being tapered. \par \par Some opacities may represent superimposed infection given the patient's septic presentation. If available, consider submitting prior exam for comparison to assess for change. An addendum will be issued accordingly.\par \par \par Has not seen PMD in years for Healthcare Maintenance. 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 \par Prior hospitalizations, ICU admission or intubations: no \par \par Hx covid infection  \par \par Health maintenance/vaccines:\par COVID vax: vaccinated with pfizer and booster \par Colonoscopy x 5 years ago, normal  \par \par Family History: no family history of cancer or lung disease\par \par Social History:\par Lives with: wife (on HD x  10 years) and son (25 years), who are current caregivers \par ETOH/tobacco use: denies alcohol and illicit drug use.\par \par DATA REVIEWED:\par BMI 40 in 2022\par BMI 36.73 Sep 2022 \par \par PFTS\par 2/10/22:\par FVC:o.67L/18%, FEV1:0.61L/23%, T.21L, DLCO: 24.8\par \par 6MWT\par \par \par CT CHEST : 4/15/2022\par B/L upper, mid and lower lung severe pulmonary fibrosis with diffuse traction bronchiectasis within fibrotic areas. patter unchanged from prior studies\par scattered calcified granulomas\par stable miidly enlarged mediastinal lymph nodes\par \par ECHO- not in [employer]\par \par RHC/LHC: not in [employer]\par \par Labs: \par H/h: 13/41 (22) \par ESR: 88 (22)\par BUN/Cr: 12/0.84 (22)\par LFTs normal (22)\par CRP: 16 (22)\par ACE: 34 (22)\par c-ANCA, p-ANCA, ANCA- negative ((22) \par Anti dsDNA - negative (22)\par DIANA neg (22)

## 2022-10-26 NOTE — REASON FOR VISIT
[Consultation] : a consultation [ILD] : ILD [TextBox_44] : Post covid pulmonary fibrosis, Pre-lung transplant Evaluation  [TextBox_13] : Dr. Cesar Daly

## 2022-10-26 NOTE — END OF VISIT
[Time Spent: ___ minutes] : I have spent [unfilled] minutes of time on the encounter. [FreeTextEntry3] : Pt Seen and examined with NIR Thomas\par Imaging/Lab/Consultant follow ups reviewed as documented above \par Assessment/Plan discussed, reviewed and agree with NIR Thomas's documentation \par Discussed at length with patient and daughter the need to loose weight and improve functional status in order to be a lung transplant candidate. \par \par Time spent:35\par

## 2022-10-26 NOTE — ASSESSMENT
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 currenlty on 4-5Lpm Pt was on Ofev previously, but discontinued due to GI complaints. \par \par lung transplant evaluation- pt currently not candidate due to high BMI and limited functional status. \par - Pt losing weight since last visit (9llbs), discussed with pt and daughter short term goal of weight to be 200lbs (4 weeks),long term 185-190 lbs, since prior visit has lost 3 lbs\par - pt needs to be able to walk 600 feet, currently only able to walk a few feet. \par -pulmonary rehab- appt on 9/16  still on [facility] list at [address] [place] [address] pt daughter to follow up, \par - CT sinus, Chest/Abd/Pelvis - ordered\par - ENT consult for nasal/sinus congestion (Dr. Santana) \par - recommend dental appt for assessment/health care maintenance \par - recommend patient return primary pulmonologist for any optimization of current regimen (2-4 weeks) \par - recommend patient see PMD for Health care maintenance- i.e baseline blood work, Echo, flu vaccine (2- 4 weeks) \par - educated pt on avoiding environmental exposure.  \par -Return to clinic in 4 weeks\par \par above discussed with Dr. Adams

## 2022-10-27 PROBLEM — R05.9 COUGH: Status: ACTIVE | Noted: 2022-01-01

## 2022-11-23 NOTE — CONSULT NOTE ADULT - ATTENDING COMMENTS
60 yo M with severe pulmonary fibrosis 2/2 COVID currently being evaluated by lung transplant team at [facility] (last visit not a candidate due to significantly limited functional status and BMI, recommended pulm rehab and weight loss), no prior history of PH although TTE's with poorly visualized RV, now transferred from OSH due to acute on chronic hypoxic respiratory failure and severe RV dysfunction concerning for cor pulmonale 2/2 ILD. OSH CTA negative for PE and currently being treated for CAP. Initially had elevated lactate suggesting cardiogenic shock; however, lactate has cleared, renal function stable, warm and well perfused. Exam notable for JVD and LE edema, extensive B/L crackles (velcro). Bedside TTE with enlarged RV, septal flattening in systole and diastole, reduced RV function. Diastolic dysfunction noted, but this more likely represents pseudodiastolic dysfunction in setting of ventricular interdependence.     #PH-ILD  #Acute ILD exacerbation    Clinical picture not consistent with cardiogenic shock, but given his severe hypoxia and significant RV dysfunction with + JVD and LE edema, is at risk for developing overt RV failure. Agree with aggressive diuresis and inotrope to assist with fluid removal. Trial of Florentin to help with oxygenation, can wean once RV is offloaded. If unable to wean Florentin may need to consider urgent transplant evaluation as well as urgent inhaled vasodilator therapy start (ie tyvaso, will need RHC to obtain this). In setting of severe RV failure would need to consider VA ECMO (vs VV ECMO if RV improves) only if patient is transplant candidate. Notified transplant team (Dr. Admas) of patient's admission, will have formal in patient transplant evaluation on Monday 11/28/22, but may consider transfer to [facility] vs[facility] if no clinical improvement. Agree with broad spectrum antibiotics + 1mg/kg steroids in setting of progression of infiltrates, worsening cough, worsening hypoxia. 60 yo M with severe pulmonary fibrosis 2/2 COVID currently being evaluated by lung transplant team at [facility] (last visit not a candidate due to significantly limited functional status and BMI, recommended pulm rehab and weight loss), no prior history of PH although TTE's with poorly visualized RV, now transferred from OSH due to acute on chronic hypoxic respiratory failure and severe RV dysfunction concerning for cor pulmonale 2/2 ILD. OSH CTA negative for PE and currently being treated for CAP. Initially had elevated lactate suggesting cardiogenic shock; however, lactate has cleared, renal function stable, warm and well perfused. Exam notable for JVD and LE edema, extensive B/L crackles (velcro). Bedside TTE with enlarged RV, septal flattening in systole and diastole, reduced RV function. Diastolic dysfunction noted, but this more likely represents pseudodiastolic dysfunction in setting of ventricular interdependence.     #PH-ILD  #Acute ILD exacerbation    Clinical picture not consistent with cardiogenic shock, but given his severe hypoxia and significant RV dysfunction with + JVD and LE edema, is at risk for developing overt RV failure. Agree with aggressive diuresis and inotrope to assist with fluid removal. Trial of Florentni to help with oxygenation, can wean once RV is offloaded. If unable to wean Florentin may need to consider urgent transplant evaluation as well as urgent inhaled vasodilator therapy start (ie tyvaso, will need RHC to obtain this). In setting of severe RV failure would need to consider VA ECMO (vs VV ECMO if RV improves) only if patient is transplant candidate. Notified transplant team (Dr. Adams) of patient's admission, will have formal in patient transplant evaluation on Monday 11/28/22, but may consider transfer to [facility] vs[facility] if no clinical improvement. Agree with broad spectrum antibiotics + 1mg/kg steroids in setting of progression of infiltrates, worsening cough, worsening hypoxia. F/U procal, BCx, obtain sputum cx if able to.

## 2022-11-23 NOTE — H&P ADULT - NSHPLABSRESULTS_GEN_ALL_CORE
LABS:                        12.4   17.50 )-----------( 372      ( 23 Nov 2022 15:56 )             38.4     11-23    136  |  97  |  17  ----------------------------<  175<H>  4.5   |  34<H>  |  0.90    Ca    8.9      23 Nov 2022 15:56  Phos  3.3     11-23  Mg     2.2     11-23    TPro  8.2  /  Alb  3.2<L>  /  TBili  0.6  /  DBili  x   /  AST  32  /  ALT  21  /  AlkPhos  115  11-23      PT/INR - ( 23 Nov 2022 15:56 )   PT: 18.1 sec;   INR: 1.51          PTT - ( 23 Nov 2022 15:56 )  PTT:27.7 sec  CAPILLARY BLOOD GLUCOSE          CARDIAC MARKERS ( 23 Nov 2022 15:56 )  x     / 0.01 ng/mL / 82 U/L / x     / 5.5 ng/mL                      I & O Summary:      Microbiology:        RADIOLOGY, EKG AND ADDITIONAL TESTS: Reviewed.

## 2022-11-23 NOTE — PATIENT PROFILE ADULT - FALL HARM RISK - HARM RISK INTERVENTIONS
Assistance with ambulation/Assistance OOB with selected safe patient handling equipment/Communicate Risk of Fall with Harm to all staff/Reinforce activity limits and safety measures with patient and family/Tailored Fall Risk Interventions/Visual Cue: Yellow wristband and red socks/Bed in lowest position, wheels locked, appropriate side rails in place/Call bell, personal items and telephone in reach/Instruct patient to call for assistance before getting out of bed or chair/Non-slip footwear when patient is out of bed/[place] to call system/Physically safe environment - no spills, clutter or unnecessary equipment/Purposeful Proactive Rounding/Room/bathroom lighting operational, light cord in reach Assistance with ambulation/Assistance OOB with selected safe patient handling equipment/Communicate Risk of Fall with Harm to all staff/Discuss with provider need for PT consult/Monitor gait and stability/Provide patient with walking aids - walker, cane, crutches/Reinforce activity limits and safety measures with patient and family/Tailored Fall Risk Interventions/Visual Cue: Yellow wristband and red socks/Bed in lowest position, wheels locked, appropriate side rails in place/Call bell, personal items and telephone in reach/Instruct patient to call for assistance before getting out of bed or chair/Non-slip footwear when patient is out of bed/[place] to call system/Physically safe environment - no spills, clutter or unnecessary equipment/Purposeful Proactive Rounding/Room/bathroom lighting operational, light cord in reach

## 2022-11-23 NOTE — H&P ADULT - NSHPPHYSICALEXAM_GEN_ALL_CORE
T(C): 37.1 (11-23-22 @ 16:20), Max: 37.1 (11-23-22 @ 15:30)  HR: 106 (11-23-22 @ 17:00) (106 - 115)  BP: 121/66 (11-23-22 @ 17:00) (121/66 - 132/84)  RR: 40 (11-23-22 @ 17:00) (37 - 44)  SpO2: 95% (11-23-22 @ 16:44) (95% - 98%)    GENERAL: Middle aged male, obese, in moderate distress   HEENT: PERRLA, EOMI, no conjunctival or scleral injection, OP clear, MMM, neck supple   RESP: +Crackles heard bilaterally. Able to complete short sentences but mildly tachypneic. No wheezing.  CV: Tachycardic. Regular rhythm. +S1S2. No murmurs, rubs, or gallops   ABD: Obese, soft, nontender, nondistended, no rebound tenderness or guarding, no palpable masses  EXT: 1+ pitting edema. Extremities WWP, 2+ peripheral pulses.   NEURO: A&Ox3, no focal neurologic deficits   PSYCH: Normal mood and affect

## 2022-11-23 NOTE — CONSULT NOTE ADULT - SUBJECTIVE AND OBJECTIVE BOX
PULMONARY SERVICE INITIAL CONSULT NOTE    HPI:  Patient is a 62 yo M w/ PMH HLD, pre-DM, and pulmonary fibrosis 2/2 COVID-19 infection (wheelchair bound at baseline, on 4L home O2) who initially presented to [facility] on 11/22/2022 for several days of worsening dyspnea, cough productive of off-white sputum, wheezing, and generalized weakness with increasing O2 requirements (up to 6L) and persistent dyspnea at rest with no associated bendopnea however did endorse orhtopnea and LE edema. Patient subsequently had near loc and presented to [facility]. At OSH patient was admitted for acute on chronic hypoxic respiratory failure. While at OSH patient treated for multi-lobar pneumonia as well as acute heart failure exacerbation with an echo concerning for acute right heart failure which resulted in the patient being transferred to [facility].     Notable outside hospital work up: WBC count (16K, with normal BUN/Cr, unremarkable LFTS, elevated procalciton 0.79, pro-bnp 5K). CT PE negative for acute PE, with diffuse traction bronchiectasis, bronchiolectasis and multi-focal GGO. TTE was performed which showed mild TR, severely dilated RV, moderate-to-severe elevated RV systolic pressure, moderate-to severe PHTN (58 mmHg), normal LV size and wall thickness, normal LV systolic function (EF 58%), and grade II diastolic dysfunction. Pulmonary hypertension team consulted due to concern for right heart failure in the setting of pulmonary hypertension.  Currently patient reports improvement in dyspnea and orthopnea.      (23 Nov 2022 16:44)      REVIEW OF SYSTEMS:  Constitutional: No fever, weight loss or fatigue  Eyes: No eye pain, visual disturbances, or discharge  ENMT:  No difficulty hearing, tinnitus, vertigo; No sinus or throat pain  Neck: No pain, stiffness or neck swelling  Respiratory: see HPI  Cardiovascular: No chest pain, palpitations, dizziness or leg swelling  Gastrointestinal: No abdominal or epigastric pain. No nausea, vomiting or hematemesis; No diarrhea or constipation. No melena or hematochezia.  Genitourinary: No dysuria, frequency, hematuria or incontinence  Neurological: No headaches, memory loss, loss of strength, numbness or tremors  Skin: No itching, burning, rashes or lesions   Lymph Nodes: No enlarged glands  Endocrine: No heat or cold intolerance; No hair loss  Musculoskeletal: No joint pain or swelling; No muscle, back or extremity pain  Psychiatric: No depression, anxiety, mood swings or difficulty sleeping  Heme/Lymph: No easy bruising or bleeding gums  Allergy and Immunologic: No hives or eczema    PAST MEDICAL & SURGICAL HISTORY:  Dyslipidemia      Borderline diabetes      Pulmonary fibrosis      S/P knee surgery          FAMILY HISTORY:      SOCIAL HISTORY:  Smoking Status: [ ] Current, [ ] Former, [ ] Never  Pack Years:    MEDICATIONS:  Pulmonary:  albuterol/ipratropium for Nebulization 3 milliLiter(s) Nebulizer every 6 hours    Antimicrobials:  vancomycin  IVPB 1500 milliGRAM(s) IV Intermittent every 12 hours    Anticoagulants:  enoxaparin Injectable 40 milliGRAM(s) SubCutaneous every 24 hours    Onc:    GI/:  pantoprazole  Injectable 40 milliGRAM(s) IV Push daily    Endocrine:  dextrose 50% Injectable 25 Gram(s) IV Push once  dextrose 50% Injectable 12.5 Gram(s) IV Push once  dextrose 50% Injectable 25 Gram(s) IV Push once  dextrose Oral Gel 15 Gram(s) Oral once PRN  glucagon  Injectable 1 milliGRAM(s) IntraMuscular once  insulin lispro (ADMELOG) corrective regimen sliding scale   SubCutaneous Before meals and at bedtime    Cardiac:  milrinone Infusion 0.125 MICROgram(s)/kG/Min IV Continuous <Continuous>    Other Medications:  dextrose 5%. 1000 milliLiter(s) IV Continuous <Continuous>  dextrose 5%. 1000 milliLiter(s) IV Continuous <Continuous>  influenza   Vaccine 0.5 milliLiter(s) IntraMuscular once      Allergies    No Known Allergies    Intolerances        Vital Signs Last 24 Hrs  T(C): 36.5 (23 Nov 2022 21:17), Max: 37.1 (23 Nov 2022 15:30)  T(F): 97.7 (23 Nov 2022 21:17), Max: 98.8 (23 Nov 2022 15:30)  HR: 116 (23 Nov 2022 20:00) (105 - 116)  BP: 135/75 (23 Nov 2022 20:00) (96/64 - 135/75)  BP(mean): 100 (23 Nov 2022 20:00) (74 - 102)  RR: 29 (23 Nov 2022 20:00) (29 - 44)  SpO2: 94% (23 Nov 2022 20:00) (94% - 98%)    Parameters below as of 23 Nov 2022 20:00  Patient On (Oxygen Delivery Method): nasal cannula, high flow  O2 Flow (L/min): 60  O2 Concentration (%): 60    11-23 @ 07:01  -  11-23 @ 21:23  --------------------------------------------------------  IN: 614.8 mL / OUT: 1200 mL / NET: -585.2 mL          PHYSICAL EXAM:  Constitutional: NAD, speaking in full sentences on bipap  HEENT: MMM  Neck: JVD+  Respiratory: Diffuse crackles  Cardiovascular: S1/S2  Gastrointestinal:  NT/ND  Extremities: WWP; 2+edema  Vascular: 2+ radial pulses B/L  Neurological: awake and alert; AMARAL    LABS:      CBC Full  -  ( 23 Nov 2022 15:56 )  WBC Count : 17.50 K/uL  RBC Count : 4.18 M/uL  Hemoglobin : 12.4 g/dL  Hematocrit : 38.4 %  Platelet Count - Automated : 372 K/uL  Mean Cell Volume : 91.9 fl  Mean Cell Hemoglobin : 29.7 pg  Mean Cell Hemoglobin Concentration : 32.3 gm/dL  Auto Neutrophil # : 15.57 K/uL  Auto Lymphocyte # : 0.98 K/uL  Auto Monocyte # : 0.83 K/uL  Auto Eosinophil # : 0.00 K/uL  Auto Basophil # : 0.02 K/uL  Auto Neutrophil % : 89.0 %  Auto Lymphocyte % : 5.6 %  Auto Monocyte % : 4.7 %  Auto Eosinophil % : 0.0 %  Auto Basophil % : 0.1 %    11-23    136  |  97  |  17  ----------------------------<  175<H>  4.5   |  34<H>  |  0.90    Ca    8.9      23 Nov 2022 15:56  Phos  3.3     11-23  Mg     2.2     11-23    TPro  8.2  /  Alb  3.2<L>  /  TBili  0.6  /  DBili  x   /  AST  32  /  ALT  21  /  AlkPhos  115  11-23    PT/INR - ( 23 Nov 2022 15:56 )   PT: 18.1 sec;   INR: 1.51          PTT - ( 23 Nov 2022 15:56 )  PTT:27.7 sec                  RADIOLOGY & ADDITIONAL STUDIES: PULMONARY SERVICE INITIAL CONSULT NOTE    HPI:  Patient is a 62 yo M w/ PMH HLD, pre-DM, and pulmonary fibrosis 2/2 COVID-19 infection (wheelchair bound at baseline, on 4L home O2) who initially presented to [facility] on 11/22/2022 for several days of worsening dyspnea, cough productive of off-white sputum, wheezing, and generalized weakness with increasing O2 requirements (up to 6L) and persistent dyspnea at rest with no associated bendopnea however did endorse orhtopnea and LE edema. Patient subsequently had near loc and presented to [facility]. At OSH patient was admitted for acute on chronic hypoxic respiratory failure. While at OSH patient treated for multi-lobar pneumonia as well as acute heart failure exacerbation with an echo concerning for acute right heart failure which resulted in the patient being transferred to [facility].     Notable outside hospital work up: WBC count (16K, with normal BUN/Cr, unremarkable LFTS, elevated procalciton 0.79, pro-bnp 5K), RVP negative. CT PE negative for acute PE, with diffuse traction bronchiectasis, bronchiolectasis and multi-focal GGO. TTE was performed which showed mild TR, severely dilated RV, moderate-to-severe elevated RV systolic pressure, moderate-to severe PHTN (58 mmHg), normal LV size and wall thickness, normal LV systolic function (EF 58%), and grade II diastolic dysfunction. Pulmonary hypertension team consulted due to concern for right heart failure in the setting of pulmonary hypertension.  Currently patient reports improvement in dyspnea and orthopnea.      (23 Nov 2022 16:44)      REVIEW OF SYSTEMS:  Constitutional: No fever, weight loss or fatigue  Eyes: No eye pain, visual disturbances, or discharge  ENMT:  No difficulty hearing, tinnitus, vertigo; No sinus or throat pain  Neck: No pain, stiffness or neck swelling  Respiratory: see HPI  Cardiovascular: No chest pain, palpitations, dizziness or leg swelling  Gastrointestinal: No abdominal or epigastric pain. No nausea, vomiting or hematemesis; No diarrhea or constipation. No melena or hematochezia.  Genitourinary: No dysuria, frequency, hematuria or incontinence  Neurological: No headaches, memory loss, loss of strength, numbness or tremors  Skin: No itching, burning, rashes or lesions   Lymph Nodes: No enlarged glands  Endocrine: No heat or cold intolerance; No hair loss  Musculoskeletal: No joint pain or swelling; No muscle, back or extremity pain  Psychiatric: No depression, anxiety, mood swings or difficulty sleeping  Heme/Lymph: No easy bruising or bleeding gums  Allergy and Immunologic: No hives or eczema    PAST MEDICAL & SURGICAL HISTORY:  Dyslipidemia      Borderline diabetes      Pulmonary fibrosis      S/P knee surgery          FAMILY HISTORY:      SOCIAL HISTORY:  Smoking Status: [ ] Current, [ ] Former, [ ] Never  Pack Years:    MEDICATIONS:  Pulmonary:  albuterol/ipratropium for Nebulization 3 milliLiter(s) Nebulizer every 6 hours    Antimicrobials:  vancomycin  IVPB 1500 milliGRAM(s) IV Intermittent every 12 hours    Anticoagulants:  enoxaparin Injectable 40 milliGRAM(s) SubCutaneous every 24 hours    Onc:    GI/:  pantoprazole  Injectable 40 milliGRAM(s) IV Push daily    Endocrine:  dextrose 50% Injectable 25 Gram(s) IV Push once  dextrose 50% Injectable 12.5 Gram(s) IV Push once  dextrose 50% Injectable 25 Gram(s) IV Push once  dextrose Oral Gel 15 Gram(s) Oral once PRN  glucagon  Injectable 1 milliGRAM(s) IntraMuscular once  insulin lispro (ADMELOG) corrective regimen sliding scale   SubCutaneous Before meals and at bedtime    Cardiac:  milrinone Infusion 0.125 MICROgram(s)/kG/Min IV Continuous <Continuous>    Other Medications:  dextrose 5%. 1000 milliLiter(s) IV Continuous <Continuous>  dextrose 5%. 1000 milliLiter(s) IV Continuous <Continuous>  influenza   Vaccine 0.5 milliLiter(s) IntraMuscular once      Allergies    No Known Allergies    Intolerances        Vital Signs Last 24 Hrs  T(C): 36.5 (23 Nov 2022 21:17), Max: 37.1 (23 Nov 2022 15:30)  T(F): 97.7 (23 Nov 2022 21:17), Max: 98.8 (23 Nov 2022 15:30)  HR: 116 (23 Nov 2022 20:00) (105 - 116)  BP: 135/75 (23 Nov 2022 20:00) (96/64 - 135/75)  BP(mean): 100 (23 Nov 2022 20:00) (74 - 102)  RR: 29 (23 Nov 2022 20:00) (29 - 44)  SpO2: 94% (23 Nov 2022 20:00) (94% - 98%)    Parameters below as of 23 Nov 2022 20:00  Patient On (Oxygen Delivery Method): nasal cannula, high flow  O2 Flow (L/min): 60  O2 Concentration (%): 60    11-23 @ 07:01  -  11-23 @ 21:23  --------------------------------------------------------  IN: 614.8 mL / OUT: 1200 mL / NET: -585.2 mL          PHYSICAL EXAM:  Constitutional: NAD, speaking in full sentences on bipap  HEENT: MMM  Neck: JVD+  Respiratory: Diffuse crackles  Cardiovascular: S1/S2  Gastrointestinal:  NT/ND  Extremities: WWP; 2+edema  Vascular: 2+ radial pulses B/L  Neurological: awake and alert; AMARAL    LABS:      CBC Full  -  ( 23 Nov 2022 15:56 )  WBC Count : 17.50 K/uL  RBC Count : 4.18 M/uL  Hemoglobin : 12.4 g/dL  Hematocrit : 38.4 %  Platelet Count - Automated : 372 K/uL  Mean Cell Volume : 91.9 fl  Mean Cell Hemoglobin : 29.7 pg  Mean Cell Hemoglobin Concentration : 32.3 gm/dL  Auto Neutrophil # : 15.57 K/uL  Auto Lymphocyte # : 0.98 K/uL  Auto Monocyte # : 0.83 K/uL  Auto Eosinophil # : 0.00 K/uL  Auto Basophil # : 0.02 K/uL  Auto Neutrophil % : 89.0 %  Auto Lymphocyte % : 5.6 %  Auto Monocyte % : 4.7 %  Auto Eosinophil % : 0.0 %  Auto Basophil % : 0.1 %    11-23    136  |  97  |  17  ----------------------------<  175<H>  4.5   |  34<H>  |  0.90    Ca    8.9      23 Nov 2022 15:56  Phos  3.3     11-23  Mg     2.2     11-23    TPro  8.2  /  Alb  3.2<L>  /  TBili  0.6  /  DBili  x   /  AST  32  /  ALT  21  /  AlkPhos  115  11-23    PT/INR - ( 23 Nov 2022 15:56 )   PT: 18.1 sec;   INR: 1.51          PTT - ( 23 Nov 2022 15:56 )  PTT:27.7 sec                  RADIOLOGY & ADDITIONAL STUDIES: PULMONARY SERVICE INITIAL CONSULT NOTE    HPI:  Patient is a 60 yo M w/ PMH HLD, pre-DM, and pulmonary fibrosis 2/2 COVID-19 infection (wheelchair bound at baseline, on 4L home O2) who initially presented to [facility] on 11/22/2022 for several days of worsening dyspnea, cough productive of off-white sputum, wheezing, and generalized weakness with increasing O2 requirements (up to 6L) and persistent dyspnea at rest with no associated bendopnea however did endorse orhtopnea and LE edema. Patient subsequently had near loc and presented to [facility]. At OSH patient was admitted for acute on chronic hypoxic respiratory failure. While at OSH patient treated for multi-lobar pneumonia as well as acute heart failure exacerbation with an echo concerning for acute right heart failure which resulted in the patient being transferred to [facility].     Notable outside hospital work up: WBC count (16K, with normal BUN/Cr, unremarkable LFTS, elevated procalciton 0.79, pro-bnp 5K), RVP negative. CT PE negative for acute PE, with diffuse traction bronchiectasis, bronchiolectasis and multi-focal GGO. TTE was performed which showed mild TR, severely dilated RV, moderate-to-severe elevated RV systolic pressure, moderate-to severe PHTN (58 mmHg), normal LV size and wall thickness, normal LV systolic function (EF 58%), and grade II diastolic dysfunction. Pulmonary hypertension team consulted due to concern for right heart failure in the setting of pulmonary hypertension.  Currently patient reports improvement in dyspnea and orthopnea.      (23 Nov 2022 16:44)      REVIEW OF SYSTEMS:  Constitutional: No fever, weight loss or fatigue  Eyes: No eye pain, visual disturbances, or discharge  ENMT:  No difficulty hearing, tinnitus, vertigo; No sinus or throat pain  Neck: No pain, stiffness or neck swelling  Respiratory: see HPI  Cardiovascular: No chest pain, palpitations, dizziness or leg swelling  Gastrointestinal: No abdominal or epigastric pain. No nausea, vomiting or hematemesis; No diarrhea or constipation. No melena or hematochezia.  Genitourinary: No dysuria, frequency, hematuria or incontinence  Neurological: No headaches, memory loss, loss of strength, numbness or tremors  Skin: No itching, burning, rashes or lesions   Lymph Nodes: No enlarged glands  Endocrine: No heat or cold intolerance; No hair loss  Musculoskeletal: No joint pain or swelling; No muscle, back or extremity pain  Psychiatric: No depression, anxiety, mood swings or difficulty sleeping  Heme/Lymph: No easy bruising or bleeding gums  Allergy and Immunologic: No hives or eczema    PAST MEDICAL & SURGICAL HISTORY:  Dyslipidemia      Borderline diabetes      Pulmonary fibrosis      S/P knee surgery          FAMILY HISTORY:      SOCIAL HISTORY:  Smoking Status: [ ] Current, [ ] Former, [ ] Never  Pack Years:    MEDICATIONS:  Pulmonary:  albuterol/ipratropium for Nebulization 3 milliLiter(s) Nebulizer every 6 hours    Antimicrobials:  vancomycin  IVPB 1500 milliGRAM(s) IV Intermittent every 12 hours    Anticoagulants:  enoxaparin Injectable 40 milliGRAM(s) SubCutaneous every 24 hours    Onc:    GI/:  pantoprazole  Injectable 40 milliGRAM(s) IV Push daily    Endocrine:  dextrose 50% Injectable 25 Gram(s) IV Push once  dextrose 50% Injectable 12.5 Gram(s) IV Push once  dextrose 50% Injectable 25 Gram(s) IV Push once  dextrose Oral Gel 15 Gram(s) Oral once PRN  glucagon  Injectable 1 milliGRAM(s) IntraMuscular once  insulin lispro (ADMELOG) corrective regimen sliding scale   SubCutaneous Before meals and at bedtime    Cardiac:  milrinone Infusion 0.125 MICROgram(s)/kG/Min IV Continuous <Continuous>    Other Medications:  dextrose 5%. 1000 milliLiter(s) IV Continuous <Continuous>  dextrose 5%. 1000 milliLiter(s) IV Continuous <Continuous>  influenza   Vaccine 0.5 milliLiter(s) IntraMuscular once      Allergies    No Known Allergies    Intolerances        Vital Signs Last 24 Hrs  T(C): 36.5 (23 Nov 2022 21:17), Max: 37.1 (23 Nov 2022 15:30)  T(F): 97.7 (23 Nov 2022 21:17), Max: 98.8 (23 Nov 2022 15:30)  HR: 116 (23 Nov 2022 20:00) (105 - 116)  BP: 135/75 (23 Nov 2022 20:00) (96/64 - 135/75)  BP(mean): 100 (23 Nov 2022 20:00) (74 - 102)  RR: 29 (23 Nov 2022 20:00) (29 - 44)  SpO2: 94% (23 Nov 2022 20:00) (94% - 98%)    Parameters below as of 23 Nov 2022 20:00  Patient On (Oxygen Delivery Method): nasal cannula, high flow  O2 Flow (L/min): 60  O2 Concentration (%): 60    11-23 @ 07:01  -  11-23 @ 21:23  --------------------------------------------------------  IN: 614.8 mL / OUT: 1200 mL / NET: -585.2 mL          PHYSICAL EXAM:  Constitutional: NAD, speaking in full sentences on bipap  HEENT: MMM  Neck: JVD+  Respiratory: Diffuse crackles  Cardiovascular: S1/S2  Gastrointestinal:  NT/ND  Extremities: WWP; 2+edema  Vascular: 2+ radial pulses B/L  Neurological: awake and alert; AMARAL    LABS:      CBC Full  -  ( 23 Nov 2022 15:56 )  WBC Count : 17.50 K/uL  RBC Count : 4.18 M/uL  Hemoglobin : 12.4 g/dL  Hematocrit : 38.4 %  Platelet Count - Automated : 372 K/uL  Mean Cell Volume : 91.9 fl  Mean Cell Hemoglobin : 29.7 pg  Mean Cell Hemoglobin Concentration : 32.3 gm/dL  Auto Neutrophil # : 15.57 K/uL  Auto Lymphocyte # : 0.98 K/uL  Auto Monocyte # : 0.83 K/uL  Auto Eosinophil # : 0.00 K/uL  Auto Basophil # : 0.02 K/uL  Auto Neutrophil % : 89.0 %  Auto Lymphocyte % : 5.6 %  Auto Monocyte % : 4.7 %  Auto Eosinophil % : 0.0 %  Auto Basophil % : 0.1 %    11-23    136  |  97  |  17  ----------------------------<  175<H>  4.5   |  34<H>  |  0.90    Ca    8.9      23 Nov 2022 15:56  Phos  3.3     11-23  Mg     2.2     11-23    TPro  8.2  /  Alb  3.2<L>  /  TBili  0.6  /  DBili  x   /  AST  32  /  ALT  21  /  AlkPhos  115  11-23    PT/INR - ( 23 Nov 2022 15:56 )   PT: 18.1 sec;   INR: 1.51          PTT - ( 23 Nov 2022 15:56 )  PTT:27.7 sec                  RADIOLOGY & ADDITIONAL STUDIES:    TTE 11/23/22  CONCLUSIONS:     1. Normal left ventricular size and systolic function.   2. Mild symmetric left ventricular hypertrophy.   3. Grade I left ventricular diastolic dysfunction.   4. Severely dilated right ventricular size.   5. Moderately-to-severely reduced right ventricular systolic function.   6. Dilated right atrium.   7. Mild-to-moderate tricuspid regurgitation.   8. Pulmonary hypertension present, pulmonary artery systolic pressure is   48 mmHg.   9. Trivial pericardial effusion.

## 2022-11-23 NOTE — PATIENT PROFILE ADULT - FUNCTIONAL ASSESSMENT - BASIC MOBILITY 6.
3-calculated by average/Not able to assess (calculate score using [facility] averaging method)  2-calculated by average/Not able to assess (calculate score using [facility] averaging method)

## 2022-11-23 NOTE — H&P ADULT - ATTENDING COMMENTS
60 yo man ABO type A, BMI-36 with severe pulmonary fibrosis secondary to COVID-19. Poor functional capacity due to BURGESS, mostly wheelchair bound on 4L NC O2 at home. Seen at [facility] for pulmonary txp eval, but BMI above cutoff.     Now [place] [facility] [place] dyspnea and syncopal episodes. Found to be severely hypoxic, placed on IABP. TTE showed dilated and severely dilated RV with normotension, normal Cr and normal LFTs.     Transferred to [facility] for ongoing care. Placed on HFNC, Florentin 20ppm and Milrinone 0.125. Received 2x doses of IV Lasix with good UOP. Net neg fluid balance ~3L. Received stress dose steroids as he was previously on steroids at home. Also treated with broad spectrum antibiotics for suspected superimposed PNA.    CXR shows diffuse bilateral interstitial infiltrates.   TTE showed severe RVD and dilation with some septum flattening. Underfilled LV with normal systolic function. Grade 1 diastolic dysfunction.    This AM -120/60-70, O2sat~96% on HFNC 60/60 and Florentin 20 and Mil 0.125    - No further diuresis today   - Continue low dose Milrinone for RV failure   - Continue HFNC and  Florentin - pulmonary following   - Methylpred 1mg/kg daily   - Continue Vanc and Zosyn for now  - Plan for RHC tomorrow   - If hemodynamic deterioration occurs, consideration for [facility] should be made. Patient could be candidate for lung/heart-lung txp    Camden Houston MD

## 2022-11-23 NOTE — H&P ADULT - ASSESSMENT
Pt is 62 yo M w/ PMH HLD, pre-DM, and pulmonary fibrosis 2/2 COVID-19 infection (wheelchair bound at baseline, on 4L home O2) who presented to [facility] for several days of worsening SOB and generalized weakness with increasing O2 requirements, found to have acute hypoxic respiratory failure 2/2 pulmonary fibrosis with superimposed bacterial pneumonia and acute decompensated pulmonary HTN, transferred to [facility] for RHC i/s/o severe pulmonary disease.    NEURO:   No active issues   - Pt mentating well, no signs of cardiogenic shock    CARDIAC  #Acute decompensated R-sided HF   TTE at [facility] showing RV dilation with elevated R-side filling pressures. No evidence of septal bowing into LV. BNP elevated to 5k (baseline 100). Decompensated R-side HF most likely i/s/o worsening PHTN 2/2 superimposed bacterial pneumonia (with elevated procalcitonin to 0.79 and elevated WBC on admission to ). Pt likely has Group 3 PHTN 2/2 intrinsic lung disease   - f/u formal TTE   - PHTN consulted, f/u recs  - Started milrinone gtt 0.125 mg/kg/hr   - s/p Lasix 80 mg IV on admission (pt was receiving 40 mg IV BID at [facility])   - Strict I&Os, pardo catheter in place     PULMONARY  #Decompensated PHTN  Pt has Group 3 PHTN 2/2 superimposed bacterial pneumonia i/s/o chronic pulmonary fibrosis from COVID-19 disease. PASP 58 mmHg on TTE at [facility]. Pt currently breathing semi-comfortably on BiPAP with adequate O2 saturation.  - Plan to switch to HFNC to reduce PEEP & pulmonary afterload   - c/w vancomycin 1500 mg IV BID and Zosyn 4.5 g IV q8h    - f/u repeat sputum culture   - c/w duo nebs q6h standing   - c/w inhaled NO at 20 ppm    #Pulmonary fibrosis:   i/s/o prior COVID-19 infection. Pt was receiving prolonged prednisone taper as early as mid-November and was also treated with stress-dose steroids at [facility].  - c/w Solumedrol 40 mg IV q12h     #r/o DVT/PE  LE doppler and CT PE were negative at [facility]. PE unlikely cause for acute decompensated PHTN.    GI:  #NPO:   - Maintain aspiration precautions  - c/w Protonix 40 mg IV daily     RENAL:  #Hyperkalemia:   Pt was hyperkalemic at [facility] with K 5.9 on admission. Treated with insulin/dextrose, calcium gluconate, and sodium bicarbonate with improvement in hyperkalemia. K 4.5 on admission to [facility].  - Monitor CMP    ENDO:   #pre-DM  - f/u A1c   - mISS   - Consistent carbohydrate diet once no longer NPO     ID:   #Superimposed bacterial PNA   ID was consulted at [facility] and recommended treatment with broad spectrum antibiotics. Pt received vancomycin and cefepime. RVP and COVID were negative.  - c/w vancomycin 1500 mg IV q12h and Zosyn 4.5 g IV q8h  - f/u repeat sputum culture     HEME:  No active issues     F: None  E: Replete PRN  N: NPO   GI ppx: Protonix 40 mg IV daily   DVT ppx: Lovenox 40 mg SQ daily   Code status: Full code   Dispo: CCU     Pt is 60 yo M w/ PMH HLD, pre-DM, and pulmonary fibrosis 2/2 COVID-19 infection (wheelchair bound at baseline, on 4L home O2) who presented to [facility] for several days of worsening SOB and generalized weakness with increasing O2 requirements, found to have acute hypoxic respiratory failure 2/2 pulmonary fibrosis with superimposed bacterial pneumonia and acute decompensated pulmonary HTN, transferred to [facility] for RHC i/s/o severe pulmonary disease.    NEURO:   No active issues   - Pt mentating well, no signs of cardiogenic shock    CARDIAC  #Acute decompensated R-sided HF   TTE at [facility] showing RV dilation with elevated R-side filling pressures. No evidence of septal bowing into LV. BNP elevated to 5k (baseline 100). Decompensated R-side HF most likely i/s/o worsening PHTN 2/2 superimposed bacterial pneumonia (with elevated procalcitonin to 0.79 and elevated WBC on admission to ). Pt likely has Group 3 PHTN 2/2 intrinsic lung disease   - f/u formal TTE   - f/u RHC results   - PHTN consulted, f/u recs  - Started milrinone gtt 0.125 mg/kg/hr   - s/p Lasix 80 mg IV on admission (pt was receiving 40 mg IV BID at [facility])   - Strict I&Os, pardo catheter in place     PULMONARY  #Decompensated PHTN  Pt has Group 3 PHTN 2/2 superimposed bacterial pneumonia i/s/o chronic pulmonary fibrosis from COVID-19 disease. PASP 58 mmHg on TTE at [facility]. Pt currently breathing semi-comfortably on BiPAP with adequate O2 saturation.  - Plan to switch to HFNC to reduce PEEP & pulmonary afterload   - c/w vancomycin 1500 mg IV BID and Zosyn 4.5 g IV q8h    - f/u repeat sputum culture   - c/w duo nebs q6h standing   - c/w inhaled NO at 20 ppm    #Pulmonary fibrosis:   i/s/o prior COVID-19 infection. Pt was receiving prolonged prednisone taper as early as mid-November and was also treated with stress-dose steroids at [facility].  - c/w Solumedrol 40 mg IV q12h     #r/o DVT/PE  LE doppler and CT PE were negative at [facility]. PE unlikely cause for acute decompensated PHTN.    GI:  #NPO:   - Maintain aspiration precautions  - c/w Protonix 40 mg IV daily     RENAL:  #Hyperkalemia:   Pt was hyperkalemic at [facility] with K 5.9 on admission. Treated with insulin/dextrose, calcium gluconate, and sodium bicarbonate with improvement in hyperkalemia. K 4.5 on admission to [facility].  - Monitor CMP    ENDO:   #pre-DM  - f/u A1c   - mISS   - Consistent carbohydrate diet once no longer NPO     ID:   #Superimposed bacterial PNA   ID was consulted at [facility] and recommended treatment with broad spectrum antibiotics. Pt received vancomycin and cefepime. RVP and COVID were negative.  - c/w vancomycin 1500 mg IV q12h and Zosyn 4.5 g IV q8h  - f/u repeat sputum culture     HEME:  No active issues     F: None  E: Replete PRN  N: NPO   GI ppx: Protonix 40 mg IV daily   DVT ppx: Lovenox 40 mg SQ daily   Code status: Full code   Dispo: CCU

## 2022-11-23 NOTE — H&P ADULT - HISTORY OF PRESENT ILLNESS
Patient is a 61y old  Male who presents with a chief complaint of SOB (23 Nov 2022 16:44)      HPI:  Patient is a 60 yo M w/ PMH HLD, pre-DM, and pulmonary fibrosis 2/2 COVID-19 infection (on 4L home O2)          Patient is a 61y old  Male who presents with a chief complaint of SOB (23 Nov 2022 16:44)      HPI:  Patient is a 62 yo M w/ PMH HLD, pre-DM, and pulmonary fibrosis 2/2 COVID-19 infection (wheelchair bound at baseline, on 4L home O2) who initially presented to [facility] on 11/22/2022 for several days of worsening dyspnea, cough productive of off-white sputum, wheezing, and generalized weakness with increasing O2 requirements (up to 6L) and persistent dyspnea at rest. On day of admission, pt had syncopal event due to SOB. Pt was brought to [place] ED in respiratory distress where he was placed on BiPAP and given one dose of vanc & zosyn out of concern for pneumonia. CXR showed diffuse bilateral airspace opacities c/w multilobar pneumonia. CT PE showed no evidence of PE, however did show extensive diffuse interstitial and ground glass infiltrates bilaterally with associated hilar and mediastinal adenopathy, with suspected multilobar atypical pneumonia. Pt was admitted to  MICU for acute hypoxic respiratory failure 2/2 pulmonary fibrosis with superimposed bacterial pneumonia and pulmonary HTN. ID was consulted at [place] and recommended treatment with Cefepime and Vancomycin (Urine legionella, strep negative). TTE was performed which showed mild TR, severely dilated RV, moderate-to-severe elevated RV systolic pressure, moderate-to severe PHTN (58 mmHg), normal LV size and wall thickness, normal LV systolic function (EF 58%), and grade II diastolic dysfunction. Cardiology was consulted at [facility] and recommended transfer to [facility] for RHC given severity of pulmonary disease.     On arrival to [facility] CCU, pt was tachypneic and tachycardic, however reported that his breathing was much improved compared to admission at [facility]. Vitals on admission were T 98.8, , /84, RR 44, SpO2 97% on BiPAP 70% FiO2. Labs showed WBC 17.50, Hgb 12.4, INR 1.51, BNP 5k, Trop 0.01, and Lactate 1.2. ROS was positive for SOB, cough, and LE swelling. Pt denied fever, chills, HA, dizziness, CP, palpitations, abdominal pain, n/v, constipation, diarrhea, dysuria, hematuria, urinary frequency/urgency, flank pain, back pain, numbness, or tingling. Pt was admitted to CCU for RHC.       Patient is a 61y old  Male who presents with a chief complaint of SOB (23 Nov 2022 16:44)      HPI:  Patient is a 60 yo M w/ PMH HLD, pre-DM, and pulmonary fibrosis 2/2 COVID-19 infection (wheelchair bound at baseline, on 4L home O2) who initially presented to [facility] on 11/22/2022 for several days of worsening dyspnea, cough productive of off-white sputum, wheezing, and generalized weakness with increasing O2 requirements (up to 6L) and persistent dyspnea at rest. On day of admission, pt had syncopal event due to SOB. Pt was brought to [place] ED in respiratory distress where he was placed on BiPAP and given one dose of vanc & zosyn out of concern for pneumonia. CXR showed diffuse bilateral airspace opacities c/w multilobar pneumonia. CT PE showed no evidence of PE, however did show extensive diffuse interstitial and ground glass infiltrates bilaterally with associated hilar and mediastinal adenopathy, with suspected multilobar atypical pneumonia. Pt was admitted to  MICU for acute hypoxic respiratory failure 2/2 pulmonary fibrosis with superimposed bacterial pneumonia and acute decompensated pulmonary HTN. ID was consulted at [place] and recommended treatment with Cefepime and Vancomycin (Urine legionella, strep negative). TTE was performed which showed mild TR, severely dilated RV, moderate-to-severe elevated RV systolic pressure, moderate-to severe PHTN (58 mmHg), normal LV size and wall thickness, normal LV systolic function (EF 58%), and grade II diastolic dysfunction. Cardiology was consulted at [facility] and recommended transfer to [facility] for RHC given severity of pulmonary disease.     On arrival to [facility] CCU, pt was tachypneic and tachycardic, however reported that his breathing was much improved compared to admission at [facility]. Vitals on admission were T 98.8, , /84, RR 44, SpO2 97% on BiPAP 70% FiO2. Labs showed WBC 17.50, Hgb 12.4, INR 1.51, BNP 5k, Trop 0.01, and Lactate 1.2. ROS was positive for SOB, cough, and LE swelling. Pt denied fever, chills, HA, dizziness, CP, palpitations, abdominal pain, n/v, constipation, diarrhea, dysuria, hematuria, urinary frequency/urgency, flank pain, back pain, numbness, or tingling. Pt was admitted to CCU for RHC.

## 2022-11-24 NOTE — DIETITIAN INITIAL EVALUATION ADULT - ADD RECOMMEND
within normal limits -Continue DASH diet. MOnitor PO intake, monitor need for ONS  -Monitor & replete lytes PRN  -Monitor pain, GI distress, skin, BM  -Nutrition education PRN

## 2022-11-24 NOTE — PROGRESS NOTE ADULT - ASSESSMENT
Pt is 60 yo M w/ PMH HLD, pre-DM, and pulmonary fibrosis 2/2 COVID-19 infection (wheelchair bound at baseline, on 4L home O2) who presented to [facility] for several days of worsening SOB and generalized weakness with increasing O2 requirements, found to have acute hypoxic respiratory failure 2/2 pulmonary fibrosis with superimposed bacterial pneumonia and acute decompensated pulmonary HTN, transferred to [facility] for RHC i/s/o severe pulmonary disease.    NEURO:   No active issues   - Pt mentating well, no signs of cardiogenic shock    CARDIAC  #Acute decompensated R-sided HF   TTE shows severely dilated RV size, moderately-to-severely reduced RV systolic function, dilated RA, normal LV size & systolic function, mild symmetric LVH, grade I LV diastolic dysfunction, mild-moderate TR, pulmonary HTN with PASP 48 mmHg, trivial pericardial effusion. No evidence of septal bowing into LV. BNP elevated to 5k (baseline 100). Decompensated R-side HF most likely i/s/o worsening PHTN 2/2 superimposed bacterial pneumonia (with elevated procalcitonin to 0.79 and elevated WBC on admission to ). Pt likely has Group 3 PHTN 2/2 intrinsic lung disease.   - Plan for RHC tomorrow, f/u results   - f/u PHTN recs   - c/w milrinone gtt 0.125 mg/kg/hr   - Hold off on additional diuresis for now   - Strict I&Os, pardo catheter in place     PULMONARY  #Decompensated PHTN  Pt has Group 3 PHTN 2/2 superimposed bacterial pneumonia i/s/o chronic pulmonary fibrosis from COVID-19 disease. PASP 48 mmHg. Pt currently breathing comfortably on HFNC 60/60.   - c/w vancomycin 1500 mg IV BID and Zosyn 4.5 g IV q8h    - f/u repeat sputum culture   - c/w duo nebs q6h standing   - c/w inhaled NO at 20 ppm  - Wean HFNC as tolerated with goal SpO2 95%   - Avoid positive pressure ventilation (increases pulmonary afterload)     #Pulmonary fibrosis:   i/s/o prior COVID-19 infection. Pt was receiving prolonged prednisone taper as early as mid-November and was also treated with stress-dose steroids at [facility].  - c/w Solumedrol 40 mg IV q12h     #r/o DVT/PE  LE doppler and CT PE were negative at [facility]. PE unlikely cause for acute decompensated PHTN.    GI:  #NPO:   - Maintain aspiration precautions  - c/w Protonix 40 mg IV daily     RENAL:  #Hyperkalemia:   Pt was hyperkalemic at [facility] with K 5.9 on admission. Treated with insulin/dextrose, calcium gluconate, and sodium bicarbonate with improvement in hyperkalemia. K 4.5 on admission to [facility].  - Monitor CMP    ENDO:   #pre-DM  A1c 6.0%   - mISS   - Consistent carbohydrate diet once no longer NPO     ID:   #Superimposed bacterial PNA   ID was consulted at [facility] and recommended treatment with broad spectrum antibiotics. Pt received vancomycin and cefepime. RVP and COVID were negative.  - c/w vancomycin 1500 mg IV q12h and Zosyn 4.5 g IV q8h  - f/u repeat sputum culture   - f/u MRSA swab     HEME:  No active issues     F: None  E: Replete PRN  N: NPO   GI ppx: Protonix 40 mg IV daily   DVT ppx: Lovenox 40 mg SQ daily   Code status: Full code   Dispo: CCU

## 2022-11-24 NOTE — PROGRESS NOTE ADULT - SUBJECTIVE AND OBJECTIVE BOX
Patient is a 61y old  Male who presents with a chief complaint of SOB (24 Nov 2022 10:23)      INTERVAL HPI/OVERNIGHT EVENTS:   Patient seen and examined at bedside. Overnight, Lasix 60 mg IV given for decreasing UOP. PM lactate elevated to 2.6, improved to 1.4 in AM. BCx sent. This morning, pt lying in bed, in NAD, with no complaints. Reports his SOB is significantly improved. Continues to have cough today. Denies fever, chills, HA, dizziness, lightheadedness, CP, palpitations, abdominal pain, n/v, diarrhea, constipation, flank pain, back pain, numbness, or tingling.       ICU Vital Signs Last 24 Hrs  T(C): 36.9 (24 Nov 2022 09:12), Max: 37.1 (23 Nov 2022 15:30)  T(F): 98.4 (24 Nov 2022 09:12), Max: 98.8 (23 Nov 2022 15:30)  HR: 102 (24 Nov 2022 11:00) (100 - 116)  BP: 104/69 (24 Nov 2022 11:00) (91/50 - 135/75)  BP(mean): 83 (24 Nov 2022 11:00) (63 - 102)  ABP: --  ABP(mean): --  RR: 24 (24 Nov 2022 11:00) (17 - 44)  SpO2: 97% (24 Nov 2022 11:00) (93% - 98%)    O2 Parameters below as of 24 Nov 2022 11:00  Patient On (Oxygen Delivery Method): nasal cannula, high flow  O2 Flow (L/min): 60  O2 Concentration (%): 60      I&O's Summary    23 Nov 2022 07:01  -  24 Nov 2022 07:00  --------------------------------------------------------  IN: 1080.5 mL / OUT: 3030 mL / NET: -1949.5 mL    24 Nov 2022 07:01  -  24 Nov 2022 11:23  --------------------------------------------------------  IN: 293.5 mL / OUT: 605 mL / NET: -311.5 mL          LABS:                        12.0   16.48 )-----------( 367      ( 24 Nov 2022 05:56 )             37.3     11-24    135  |  91<L>  |  21  ----------------------------<  140<H>  4.0   |  37<H>  |  1.04    Ca    8.7      24 Nov 2022 05:56  Phos  3.9     11-24  Mg     2.1     11-24    TPro  8.1  /  Alb  3.2<L>  /  TBili  0.6  /  DBili  x   /  AST  31  /  ALT  21  /  AlkPhos  103  11-24    PT/INR - ( 23 Nov 2022 21:43 )   PT: 16.8 sec;   INR: 1.41          PTT - ( 23 Nov 2022 21:43 )  PTT:29.8 sec    CAPILLARY BLOOD GLUCOSE      POCT Blood Glucose.: 191 mg/dL (24 Nov 2022 10:46)  POCT Blood Glucose.: 128 mg/dL (24 Nov 2022 06:29)  POCT Blood Glucose.: 285 mg/dL (23 Nov 2022 21:20)    ABG - ( 23 Nov 2022 22:08 )  pH, Arterial: 7.40  pH, Blood: x     /  pCO2: 62    /  pO2: 124   / HCO3: 38    / Base Excess: 11.0  /  SaO2: 99.5                RADIOLOGY & ADDITIONAL TESTS:    Consultant(s) Notes Reviewed:  [x ] YES  [ ] NO    MEDICATIONS  (STANDING):  albuterol/ipratropium for Nebulization 3 milliLiter(s) Nebulizer every 6 hours  dextrose 5%. 1000 milliLiter(s) (100 mL/Hr) IV Continuous <Continuous>  dextrose 5%. 1000 milliLiter(s) (50 mL/Hr) IV Continuous <Continuous>  dextrose 50% Injectable 25 Gram(s) IV Push once  dextrose 50% Injectable 12.5 Gram(s) IV Push once  dextrose 50% Injectable 25 Gram(s) IV Push once  enoxaparin Injectable 40 milliGRAM(s) SubCutaneous every 24 hours  glucagon  Injectable 1 milliGRAM(s) IntraMuscular once  influenza   Vaccine 0.5 milliLiter(s) IntraMuscular once  insulin lispro (ADMELOG) corrective regimen sliding scale   SubCutaneous Before meals and at bedtime  methylPREDNISolone sodium succinate Injectable 40 milliGRAM(s) IV Push every 12 hours  milrinone Infusion 0.125 MICROgram(s)/kG/Min (3.68 mL/Hr) IV Continuous <Continuous>  pantoprazole  Injectable 40 milliGRAM(s) IV Push daily  piperacillin/tazobactam IVPB.. 4.5 Gram(s) IV Intermittent every 8 hours  vancomycin  IVPB 1500 milliGRAM(s) IV Intermittent every 12 hours    MEDICATIONS  (PRN):  benzonatate 100 milliGRAM(s) Oral three times a day PRN Cough  dextrose Oral Gel 15 Gram(s) Oral once PRN Blood Glucose LESS THAN 70 milliGRAM(s)/deciliter      PHYSICAL EXAM:  GENERAL: Middle aged male, obese, in NAD   HEENT: PERRLA, EOMI, no conjunctival or scleral injection, OP clear, MMM, neck supple   RESP: Coarse ronchi and wheezing heard bilaterally. Poor inspiratory effort   CV: Tachycardic. Regular rhythm. +S1S2. No murmurs, rubs, or gallops   ABD: Obese, soft, nontender, nondistended, no rebound tenderness or guarding, no palpable masses  EXT: 1+ pitting edema. Extremities WWP, 2+ peripheral pulses.   NEURO: A&Ox3, no focal neurologic deficits   PSYCH: Normal mood and affect    Care Discussed with Consultants/Other Providers [ x] YES  [ ] NO

## 2022-11-24 NOTE — DIETITIAN INITIAL EVALUATION ADULT - OTHER INFO
62 yo M with a history of pulmonary fibrosis 2/2 Covid PNA (on home O2 4L) who p/w subaacute onset of 7 days of progressive sob, increasing O2 requirements, and syncopal episode, inititally presented to [facility] but later transferred to [facility] 2/2 profound RV failure 2/2 pulmonary HTN.  62 yo M with a history of pulmonary fibrosis 2/2 Covid PNA (on home O2 4L) who p/w subaacute onset of 7 days of progressive sob, increasing O2 requirements, and syncopal episode, initially presented to [facility] but later transferred to [facility] 2/2 profound RV failure 2/2 pulmonary HTN.     Pt seen in ICU, resting in bed. HOB elev 45degrees. Pt on HFNC. Afebrile, /62. MAP 76. Pt assessed for unintentional weight loss. He reports UBW was 215lbs 2 weeks ago. CBW is 209.8lbs; -5.2lbs/ -2.4% weight loss in 14 days. Weight loss is not significant. Observed pt has no overt s/s of fat, muscle wasting. Pt endorse his appetite is decrease due to hospitalization but overall eating well. Per ASPEN guidelines, pt does not meet criteria for malnutrition. Pt with hx of dyslipidemia, A1C noted 6.0. RD educate pt on general healthy diet, healthy fat. Pt verbalize understanding. Skin intact. +3 edema to left, right leg. Connor: 15. Please see additional nutrition recs below.

## 2022-11-24 NOTE — DIETITIAN INITIAL EVALUATION ADULT - PERTINENT MEDS FT
MEDICATIONS  (STANDING):  albuterol/ipratropium for Nebulization 3 milliLiter(s) Nebulizer every 6 hours  dextrose 5%. 1000 milliLiter(s) (100 mL/Hr) IV Continuous <Continuous>  dextrose 5%. 1000 milliLiter(s) (50 mL/Hr) IV Continuous <Continuous>  dextrose 50% Injectable 25 Gram(s) IV Push once  dextrose 50% Injectable 12.5 Gram(s) IV Push once  dextrose 50% Injectable 25 Gram(s) IV Push once  enoxaparin Injectable 40 milliGRAM(s) SubCutaneous every 24 hours  glucagon  Injectable 1 milliGRAM(s) IntraMuscular once  influenza   Vaccine 0.5 milliLiter(s) IntraMuscular once  insulin lispro (ADMELOG) corrective regimen sliding scale   SubCutaneous Before meals and at bedtime  methylPREDNISolone sodium succinate Injectable 40 milliGRAM(s) IV Push every 12 hours  milrinone Infusion 0.125 MICROgram(s)/kG/Min (3.68 mL/Hr) IV Continuous <Continuous>  pantoprazole  Injectable 40 milliGRAM(s) IV Push daily  piperacillin/tazobactam IVPB.. 4.5 Gram(s) IV Intermittent every 8 hours  vancomycin  IVPB 1500 milliGRAM(s) IV Intermittent every 12 hours    MEDICATIONS  (PRN):  benzonatate 100 milliGRAM(s) Oral three times a day PRN Cough  dextrose Oral Gel 15 Gram(s) Oral once PRN Blood Glucose LESS THAN 70 milliGRAM(s)/deciliter

## 2022-11-24 NOTE — DIETITIAN INITIAL EVALUATION ADULT - PERTINENT LABORATORY DATA
11-24    135  |  91<L>  |  21  ----------------------------<  140<H>  4.0   |  37<H>  |  1.04    Ca    8.7      24 Nov 2022 05:56  Phos  3.9     11-24  Mg     2.1     11-24    TPro  8.1  /  Alb  3.2<L>  /  TBili  0.6  /  DBili  x   /  AST  31  /  ALT  21  /  AlkPhos  103  11-24  POCT Blood Glucose.: 128 mg/dL (11-24-22 @ 06:29)  A1C with Estimated Average Glucose Result: 6.0 % (11-24-22 @ 05:56)

## 2022-11-24 NOTE — DIETITIAN INITIAL EVALUATION ADULT - NS FNS DIET ORDER
Diet, NPO after Midnight:      NPO Start Date: 23-Nov-2022,   NPO Start Time: 23:59  Except Medications (11-23-22 @ 20:40)  Diet, DASH/TLC:   Sodium & Cholesterol Restricted (11-23-22 @ 19:30)

## 2022-11-24 NOTE — PROGRESS NOTE ADULT - SUBJECTIVE AND OBJECTIVE BOX
PULMONARY CONSULT SERVICE FOLLOW-UP NOTE    INTERVAL HPI:  Reviewed chart and overnight events; patient seen and examined at bedside. No acute events. On milrinone and Florentin, diuresing well. Reports that his breathing is significantly improved with diuresis. No chest pain. No episodes of pre syncope or syncope overnight. No fever, chills, or rigors.     MEDICATIONS:  Pulmonary:  albuterol/ipratropium for Nebulization 3 milliLiter(s) Nebulizer every 6 hours  benzonatate 100 milliGRAM(s) Oral three times a day PRN    Antimicrobials:  piperacillin/tazobactam IVPB.. 4.5 Gram(s) IV Intermittent every 8 hours  vancomycin  IVPB 1500 milliGRAM(s) IV Intermittent every 12 hours    Anticoagulants:  enoxaparin Injectable 40 milliGRAM(s) SubCutaneous every 24 hours    Cardiac:  milrinone Infusion 0.125 MICROgram(s)/kG/Min IV Continuous <Continuous>      Allergies    No Known Allergies    Intolerances        Vital Signs Last 24 Hrs  T(C): 36.9 (24 Nov 2022 09:12), Max: 37.1 (23 Nov 2022 16:20)  T(F): 98.4 (24 Nov 2022 09:12), Max: 98.8 (23 Nov 2022 16:20)  HR: 102 (24 Nov 2022 16:00) (100 - 116)  BP: 107/58 (24 Nov 2022 16:00) (91/50 - 135/75)  BP(mean): 76 (24 Nov 2022 16:00) (63 - 100)  RR: 28 (24 Nov 2022 16:00) (17 - 40)  SpO2: 97% (24 Nov 2022 16:00) (93% - 99%)    Parameters below as of 24 Nov 2022 16:00  Patient On (Oxygen Delivery Method): nasal cannula, high flow  O2 Flow (L/min): 60  O2 Concentration (%): 60    11-23 @ 07:01  -  11-24 @ 07:00  --------------------------------------------------------  IN: 1080.5 mL / OUT: 3030 mL / NET: -1949.5 mL    11-24 @ 07:01  -  11-24 @ 16:19  --------------------------------------------------------  IN: 383.3 mL / OUT: 740 mL / NET: -356.7 mL          PHYSICAL EXAM:  Constitutional: WD  HEENT: NC/AT; PERRL, anicteric sclera; MMM  Neck: supple  Cardiovascular: +S1/S2, RRR  Respiratory: diffuse inspiratory crackles.   Gastrointestinal: soft, NT/ND  Extremities: WWP; 1+ b/l lower extremity edema, clubbing or cyanosis  Vascular: 2+ radial pulses B/L  Neurological: awake and alert; AMARAL    LABS:  ABG - ( 23 Nov 2022 22:08 )  pH, Arterial: 7.40  pH, Blood: x     /  pCO2: 62    /  pO2: 124   / HCO3: 38    / Base Excess: 11.0  /  SaO2: 99.5                CBC Full  -  ( 24 Nov 2022 05:56 )  WBC Count : 16.48 K/uL  RBC Count : 4.04 M/uL  Hemoglobin : 12.0 g/dL  Hematocrit : 37.3 %  Platelet Count - Automated : 367 K/uL  Mean Cell Volume : 92.3 fl  Mean Cell Hemoglobin : 29.7 pg  Mean Cell Hemoglobin Concentration : 32.2 gm/dL  Auto Neutrophil # : 14.36 K/uL  Auto Lymphocyte # : 1.21 K/uL  Auto Monocyte # : 0.79 K/uL  Auto Eosinophil # : 0.01 K/uL  Auto Basophil # : 0.01 K/uL  Auto Neutrophil % : 87.1 %  Auto Lymphocyte % : 7.3 %  Auto Monocyte % : 4.8 %  Auto Eosinophil % : 0.1 %  Auto Basophil % : 0.1 %    11-24    135  |  91<L>  |  21  ----------------------------<  140<H>  4.0   |  37<H>  |  1.04    Ca    8.7      24 Nov 2022 05:56  Phos  3.9     11-24  Mg     2.1     11-24    TPro  8.1  /  Alb  3.2<L>  /  TBili  0.6  /  DBili  x   /  AST  31  /  ALT  21  /  AlkPhos  103  11-24    PT/INR - ( 23 Nov 2022 21:43 )   PT: 16.8 sec;   INR: 1.41          PTT - ( 23 Nov 2022 21:43 )  PTT:29.8 sec                  RADIOLOGY & ADDITIONAL STUDIES:

## 2022-11-25 NOTE — CONSULT NOTE ADULT - SUBJECTIVE AND OBJECTIVE BOX
HPI:    Patient is a 61y old  Male who presents with a chief complaint of SOB (23 Nov 2022 16:44)      HPI:  Patient is a 62 yo M w/ PMH HLD, pre-DM, and pulmonary fibrosis 2/2 COVID-19 infection (wheelchair bound at baseline, on 4L home O2) who initially presented to [facility] on 11/22/2022 for several days of worsening dyspnea, cough productive of off-white sputum, wheezing, and generalized weakness with increasing O2 requirements (up to 6L) and persistent dyspnea at rest. On day of admission, pt had syncopal event due to SOB. Pt was brought to [facility] ED in respiratory distress where he was placed on BiPAP and given one dose of vanc & zosyn out of concern for pneumonia. CXR showed diffuse bilateral airspace opacities c/w multilobar pneumonia. CT PE showed no evidence of PE, however did show extensive diffuse interstitial and ground glass infiltrates bilaterally with associated hilar and mediastinal adenopathy, with suspected multilobar atypical pneumonia. Pt was admitted to  MICU for acute hypoxic respiratory failure 2/2 pulmonary fibrosis with superimposed bacterial pneumonia and acute decompensated pulmonary HTN. ID was consulted at [facility] and recommended treatment with Cefepime and Vancomycin (Urine legionella, strep negative). TTE was performed which showed mild TR, severely dilated RV, moderate-to-severe elevated RV systolic pressure, moderate-to severe PHTN (58 mmHg), normal LV size and wall thickness, normal LV systolic function (EF 58%), and grade II diastolic dysfunction. Cardiology was consulted at [facility] and recommended transfer to [facility] for RHC given severity of pulmonary disease.     On arrival to [facility] CCU, pt was tachypneic and tachycardic, however reported that his breathing was much improved compared to admission at [facility]. Vitals on admission were T 98.8, , /84, RR 44, SpO2 97% on BiPAP 70% FiO2. Labs showed WBC 17.50, Hgb 12.4, INR 1.51, BNP 5k, Trop 0.01, and Lactate 1.2. ROS was positive for SOB, cough, and LE swelling. Pt denied fever, chills, HA, dizziness, CP, palpitations, abdominal pain, n/v, constipation, diarrhea, dysuria, hematuria, urinary frequency/urgency, flank pain, back pain, numbness, or tingling. Pt was admitted to CCU for RHC.     (23 Nov 2022 16:44)      PAST MEDICAL & SURGICAL HISTORY:  Dyslipidemia      Borderline diabetes      Pulmonary fibrosis      S/P knee surgery            REVIEW OF SYSTEMS:    General:	 no weakness; no fevers, no chills  Skin/Breast: no rash  Respiratory and Thorax: no SOB, no cough  Cardiovascular:	No chest pain  Gastrointestinal:	 no nausea, vomiting , diarrhea  Genitourinary:	no dysuria, no difficulty urinating, no hematuria  Musculoskeletal:	no weakness, no joint swelling/pain  Neurological:	no focal weakness/numbness  Endocrine:	no polyuria, no polydipsia      ANTIBIOTICS:  MEDICATIONS  (STANDING):  albuterol/ipratropium for Nebulization 3 milliLiter(s) Nebulizer every 6 hours  dextrose 5%. 1000 milliLiter(s) (100 mL/Hr) IV Continuous <Continuous>  dextrose 5%. 1000 milliLiter(s) (50 mL/Hr) IV Continuous <Continuous>  dextrose 50% Injectable 25 Gram(s) IV Push once  dextrose 50% Injectable 12.5 Gram(s) IV Push once  dextrose 50% Injectable 25 Gram(s) IV Push once  enoxaparin Injectable 40 milliGRAM(s) SubCutaneous every 24 hours  glucagon  Injectable 1 milliGRAM(s) IntraMuscular once  influenza   Vaccine 0.5 milliLiter(s) IntraMuscular once  insulin lispro (ADMELOG) corrective regimen sliding scale   SubCutaneous Before meals and at bedtime  methylPREDNISolone sodium succinate Injectable 50 milliGRAM(s) IV Push every 12 hours  milrinone Infusion 0.125 MICROgram(s)/kG/Min (3.68 mL/Hr) IV Continuous <Continuous>  piperacillin/tazobactam IVPB.. 4.5 Gram(s) IV Intermittent every 8 hours  vancomycin  IVPB 1500 milliGRAM(s) IV Intermittent every 24 hours    MEDICATIONS  (PRN):  benzonatate 100 milliGRAM(s) Oral three times a day PRN Cough  dextrose Oral Gel 15 Gram(s) Oral once PRN Blood Glucose LESS THAN 70 milliGRAM(s)/deciliter  hydrocodone/homatropine Syrup 5 milliLiter(s) Oral every 6 hours PRN Cough      Allergies    No Known Allergies    Intolerances        SOCIAL HISTORY:    FAMILY HISTORY:      Vital Signs Last 24 Hrs  T(C): 36.8 (25 Nov 2022 13:46), Max: 37.2 (25 Nov 2022 01:16)  T(F): 98.3 (25 Nov 2022 13:46), Max: 99 (25 Nov 2022 01:16)  HR: 102 (25 Nov 2022 15:00) (88 - 114)  BP: 124/73 (25 Nov 2022 15:00) (103/62 - 126/80)  BP(mean): 88 (25 Nov 2022 15:00) (74 - 98)  RR: 33 (25 Nov 2022 15:00) (18 - 36)  SpO2: 95% (25 Nov 2022 15:00) (91% - 97%)    Parameters below as of 25 Nov 2022 16:00  Patient On (Oxygen Delivery Method): nasal cannula, high flow  O2 Flow (L/min): 50  O2 Concentration (%): 60    PHYSICAL EXAM:  Constitutional: NAD  Eyes: CLAUDETTE, EOMI  Ear/Nose/Throat: no oral lesion, no sinus tenderness on percussion	  Neck: no JVD, no lymphadenopathy, supple  Respiratory: CTA theresa  Cardiovascular: S1S2 RRR, no murmurs  Gastrointestinal: soft, (+) BS, no HSM  Extremities:no e/e/c  Vascular: DP Pulse: right normal; left normal            LABS:                        11.4   15.81 )-----------( 313      ( 25 Nov 2022 05:38 )             35.2     11-25    134<L>  |  89<L>  |  20  ----------------------------<  135<H>  4.0   |  42<H>  |  0.91    Ca    8.6      25 Nov 2022 05:38  Phos  4.0     11-25  Mg     2.3     11-25    TPro  7.6  /  Alb  3.1<L>  /  TBili  0.6  /  DBili  x   /  AST  27  /  ALT  23  /  AlkPhos  86  11-25    PT/INR - ( 25 Nov 2022 05:38 )   PT: 15.3 sec;   INR: 1.28          PTT - ( 25 Nov 2022 05:38 )  PTT:29.6 sec      MICROBIOLOGY: reviewed  RADIOLOGY & ADDITIONAL STUDIES: reviewed

## 2022-11-25 NOTE — PROGRESS NOTE ADULT - SUBJECTIVE AND OBJECTIVE BOX
PULMONARY CONSULT SERVICE FOLLOW-UP NOTE  -------------------------------------------------------------------    INTERVAL HPI:    No acute overnight events.   Pt seen and examined at bedside this PM.    Feels better. On HFNC 60/45% with sat in mid-90s. Down to Florentin @ 10. Still on Milrinone 0.125. Continues on Solumedrol 50 q12  mildly positive fluid balance past 24-hrs - -1.8L since admit        -------------------------------------------------------------------  MEDICATIONS:    Pulmonary:  albuterol/ipratropium for Nebulization 3 milliLiter(s) Nebulizer every 6 hours  benzonatate 100 milliGRAM(s) Oral three times a day PRN  hydrocodone/homatropine Syrup 5 milliLiter(s) Oral every 6 hours PRN    Antimicrobials:  piperacillin/tazobactam IVPB.. 4.5 Gram(s) IV Intermittent every 8 hours  vancomycin  IVPB 1500 milliGRAM(s) IV Intermittent every 24 hours    Anticoagulants:  enoxaparin Injectable 40 milliGRAM(s) SubCutaneous every 24 hours    Cardiac:  milrinone Infusion 0.125 MICROgram(s)/kG/Min IV Continuous <Continuous>      Allergies    No Known Allergies    Intolerances        Vital Signs Last 24 Hrs  T(C): 37.2 (25 Nov 2022 18:29), Max: 37.2 (25 Nov 2022 01:16)  T(F): 98.9 (25 Nov 2022 18:29), Max: 99 (25 Nov 2022 01:16)  HR: 106 (25 Nov 2022 18:00) (88 - 114)  BP: 125/69 (25 Nov 2022 18:00) (103/62 - 126/80)  BP(mean): 90 (25 Nov 2022 18:00) (74 - 98)  RR: 32 (25 Nov 2022 18:00) (18 - 36)  SpO2: 96% (25 Nov 2022 18:00) (91% - 97%)    Parameters below as of 25 Nov 2022 18:00  Patient On (Oxygen Delivery Method): nasal cannula, high flow        11-24 @ 07:01  -  11-25 @ 07:00  --------------------------------------------------------  IN: 1833.8 mL / OUT: 1674 mL / NET: 159.8 mL    11-25 @ 07:01  -  11-25 @ 18:46  --------------------------------------------------------  IN: 140.7 mL / OUT: 1130 mL / NET: -989.3 mL          -------------------------------------------------------------------  PHYSICAL EXAM:    Constitutional: WD  HEENT: NC/AT; PERRL, anicteric sclera; MMM  Neck: supple  Cardiovascular: +S1/S2, RRR  Respiratory: diffuse inspiratory crackles.   Gastrointestinal: soft, NT/ND  Extremities: WWP; 1+ b/l lower extremity edema, clubbing or cyanosis  Vascular: 2+ radial pulses B/L  Neurological: awake and alert; AMARAL    -------------------------------------------------------------------  LABS:    ABG - ( 25 Nov 2022 17:47 )  pH, Arterial: 7.41  pH, Blood: x     /  pCO2: 73    /  pO2: 125   / HCO3: 46    / Base Excess: 17.6  /  SaO2: 99.1                CBC Full  -  ( 25 Nov 2022 05:38 )  WBC Count : 15.81 K/uL  RBC Count : 3.86 M/uL  Hemoglobin : 11.4 g/dL  Hematocrit : 35.2 %  Platelet Count - Automated : 313 K/uL  Mean Cell Volume : 91.2 fl  Mean Cell Hemoglobin : 29.5 pg  Mean Cell Hemoglobin Concentration : 32.4 gm/dL  Auto Neutrophil # : 13.81 K/uL  Auto Lymphocyte # : 1.22 K/uL  Auto Monocyte # : 0.63 K/uL  Auto Eosinophil # : 0.03 K/uL  Auto Basophil # : 0.01 K/uL  Auto Neutrophil % : 87.3 %  Auto Lymphocyte % : 7.7 %  Auto Monocyte % : 4.0 %  Auto Eosinophil % : 0.2 %  Auto Basophil % : 0.1 %    11-25    134<L>  |  89<L>  |  20  ----------------------------<  135<H>  4.0   |  42<H>  |  0.91    Ca    8.6      25 Nov 2022 05:38  Phos  4.0     11-25  Mg     2.3     11-25    TPro  7.6  /  Alb  3.1<L>  /  TBili  0.6  /  DBili  x   /  AST  27  /  ALT  23  /  AlkPhos  86  11-25    PT/INR - ( 25 Nov 2022 05:38 )   PT: 15.3 sec;   INR: 1.28          PTT - ( 25 Nov 2022 05:38 )  PTT:29.6 sec                  -------------------------------------------------------------------  RADIOLOGY & ADDITIONAL STUDIES:

## 2022-11-25 NOTE — CONSULT NOTE ADULT - ASSESSMENT
60 yo male with pulmonary fibrosis 2/2 COVID-19, recent steroid taper, admission OSH 10/10 where he was found to have Enterovirus respiratory tract infection, then presented to another OSH 11/22 with progressive SOB (told he had pneumonia and started empirically on antibiotics (flu/RSV testing reportedly negative there)), transferred here for ?cardiogenic shock, b/l pulmonary infiltrates ?new HAP; also at increased risk for PCP pneumonia given recent steroid exposure.  - repeat sputum cx  - sputum Pneumocystis PCR  - serum Fungitell  - can continue empiric vancomycin 1.5g IV [id number] redose around 11pm--plus Zosyn 4.5g IV q8h

## 2022-11-25 NOTE — PROGRESS NOTE ADULT - SUBJECTIVE AND OBJECTIVE BOX
Patient is a 61y old  Male who presents with a chief complaint of SOB (25 Nov 2022 10:35)      INTERVAL HPI/OVERNIGHT EVENTS:   Patient seen and examined at bedside. Overnight, continued to have cough, improved with hycodan x1. Vanc trough supratherapeutic (trough 28), plan to re-check at 5 PM. UOP downtrending, however CMP unremarkable and pt dry-appearing on exam. This morning, pt lying in bed, in NAD, with no complaints. Cough unchanged from baseline. Saturating well on HFNC. Denies fever, chills, HA, dizziness, CP, SOB, palpitations, abdominal pain, n/v, changes in bowel/urinary habits, numbness, or tingling.     ROS: Unable to be obtained due to clinical status     ICU Vital Signs Last 24 Hrs  T(C): 36.6 (25 Nov 2022 09:15), Max: 37.2 (25 Nov 2022 01:16)  T(F): 97.9 (25 Nov 2022 09:15), Max: 99 (25 Nov 2022 01:16)  HR: 114 (25 Nov 2022 12:00) (88 - 114)  BP: 117/67 (25 Nov 2022 12:00) (103/60 - 130/70)  BP(mean): 85 (25 Nov 2022 12:00) (74 - 98)  ABP: --  ABP(mean): --  RR: 31 (25 Nov 2022 12:00) (18 - 36)  SpO2: 94% (25 Nov 2022 12:00) (91% - 99%)    O2 Parameters below as of 25 Nov 2022 12:00  Patient On (Oxygen Delivery Method): nasal cannula  O2 Flow (L/min): 50  O2 Concentration (%): 60      I&O's Summary    24 Nov 2022 07:01  -  25 Nov 2022 07:00  --------------------------------------------------------  IN: 1833.8 mL / OUT: 1674 mL / NET: 159.8 mL    25 Nov 2022 07:01  -  25 Nov 2022 12:11  --------------------------------------------------------  IN: 18.5 mL / OUT: 545 mL / NET: -526.5 mL          LABS:                        11.4   15.81 )-----------( 313      ( 25 Nov 2022 05:38 )             35.2     11-25    134<L>  |  89<L>  |  20  ----------------------------<  135<H>  4.0   |  42<H>  |  0.91    Ca    8.6      25 Nov 2022 05:38  Phos  4.0     11-25  Mg     2.3     11-25    TPro  7.6  /  Alb  3.1<L>  /  TBili  0.6  /  DBili  x   /  AST  27  /  ALT  23  /  AlkPhos  86  11-25    PT/INR - ( 25 Nov 2022 05:38 )   PT: 15.3 sec;   INR: 1.28          PTT - ( 25 Nov 2022 05:38 )  PTT:29.6 sec    CAPILLARY BLOOD GLUCOSE      POCT Blood Glucose.: 227 mg/dL (25 Nov 2022 11:39)  POCT Blood Glucose.: 117 mg/dL (25 Nov 2022 07:22)  POCT Blood Glucose.: 357 mg/dL (24 Nov 2022 21:26)  POCT Blood Glucose.: 168 mg/dL (24 Nov 2022 16:00)    ABG - ( 23 Nov 2022 22:08 )  pH, Arterial: 7.40  pH, Blood: x     /  pCO2: 62    /  pO2: 124   / HCO3: 38    / Base Excess: 11.0  /  SaO2: 99.5                RADIOLOGY & ADDITIONAL TESTS:    Consultant(s) Notes Reviewed:  [x ] YES  [ ] NO    MEDICATIONS  (STANDING):  albuterol/ipratropium for Nebulization 3 milliLiter(s) Nebulizer every 6 hours  dextrose 5%. 1000 milliLiter(s) (100 mL/Hr) IV Continuous <Continuous>  dextrose 5%. 1000 milliLiter(s) (50 mL/Hr) IV Continuous <Continuous>  dextrose 50% Injectable 25 Gram(s) IV Push once  dextrose 50% Injectable 12.5 Gram(s) IV Push once  dextrose 50% Injectable 25 Gram(s) IV Push once  enoxaparin Injectable 40 milliGRAM(s) SubCutaneous every 24 hours  glucagon  Injectable 1 milliGRAM(s) IntraMuscular once  influenza   Vaccine 0.5 milliLiter(s) IntraMuscular once  insulin lispro (ADMELOG) corrective regimen sliding scale   SubCutaneous Before meals and at bedtime  methylPREDNISolone sodium succinate Injectable 50 milliGRAM(s) IV Push every 12 hours  milrinone Infusion 0.125 MICROgram(s)/kG/Min (3.68 mL/Hr) IV Continuous <Continuous>  pantoprazole  Injectable 40 milliGRAM(s) IV Push daily  piperacillin/tazobactam IVPB.. 4.5 Gram(s) IV Intermittent every 8 hours    MEDICATIONS  (PRN):  benzonatate 100 milliGRAM(s) Oral three times a day PRN Cough  dextrose Oral Gel 15 Gram(s) Oral once PRN Blood Glucose LESS THAN 70 milliGRAM(s)/deciliter  hydrocodone/homatropine Syrup 5 milliLiter(s) Oral every 6 hours PRN Cough      PHYSICAL EXAM:  GENERAL: Middle aged male, obese, in NAD   HEENT: PERRLA, EOMI, no conjunctival or scleral injection, OP clear, MMM, neck supple   RESP: Coarse ronchi and wheezing heard bilaterally. Poor inspiratory effort   CV: Tachycardic. Regular rhythm. +S1S2. No murmurs, rubs, or gallops   ABD: Obese, soft, nontender, nondistended, no rebound tenderness or guarding, no palpable masses  EXT: 1+ pitting edema. Extremities WWP, 2+ peripheral pulses.   NEURO: A&Ox3, no focal neurologic deficits   PSYCH: Normal mood and affect      Care Discussed with Consultants/Other Providers [ x] YES  [ ] NO

## 2022-11-25 NOTE — PROGRESS NOTE ADULT - ASSESSMENT
61M with known pulmonary fibrosis on home oxygen presented with progressive dyspnea now transferred from outside hospital with concern for RV failure in the setting of pulmonary hypertension for which pulmonary hypertension service has been consulted    #Pulmonary Hypertension  #Acute on Chronic Hypoxemic Respiratory Failure     Patient presented to outside hospital with concern for acute on chronic hypoxic respiratory failure likely due to multifactorial etiology due to possible pneumonia with associated heart failure with concern for RV failure in the setting of pulmonary hypertension with possible component of diastolic dysfunction. Patient with no formal diagnosis of pulmonary hypertension with echo on 10/22 unable to visualize the right side. No known risk factors for WHO group 1 pHTN. Possible component of group 2 given LV wall thickness seen on echo with diastolic dysfunction noted on both echo at OSH and here. 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 Patient currently is mentating, and warm to touch, with labs that indicated adequate end organ perfusion (Cr is in normal limits with urine visualized in bladder on US, LFTS WNL) and currently does not appear to be in acute decompensated RHF. His bedside echo performed by our team is notable for some diastolic/systolic septal wall flattening consistent with pressure and volume overload of right, his tapse is reduced (11), with an elevated TR JET > 2.8 consistent with pulmonary hypertension-possibly precipitated by bacterial pneumonia given elevated wbc and procal with productive cough.     - Can start wean of Solumedrol // 40mg q12   - approaching euvolemia, agree with gentle slow-down of diuresis / caution with increased metabolic alkalosis  - c/w HFNC // O2 sat goal > 93%   - Weaned to Florentin @ 10  - patient evaluated by transplant service, will likely require inpatient evaluation. BMI previously not at goal.   - pending RHC // scheduled for Monday     other: labs from 1/22: Anti centromere negative, milton negative, ds-dna negative, rf negative

## 2022-11-25 NOTE — PROGRESS NOTE ADULT - ASSESSMENT
Pt is 60 yo M w/ PMH HLD, pre-DM, and pulmonary fibrosis 2/2 COVID-19 infection (wheelchair bound at baseline, on 4L home O2) who presented to [facility] for several days of worsening SOB and generalized weakness with increasing O2 requirements, found to have acute hypoxic respiratory failure 2/2 pulmonary fibrosis with superimposed bacterial pneumonia and acute decompensated pulmonary HTN, transferred to [facility] for RHC i/s/o severe pulmonary disease.    NEURO:   No active issues   - Pt mentating well, no signs of cardiogenic shock    CARDIAC  #Acute decompensated R-sided HF   TTE shows severely dilated RV size, moderately-to-severely reduced RV systolic function, dilated RA, normal LV size & systolic function, mild symmetric LVH, grade I LV diastolic dysfunction, mild-moderate TR, pulmonary HTN with PASP 48 mmHg, trivial pericardial effusion. No evidence of septal bowing into LV. BNP elevated to 5k (baseline 100). Decompensated R-side HF most likely i/s/o worsening PHTN 2/2 superimposed bacterial pneumonia (with elevated procalcitonin to 0.79 and elevated WBC on admission to ). Pt likely has Group 3 PHTN 2/2 intrinsic lung disease.   - Plan for RHC on Monday, f/u results   - f/u PHTN recs   - c/w milrinone gtt 0.125 mg/kg/hr   - s/p 20 mg IV Lasix today   - Strict I&Os, pardo catheter in place     PULMONARY  #Decompensated PHTN  Pt has Group 3 PHTN 2/2 superimposed bacterial pneumonia i/s/o chronic pulmonary fibrosis from COVID-19 disease. PASP 48 mmHg. Pt currently breathing comfortably on HFNC 60/60.   - c/w vancomycin 1500 mg IV BID and Zosyn 4.5 g IV q8h    - f/u repeat sputum culture   - c/w duo nebs q6h standing   - Decreased inhaled NO to 10 ppm with plan to wean further   - Wean HFNC as tolerated with goal SpO2 95%   - Avoid positive pressure ventilation (increases pulmonary afterload)  - f/u ABG      #Pulmonary fibrosis:   i/s/o prior COVID-19 infection. Pt was receiving prolonged prednisone taper as early as mid-November and was also treated with stress-dose steroids at [facility].  - c/w Solumedrol 50 mg IV q12h     #r/o DVT/PE  LE doppler and CT PE were negative at [facility]. PE unlikely cause for acute decompensated PHTN.    GI:  #NPO:   - Maintain aspiration precautions  - c/w Protonix 40 mg IV daily     RENAL:  #Metabolic alkalosis   - s/p acetazolamide 250 mg x1 dose     ENDO:   #pre-DM  A1c 6.0%   - mISS   - Consistent carbohydrate diet once no longer NPO     ID:   #Superimposed bacterial PNA   ID was consulted at [facility] and recommended treatment with broad spectrum antibiotics. Pt received vancomycin and cefepime. RVP and COVID were negative.  - c/w vancomycin 1500 mg IV q12h and Zosyn 4.5 g IV q8h  - f/u repeat sputum culture   - MRSA swab positive   - f/u ID recs     HEME:  No active issues     F: None  E: Replete PRN  N: NPO   GI ppx: Protonix 40 mg IV daily   DVT ppx: Lovenox 40 mg SQ daily   Code status: Full code   Dispo: CCU

## 2022-11-26 NOTE — PROGRESS NOTE ADULT - SUBJECTIVE AND OBJECTIVE BOX
INTERVAL HPI/OVERNIGHT EVENTS:    SUBJECTIVE: Patient seen and examined at bedside.    OBJECTIVE:    VITAL SIGNS:  ICU Vital Signs Last 24 Hrs  T(C): 37.1 (26 Nov 2022 05:00), Max: 37.2 (25 Nov 2022 18:29)  T(F): 98.7 (26 Nov 2022 05:00), Max: 98.9 (25 Nov 2022 18:29)  HR: 86 (26 Nov 2022 07:00) (86 - 114)  BP: 117/68 (26 Nov 2022 07:00) (102/68 - 133/74)  BP(mean): 86 (26 Nov 2022 07:00) (75 - 98)  ABP: --  ABP(mean): --  RR: 22 (26 Nov 2022 07:00) (20 - 37)  SpO2: 96% (26 Nov 2022 07:00) (91% - 97%)    O2 Parameters below as of 26 Nov 2022 07:00  Patient On (Oxygen Delivery Method): nasal cannula, high flow  O2 Flow (L/min): 50  O2 Concentration (%): 50          11-25 @ 07:01  -  11-26 @ 07:00  --------------------------------------------------------  IN: 923.8 mL / OUT: 2305 mL / NET: -1381.2 mL      CAPILLARY BLOOD GLUCOSE      POCT Blood Glucose.: 130 mg/dL (26 Nov 2022 06:50)      PHYSICAL EXAM:    General: NAD  HEENT: NC/AT; anicteric sclera  Neck: supple, no JVD  Respiratory: CTA B/L; no W/R/R  Cardiovascular: +S1/S2; RRR; no M/R/G  Gastrointestinal: soft, NT/ND; +BS x4  Extremities: WWP; 2+ peripheral pulses B/L; no LE edema  Skin: normal color and turgor; no rash  Neurological:     MEDICATIONS:  MEDICATIONS  (STANDING):  albuterol/ipratropium for Nebulization 3 milliLiter(s) Nebulizer every 6 hours  dextrose 5%. 1000 milliLiter(s) (100 mL/Hr) IV Continuous <Continuous>  dextrose 5%. 1000 milliLiter(s) (50 mL/Hr) IV Continuous <Continuous>  dextrose 50% Injectable 25 Gram(s) IV Push once  dextrose 50% Injectable 12.5 Gram(s) IV Push once  dextrose 50% Injectable 25 Gram(s) IV Push once  enoxaparin Injectable 40 milliGRAM(s) SubCutaneous every 24 hours  glucagon  Injectable 1 milliGRAM(s) IntraMuscular once  influenza   Vaccine 0.5 milliLiter(s) IntraMuscular once  insulin lispro (ADMELOG) corrective regimen sliding scale   SubCutaneous Before meals and at bedtime  methylPREDNISolone sodium succinate Injectable 40 milliGRAM(s) IV Push every 12 hours  milrinone Infusion 0.125 MICROgram(s)/kG/Min (3.68 mL/Hr) IV Continuous <Continuous>  pantoprazole    Tablet 40 milliGRAM(s) Oral before breakfast  piperacillin/tazobactam IVPB.. 4.5 Gram(s) IV Intermittent every 8 hours  vancomycin  IVPB 1500 milliGRAM(s) IV Intermittent every 24 hours    MEDICATIONS  (PRN):  benzonatate 100 milliGRAM(s) Oral three times a day PRN Cough  dextrose Oral Gel 15 Gram(s) Oral once PRN Blood Glucose LESS THAN 70 milliGRAM(s)/deciliter  hydrocodone/homatropine Syrup 5 milliLiter(s) Oral every 6 hours PRN Cough      ALLERGIES:  Allergies    No Known Allergies    Intolerances        LABS:                        12.0   14.15 )-----------( 312      ( 26 Nov 2022 05:33 )             37.3     11-26    136  |  94<L>  |  20  ----------------------------<  145<H>  4.4   |  37<H>  |  0.91    Ca    8.8      26 Nov 2022 05:33  Phos  3.6     11-26  Mg     2.5     11-26    TPro  7.8  /  Alb  3.4  /  TBili  0.5  /  DBili  x   /  AST  25  /  ALT  24  /  AlkPhos  84  11-26    LIVER FUNCTIONS - ( 26 Nov 2022 05:33 )  Alb: 3.4 g/dL / Pro: 7.8 g/dL / ALK PHOS: 84 U/L / ALT: 24 U/L / AST: 25 U/L / GGT: x           PT/INR - ( 25 Nov 2022 05:38 )   PT: 15.3 sec;   INR: 1.28          PTT - ( 25 Nov 2022 05:38 )  PTT:29.6 sec          RADIOLOGY & ADDITIONAL TESTS: Reviewed. INTERVAL HPI/OVERNIGHT EVENTS: weaned down to solumedrol 40 BID per pulm recs. ABG stable overnight.    SUBJECTIVE: Patient seen and examined at bedside. Reports he is feeling better to about the same, not worsening. Still coughing significantly. Reports having a good appetite. States he has not had a BM in 2 days but not in any discomfort, he believes it is because he has remained NPO. Has not moved out of bed yet.    OBJECTIVE:    VITAL SIGNS:  ICU Vital Signs Last 24 Hrs  T(C): 37.1 (26 Nov 2022 05:00), Max: 37.2 (25 Nov 2022 18:29)  T(F): 98.7 (26 Nov 2022 05:00), Max: 98.9 (25 Nov 2022 18:29)  HR: 86 (26 Nov 2022 07:00) (86 - 114)  BP: 117/68 (26 Nov 2022 07:00) (102/68 - 133/74)  BP(mean): 86 (26 Nov 2022 07:00) (75 - 98)  ABP: --  ABP(mean): --  RR: 22 (26 Nov 2022 07:00) (20 - 37)  SpO2: 96% (26 Nov 2022 07:00) (91% - 97%)    O2 Parameters below as of 26 Nov 2022 07:00  Patient On (Oxygen Delivery Method): nasal cannula, high flow  O2 Flow (L/min): 50  O2 Concentration (%): 50          11-25 @ 07:01  -  11-26 @ 07:00  --------------------------------------------------------  IN: 923.8 mL / OUT: 2305 mL / NET: -1381.2 mL      CAPILLARY BLOOD GLUCOSE      POCT Blood Glucose.: 130 mg/dL (26 Nov 2022 06:50)      PHYSICAL EXAM:    General: NAD  HEENT: NC/AT; anicteric sclera  Neck: supple, no JVD  Respiratory: poor inspiratory effort, course rhonchi B/L; no wheezing   Cardiovascular: +S1/S2; regular and tachycardic; no M/R/G  Gastrointestinal: soft, NT/ND; +BS x4  Extremities: WWP; 2+ peripheral pulses B/L; no LE edema  Skin: normal color and turgor; no rash  Neurological: A&Ox3, no focal deficits    MEDICATIONS:  MEDICATIONS  (STANDING):  albuterol/ipratropium for Nebulization 3 milliLiter(s) Nebulizer every 6 hours  dextrose 5%. 1000 milliLiter(s) (100 mL/Hr) IV Continuous <Continuous>  dextrose 5%. 1000 milliLiter(s) (50 mL/Hr) IV Continuous <Continuous>  dextrose 50% Injectable 25 Gram(s) IV Push once  dextrose 50% Injectable 12.5 Gram(s) IV Push once  dextrose 50% Injectable 25 Gram(s) IV Push once  enoxaparin Injectable 40 milliGRAM(s) SubCutaneous every 24 hours  glucagon  Injectable 1 milliGRAM(s) IntraMuscular once  influenza   Vaccine 0.5 milliLiter(s) IntraMuscular once  insulin lispro (ADMELOG) corrective regimen sliding scale   SubCutaneous Before meals and at bedtime  methylPREDNISolone sodium succinate Injectable 40 milliGRAM(s) IV Push every 12 hours  milrinone Infusion 0.125 MICROgram(s)/kG/Min (3.68 mL/Hr) IV Continuous <Continuous>  pantoprazole    Tablet 40 milliGRAM(s) Oral before breakfast  piperacillin/tazobactam IVPB.. 4.5 Gram(s) IV Intermittent every 8 hours  vancomycin  IVPB 1500 milliGRAM(s) IV Intermittent every 24 hours    MEDICATIONS  (PRN):  benzonatate 100 milliGRAM(s) Oral three times a day PRN Cough  dextrose Oral Gel 15 Gram(s) Oral once PRN Blood Glucose LESS THAN 70 milliGRAM(s)/deciliter  hydrocodone/homatropine Syrup 5 milliLiter(s) Oral every 6 hours PRN Cough      ALLERGIES:  Allergies    No Known Allergies    Intolerances        LABS:                        12.0   14.15 )-----------( 312      ( 26 Nov 2022 05:33 )             37.3     11-26    136  |  94<L>  |  20  ----------------------------<  145<H>  4.4   |  37<H>  |  0.91    Ca    8.8      26 Nov 2022 05:33  Phos  3.6     11-26  Mg     2.5     11-26    TPro  7.8  /  Alb  3.4  /  TBili  0.5  /  DBili  x   /  AST  25  /  ALT  24  /  AlkPhos  84  11-26    LIVER FUNCTIONS - ( 26 Nov 2022 05:33 )  Alb: 3.4 g/dL / Pro: 7.8 g/dL / ALK PHOS: 84 U/L / ALT: 24 U/L / AST: 25 U/L / GGT: x           PT/INR - ( 25 Nov 2022 05:38 )   PT: 15.3 sec;   INR: 1.28          PTT - ( 25 Nov 2022 05:38 )  PTT:29.6 sec          RADIOLOGY & ADDITIONAL TESTS: Reviewed.

## 2022-11-26 NOTE — PROGRESS NOTE ADULT - ASSESSMENT
Pt is 62 yo M w/ PMH HLD, pre-DM, and pulmonary fibrosis 2/2 COVID-19 infection (wheelchair bound at baseline, on 4L home O2) who presented to [facility] for several days of worsening SOB and generalized weakness with increasing O2 requirements, found to have acute hypoxic respiratory failure 2/2 pulmonary fibrosis with superimposed bacterial pneumonia and acute decompensated pulmonary HTN, transferred to [facility] for RHC i/s/o severe pulmonary disease.    NEURO:   No active issues   - Pt mentating well, no signs of cardiogenic shock    CARDIAC  #Acute decompensated R-sided HF   TTE shows severely dilated RV size, moderately-to-severely reduced RV systolic function, dilated RA, normal LV size & systolic function, mild symmetric LVH, grade I LV diastolic dysfunction, mild-moderate TR, pulmonary HTN with PASP 48 mmHg, trivial pericardial effusion. No evidence of septal bowing into LV. BNP elevated to 5k (baseline 100). Decompensated R-side HF most likely i/s/o worsening PHTN 2/2 superimposed bacterial pneumonia (with elevated procalcitonin to 0.79 and elevated WBC on admission to ). Pt likely has Group 3 PHTN 2/2 intrinsic lung disease.   - Plan for RHC on Monday, f/u results   - f/u PHTN recs   - c/w milrinone gtt 0.125 mg/kg/hr   - s/p 20 mg IV Lasix today   - Strict I&Os, pardo catheter in place     PULMONARY  #Decompensated PHTN  Pt has Group 3 PHTN 2/2 superimposed bacterial pneumonia i/s/o chronic pulmonary fibrosis from COVID-19 disease. PASP 48 mmHg. Pt currently breathing comfortably on HFNC 60/60.   - c/w vancomycin 1500 mg IV BID and Zosyn 4.5 g IV q8h    - f/u repeat sputum culture   - c/w duo nebs q6h standing   - Decreased inhaled NO to 10 ppm with plan to wean further   - Wean HFNC as tolerated with goal SpO2 95%   - Avoid positive pressure ventilation (increases pulmonary afterload)  - f/u ABG      #Pulmonary fibrosis:   i/s/o prior COVID-19 infection. Pt was receiving prolonged prednisone taper as early as mid-November and was also treated with stress-dose steroids at [facility].  - c/w Solumedrol 50 mg IV q12h     #r/o DVT/PE  LE doppler and CT PE were negative at [facility]. PE unlikely cause for acute decompensated PHTN.    GI:  #NPO:   - Maintain aspiration precautions  - c/w Protonix 40 mg IV daily     RENAL:  #Metabolic alkalosis   - s/p acetazolamide 250 mg x1 dose     ENDO:   #pre-DM  A1c 6.0%   - mISS   - Consistent carbohydrate diet once no longer NPO     ID:   #Superimposed bacterial PNA   ID was consulted at [facility] and recommended treatment with broad spectrum antibiotics. Pt received vancomycin and cefepime. RVP and COVID were negative.  - c/w vancomycin 1500 mg IV q12h and Zosyn 4.5 g IV q8h  - f/u repeat sputum culture   - MRSA swab positive   - f/u ID recs     HEME:  No active issues     F: None  E: Replete PRN  N: NPO   GI ppx: Protonix 40 mg IV daily   DVT ppx: Lovenox 40 mg SQ daily   Code status: Full code   Dispo: CCU   Pt is 60 yo M w/ PMH HLD, pre-DM, and pulmonary fibrosis 2/2 COVID-19 infection (wheelchair bound at baseline, on 4L home O2) who presented to [facility] for several days of worsening SOB and generalized weakness with increasing O2 requirements, found to have acute hypoxic respiratory failure 2/2 pulmonary fibrosis with superimposed bacterial pneumonia and acute decompensated pulmonary HTN, transferred to [facility] for RHC i/s/o severe pulmonary disease.    NEURO:   No active issues   - Pt mentating well, no signs of cardiogenic shock    CARDIAC  #Acute decompensated R-sided HF   TTE shows severely dilated RV size, moderately-to-severely reduced RV systolic function, dilated RA, normal LV size & systolic function, mild symmetric LVH, grade I LV diastolic dysfunction, mild-moderate TR, pulmonary HTN with PASP 48 mmHg, trivial pericardial effusion. No evidence of septal bowing into LV. BNP elevated to 5k (baseline 100). Decompensated R-side HF most likely i/s/o worsening PHTN 2/2 superimposed bacterial pneumonia (with elevated procalcitonin to 0.79 and elevated WBC on admission to ). Pt likely has Group 3 PHTN 2/2 intrinsic lung disease.   - Plan for RHC on Monday, f/u results   - f/u PHTN recs   - c/w milrinone gtt 0.125 mg/kg/hr; plan to wean down tomorrow  - s/p 20 mg IV Lasix yesterday; no plan to diurese today as pt appears euvolemic  - Strict I&Os, pardo catheter in place     PULMONARY  #Decompensated PHTN  Pt has Group 3 PHTN 2/2 superimposed bacterial pneumonia i/s/o chronic pulmonary fibrosis from COVID-19 disease. PASP 48 mmHg. Pt currently breathing comfortably on HFNC 60/60.   - c/w vancomycin 1500 mg IV BID and Zosyn 4.5 g IV q8h    - f/u repeat sputum culture   - c/w duo nebs q6h standing   - Discontinued inhaled NO  - Wean HFNC as tolerated with goal SpO2 95%, weaned to FiO2 40% this AM  - Avoid positive pressure ventilation (increases pulmonary afterload)  - f/u ABG    - encourage OOBTC    #Pulmonary fibrosis:   i/s/o prior COVID-19 infection. Pt was receiving prolonged prednisone taper as early as mid-November and was also treated with stress-dose steroids at [facility].  - decreased to Solumedrol 40 mg IV q12h     #r/o DVT/PE  LE doppler and CT PE were negative at [facility]. PE unlikely cause for acute decompensated PHTN.    GI:  #NPO:   - Maintain aspiration precautions  - c/w Protonix 40 mg IV daily     RENAL:  #Metabolic alkalosis   - s/p acetazolamide 250 mg x1 dose     ENDO:   #pre-DM  A1c 6.0%   - mISS   - Consistent carbohydrate diet once no longer NPO     ID:   #Superimposed bacterial PNA   ID was consulted at [facility] and recommended treatment with broad spectrum antibiotics. Pt received vancomycin and cefepime. RVP and COVID were negative.  - c/w vancomycin 1500 mg IV q12h and Zosyn 4.5 g IV q8h  - f/u repeat sputum culture   - MRSA swab positive   - f/u ID recs     HEME:  No active issues     F: None  E: Replete PRN  N: NPO   GI ppx: Protonix 40 mg IV daily   DVT ppx: Lovenox 40 mg SQ daily   Code status: Full code   Dispo: CCU

## 2022-11-27 NOTE — PROGRESS NOTE ADULT - ASSESSMENT
62 yo male with pulmonary fibrosis 2/2 COVID-19, recent steroid taper, admission OSH 10/10 where he was found to have Enterovirus respiratory tract infection, then presented to another OSH 11/22 with progressive SOB (told he had pneumonia and started empirically on antibiotics (flu/RSV testing reportedly negative there)), transferred here for ?cardiogenic shock, b/l pulmonary infiltrates ?HAP; also at increased risk for PCP pneumonia given recent steroid exposure. Sputum culture rejected X 2 2/2 contamination.  - f/u sputum Pneumocystis PCR  - f/u serum Fungitell  - can continue empiric vancomycin 1.5g IV q24h plus Zosyn 4.5g IV [id number] completion of empiric 7d course on 11/28

## 2022-11-27 NOTE — PROGRESS NOTE ADULT - SUBJECTIVE AND OBJECTIVE BOX
Patient is a 61y old  Male who presents with a chief complaint of SOB (2022 11:05)      INTERVAL HPI/OVERNIGHT EVENTS:   No overnight events   Pt seen and examined at beside this am  Afebrile, hemodynamically stable   Unable to obtain ROS due to sedation    ICU Vital Signs Last 24 Hrs  T(C): 36.5 (2022 09:00), Max: 36.9 (2022 22:00)  T(F): 97.7 (2022 09:00), Max: 98.5 (2022 22:00)  HR: 96 (:) (84 - 113)  BP: 137/84 (:) (94/53 - 137/84)  BP(mean): 105 (:) (77 - 105)  ABP: --  ABP(mean): --  RR: 27 (2022 11:00) (20 - 43)  SpO2: 95% (:) (89% - 100%)    O2 Parameters below as of 2022 09:07  Patient On (Oxygen Delivery Method): nasal cannula, high flow  O2 Flow (L/min): 50  O2 Concentration (%): 50      I&O's Summary    2022 07:01  -  2022 07:00  --------------------------------------------------------  IN: 947.1 mL / OUT: 2122 mL / NET: -1174.9 mL    2022 07:01  -  2022 11:48  --------------------------------------------------------  IN: 219.8 mL / OUT: 1080 mL / NET: -860.2 mL          LABS:                        12.0   15.05 )-----------( 300      ( 2022 05:32 )             37.7         135  |  95<L>  |  22  ----------------------------<  150<H>  4.6   |  36<H>  |  0.82    Ca    8.6      2022 05:32  Phos  2.5       Mg     2.4         TPro  7.6  /  Alb  3.1<L>  /  TBili  0.5  /  DBili  x   /  AST  28  /  ALT  31  /  AlkPhos  76        Urinalysis Basic - ( 2022 10:28 )    Color: Red / Appearance: Cloudy / S.015 / pH: x  Gluc: x / Ketone: NEGATIVE  / Bili: Negative / Urobili: 0.2 E.U./dL   Blood: x / Protein: 30 mg/dL / Nitrite: NEGATIVE   Leuk Esterase: Trace / RBC: Many /HPF / WBC < 5 /HPF   Sq Epi: x / Non Sq Epi: 0-5 /HPF / Bacteria: None /HPF      CAPILLARY BLOOD GLUCOSE      POCT Blood Glucose.: 272 mg/dL (2022 10:32)  POCT Blood Glucose.: 161 mg/dL (2022 22:34)  POCT Blood Glucose.: 202 mg/dL (2022 16:22)    ABG - ( 2022 14:26 )  pH, Arterial: 7.37  pH, Blood: x     /  pCO2: 68    /  pO2: 143   / HCO3: 39    / Base Excess: 11.1  /  SaO2: 99.5                RADIOLOGY & ADDITIONAL TESTS:    Consultant(s) Notes Reviewed:  [x ] YES  [ ] NO    MEDICATIONS  (STANDING):  AQUAPHOR (petrolatum Ointment) 1 Application(s) Topical two times a day  benzocaine 15 mG/menthol 3.6 mG Lozenge 2 Lozenge Oral every 6 hours  benzonatate 200 milliGRAM(s) Oral three times a day  dextrose 5%. 1000 milliLiter(s) (100 mL/Hr) IV Continuous <Continuous>  dextrose 5%. 1000 milliLiter(s) (50 mL/Hr) IV Continuous <Continuous>  dextrose 50% Injectable 25 Gram(s) IV Push once  dextrose 50% Injectable 12.5 Gram(s) IV Push once  dextrose 50% Injectable 25 Gram(s) IV Push once  enoxaparin Injectable 40 milliGRAM(s) SubCutaneous every 24 hours  fluticasone propionate 50 MICROgram(s)/spray Nasal Spray 2 Spray(s) Both Nostrils two times a day  glucagon  Injectable 1 milliGRAM(s) IntraMuscular once  influenza   Vaccine 0.5 milliLiter(s) IntraMuscular once  insulin lispro (ADMELOG) corrective regimen sliding scale   SubCutaneous Before meals and at bedtime  ipratropium    for Nebulization 500 MICROGram(s) Nebulizer every 6 hours  methylPREDNISolone sodium succinate Injectable 40 milliGRAM(s) IV Push every 12 hours  milrinone Infusion 0.125 MICROgram(s)/kG/Min (3.68 mL/Hr) IV Continuous <Continuous>  pantoprazole    Tablet 40 milliGRAM(s) Oral before breakfast  piperacillin/tazobactam IVPB.. 4.5 Gram(s) IV Intermittent every 8 hours  polyethylene glycol 3350 17 Gram(s) Oral daily  senna 2 Tablet(s) Oral at bedtime  vancomycin  IVPB 1500 milliGRAM(s) IV Intermittent every 24 hours    MEDICATIONS  (PRN):  dextrose Oral Gel 15 Gram(s) Oral once PRN Blood Glucose LESS THAN 70 milliGRAM(s)/deciliter  hydrocodone/homatropine Syrup 5 milliLiter(s) Oral every 4 hours PRN Cough      PHYSICAL EXAM:  HEAD:  Atraumatic, Normocephalic  EYES: EOMI, PERRLA, conjunctiva and sclera clear  NECK: Supple, No JVD, Normal thyroid, no enlarged nodes  NERVOUS SYSTEM:  Alert & Awake.   CHEST/LUNG: B/L good air entry; No rales, rhonchi, or wheezing  HEART: S1S2 normal, Regular rate and rhythm; No murmurs  ABDOMEN: Soft, Nontender, Nondistended; Bowel sounds present  EXTREMITIES:  2+ Peripheral Pulses, No clubbing, cyanosis, or edema  LYMPH: No lymphadenopathy noted  SKIN: No rashes or lesions    Care Discussed with Consultants/Other Providers [ x] YES  [ ] NO Patient is a 61y old  Male who presents with a chief complaint of SOB (2022 11:05)      INTERVAL HPI/OVERNIGHT EVENTS:   Patient seen and examined at bedside. Overnight, started on bowel regimen due to no BM since admission. This morning, pt lying in bed, in NAD. Reports that his shortness of breath is unchanged from yesterday. Continues to have significant coughing with desaturations during coughing episodes. Plan for RHC tomorrow. Pt denies fever, chills, HA, dizziness, CP, palpitations, abdominal pain, n/v, diarrhea, flank pain, back pain, numbness, or tingling.       ICU Vital Signs Last 24 Hrs  T(C): 36.5 (2022 09:00), Max: 36.9 (2022 22:00)  T(F): 97.7 (2022 09:00), Max: 98.5 (2022 22:00)  HR: 96 (2022 11:00) (84 - 113)  BP: 137/84 (2022 11:00) (94/53 - 137/84)  BP(mean): 105 (2022 11:00) (77 - 105)  ABP: --  ABP(mean): --  RR: 27 (2022 11:00) (20 - 43)  SpO2: 95% (2022 11:00) (89% - 100%)    O2 Parameters below as of 2022 09:07  Patient On (Oxygen Delivery Method): nasal cannula, high flow  O2 Flow (L/min): 50  O2 Concentration (%): 50      I&O's Summary    2022 07:01  -  2022 07:00  --------------------------------------------------------  IN: 947.1 mL / OUT: 2122 mL / NET: -1174.9 mL    2022 07:01  -  2022 11:48  --------------------------------------------------------  IN: 219.8 mL / OUT: 1080 mL / NET: -860.2 mL          LABS:                        12.0   15.05 )-----------( 300      ( 2022 05:32 )             37.7         135  |  95<L>  |  22  ----------------------------<  150<H>  4.6   |  36<H>  |  0.82    Ca    8.6      2022 05:32  Phos  2.5       Mg     2.4         TPro  7.6  /  Alb  3.1<L>  /  TBili  0.5  /  DBili  x   /  AST  28  /  ALT  31  /  AlkPhos  76        Urinalysis Basic - ( 2022 10:28 )    Color: Red / Appearance: Cloudy / S.015 / pH: x  Gluc: x / Ketone: NEGATIVE  / Bili: Negative / Urobili: 0.2 E.U./dL   Blood: x / Protein: 30 mg/dL / Nitrite: NEGATIVE   Leuk Esterase: Trace / RBC: Many /HPF / WBC < 5 /HPF   Sq Epi: x / Non Sq Epi: 0-5 /HPF / Bacteria: None /HPF      CAPILLARY BLOOD GLUCOSE      POCT Blood Glucose.: 272 mg/dL (2022 10:32)  POCT Blood Glucose.: 161 mg/dL (2022 22:34)  POCT Blood Glucose.: 202 mg/dL (2022 16:22)    ABG - ( 2022 14:26 )  pH, Arterial: 7.37  pH, Blood: x     /  pCO2: 68    /  pO2: 143   / HCO3: 39    / Base Excess: 11.1  /  SaO2: 99.5                RADIOLOGY & ADDITIONAL TESTS:    Consultant(s) Notes Reviewed:  [x ] YES  [ ] NO    MEDICATIONS  (STANDING):  AQUAPHOR (petrolatum Ointment) 1 Application(s) Topical two times a day  benzocaine 15 mG/menthol 3.6 mG Lozenge 2 Lozenge Oral every 6 hours  benzonatate 200 milliGRAM(s) Oral three times a day  dextrose 5%. 1000 milliLiter(s) (100 mL/Hr) IV Continuous <Continuous>  dextrose 5%. 1000 milliLiter(s) (50 mL/Hr) IV Continuous <Continuous>  dextrose 50% Injectable 25 Gram(s) IV Push once  dextrose 50% Injectable 12.5 Gram(s) IV Push once  dextrose 50% Injectable 25 Gram(s) IV Push once  enoxaparin Injectable 40 milliGRAM(s) SubCutaneous every 24 hours  fluticasone propionate 50 MICROgram(s)/spray Nasal Spray 2 Spray(s) Both Nostrils two times a day  glucagon  Injectable 1 milliGRAM(s) IntraMuscular once  influenza   Vaccine 0.5 milliLiter(s) IntraMuscular once  insulin lispro (ADMELOG) corrective regimen sliding scale   SubCutaneous Before meals and at bedtime  ipratropium    for Nebulization 500 MICROGram(s) Nebulizer every 6 hours  methylPREDNISolone sodium succinate Injectable 40 milliGRAM(s) IV Push every 12 hours  milrinone Infusion 0.125 MICROgram(s)/kG/Min (3.68 mL/Hr) IV Continuous <Continuous>  pantoprazole    Tablet 40 milliGRAM(s) Oral before breakfast  piperacillin/tazobactam IVPB.. 4.5 Gram(s) IV Intermittent every 8 hours  polyethylene glycol 3350 17 Gram(s) Oral daily  senna 2 Tablet(s) Oral at bedtime  vancomycin  IVPB 1500 milliGRAM(s) IV Intermittent every 24 hours    MEDICATIONS  (PRN):  dextrose Oral Gel 15 Gram(s) Oral once PRN Blood Glucose LESS THAN 70 milliGRAM(s)/deciliter  hydrocodone/homatropine Syrup 5 milliLiter(s) Oral every 4 hours PRN Cough      PHYSICAL EXAM:  GENERAL: Middle aged male, obese, in NAD   HEENT: PERRLA, EOMI, no conjunctival or scleral injection, OP clear, MMM, neck supple   RESP: Coarse ronchi and wheezing heard bilaterally. Poor inspiratory effort   CV: Tachycardic. Regular rhythm. +S1S2. No murmurs, rubs, or gallops   ABD: Obese, soft, nontender, nondistended, no rebound tenderness or guarding, no palpable masses  EXT: 1+ pitting edema. Extremities WWP, 2+ peripheral pulses.   NEURO: A&Ox3, no focal neurologic deficits   PSYCH: Normal mood and affect    Care Discussed with Consultants/Other Providers [ x] YES  [ ] NO

## 2022-11-27 NOTE — PROGRESS NOTE ADULT - ASSESSMENT
Pt is 60 yo M w/ PMH HLD, pre-DM, and pulmonary fibrosis 2/2 COVID-19 infection (wheelchair bound at baseline, on 4L home O2) who presented to [facility] for several days of worsening SOB and generalized weakness with increasing O2 requirements, found to have acute hypoxic respiratory failure 2/2 pulmonary fibrosis with superimposed bacterial pneumonia and acute decompensated pulmonary HTN, transferred to [facility] for RHC i/s/o severe pulmonary disease.    NEURO:   No active issues   - Pt mentating well, no signs of cardiogenic shock    CARDIAC  #Acute decompensated R-sided HF   TTE shows severely dilated RV size, moderately-to-severely reduced RV systolic function, dilated RA, normal LV size & systolic function, mild symmetric LVH, grade I LV diastolic dysfunction, mild-moderate TR, pulmonary HTN with PASP 48 mmHg, trivial pericardial effusion. No evidence of septal bowing into LV. BNP elevated to 5k (baseline 100). Decompensated R-side HF most likely i/s/o worsening PHTN 2/2 superimposed bacterial pneumonia (with elevated procalcitonin to 0.79 and elevated WBC on admission to ). Pt likely has Group 3 PHTN 2/2 intrinsic lung disease.   - Plan for RHC on Monday, f/u results   - f/u PHTN recs   - c/w milrinone gtt 0.125 mg/kg/hr  - s/p 40 mg IV Lasix today  - Strict I&Os, pardo catheter in place     PULMONARY  #Decompensated PHTN  Pt has Group 3 PHTN 2/2 superimposed bacterial pneumonia i/s/o chronic pulmonary fibrosis from COVID-19 disease. PASP 48 mmHg. Pt currently breathing comfortably on HFNC 60/60.   - c/w vancomycin 1500 mg IV daily and Zosyn 4.5 g IV q8h    - f/u repeat sputum culture   - c/w duo nebs q6h standing   - c/w inhaled NO at 20 ppm   - c/w HFNC 50/50   - Avoid positive pressure ventilation (increases pulmonary afterload)  - encourage OOBTC    #Pulmonary fibrosis:   i/s/o prior COVID-19 infection. Pt was receiving prolonged prednisone taper as early as mid-November and was also treated with stress-dose steroids at [facility].  - c/w Solumedrol 40 mg IV q12h     #r/o DVT/PE  LE doppler and CT PE were negative at [facility]. PE unlikely cause for acute decompensated PHTN.    GI:  - c/w Protonix 40 mg IV daily     RENAL:  #Metabolic alkalosis   - s/p acetazolamide 250 mg x1 dose   - Monitor BMP     ENDO:   #pre-DM  A1c 6.0%   - mISS   - DASH/TLC diet     ID:   #Superimposed bacterial PNA   ID was consulted at [facility] and recommended treatment with broad spectrum antibiotics. Pt received vancomycin and cefepime. RVP and COVID were negative.  - c/w vancomycin 1500 mg IV daily and Zosyn 4.5 g IV q8h  - f/u repeat sputum culture & Pneumocystis PCR    - f/u serum Fungitell   - MRSA swab positive   - f/u ID recs     HEME:  No active issues     F: PO  E: Replete PRN  N: DASH/TLC   GI ppx: Protonix 40 mg IV daily   DVT ppx: Lovenox 40 mg SQ daily   Code status: Full code   Dispo: CCU   Pt is 62 yo M w/ PMH HLD, pre-DM, and pulmonary fibrosis 2/2 COVID-19 infection (wheelchair bound at baseline, on 4L home O2) who presented to [facility] for several days of worsening SOB and generalized weakness with increasing O2 requirements, found to have acute hypoxic respiratory failure 2/2 pulmonary fibrosis with superimposed bacterial pneumonia and acute decompensated pulmonary HTN, transferred to [facility] for RHC i/s/o severe pulmonary disease.    NEURO:   No active issues   - Pt mentating well, no signs of cardiogenic shock    CARDIAC  #Acute decompensated R-sided HF   TTE shows severely dilated RV size, moderately-to-severely reduced RV systolic function, dilated RA, normal LV size & systolic function, mild symmetric LVH, grade I LV diastolic dysfunction, mild-moderate TR, pulmonary HTN with PASP 48 mmHg, trivial pericardial effusion. No evidence of septal bowing into LV. BNP elevated to 5k (baseline 100). Decompensated R-side HF most likely i/s/o worsening PHTN 2/2 superimposed bacterial pneumonia (with elevated procalcitonin to 0.79 and elevated WBC on admission to ). Pt likely has Group 3 PHTN 2/2 intrinsic lung disease.   - Plan for RHC on Monday, f/u results   - f/u PHTN recs   - c/w milrinone gtt 0.125 mcg/kg/hr  - s/p 40 mg IV Lasix today  - Strict I&Os, pardo catheter in place     PULMONARY  #Decompensated PHTN  Pt has Group 3 PHTN 2/2 superimposed bacterial pneumonia i/s/o chronic pulmonary fibrosis from COVID-19 disease. PASP 48 mmHg. Pt currently breathing comfortably on HFNC 60/60.   - c/w vancomycin 1500 mg IV daily and Zosyn 4.5 g IV q8h    - f/u repeat sputum culture   - c/w duo nebs q6h standing   - c/w inhaled NO at 20 ppm   - c/w HFNC 50/50   - Avoid positive pressure ventilation (increases pulmonary afterload)  - encourage OOBTC    #Pulmonary fibrosis:   i/s/o prior COVID-19 infection. Pt was receiving prolonged prednisone taper as early as mid-November and was also treated with stress-dose steroids at [facility].  - c/w Solumedrol 40 mg IV q12h     #r/o DVT/PE  LE doppler and CT PE were negative at [facility]. PE unlikely cause for acute decompensated PHTN.    GI:  - c/w Protonix 40 mg IV daily     RENAL:  #Metabolic alkalosis   - s/p acetazolamide 250 mg x1 dose   - Monitor BMP     ENDO:   #pre-DM  A1c 6.0%   - mISS   - DASH/TLC diet     ID:   #Superimposed bacterial PNA   ID was consulted at [facility] and recommended treatment with broad spectrum antibiotics. Pt received vancomycin and cefepime. RVP and COVID were negative.  - c/w vancomycin 1500 mg IV daily and Zosyn 4.5 g IV q8h  - f/u repeat sputum culture & Pneumocystis PCR    - f/u serum Fungitell   - MRSA swab positive   - f/u ID recs     HEME:  No active issues     F: PO  E: Replete PRN  N: DASH/TLC   GI ppx: Protonix 40 mg IV daily   DVT ppx: Lovenox 40 mg SQ daily   Code status: Full code   Dispo: CCU

## 2022-11-27 NOTE — PROGRESS NOTE ADULT - SUBJECTIVE AND OBJECTIVE BOX
PULMONARY CONSULT SERVICE FOLLOW-UP NOTE    INTERVAL HPI:  Reviewed chart and overnight events; patient seen and examined at bedside. Reports no change in cough or SOB. No new complaints.     MEDICATIONS:  Pulmonary:  benzonatate 200 milliGRAM(s) Oral three times a day  hydrocodone/homatropine Syrup 5 milliLiter(s) Oral every 4 hours PRN  ipratropium    for Nebulization 500 MICROGram(s) Nebulizer every 6 hours    Antimicrobials:  piperacillin/tazobactam IVPB.. 4.5 Gram(s) IV Intermittent every 8 hours  vancomycin  IVPB 1500 milliGRAM(s) IV Intermittent every 24 hours    Anticoagulants:  enoxaparin Injectable 40 milliGRAM(s) SubCutaneous every 24 hours    Cardiac:  milrinone Infusion 0.125 MICROgram(s)/kG/Min IV Continuous <Continuous>      Allergies    No Known Allergies    Intolerances        Vital Signs Last 24 Hrs  T(C): 36.5 (2022 09:00), Max: 36.9 (2022 22:00)  T(F): 97.7 (2022 09:00), Max: 98.5 (2022 22:00)  HR: 107 (2022 13:00) (84 - 107)  BP: 115/68 (2022 13:00) (94/53 - 137/84)  BP(mean): 86 (2022 13:00) (77 - 105)  RR: 36 (2022 13:00) (20 - 42)  SpO2: 94% (2022 13:00) (89% - 100%)    Parameters below as of 2022 09:07  Patient On (Oxygen Delivery Method): nasal cannula, high flow  O2 Flow (L/min): 50  O2 Concentration (%): 50     @ :  -   @ 07:00  --------------------------------------------------------  IN: 947.1 mL / OUT: 2122 mL / NET: -1174.9 mL     @ 07:  -   @ 13:14  --------------------------------------------------------  IN: 227.2 mL / OUT: 1755 mL / NET: -1527.8 mL    PHYSICAL EXAM:  Constitutional: NAD  HEENT: NC/AT; PERRL, anicteric sclera; MMM  Neck: supple  Cardiovascular: +S1/S2, RRR  Respiratory: Diffuse inspiratory crackles  Gastrointestinal: soft, NT/ND  Extremities: WWP; +1 b/l LE edema, no clubbing or cyanosis  Vascular: 2+ radial pulses B/L  Neurological: awake and alert; AMARAL    LABS:  ABG - ( 2022 14:26 )  pH, Arterial: 7.37  pH, Blood: x     /  pCO2: 68    /  pO2: 143   / HCO3: 39    / Base Excess: 11.1  /  SaO2: 99.5      CBC Full  -  ( 2022 05:32 )  WBC Count : 15.05 K/uL  RBC Count : 4.01 M/uL  Hemoglobin : 12.0 g/dL  Hematocrit : 37.7 %  Platelet Count - Automated : 300 K/uL  Mean Cell Volume : 94.0 fl  Mean Cell Hemoglobin : 29.9 pg  Mean Cell Hemoglobin Concentration : 31.8 gm/dL  Auto Neutrophil # : 12.18 K/uL  Auto Lymphocyte # : 1.47 K/uL  Auto Monocyte # : 1.21 K/uL  Auto Eosinophil # : 0.00 K/uL  Auto Basophil # : 0.02 K/uL  Auto Neutrophil % : 81.0 %  Auto Lymphocyte % : 9.8 %  Auto Monocyte % : 8.0 %  Auto Eosinophil % : 0.0 %  Auto Basophil % : 0.1 %        135  |  95<L>  |  22  ----------------------------<  150<H>  4.6   |  36<H>  |  0.82    Ca    8.6      2022 05:32  Phos  2.5       Mg     2.4         TPro  7.6  /  Alb  3.1<L>  /  TBili  0.5  /  DBili  x   /  AST  28  /  ALT  31  /  AlkPhos  76            Urinalysis Basic - ( 2022 10:28 )    Color: Red / Appearance: Cloudy / S.015 / pH: x  Gluc: x / Ketone: NEGATIVE  / Bili: Negative / Urobili: 0.2 E.U./dL   Blood: x / Protein: 30 mg/dL / Nitrite: NEGATIVE   Leuk Esterase: Trace / RBC: Many /HPF / WBC < 5 /HPF   Sq Epi: x / Non Sq Epi: 0-5 /HPF / Bacteria: None /HPF                RADIOLOGY & ADDITIONAL STUDIES:

## 2022-11-27 NOTE — PROGRESS NOTE ADULT - SUBJECTIVE AND OBJECTIVE BOX
INTERVAL HPI/OVERNIGHT EVENTS: Ongoing SOB.    CONSTITUTIONAL:  Negative fever or chills, feels well, good appetite  EYES:  Negative  blurry vision or double vision  CARDIOVASCULAR:  Negative for chest pain or palpitations  RESPIRATORY:  Negative for cough, wheezing, or SOB   GASTROINTESTINAL:  Negative for nausea, vomiting, diarrhea, constipation, or abdominal pain  GENITOURINARY:  Negative frequency, urgency or dysuria  NEUROLOGIC:  No headache, confusion, dizziness, lightheadedness      ANTIBIOTICS/RELEVANT:    MEDICATIONS  (STANDING):  AQUAPHOR (petrolatum Ointment) 1 Application(s) Topical two times a day  benzocaine 15 mG/menthol 3.6 mG Lozenge 2 Lozenge Oral every 6 hours  benzonatate 200 milliGRAM(s) Oral three times a day  dextrose 5%. 1000 milliLiter(s) (100 mL/Hr) IV Continuous <Continuous>  dextrose 5%. 1000 milliLiter(s) (50 mL/Hr) IV Continuous <Continuous>  dextrose 50% Injectable 25 Gram(s) IV Push once  dextrose 50% Injectable 12.5 Gram(s) IV Push once  dextrose 50% Injectable 25 Gram(s) IV Push once  enoxaparin Injectable 40 milliGRAM(s) SubCutaneous every 24 hours  fluticasone propionate 50 MICROgram(s)/spray Nasal Spray 2 Spray(s) Both Nostrils two times a day  glucagon  Injectable 1 milliGRAM(s) IntraMuscular once  influenza   Vaccine 0.5 milliLiter(s) IntraMuscular once  insulin lispro (ADMELOG) corrective regimen sliding scale   SubCutaneous Before meals and at bedtime  ipratropium    for Nebulization 500 MICROGram(s) Nebulizer every 6 hours  methylPREDNISolone sodium succinate Injectable 40 milliGRAM(s) IV Push every 12 hours  milrinone Infusion 0.125 MICROgram(s)/kG/Min (3.68 mL/Hr) IV Continuous <Continuous>  pantoprazole    Tablet 40 milliGRAM(s) Oral before breakfast  piperacillin/tazobactam IVPB.. 4.5 Gram(s) IV Intermittent every 8 hours  polyethylene glycol 3350 17 Gram(s) Oral daily  senna 2 Tablet(s) Oral at bedtime  vancomycin  IVPB 1500 milliGRAM(s) IV Intermittent every 24 hours    MEDICATIONS  (PRN):  dextrose Oral Gel 15 Gram(s) Oral once PRN Blood Glucose LESS THAN 70 milliGRAM(s)/deciliter  hydrocodone/homatropine Syrup 5 milliLiter(s) Oral every 4 hours PRN Cough        Vital Signs Last 24 Hrs  T(C): 36.5 (2022 09:00), Max: 36.9 (2022 22:00)  T(F): 97.7 (2022 09:00), Max: 98.5 (2022 22:00)  HR: 107 (:00) (84 - 107)  BP: 115/68 (2022 13:00) (94/53 - 137/84)  BP(mean): 86 (2022 13:00) (77 - 105)  RR: 36 (:00) (20 - 42)  SpO2: 94% (:) (89% - 100%)    Parameters below as of 2022 09:07  Patient On (Oxygen Delivery Method): nasal cannula, high flow  O2 Flow (L/min): 50  O2 Concentration (%): 50    PHYSICAL EXAM:  Constitutional: NAD  Eyes: CLAUDETTE, EOMI  Ear/Nose/Throat: no oral lesion, no sinus tenderness on percussion	  Neck: no JVD, no lymphadenopathy, supple  Respiratory: CTA theresa  Cardiovascular: S1S2 RRR, no murmurs  Gastrointestinal:soft, (+) BS, no HSM  Extremities:no e/e/c  Vascular: DP Pulse:	right normal; left normal      LABS:                        12.0   15.05 )-----------( 300      ( 2022 05:32 )             37.7         135  |  95<L>  |  22  ----------------------------<  150<H>  4.6   |  36<H>  |  0.82    Ca    8.6      2022 05:32  Phos  2.5       Mg     2.4         TPro  7.6  /  Alb  3.1<L>  /  TBili  0.5  /  DBili  x   /  AST  28  /  ALT  31  /  AlkPhos  76        Urinalysis Basic - ( 2022 10:28 )    Color: Red / Appearance: Cloudy / S.015 / pH: x  Gluc: x / Ketone: NEGATIVE  / Bili: Negative / Urobili: 0.2 E.U./dL   Blood: x / Protein: 30 mg/dL / Nitrite: NEGATIVE   Leuk Esterase: Trace / RBC: Many /HPF / WBC < 5 /HPF   Sq Epi: x / Non Sq Epi: 0-5 /HPF / Bacteria: None /HPF        MICROBIOLOGY: reviewed    RADIOLOGY & ADDITIONAL STUDIES: reviewed

## 2022-11-27 NOTE — PROGRESS NOTE ADULT - ASSESSMENT
61M with known pulmonary fibrosis on home oxygen presented with progressive dyspnea now transferred from outside hospital with concern for RV failure in the setting of pulmonary hypertension for which pulmonary hypertension service has been consulted    #Pulmonary Hypertension  #Acute on Chronic Hypoxemic Respiratory Failure     Patient presented to outside hospital with concern for acute on chronic hypoxic respiratory failure likely due to multifactorial etiology due to possible pneumonia and heart failure with concern for RV failure. Patient with no formal diagnosis of pulmonary hypertension, echo on 10/22 unable to visualize the RV. No known risk factors for WHO group 1 pHTN. Possible component of group 2 given LV wall thickness seen on echo with diastolic dysfunction. 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 Patient does not appear to be in acute decompensated RHF as he is currently mentating, warm, and labs are indicative of adequate end organ perfusion. Bedside echo is notable for some diastolic/systolic septal wall flattening consistent with RV pressure and volume overload, tapse is reduced (11), with an elevated TR JET > 2.8 consistent with pulmonary hypertension, possibly precipitated by bacterial pneumonia given elevated wbc and procal with productive cough.     - C/w Solumedrol 40mg q12   - approaching euvolemia, agree with gentle slow-down of diuresis / caution with increased metabolic alkalosis  - c/w HFNC, O2 sat goal > 93%   - Weaned to Florentin @ 10  - Aggressive bowel regimen to avoid straining with BM   - patient evaluated by transplant service, will likely require inpatient evaluation. BMI now at goal after diuresis  - pending RHC // scheduled for Monday      61M with known pulmonary fibrosis on home oxygen presented with progressive dyspnea now transferred from outside hospital with concern for RV failure in the setting of pulmonary hypertension for which pulmonary hypertension service has been consulted    #Pulmonary Hypertension  #Acute on Chronic Hypoxemic Respiratory Failure     Patient presented to outside hospital with concern for acute on chronic hypoxic respiratory failure likely due to multifactorial etiology due to possible pneumonia and heart failure with concern for RV failure. Patient with no formal diagnosis of pulmonary hypertension, echo on 10/22 unable to visualize the RV. No known risk factors for WHO group 1 pHTN. Possible component of group 2 given LV wall thickness seen on echo with diastolic dysfunction. 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 Patient does not appear to be in acute decompensated RHF as he is currently mentating, warm, and labs are indicative of adequate end organ perfusion. Bedside echo is notable for some diastolic/systolic septal wall flattening consistent with RV pressure and volume overload, tapse is reduced (11), with an elevated TR JET > 2.8 consistent with pulmonary hypertension, possibly precipitated by bacterial pneumonia given elevated wbc and procal with productive cough.     - C/w Solumedrol 40mg q12   - approaching euvolemia, agree with gentle slow-down of diuresis / caution with increased metabolic alkalosis  - c/w HFNC, O2 sat goal > 93%   - Weaned to Florentin @ 10  - Aggressive bowel regimen to avoid straining with BM   - OOBTC as tolerated  - patient evaluated by transplant service, will likely require inpatient evaluation. BMI now at goal after diuresis  - pending RHC // scheduled for Monday

## 2022-11-28 NOTE — PROGRESS NOTE ADULT - SUBJECTIVE AND OBJECTIVE BOX
Pulmonary Transplant service Pulmonary Transplant service    HPI:  Patient is a 62 yo M w/ PMH HLD, pre-DM, and pulmonary fibrosis 2/2 COVID-19 infection (wheelchair bound at baseline, on 4L home O2) who initially presented to [facility] on 2022 for several days of worsening dyspnea, cough productive of off-white sputum, wheezing, and generalized weakness with increasing O2 requirements (up to 6L) and persistent dyspnea at rest with no associated bendopnea however did endorse orhtopnea and LE edema. Patient subsequently had near loc and presented to [facility]. At OSH patient was admitted for acute on chronic hypoxic respiratory failure. While at OSH patient treated for multi-lobar pneumonia as well as acute heart failure exacerbation with an echo concerning for acute right heart failure which resulted in the patient being transferred to [facility]. Patient is currently being evaluated for in-patient lung transplant    Notable outside hospital work up: WBC count (16K, with normal BUN/Cr, unremarkable LFTS, elevated procalciton 0.79, pro-bnp 5K), RVP negative. CT PE negative for acute PE, with diffuse traction bronchiectasis, bronchiolectasis and multi-focal GGO. TTE was performed which showed mild TR, severely dilated RV, moderate-to-severe elevated RV systolic pressure, moderate-to severe PHTN (58 mmHg), normal LV size and wall thickness, normal LV systolic function (EF 58%), and grade II diastolic dysfunction. Pulmonary hypertension team consulted due to concern for right heart failure in the setting of pulmonary hypertension.  Currently patient reports improvement in dyspnea and orthopnea.     Functional assessment and health maintenance Patient with overall poor functional status, heavily reliant on son and wife for ADLs-Son will help patient shower and clean himself. Cannot ambulate more than a few feet without dyspnea, relies on wheelchair-performs ADLs with total assistance No other known/documented medical problems per chart review (no hx of diabetes) and is UTD with health care screening-Last CRC screening with colonoscopy approximately 5 years ago-normal. Son (age 25 years) and wife are current care givers.     Currently with no new acute complaints, denies, fevers, chills or worsening shortness of breath.    Review of systems negative except as noted above.     VITAL SIGNS:  Vital Signs Last 24 Hrs  T(C): 36.4 (2022 18:05), Max: 36.8 (2022 22:05)  T(F): 97.5 (2022 18:05), Max: 98.2 (2022 22:05)  HR: 103 (2022 20:00) (65 - 119)  BP: 139/88 (2022 20:00) (111/66 - 143/85)  BP(mean): 108 (:00) (82 - 108)  RR: 39 (:00) (22 - 40)  SpO2: 95% (:00) (91% - 100%)    Parameters below as of :00  Patient On (Oxygen Delivery Method): nasal cannula, high flow  O2 Flow (L/min): 50  O2 Concentration (%): 50      22 @ 07:01  -  22 @ 07:00  --------------------------------------------------------  IN: 1068.8 mL / OUT: 3080 mL / NET: -2011.2 mL    22 @ 07:01  -  22 @ 21:10  --------------------------------------------------------  IN: 49.1 mL / OUT: 530 mL / NET: -480.9 mL        PHYSICAL EXAM:    General: Intermittently tachypnic on HFNC  HEENT: MMM  Neck: supple, no LAD, JVD decreased   Cardiovascular: +S1/S2; RRR  Respiratory: Bibasilar crackles R>L  Gastrointestinal: Distended, no fluid wave  Extremities: WWP; no clubbing, trace edema present  Vascular: 2+ radial, DP pulses B/L  Neurological: AAOx3; no focal deficits    MEDICATIONS:  MEDICATIONS  (STANDING):  AQUAPHOR (petrolatum Ointment) 1 Application(s) Topical two times a day  benzocaine 15 mG/menthol 3.6 mG Lozenge 2 Lozenge Oral every 6 hours  benzonatate 200 milliGRAM(s) Oral three times a day  chlorhexidine 2% Cloths 1 Application(s) Topical daily  dextrose 5%. 1000 milliLiter(s) (50 mL/Hr) IV Continuous <Continuous>  dextrose 5%. 1000 milliLiter(s) (100 mL/Hr) IV Continuous <Continuous>  dextrose 50% Injectable 25 Gram(s) IV Push once  dextrose 50% Injectable 12.5 Gram(s) IV Push once  dextrose 50% Injectable 25 Gram(s) IV Push once  enoxaparin Injectable 40 milliGRAM(s) SubCutaneous every 24 hours  fluticasone propionate 50 MICROgram(s)/spray Nasal Spray 2 Spray(s) Both Nostrils two times a day  glucagon  Injectable 1 milliGRAM(s) IntraMuscular once  influenza   Vaccine 0.5 milliLiter(s) IntraMuscular once  insulin lispro (ADMELOG) corrective regimen sliding scale   SubCutaneous Before meals and at bedtime  ipratropium    for Nebulization 500 MICROGram(s) Nebulizer every 6 hours  methylPREDNISolone sodium succinate Injectable 40 milliGRAM(s) IV Push every 12 hours  milrinone Infusion 0.125 MICROgram(s)/kG/Min (3.68 mL/Hr) IV Continuous <Continuous>  nystatin    Suspension 832307 Unit(s) Swish and Swallow three times a day  pantoprazole    Tablet 40 milliGRAM(s) Oral before breakfast  piperacillin/tazobactam IVPB.. 4.5 Gram(s) IV Intermittent once  polyethylene glycol 3350 17 Gram(s) Oral daily  senna 2 Tablet(s) Oral at bedtime    MEDICATIONS  (PRN):  dextrose Oral Gel 15 Gram(s) Oral once PRN Blood Glucose LESS THAN 70 milliGRAM(s)/deciliter  hydrocodone/homatropine Syrup 5 milliLiter(s) Oral every 4 hours PRN Cough      ALLERGIES:  Allergies    No Known Allergies    Intolerances        LABS:                        12.7   16.42 )-----------( 299      ( 2022 05:51 )             39.2         136  |  93<L>  |  25<H>  ----------------------------<  122<H>  4.0   |  37<H>  |  0.88    Ca    8.6      2022 05:51  Phos  2.6       Mg     2.5         TPro  7.4  /  Alb  3.2<L>  /  TBili  0.6  /  DBili  x   /  AST  25  /  ALT  35  /  AlkPhos  74      PT/INR - ( 2022 05:51 )   PT: 13.2 sec;   INR: 1.11          PTT - ( 2022 05:51 )  PTT:24.9 sec  Urinalysis Basic - ( 2022 10:28 )    Color: Red / Appearance: Cloudy / S.015 / pH: x  Gluc: x / Ketone: NEGATIVE  / Bili: Negative / Urobili: 0.2 E.U./dL   Blood: x / Protein: 30 mg/dL / Nitrite: NEGATIVE   Leuk Esterase: Trace / RBC: Many /HPF / WBC < 5 /HPF   Sq Epi: x / Non Sq Epi: 0-5 /HPF / Bacteria: None /HPF      CAPILLARY BLOOD GLUCOSE      POCT Blood Glucose.: 140 mg/dL (2022 15:22)          RADIOLOGY & ADDITIONAL TESTS: Reviewed.

## 2022-11-28 NOTE — PROGRESS NOTE ADULT - SUBJECTIVE AND OBJECTIVE BOX
******INCOMPLETE******    Patient is a 61y old  Male who presents with a chief complaint of SOB (2022 13:56)    OVERNIGHT EVENTS/INTERVAL HPI:    REVIEW OF SYSTEMS:  All other review of systems is negative unless indicated above.    OBJECTIVE:  T(C): 36.7 (22 @ 04:52), Max: 36.8 (22 @ 17:55)  HR: 91 (22 @ 07:00) (65 - 119)  BP: 122/68 (22 @ 07:00) (106/71 - 137/84)  RR: 28 (22 @ 07:00) (23 - 42)  SpO2: 95% (22 @ 07:00) (89% - 99%)  Daily     Daily     Physical Exam:  General: in no acute distress  Eyes: EOMI intact bilaterally. Anicteric sclerae, moist conjunctivae  HENT: Moist mucous membranes  Neck: Trachea midline, supple  Lungs: CTA B/L. No wheezes, rales, or rhonchi  Cardiovascular: RRR. No murmurs, rubs, or gallops  Abdomen: Soft, non-tender non-distended; No rebound or guarding  Extremities: WWP, No clubbing, cyanosis or edema  MSK: No midline bony tenderness. No CVA tenderness bilaterally  Neurological: Alert and oriented x3  Skin: Warm and dry. No obvious rash     Medications:  MEDICATIONS  (STANDING):  AQUAPHOR (petrolatum Ointment) 1 Application(s) Topical two times a day  benzocaine 15 mG/menthol 3.6 mG Lozenge 2 Lozenge Oral every 6 hours  benzonatate 200 milliGRAM(s) Oral three times a day  dextrose 5%. 1000 milliLiter(s) (100 mL/Hr) IV Continuous <Continuous>  dextrose 5%. 1000 milliLiter(s) (50 mL/Hr) IV Continuous <Continuous>  dextrose 50% Injectable 25 Gram(s) IV Push once  dextrose 50% Injectable 12.5 Gram(s) IV Push once  dextrose 50% Injectable 25 Gram(s) IV Push once  fluticasone propionate 50 MICROgram(s)/spray Nasal Spray 2 Spray(s) Both Nostrils two times a day  glucagon  Injectable 1 milliGRAM(s) IntraMuscular once  influenza   Vaccine 0.5 milliLiter(s) IntraMuscular once  insulin lispro (ADMELOG) corrective regimen sliding scale   SubCutaneous Before meals and at bedtime  ipratropium    for Nebulization 500 MICROGram(s) Nebulizer every 6 hours  methylPREDNISolone sodium succinate Injectable 40 milliGRAM(s) IV Push every 12 hours  milrinone Infusion 0.125 MICROgram(s)/kG/Min (3.68 mL/Hr) IV Continuous <Continuous>  nystatin    Suspension 514794 Unit(s) Swish and Swallow three times a day  pantoprazole    Tablet 40 milliGRAM(s) Oral before breakfast  piperacillin/tazobactam IVPB.. 4.5 Gram(s) IV Intermittent every 8 hours  polyethylene glycol 3350 17 Gram(s) Oral daily  senna 2 Tablet(s) Oral at bedtime    MEDICATIONS  (PRN):  dextrose Oral Gel 15 Gram(s) Oral once PRN Blood Glucose LESS THAN 70 milliGRAM(s)/deciliter  hydrocodone/homatropine Syrup 5 milliLiter(s) Oral every 4 hours PRN Cough      Labs:                        12.7   16.42 )-----------( 299      ( 2022 05:51 )             39.2         136  |  93<L>  |  25<H>  ----------------------------<  122<H>  4.0   |  37<H>  |  0.88    Ca    8.6      2022 05:51  Phos  2.6       Mg     2.5         TPro  7.4  /  Alb  3.2<L>  /  TBili  0.6  /  DBili  x   /  AST  25  /  ALT  35  /  AlkPhos  74  -    PT/INR - ( 2022 05:51 )   PT: 13.2 sec;   INR: 1.11          PTT - ( 2022 05:51 )  PTT:24.9 sec  Urinalysis Basic - ( 2022 10:28 )    Color: Red / Appearance: Cloudy / S.015 / pH: x  Gluc: x / Ketone: NEGATIVE  / Bili: Negative / Urobili: 0.2 E.U./dL   Blood: x / Protein: 30 mg/dL / Nitrite: NEGATIVE   Leuk Esterase: Trace / RBC: Many /HPF / WBC < 5 /HPF   Sq Epi: x / Non Sq Epi: 0-5 /HPF / Bacteria: None /HPF      COVID-19 PCR: NotDetec (2022 19:34)  COVID-19 PCR: NotDetec (2022 15:54)  SARS-CoV-2: NotDetec (10 Oct 2022 00:11)      Radiology: Reviewed Patient is a 61y old  Male who presents with a chief complaint of SOB (2022 13:56)    OVERNIGHT EVENTS/INTERVAL HPI: As per night team, one episode of hypoxia with ambulation, resolved on HFNC. Afebrile. Nystatin swish/swallow started for concern of oral thrush. Patient seen and examined at bedside. Comfortable on HFNC. Offers no new complaints at this time. Patient denies fevers, sweats, chills, SOB, dyspnea, chest pain, nausea/vomiting, diarrhea, constipation, abdominal pain. 12 pt ROS otherwise negative.     REVIEW OF SYSTEMS:  All other review of systems is negative unless indicated above.    OBJECTIVE:  T(C): 36.7 (22 @ 04:52), Max: 36.8 (22 @ 17:55)  HR: 91 (22 @ 07:00) (65 - 119)  BP: 122/68 (22 @ 07:00) (106/71 - 137/84)  RR: 28 (22 @ 07:00) (23 - 42)  SpO2: 95% (22 @ 07:00) (89% - 99%)  Daily     Daily     Physical Exam:  General: in no acute distress, comfortable on HFNC    Eyes: EOMI intact bilaterally. Anicteric sclerae, moist conjunctivae  HEENT: diffuse white plaques at posterior oropharynx. no stridor or pooling of secretions.  Neck: Trachea midline, supple  Lungs: CTA B/L  Cardiovascular: RRR. No murmurs, rubs, or gallops  Abdomen: Soft, non-tender non-distended; No rebound or guarding  Neurological: Alert and oriented x3  Skin: Warm and dry. No obvious rash     Medications:  MEDICATIONS  (STANDING):  AQUAPHOR (petrolatum Ointment) 1 Application(s) Topical two times a day  benzocaine 15 mG/menthol 3.6 mG Lozenge 2 Lozenge Oral every 6 hours  benzonatate 200 milliGRAM(s) Oral three times a day  dextrose 5%. 1000 milliLiter(s) (100 mL/Hr) IV Continuous <Continuous>  dextrose 5%. 1000 milliLiter(s) (50 mL/Hr) IV Continuous <Continuous>  dextrose 50% Injectable 25 Gram(s) IV Push once  dextrose 50% Injectable 12.5 Gram(s) IV Push once  dextrose 50% Injectable 25 Gram(s) IV Push once  fluticasone propionate 50 MICROgram(s)/spray Nasal Spray 2 Spray(s) Both Nostrils two times a day  glucagon  Injectable 1 milliGRAM(s) IntraMuscular once  influenza   Vaccine 0.5 milliLiter(s) IntraMuscular once  insulin lispro (ADMELOG) corrective regimen sliding scale   SubCutaneous Before meals and at bedtime  ipratropium    for Nebulization 500 MICROGram(s) Nebulizer every 6 hours  methylPREDNISolone sodium succinate Injectable 40 milliGRAM(s) IV Push every 12 hours  milrinone Infusion 0.125 MICROgram(s)/kG/Min (3.68 mL/Hr) IV Continuous <Continuous>  nystatin    Suspension 137591 Unit(s) Swish and Swallow three times a day  pantoprazole    Tablet 40 milliGRAM(s) Oral before breakfast  piperacillin/tazobactam IVPB.. 4.5 Gram(s) IV Intermittent every 8 hours  polyethylene glycol 3350 17 Gram(s) Oral daily  senna 2 Tablet(s) Oral at bedtime    MEDICATIONS  (PRN):  dextrose Oral Gel 15 Gram(s) Oral once PRN Blood Glucose LESS THAN 70 milliGRAM(s)/deciliter  hydrocodone/homatropine Syrup 5 milliLiter(s) Oral every 4 hours PRN Cough      Labs:                        12.7   16.42 )-----------( 299      ( 2022 05:51 )             39.2         136  |  93<L>  |  25<H>  ----------------------------<  122<H>  4.0   |  37<H>  |  0.88    Ca    8.6      2022 05:51  Phos  2.6       Mg     2.5         TPro  7.4  /  Alb  3.2<L>  /  TBili  0.6  /  DBili  x   /  AST  25  /  ALT  35  /  AlkPhos  74      PT/INR - ( 2022 05:51 )   PT: 13.2 sec;   INR: 1.11          PTT - ( 2022 05:51 )  PTT:24.9 sec  Urinalysis Basic - ( 2022 10:28 )    Color: Red / Appearance: Cloudy / S.015 / pH: x  Gluc: x / Ketone: NEGATIVE  / Bili: Negative / Urobili: 0.2 E.U./dL   Blood: x / Protein: 30 mg/dL / Nitrite: NEGATIVE   Leuk Esterase: Trace / RBC: Many /HPF / WBC < 5 /HPF   Sq Epi: x / Non Sq Epi: 0-5 /HPF / Bacteria: None /HPF      COVID-19 PCR: NotDetec (2022 19:34)  COVID-19 PCR: NotDetec (2022 15:54)  SARS-CoV-2: NotDetec (10 Oct 2022 00:11)      Radiology: Reviewed

## 2022-11-28 NOTE — PROGRESS NOTE ADULT - ASSESSMENT
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     #Pulmonary Transplant evaluation  The patient has a known history of interstitial lung disease on long term daily/continuous oxygen therapy and has therefore been following with our transplant clinic at [facility]. While the patient currently has no absolute contraindications to pulmonary transplant he does have several relative contraindications-most notably his poor functional status (as documented above-performs ADLs with Total assistance of his wife and son and heavily reliant on wheelchair for mobility). However, given patient presenting with acute illness may benefit from repeat evaluation for lung transplant pending further discussion with transplant team.  - Will discuss with transplant team at [facility]  - Patient may benefit from continued pulmonary rehab to improve functional status as well as nutritional counseling to further reduce BMI  - chronic health/health maintenance:  Please send hgb a1c, please send hepatitis panel, last CRC screening within 5 years-normal  - social barriers: support system-son (25 years old), Wife (on HD x 10 years)    - Diagnostic/testing Parameters: From RHC performed on 11/28: Current MRAP <15, Cardiac Index currently  >2 L/min/m^2, unable to perform PFTs or 6MWT due to functional limitation    #WHO group 3 pHTN  Please see attestation from note on 11/24-mild precapillary pulmonary hypertension with normal CO/CI by Td on low dose milrinone, normal filling pressures suggesting precapillary PH2/2 underlying severe fibrotic ILD and adequate volume status.   - Continue to wean Florentin adn HFNC as tolerated-with goal to return patient to 4L, may benefit from initiation of inhaled Tayvaso in the inpatient setting    #ILD flare c pneumonia   Post covid ILD now presenting with worsening cough and elevated WBC count with elevated procalciton and CT chest performed at OSH with concern for newly developing ggos when compared to prior CT scan done in october.   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     #Pulmonary Transplant evaluation  The patient has a known history of interstitial lung disease on long term daily/continuous oxygen therapy and has therefore been following with our transplant clinic at [facility]. While the patient currently has no absolute contraindications to pulmonary transplant he does have several relative contraindications-most notably his poor functional status (as documented above-performs ADLs with Total assistance of his wife and son and heavily reliant on wheelchair for mobility). However, given patient presenting with acute illness may benefit from repeat evaluation for lung transplant pending further discussion with transplant team.  - Patient may benefit from continued pulmonary rehab to improve functional status as well as nutritional counseling to further reduce BMI  - chronic health/health maintenance:  Please send hgb a1c, please send hepatitis panel, last CRC screening within 5 years-normal  - social barriers: support system-son (25 years old), Wife (on HD x 10 years)    - Diagnostic/testing Parameters: From RHC performed on 11/28: Current MRAP <15, Cardiac Index currently  >2 L/min/m^2, unable to perform PFTs or 6MWT due to functional limitation    #WHO group 3 pHTN  Please see attestation from note on 11/24-mild precapillary pulmonary hypertension with normal CO/CI by Td on low dose milrinone, normal filling pressures suggesting precapillary PH2/2 underlying severe fibrotic ILD and adequate volume status.   - Continue to wean Florentin adn HFNC as tolerated-with goal to return patient to 4L, may benefit from initiation of inhaled Tayvaso in the inpatient setting    #ILD flare c pneumonia   Post covid ILD now presenting with worsening cough and elevated WBC count with elevated procalciton and CT chest performed at OSH with concern for newly developing ggos when compared to prior CT scan done in october.   - Agree with broad spectrum abx   - continue with steroids, will need to discuss steroid taper length

## 2022-11-28 NOTE — PROGRESS NOTE ADULT - ASSESSMENT
Pt is 60 yo M w/ PMH HLD, pre-DM, and pulmonary fibrosis 2/2 COVID-19 infection (wheelchair bound at baseline, on 4L home O2) who presented to [facility] for several days of worsening SOB and generalized weakness with increasing O2 requirements, found to have acute hypoxic respiratory failure 2/2 pulmonary fibrosis with superimposed bacterial pneumonia and acute decompensated pulmonary HTN, transferred to [facility] for RHC i/s/o severe pulmonary disease.    NEURO:   No active issues   - Pt mentating well, no signs of cardiogenic shock    CARDIAC  #Acute decompensated R-sided HF   TTE shows severely dilated RV size, moderately-to-severely reduced RV systolic function, dilated RA, normal LV size & systolic function, mild symmetric LVH, grade I LV diastolic dysfunction, mild-moderate TR, pulmonary HTN with PASP 48 mmHg, trivial pericardial effusion. No evidence of septal bowing into LV. BNP elevated to 5k (baseline 100). Decompensated R-side HF most likely i/s/o worsening PHTN 2/2 superimposed bacterial pneumonia (with elevated procalcitonin to 0.79 and elevated WBC on admission to ). Pt likely has Group 3 PHTN 2/2 intrinsic lung disease.   - f/u RHC results   - f/u PHTN recs   - f/u transplant team recs   - c/w milrinone gtt 0.125 mcg/kg/hr  - s/p 20 mg IV Lasix today  - Strict I&Os, pardo catheter in place     PULMONARY  #Decompensated PHTN  Pt has Group 3 PHTN 2/2 superimposed bacterial pneumonia i/s/o chronic pulmonary fibrosis from COVID-19 disease. PASP 48 mmHg. Pt currently breathing comfortably on HFNC 60/60.   - c/w vancomycin 1500 mg IV daily and Zosyn 4.5 g IV q8h    - f/u repeat sputum culture   - c/w duo nebs q6h standing   - c/w inhaled NO at 15 ppm   - c/w HFNC 50/50, wean as tolerated   - Avoid positive pressure ventilation (increases pulmonary afterload)  - encourage OOBTC    #Pulmonary fibrosis:   i/s/o prior COVID-19 infection. Pt was receiving prolonged prednisone taper as early as mid-November and was also treated with stress-dose steroids at [facility].  - c/w Solumedrol 40 mg IV q12h     GI:  - c/w Protonix 40 mg IV daily     RENAL:  No active issues     ENDO:   #pre-DM  A1c 6.0%   - mISS   - DASH/TLC diet     ID:   #Superimposed bacterial PNA   ID was consulted at [facility] and recommended treatment with broad spectrum antibiotics. Pt received vancomycin and cefepime. RVP and COVID were negative.  - c/w vancomycin 1500 mg IV daily and Zosyn 4.5 g IV q8h  - f/u repeat sputum culture & Pneumocystis PCR    - f/u serum Fungitell   - MRSA swab positive   - f/u ID recs     HEME:  No active issues     F: PO  E: Replete PRN  N: DASH/TLC   GI ppx: Protonix 40 mg IV daily   DVT ppx: Lovenox 40 mg SQ daily   Code status: Full code   Dispo: CCU

## 2022-11-28 NOTE — PROGRESS NOTE ADULT - SUBJECTIVE AND OBJECTIVE BOX
Patient is a 61y old  Male who presents with a chief complaint of SOB (2022 12:06)      INTERVAL HPI/OVERNIGHT EVENTS:   Patient seen and examined at bedside. Overnight, had worsening SOB and cough after walking to toilet; briefly desaturated to high 70s/low 80s. Increased HFNC to 50/60 with improvement in SpO2 to high 80s-90s. This morning, pt lying in bed, in NAD. Reports that his SOB and cough are at baseline. Remains hemodynamically stable on milrinone gtt with no evidence of respiratory distress. For [facility] today. Denies fever, chills, HA, dizziness, CP, palpitations, abdominal pain, n/v, diarrhea, constipation, flank pain, back pain, numbness, or tingling.        ICU Vital Signs Last 24 Hrs  T(C): 36.8 (2022 09:49), Max: 36.8 (2022 17:55)  T(F): 98.2 (2022 09:49), Max: 98.2 (2022 17:55)  HR: 94 (2022 11:00) (65 - 119)  BP: 115/72 (2022 11:00) (106/71 - 135/76)  BP(mean): 87 (2022 11:00) (82 - 100)  ABP: --  ABP(mean): --  RR: 22 (2022 11:00) (22 - 40)  SpO2: 100% (2022 11:00) (91% - 100%)    O2 Parameters below as of 2022 10:00  Patient On (Oxygen Delivery Method): nasal cannula, high flow  O2 Flow (L/min): 50  O2 Concentration (%): 50      I&O's Summary    2022 07:01  -  2022 07:00  --------------------------------------------------------  IN: 1068.8 mL / OUT: 3080 mL / NET: -2011.2 mL    2022 07:01  -  2022 12:55  --------------------------------------------------------  IN: 38.9 mL / OUT: 340 mL / NET: -301.1 mL          LABS:                        12.7   16.42 )-----------( 299      ( 2022 05:51 )             39.2         136  |  93<L>  |  25<H>  ----------------------------<  122<H>  4.0   |  37<H>  |  0.88    Ca    8.6      2022 05:51  Phos  2.6       Mg     2.5         TPro  7.4  /  Alb  3.2<L>  /  TBili  0.6  /  DBili  x   /  AST  25  /  ALT  35  /  AlkPhos  74      PT/INR - ( 2022 05:51 )   PT: 13.2 sec;   INR: 1.11          PTT - ( 2022 05:51 )  PTT:24.9 sec  Urinalysis Basic - ( 2022 10:28 )    Color: Red / Appearance: Cloudy / S.015 / pH: x  Gluc: x / Ketone: NEGATIVE  / Bili: Negative / Urobili: 0.2 E.U./dL   Blood: x / Protein: 30 mg/dL / Nitrite: NEGATIVE   Leuk Esterase: Trace / RBC: Many /HPF / WBC < 5 /HPF   Sq Epi: x / Non Sq Epi: 0-5 /HPF / Bacteria: None /HPF      CAPILLARY BLOOD GLUCOSE      POCT Blood Glucose.: 131 mg/dL (2022 10:03)  POCT Blood Glucose.: 180 mg/dL (2022 23:05)  POCT Blood Glucose.: 197 mg/dL (2022 15:49)    ABG - ( 2022 14:26 )  pH, Arterial: 7.37  pH, Blood: x     /  pCO2: 68    /  pO2: 143   / HCO3: 39    / Base Excess: 11.1  /  SaO2: 99.5                RADIOLOGY & ADDITIONAL TESTS:    Consultant(s) Notes Reviewed:  [x ] YES  [ ] NO    MEDICATIONS  (STANDING):  AQUAPHOR (petrolatum Ointment) 1 Application(s) Topical two times a day  benzocaine 15 mG/menthol 3.6 mG Lozenge 2 Lozenge Oral every 6 hours  benzonatate 200 milliGRAM(s) Oral three times a day  dextrose 5%. 1000 milliLiter(s) (50 mL/Hr) IV Continuous <Continuous>  dextrose 5%. 1000 milliLiter(s) (100 mL/Hr) IV Continuous <Continuous>  dextrose 50% Injectable 25 Gram(s) IV Push once  dextrose 50% Injectable 12.5 Gram(s) IV Push once  dextrose 50% Injectable 25 Gram(s) IV Push once  fluticasone propionate 50 MICROgram(s)/spray Nasal Spray 2 Spray(s) Both Nostrils two times a day  glucagon  Injectable 1 milliGRAM(s) IntraMuscular once  influenza   Vaccine 0.5 milliLiter(s) IntraMuscular once  insulin lispro (ADMELOG) corrective regimen sliding scale   SubCutaneous Before meals and at bedtime  ipratropium    for Nebulization 500 MICROGram(s) Nebulizer every 6 hours  methylPREDNISolone sodium succinate Injectable 40 milliGRAM(s) IV Push every 12 hours  milrinone Infusion 0.125 MICROgram(s)/kG/Min (3.68 mL/Hr) IV Continuous <Continuous>  nystatin    Suspension 173014 Unit(s) Swish and Swallow three times a day  pantoprazole    Tablet 40 milliGRAM(s) Oral before breakfast  piperacillin/tazobactam IVPB.. 4.5 Gram(s) IV Intermittent every 8 hours  polyethylene glycol 3350 17 Gram(s) Oral daily  senna 2 Tablet(s) Oral at bedtime    MEDICATIONS  (PRN):  dextrose Oral Gel 15 Gram(s) Oral once PRN Blood Glucose LESS THAN 70 milliGRAM(s)/deciliter  hydrocodone/homatropine Syrup 5 milliLiter(s) Oral every 4 hours PRN Cough      PHYSICAL EXAM:  GENERAL: Middle aged male, obese, in NAD   HEENT: PERRLA, EOMI, no conjunctival or scleral injection, OP clear, MMM, neck supple   RESP: Coarse ronchi and wheezing heard bilaterally. Poor inspiratory effort   CV: Tachycardic. Regular rhythm. +S1S2. No murmurs, rubs, or gallops   ABD: Obese, soft, nontender, nondistended, no rebound tenderness or guarding, no palpable masses  EXT: 1+ pitting edema. Extremities WWP, 2+ peripheral pulses.   NEURO: A&Ox3, no focal neurologic deficits   PSYCH: Normal mood and affect    Care Discussed with Consultants/Other Providers [ x] YES  [ ] NO

## 2022-11-29 NOTE — PROGRESS NOTE ADULT - SUBJECTIVE AND OBJECTIVE BOX
INFECTIOUS DISEASES CONSULT FOLLOW-UP NOTE    *INCOMPLETE*    INTERVAL HPI/OVERNIGHT EVENTS: No overnight event and was afebrile.     Subjective: Patient seen and examined at bedside. Patient reports feeling okay and believes his breathing is stable today compared to yesterday, some SOB. No pain/discomfort at areas of lines. Denies headache, dizziness, fever, chills, chest pain, abd pain, diarrhea.    ANTIBIOTICS/RELEVANT:    MEDICATIONS  (STANDING):  AQUAPHOR (petrolatum Ointment) 1 Application(s) Topical two times a day  benzocaine 15 mG/menthol 3.6 mG Lozenge 2 Lozenge Oral every 6 hours  benzonatate 200 milliGRAM(s) Oral three times a day  chlorhexidine 2% Cloths 1 Application(s) Topical daily  dextrose 5%. 1000 milliLiter(s) (50 mL/Hr) IV Continuous <Continuous>  dextrose 5%. 1000 milliLiter(s) (100 mL/Hr) IV Continuous <Continuous>  dextrose 50% Injectable 25 Gram(s) IV Push once  dextrose 50% Injectable 12.5 Gram(s) IV Push once  dextrose 50% Injectable 25 Gram(s) IV Push once  enoxaparin Injectable 40 milliGRAM(s) SubCutaneous every 24 hours  fluticasone propionate 50 MICROgram(s)/spray Nasal Spray 2 Spray(s) Both Nostrils two times a day  glucagon  Injectable 1 milliGRAM(s) IntraMuscular once  influenza   Vaccine 0.5 milliLiter(s) IntraMuscular once  insulin lispro (ADMELOG) corrective regimen sliding scale   SubCutaneous Before meals and at bedtime  ipratropium    for Nebulization 500 MICROGram(s) Nebulizer every 6 hours  methylPREDNISolone sodium succinate Injectable 40 milliGRAM(s) IV Push every 12 hours  milrinone Infusion 0.125 MICROgram(s)/kG/Min (3.68 mL/Hr) IV Continuous <Continuous>  nystatin    Suspension 936959 Unit(s) Swish and Swallow three times a day  pantoprazole    Tablet 40 milliGRAM(s) Oral before breakfast  polyethylene glycol 3350 17 Gram(s) Oral daily  senna 2 Tablet(s) Oral at bedtime    MEDICATIONS  (PRN):  dextrose Oral Gel 15 Gram(s) Oral once PRN Blood Glucose LESS THAN 70 milliGRAM(s)/deciliter  hydrocodone/homatropine Syrup 5 milliLiter(s) Oral every 4 hours PRN Cough        Vital Signs Last 24 Hrs  T(C): 36.6 (:00), Max: 36.9 (:00)  T(F): 97.9 (:), Max: 98.4 (:)  HR: 87 (:) (86 - 111)  BP: 146/82 (:) (115/72 - 146/82)  BP(mean): 102 (:) (84 - 108)  RR: 8 (:) (8 - 40)  SpO2: 97% () (93% - 100%)    Parameters below as of   Patient On (Oxygen Delivery Method): nasal cannula, high flow  O2 Flow (L/min): 40  O2 Concentration (%): 50    22 @ 07:01  -  22 @ 07:00  --------------------------------------------------------  IN: 186.5 mL / OUT: 1180 mL / NET: -993.5 mL    22 @ 07:01  -  22 @ 09:32  --------------------------------------------------------  IN: 6.8 mL / OUT: 0 mL / NET: 6.8 mL        PHYSICAL EXAM  Constitutional: alert, NAD  Eyes: the sclera and conjunctiva were normal.   ENT: the ears and nose were normal in appearance.   Neck: the appearance of the neck was normal and the neck was supple, R swan c/d/i  Pulmonary: no respiratory distress and lungs with bilateral crackles  Heart: heart rate was normal and rhythm regular, normal S1 and S2  Vascular: no peripheral edema  Abdomen: normal bowel sounds, soft, non-tender  Neurological: no focal deficits  Psychiatric: the affect was normal      LABS:                        12.3   15.43 )-----------( 252      ( 2022 05:36 )             37.7         135  |  93<L>  |  22  ----------------------------<  157<H>  4.0   |  39<H>  |  0.74    Ca    8.3<L>      2022 05:36  Phos  2.7       Mg     2.4         TPro  6.9  /  Alb  3.1<L>  /  TBili  0.5  /  DBili  <0.2  /  AST  21  /  ALT  30  /  AlkPhos  72      PT/INR - ( 2022 05:51 )   PT: 13.2 sec;   INR: 1.11          PTT - ( 2022 05:51 )  PTT:24.9 sec  Urinalysis Basic - ( 2022 10:28 )    Color: Red / Appearance: Cloudy / S.015 / pH: x  Gluc: x / Ketone: NEGATIVE  / Bili: Negative / Urobili: 0.2 E.U./dL   Blood: x / Protein: 30 mg/dL / Nitrite: NEGATIVE   Leuk Esterase: Trace / RBC: Many /HPF / WBC < 5 /HPF   Sq Epi: x / Non Sq Epi: 0-5 /HPF / Bacteria: None /HPF        MICROBIOLOGY:    Culture - Sputum (collected 2022 16:52)  Source: .Sputum Sputum  Gram Stain (2022 18:01):    Few-moderate epithelial cells    Rare WBC's    Few Yeast  Final Report (2022 18:01):    Sputum specimen rejected.  Microscopic examination indicates    oropharyngeal contamination.  Please repeat.        RADIOLOGY & ADDITIONAL STUDIES:  Reviewed INFECTIOUS DISEASES CONSULT FOLLOW-UP NOTE      INTERVAL HPI/OVERNIGHT EVENTS: No overnight event and was afebrile.     Subjective: Patient seen and examined at bedside. Patient reports feeling okay and believes his breathing is stable today compared to yesterday, some SOB. No pain/discomfort at areas of lines. Denies headache, dizziness, fever, chills, chest pain, abd pain, diarrhea.    ANTIBIOTICS/RELEVANT:    MEDICATIONS  (STANDING):  AQUAPHOR (petrolatum Ointment) 1 Application(s) Topical two times a day  benzocaine 15 mG/menthol 3.6 mG Lozenge 2 Lozenge Oral every 6 hours  benzonatate 200 milliGRAM(s) Oral three times a day  chlorhexidine 2% Cloths 1 Application(s) Topical daily  dextrose 5%. 1000 milliLiter(s) (50 mL/Hr) IV Continuous <Continuous>  dextrose 5%. 1000 milliLiter(s) (100 mL/Hr) IV Continuous <Continuous>  dextrose 50% Injectable 25 Gram(s) IV Push once  dextrose 50% Injectable 12.5 Gram(s) IV Push once  dextrose 50% Injectable 25 Gram(s) IV Push once  enoxaparin Injectable 40 milliGRAM(s) SubCutaneous every 24 hours  fluticasone propionate 50 MICROgram(s)/spray Nasal Spray 2 Spray(s) Both Nostrils two times a day  glucagon  Injectable 1 milliGRAM(s) IntraMuscular once  influenza   Vaccine 0.5 milliLiter(s) IntraMuscular once  insulin lispro (ADMELOG) corrective regimen sliding scale   SubCutaneous Before meals and at bedtime  ipratropium    for Nebulization 500 MICROGram(s) Nebulizer every 6 hours  methylPREDNISolone sodium succinate Injectable 40 milliGRAM(s) IV Push every 12 hours  milrinone Infusion 0.125 MICROgram(s)/kG/Min (3.68 mL/Hr) IV Continuous <Continuous>  nystatin    Suspension [id number] Unit(s) Swish and Swallow three times a day  pantoprazole    Tablet 40 milliGRAM(s) Oral before breakfast  polyethylene glycol 3350 17 Gram(s) Oral daily  senna 2 Tablet(s) Oral at bedtime    MEDICATIONS  (PRN):  dextrose Oral Gel 15 Gram(s) Oral once PRN Blood Glucose LESS THAN 70 milliGRAM(s)/deciliter  hydrocodone/homatropine Syrup 5 milliLiter(s) Oral every 4 hours PRN Cough        Vital Signs Last 24 Hrs  T(C): 36.6 (:), Max: 36.9 (:00)  T(F): 97.9 (:), Max: 98.4 (:)  HR: 87 (:) (86 - 111)  BP: 146/82 (:) (115/72 - 146/82)  BP(mean): 102 (:) (84 - 108)  RR: 8 () (8 - 40)  SpO2: 97% () (93% - 100%)    Parameters below as of   Patient On (Oxygen Delivery Method): nasal cannula, high flow  O2 Flow (L/min): 40  O2 Concentration (%): 50    22 @ 07:01  -  22 @ 07:00  --------------------------------------------------------  IN: 186.5 mL / OUT: 1180 mL / NET: -993.5 mL    22 @ 07:01  -  22 @ 09:32  --------------------------------------------------------  IN: 6.8 mL / OUT: 0 mL / NET: 6.8 mL        PHYSICAL EXAM  Constitutional: alert, NAD  Eyes: the sclera and conjunctiva were normal.   ENT: the ears and nose were normal in appearance.   Neck: the appearance of the neck was normal and the neck was supple, R swan c/d/i  Pulmonary: no respiratory distress and lungs with bilateral crackles  Heart: heart rate was normal and rhythm regular, normal S1 and S2  Vascular: no peripheral edema  Abdomen: normal bowel sounds, soft, non-tender  Neurological: no focal deficits  Psychiatric: the affect was normal      LABS:                        12.3   15.43 )-----------( 252      ( 2022 05:36 )             37.7         135  |  93<L>  |  22  ----------------------------<  157<H>  4.0   |  39<H>  |  0.74    Ca    8.3<L>      2022 05:36  Phos  2.7       Mg     2.4         TPro  6.9  /  Alb  3.1<L>  /  TBili  0.5  /  DBili  <0.2  /  AST  21  /  ALT  30  /  AlkPhos  72      PT/INR - ( 2022 05:51 )   PT: 13.2 sec;   INR: 1.11          PTT - ( 2022 05:51 )  PTT:24.9 sec  Urinalysis Basic - ( 2022 10:28 )    Color: Red / Appearance: Cloudy / S.015 / pH: x  Gluc: x / Ketone: NEGATIVE  / Bili: Negative / Urobili: 0.2 E.U./dL   Blood: x / Protein: 30 mg/dL / Nitrite: NEGATIVE   Leuk Esterase: Trace / RBC: Many /HPF / WBC < 5 /HPF   Sq Epi: x / Non Sq Epi: 0-5 /HPF / Bacteria: None /HPF        MICROBIOLOGY:    Culture - Sputum (collected 2022 16:52)  Source: .Sputum Sputum  Gram Stain (2022 18:01):    Few-moderate epithelial cells    Rare WBC's    Few Yeast  Final Report (2022 18:01):    Sputum specimen rejected.  Microscopic examination indicates    oropharyngeal contamination.  Please repeat.        RADIOLOGY & ADDITIONAL STUDIES:  Reviewed

## 2022-11-29 NOTE — PROGRESS NOTE ADULT - ASSESSMENT
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    #Pulmonary Transplant evaluation  The patient has a known history of interstitial lung disease on long term daily/continuous oxygen therapy and has therefore been following with our transplant clinic at [facility]. While the patient currently has no absolute contraindications to pulmonary transplant he does have several relative contraindications-most notably his poor functional status (as documented above-performs ADLs with Total assistance of his wife and son and heavily reliant on wheelchair for mobility). However, given patient presenting with acute illness may benefit from repeat evaluation for lung transplant pending further discussion with transplant team.  - Will discuss with transplant team at [facility]  - Patient may benefit from continued pulmonary rehab to improve functional status as well as nutritional counseling to further reduce BMI  - chronic health/health maintenance: Please send hgb a1c, please send hepatitis panel, last CRC screening within 5 years-normal  - social barriers: support system-son (25 years old), Wife (on HD x 10 years)    - Diagnostic/testing Parameters: From RHC performed on 11/28: Current MRAP <15, Cardiac Index currently  >2 L/min/m^2, unable to perform PFTs or 6MWT due to functional limitation    #WHO group 3 pHTN  -Mild precapillary pulmonary hypertension with normal CO/CI by Td on low dose milrinone, normal filling pressures suggesting precapillary PH 2/2 underlying severe fibrotic ILD and adequate volume status.   - Continue to wean Florentin and HFNC as tolerated-with goal to return patient to 4L, may benefit from initiation of inhaled Tayvaso in the inpatient setting but will continue to evalaute    #ILD flare 2/2 bacterial pneumonia   Post COVID ILD now presenting with worsening cough and elevated WBC count with elevated procalcitonin and CT chest performed at OSH with concern for newly developing ggos when compared to prior CT scan done in october.   - Agree with broad spectrum abx   - continue with steroids and will plan for steroid taper.    Patient discussed with Dr. Burch.   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    #Pulmonary Transplant evaluation  #WHO group 3 pHTN  #ILD flare 2/2 bacterial pneumonia     The patient has a known history of interstitial lung disease on long term daily/continuous oxygen therapy and has therefore been following with our transplant clinic at [facility]. While the patient currently has no absolute contraindications to pulmonary transplant he does have several relative contraindications-most notably his poor functional status (as documented above-performs ADLs with Total assistance of his wife and son and heavily reliant on wheelchair for mobility). However, given patient presenting with acute illness may benefit from repeat evaluation for lung transplant pending further discussion with transplant team. Mild precapillary pulmonary hypertension with normal CO/CI by Td on low dose milrinone, normal filling pressures suggesting precapillary PH 2/2 underlying severe fibrotic ILD and adequate volume status. Continue to wean Florentin and HFNC as tolerated-with goal to return patient to 4L, may benefit from initiation of inhaled Tayvaso in the inpatient setting but will continue to evaluate. Post COVID ILD now presenting with worsening cough and elevated WBC count with elevated procalcitonin and CT chest performed at OSH with concern for newly developing ggos when compared to prior CT scan done in october. Social barriers: support system-son (25 years old), Wife (on HD x 10 years). RA 5mmHg. PCWP 8mmHg. Chronic health/health maintenance: Please send hgb a1c, please send hepatitis panel, last CRC screening within 5 years-normal    Recommendations:  - Agree with broad spectrum abx   - continue with methylprednisolone 40mg BID today, then taper to 30mg BID beginning 11/30. Continue 30mg BID and will evaluate symptoms after few days  - Please send hgb a1c, please send hepatitis panel  - Please continue to wean Florentin  - Would wean HFNC to 40/40 after patient is off Florentin  - Bicarb slowly uptrending, ABG noted, continue to monitor and patient may require diamox or change of diuretic at the discretion of the cardiology team  - Patient may benefit from continued pulmonary rehab to improve functional status as well as nutritional counseling to further reduce BMI    Patient discussed with Dr. Burch.

## 2022-11-29 NOTE — PROGRESS NOTE ADULT - ASSESSMENT
60 yo male with pulmonary fibrosis 2/2 COVID-19, recent steroid taper, admission OSH 10/10 where he was found to have Enterovirus respiratory tract infection, then presented to another OSH 11/22 with progressive SOB (told he had pneumonia and started empirically on antibiotics (flu/RSV testing reportedly negative there)), transferred here for ?cardiogenic shock, b/l pulmonary infiltrates ?HAP; also at increased risk for PCP pneumonia given recent steroid exposure. Sputum culture rejected x2 2/2 contamination.    Cultures  11/23 blood culture negative  11/24 and 11/25 sputum culture rejected    11/24 MRSA nares positive      Plan:  - s/p course of vancomycin as well as 7 day course of zosyn (finished 11/28)  - Pneumocystis PCR negative, start TMP/SMX 1DS daily for primary PCP ppx while on steroids  - c/w 7 day course of nystatin for oropharyngeal thrush   62 yo male with pulmonary fibrosis 2/2 COVID-19, recent steroid taper, admission OSH 10/10 where he was found to have Enterovirus respiratory tract infection, then presented to another OSH 11/22 with progressive SOB (told he had pneumonia and started empirically on antibiotics (flu/RSV testing reportedly negative there)), transferred here for ?cardiogenic shock, b/l pulmonary infiltrates ?HAP; also at increased risk for PCP pneumonia given recent steroid exposure. Sputum culture rejected x2 2/2 contamination.    Cultures  11/23 blood culture negative  11/24 and 11/25 sputum culture rejected    11/24 MRSA nares positive      Plan:  - s/p course of vancomycin as well as 7 day course of zosyn (finished 11/28)  - Pneumocystis PCR negative, start TMP/SMX 1DS daily for primary PCP ppx while on steroids  - c/w 7 day course of nystatin for oropharyngeal thrush  - Can assist with ID transplant evaluation 60 yo male with pulmonary fibrosis 2/2 COVID-19, recent steroid taper, admission OSH 10/10 where he was found to have Enterovirus respiratory tract infection, then presented to another OSH 11/22 with progressive SOB (told he had pneumonia and started empirically on antibiotics (flu/RSV testing reportedly negative there)), transferred here for ?cardiogenic shock, b/l pulmonary infiltrates ?HAP; also at increased risk for PCP pneumonia given recent steroid exposure. Sputum culture rejected x2 2/2 contamination.    Cultures  11/23 blood culture negative  11/24 and 11/25 sputum culture rejected    11/24 MRSA nares positive      Plan:  - s/p course of vancomycin as well as 7 day course of zosyn (finished 11/28)  - Pneumocystis PCR negative, start TMP/SMX 1DS daily for primary PCP ppx while on steroids  - c/w 7 day course of nystatin for oropharyngeal thrush  - Can assist with ID transplant evaluation (see attending attestation for details below) 62 yo male with pulmonary fibrosis 2/2 COVID-19, recent steroid taper, admission OSH 10/10 where he was found to have Enterovirus respiratory tract infection, then presented to another OSH 11/22 with progressive SOB (told he had pneumonia and started empirically on antibiotics (flu/RSV testing reportedly negative there)), transferred here for ?cardiogenic shock, b/l pulmonary infiltrates ?HAP; also at increased risk for PCP pneumonia given recent steroid exposure. Sputum culture rejected x2 2/2 contamination.      Plan:  - s/p course of vancomycin as well as 7 day course of zosyn (finished 11/28)  - Pneumocystis PCR negative, start TMP/SMX 1DS daily for primary PCP ppx while on steroids  - c/w 7 day course of nystatin for oropharyngeal thrush  - Can assist with ID transplant evaluation (see attending attestation for details below)    ID team 1 will sign off, please recall if further ID input is needed.

## 2022-11-29 NOTE — PROGRESS NOTE ADULT - SUBJECTIVE AND OBJECTIVE BOX
Patient is a 61y old  Male who presents with a chief complaint of SOB (29 Nov 2022 11:55)      INTERVAL HPI/OVERNIGHT EVENTS:   Patient seen and examined at bedside. Overnight, urinated 250 cc at 9 PM. Had 2 large BMs Received final dose of Zosyn. Decreased HFNC from 50/50 to 40/50. AM bladder scan 331, straight cath 400. This morning, pt sitting up in bed, in NAD, with no complaints. s/p RHC yesterday. Respiratory status at baseline with no recent episodes of coughing. Remains hemodynamically stable on milrinone gtt. Denies fever, chills, HA, dizziness, CP, palpitations, abdominal pain, n/v, diarrhea, constipation, flank pain, back pain, numbness, or tingling.      ICU Vital Signs Last 24 Hrs  T(C): 36.6 (29 Nov 2022 12:00), Max: 36.9 (28 Nov 2022 22:00)  T(F): 97.9 (29 Nov 2022 12:00), Max: 98.4 (28 Nov 2022 22:00)  HR: 100 (29 Nov 2022 14:00) (86 - 111)  BP: 120/79 (29 Nov 2022 14:00) (120/79 - 146/82)  BP(mean): 92 (29 Nov 2022 14:00) (84 - 108)  ABP: 124/68 (29 Nov 2022 14:00) (123/70 - 166/80)  ABP(mean): 85 (29 Nov 2022 14:00) (80 - 117)  RR: 29 (29 Nov 2022 14:00) (8 - 39)  SpO2: 96% (29 Nov 2022 14:00) (95% - 100%)    O2 Parameters below as of 29 Nov 2022 11:40  Patient On (Oxygen Delivery Method): nasal cannula, high flow  O2 Flow (L/min): 40  O2 Concentration (%): 50      I&O's Summary    28 Nov 2022 07:01  -  29 Nov 2022 07:00  --------------------------------------------------------  IN: 186.5 mL / OUT: 1180 mL / NET: -993.5 mL    29 Nov 2022 07:01  -  29 Nov 2022 14:04  --------------------------------------------------------  IN: 23.8 mL / OUT: 280 mL / NET: -256.2 mL          LABS:                        12.3   15.43 )-----------( 252      ( 29 Nov 2022 05:36 )             37.7     11-29    135  |  93<L>  |  22  ----------------------------<  157<H>  4.0   |  39<H>  |  0.74    Ca    8.3<L>      29 Nov 2022 05:36  Phos  2.7     11-29  Mg     2.4     11-29    TPro  6.9  /  Alb  3.1<L>  /  TBili  0.5  /  DBili  <0.2  /  AST  21  /  ALT  30  /  AlkPhos  72  11-29    PT/INR - ( 28 Nov 2022 05:51 )   PT: 13.2 sec;   INR: 1.11          PTT - ( 28 Nov 2022 05:51 )  PTT:24.9 sec    CAPILLARY BLOOD GLUCOSE      POCT Blood Glucose.: 163 mg/dL (29 Nov 2022 10:36)  POCT Blood Glucose.: 98 mg/dL (29 Nov 2022 07:33)  POCT Blood Glucose.: 250 mg/dL (28 Nov 2022 22:12)  POCT Blood Glucose.: 140 mg/dL (28 Nov 2022 15:22)    ABG - ( 29 Nov 2022 08:47 )  pH, Arterial: 7.37  pH, Blood: x     /  pCO2: 67    /  pO2: 107   / HCO3: 39    / Base Excess: 10.6  /  SaO2: 98.2                RADIOLOGY & ADDITIONAL TESTS:    Consultant(s) Notes Reviewed:  [x ] YES  [ ] NO    MEDICATIONS  (STANDING):  AQUAPHOR (petrolatum Ointment) 1 Application(s) Topical two times a day  benzocaine 15 mG/menthol 3.6 mG Lozenge 2 Lozenge Oral every 6 hours  benzonatate 200 milliGRAM(s) Oral three times a day  chlorhexidine 2% Cloths 1 Application(s) Topical daily  dextrose 5%. 1000 milliLiter(s) (50 mL/Hr) IV Continuous <Continuous>  dextrose 5%. 1000 milliLiter(s) (100 mL/Hr) IV Continuous <Continuous>  dextrose 50% Injectable 25 Gram(s) IV Push once  dextrose 50% Injectable 12.5 Gram(s) IV Push once  dextrose 50% Injectable 25 Gram(s) IV Push once  enoxaparin Injectable 40 milliGRAM(s) SubCutaneous every 24 hours  fluticasone propionate 50 MICROgram(s)/spray Nasal Spray 2 Spray(s) Both Nostrils two times a day  glucagon  Injectable 1 milliGRAM(s) IntraMuscular once  influenza   Vaccine 0.5 milliLiter(s) IntraMuscular once  insulin lispro (ADMELOG) corrective regimen sliding scale   SubCutaneous Before meals and at bedtime  ipratropium    for Nebulization 500 MICROGram(s) Nebulizer every 6 hours  methylPREDNISolone sodium succinate Injectable 40 milliGRAM(s) IV Push every 12 hours  milrinone Infusion 0.125 MICROgram(s)/kG/Min (3.68 mL/Hr) IV Continuous <Continuous>  nystatin    Suspension 632924 Unit(s) Swish and Swallow three times a day  pantoprazole    Tablet 40 milliGRAM(s) Oral before breakfast  polyethylene glycol 3350 17 Gram(s) Oral daily  senna 2 Tablet(s) Oral at bedtime  trimethoprim  160 mG/sulfamethoxazole 800 mG 1 Tablet(s) Oral daily    MEDICATIONS  (PRN):  dextrose Oral Gel 15 Gram(s) Oral once PRN Blood Glucose LESS THAN 70 milliGRAM(s)/deciliter  hydrocodone/homatropine Syrup 5 milliLiter(s) Oral every 4 hours PRN Cough      PHYSICAL EXAM:  GENERAL: Middle aged male, obese, in NAD   HEENT: PERRLA, EOMI, no conjunctival or scleral injection, OP clear, MMM, neck supple   RESP: Coarse ronchi and wheezing heard bilaterally. Poor inspiratory effort   CV: Tachycardic. Regular rhythm. +S1S2. No murmurs, rubs, or gallops   ABD: Obese, soft, nontender, nondistended, no rebound tenderness or guarding, no palpable masses  EXT: 1+ pitting edema. Extremities WWP, 2+ peripheral pulses.   NEURO: A&Ox3, no focal neurologic deficits   PSYCH: Normal mood and affect    Care Discussed with Consultants/Other Providers [ x] YES  [ ] NO Patient is a 61y old  Male who presents with a chief complaint of SOB (29 Nov 2022 11:55)    HOSPITAL COURSE:  Patient is a 60 yo M w/ PMH HLD, pre-DM, and pulmonary fibrosis 2/2 COVID-19 infection (wheelchair bound at baseline, on 4L home O2) who initially presented to [facility] on 11/22/2022 for several days of worsening dyspnea, productive , wheezing, and generalized weakness with increasing O2 requirements (up to 6L) and persistent dyspnea at rest. On day of admission, pt had syncopal event due to SOB. Pt was brought to [place] ED in respiratory distress where he was placed on BiPAP and started on broad spectrum antibiotics out of concern for pneumonia. CXR showed diffuse bilateral airspace opacities c/w multilobar pneumonia. CT PE showed no evidence of PE, however did show extensive diffuse interstitial and ground glass infiltrates bilaterally with associated hilar and mediastinal adenopathy, with suspected multilobar atypical pneumonia. Pt was admitted to  MICU for acute hypoxic respiratory failure 2/2 pulmonary fibrosis with superimposed bacterial pneumonia and acute decompensated pulmonary HTN. TTE was performed which showed mild TR, severely dilated RV, moderate-to-severe elevated RV systolic pressure, moderate-to severe PHTN (58 mmHg), normal LV size and wall thickness, normal LV systolic function (EF 58%), and grade II diastolic dysfunction. Pt was transferred to [facility] for further management and RHC given severity of pulmonary disease.     On admission to [facility] CCU    On arrival to [facility] CCU, pt was tachypneic and tachycardic, however reported that his breathing was much improved compared to admission at [facility]. Vitals on admission were T 98.8, , /84, RR 44, SpO2 97% on BiPAP 70% FiO2. Labs showed WBC 17.50, Hgb 12.4, INR 1.51, BNP 5k, Trop 0.01, and Lactate 1.2. ROS was positive for SOB, cough, and LE swelling. Pt denied fever, chills, HA, dizziness, CP, palpitations, abdominal pain, n/v, constipation, diarrhea, dysuria, hematuria, urinary frequency/urgency, flank pain, back pain, numbness, or tingling. Pt was admitted to CCU for RHC.    INTERVAL HPI/OVERNIGHT EVENTS:   Patient seen and examined at bedside. Overnight, urinated 250 cc at 9 PM. Had 2 large BMs Received final dose of Zosyn. Decreased HFNC from 50/50 to 40/50. AM bladder scan 331, straight cath 400. This morning, pt sitting up in bed, in NAD, with no complaints. s/p RHC yesterday. Respiratory status at baseline with no recent episodes of coughing. Remains hemodynamically stable on milrinone gtt. Denies fever, chills, HA, dizziness, CP, palpitations, abdominal pain, n/v, diarrhea, constipation, flank pain, back pain, numbness, or tingling.      ICU Vital Signs Last 24 Hrs  T(C): 36.6 (29 Nov 2022 12:00), Max: 36.9 (28 Nov 2022 22:00)  T(F): 97.9 (29 Nov 2022 12:00), Max: 98.4 (28 Nov 2022 22:00)  HR: 100 (29 Nov 2022 14:00) (86 - 111)  BP: 120/79 (29 Nov 2022 14:00) (120/79 - 146/82)  BP(mean): 92 (29 Nov 2022 14:00) (84 - 108)  ABP: 124/68 (29 Nov 2022 14:00) (123/70 - 166/80)  ABP(mean): 85 (29 Nov 2022 14:00) (80 - 117)  RR: 29 (29 Nov 2022 14:00) (8 - 39)  SpO2: 96% (29 Nov 2022 14:00) (95% - 100%)    O2 Parameters below as of 29 Nov 2022 11:40  Patient On (Oxygen Delivery Method): nasal cannula, high flow  O2 Flow (L/min): 40  O2 Concentration (%): 50      I&O's Summary    28 Nov 2022 07:01  -  29 Nov 2022 07:00  --------------------------------------------------------  IN: 186.5 mL / OUT: 1180 mL / NET: -993.5 mL    29 Nov 2022 07:01  -  29 Nov 2022 14:04  --------------------------------------------------------  IN: 23.8 mL / OUT: 280 mL / NET: -256.2 mL          LABS:                        12.3   15.43 )-----------( 252      ( 29 Nov 2022 05:36 )             37.7     11-29    135  |  93<L>  |  22  ----------------------------<  157<H>  4.0   |  39<H>  |  0.74    Ca    8.3<L>      29 Nov 2022 05:36  Phos  2.7     11-29  Mg     2.4     11-29    TPro  6.9  /  Alb  3.1<L>  /  TBili  0.5  /  DBili  <0.2  /  AST  21  /  ALT  30  /  AlkPhos  72  11-29    PT/INR - ( 28 Nov 2022 05:51 )   PT: 13.2 sec;   INR: 1.11          PTT - ( 28 Nov 2022 05:51 )  PTT:24.9 sec    CAPILLARY BLOOD GLUCOSE      POCT Blood Glucose.: 163 mg/dL (29 Nov 2022 10:36)  POCT Blood Glucose.: 98 mg/dL (29 Nov 2022 07:33)  POCT Blood Glucose.: 250 mg/dL (28 Nov 2022 22:12)  POCT Blood Glucose.: 140 mg/dL (28 Nov 2022 15:22)    ABG - ( 29 Nov 2022 08:47 )  pH, Arterial: 7.37  pH, Blood: x     /  pCO2: 67    /  pO2: 107   / HCO3: 39    / Base Excess: 10.6  /  SaO2: 98.2                RADIOLOGY & ADDITIONAL TESTS:    Consultant(s) Notes Reviewed:  [x ] YES  [ ] NO    MEDICATIONS  (STANDING):  AQUAPHOR (petrolatum Ointment) 1 Application(s) Topical two times a day  benzocaine 15 mG/menthol 3.6 mG Lozenge 2 Lozenge Oral every 6 hours  benzonatate 200 milliGRAM(s) Oral three times a day  chlorhexidine 2% Cloths 1 Application(s) Topical daily  dextrose 5%. 1000 milliLiter(s) (50 mL/Hr) IV Continuous <Continuous>  dextrose 5%. 1000 milliLiter(s) (100 mL/Hr) IV Continuous <Continuous>  dextrose 50% Injectable 25 Gram(s) IV Push once  dextrose 50% Injectable 12.5 Gram(s) IV Push once  dextrose 50% Injectable 25 Gram(s) IV Push once  enoxaparin Injectable 40 milliGRAM(s) SubCutaneous every 24 hours  fluticasone propionate 50 MICROgram(s)/spray Nasal Spray 2 Spray(s) Both Nostrils two times a day  glucagon  Injectable 1 milliGRAM(s) IntraMuscular once  influenza   Vaccine 0.5 milliLiter(s) IntraMuscular once  insulin lispro (ADMELOG) corrective regimen sliding scale   SubCutaneous Before meals and at bedtime  ipratropium    for Nebulization 500 MICROGram(s) Nebulizer every 6 hours  methylPREDNISolone sodium succinate Injectable 40 milliGRAM(s) IV Push every 12 hours  milrinone Infusion 0.125 MICROgram(s)/kG/Min (3.68 mL/Hr) IV Continuous <Continuous>  nystatin    Suspension 133456 Unit(s) Swish and Swallow three times a day  pantoprazole    Tablet 40 milliGRAM(s) Oral before breakfast  polyethylene glycol 3350 17 Gram(s) Oral daily  senna 2 Tablet(s) Oral at bedtime  trimethoprim  160 mG/sulfamethoxazole 800 mG 1 Tablet(s) Oral daily    MEDICATIONS  (PRN):  dextrose Oral Gel 15 Gram(s) Oral once PRN Blood Glucose LESS THAN 70 milliGRAM(s)/deciliter  hydrocodone/homatropine Syrup 5 milliLiter(s) Oral every 4 hours PRN Cough      PHYSICAL EXAM:  GENERAL: Middle aged male, obese, in NAD   HEENT: PERRLA, EOMI, no conjunctival or scleral injection, OP clear, MMM, neck supple   RESP: Coarse ronchi and wheezing heard bilaterally. Poor inspiratory effort   CV: Tachycardic. Regular rhythm. +S1S2. No murmurs, rubs, or gallops   ABD: Obese, soft, nontender, nondistended, no rebound tenderness or guarding, no palpable masses  EXT: 1+ pitting edema. Extremities WWP, 2+ peripheral pulses.   NEURO: A&Ox3, no focal neurologic deficits   PSYCH: Normal mood and affect    Care Discussed with Consultants/Other Providers [ x] YES  [ ] NO Patient is a 61y old  Male who presents with a chief complaint of SOB (29 Nov 2022 11:55)    HOSPITAL COURSE:  Patient is a 60 yo M w/ PMH HLD, pre-DM, and pulmonary fibrosis 2/2 COVID-19 infection (wheelchair bound at baseline, on 4L home O2) who initially presented to [facility] on 11/22/2022 for several days of worsening dyspnea, productive , wheezing, and generalized weakness with increasing O2 requirements (up to 6L) and persistent dyspnea at rest. On day of admission, pt had syncopal event due to SOB. Pt was brought to [place] ED in respiratory distress where he was placed on BiPAP and started on broad spectrum antibiotics out of concern for pneumonia. CXR showed diffuse bilateral airspace opacities c/w multilobar pneumonia. CT PE showed no evidence of PE, however did show extensive diffuse interstitial and ground glass infiltrates bilaterally with associated hilar and mediastinal adenopathy, with suspected multilobar atypical pneumonia. Pt was admitted to  MICU for acute hypoxic respiratory failure 2/2 pulmonary fibrosis with superimposed bacterial pneumonia and acute decompensated pulmonary HTN. TTE was performed which showed mild TR, severely dilated RV, moderate-to-severe elevated RV systolic pressure, moderate-to severe PHTN (58 mmHg), normal LV size and wall thickness, normal LV systolic function (EF 58%), and grade II diastolic dysfunction. Pt was transferred to [facility] for further management and RHC given severity of pulmonary disease.     On admission to [facility] CCU, pt was weaned off BiPAP to HFNC and started on inhaled NO for afterload reduction and milrinone gtt for inotropic support. Received Solumedrol 40-50 mg IV BID for pulmonary fibrosis (pt had completed prednisone taper for PF exacerbation a few days prior to admission). Also received multiple doses of IV lasix for diuresis i/s/o fluid overload from R-sided HF. Completed course of vanc & zosyn for superimposed bacterial PNA, now being treated with Bactrim for Pneumocystis prophylaxis i/s/o chronic steroid use and nystatin for oropharyngeal thrush. Initial attempts at weaning Florentin and HFNC have been c/b desaturations, however currently pt has been weaned to Florentin 5-10 ppm (from 20 ppm) and is stable on HFNC 50/50. Pt underwent RHC on 11/28 which showed CO 8.2, CI 4.2, CVP 2, PA pressure 44/14 (25), wedge pressure 6, RV 30/5. TTE on 11/28 showed improved RV systolic function from initial presentation. Pt remains on milrinone gtt for inotropic support and is being followed by PHTN team with plans to evaluate for possible lung transplant. Mercedes dc'd yesterday.    INTERVAL HPI/OVERNIGHT EVENTS:   Patient seen and examined at bedside. Overnight, urinated 250 cc at 9 PM. Had 2 large BMs Received final dose of Zosyn. Decreased HFNC from 50/50 to 40/50. AM bladder scan 331, straight cath 400. This morning, pt sitting up in bed, in NAD, with no complaints. s/p RHC yesterday. Respiratory status at baseline with no recent episodes of coughing. Remains hemodynamically stable on milrinone gtt. Denies fever, chills, HA, dizziness, CP, palpitations, abdominal pain, n/v, diarrhea, constipation, flank pain, back pain, numbness, or tingling.      ICU Vital Signs Last 24 Hrs  T(C): 36.6 (29 Nov 2022 12:00), Max: 36.9 (28 Nov 2022 22:00)  T(F): 97.9 (29 Nov 2022 12:00), Max: 98.4 (28 Nov 2022 22:00)  HR: 100 (29 Nov 2022 14:00) (86 - 111)  BP: 120/79 (29 Nov 2022 14:00) (120/79 - 146/82)  BP(mean): 92 (29 Nov 2022 14:00) (84 - 108)  ABP: 124/68 (29 Nov 2022 14:00) (123/70 - 166/80)  ABP(mean): 85 (29 Nov 2022 14:00) (80 - 117)  RR: 29 (29 Nov 2022 14:00) (8 - 39)  SpO2: 96% (29 Nov 2022 14:00) (95% - 100%)    O2 Parameters below as of 29 Nov 2022 11:40  Patient On (Oxygen Delivery Method): nasal cannula, high flow  O2 Flow (L/min): 40  O2 Concentration (%): 50      I&O's Summary    28 Nov 2022 07:01  -  29 Nov 2022 07:00  --------------------------------------------------------  IN: 186.5 mL / OUT: 1180 mL / NET: -993.5 mL    29 Nov 2022 07:01  -  29 Nov 2022 14:04  --------------------------------------------------------  IN: 23.8 mL / OUT: 280 mL / NET: -256.2 mL          LABS:                        12.3   15.43 )-----------( 252      ( 29 Nov 2022 05:36 )             37.7     11-29    135  |  93<L>  |  22  ----------------------------<  157<H>  4.0   |  39<H>  |  0.74    Ca    8.3<L>      29 Nov 2022 05:36  Phos  2.7     11-29  Mg     2.4     11-29    TPro  6.9  /  Alb  3.1<L>  /  TBili  0.5  /  DBili  <0.2  /  AST  21  /  ALT  30  /  AlkPhos  72  11-29    PT/INR - ( 28 Nov 2022 05:51 )   PT: 13.2 sec;   INR: 1.11          PTT - ( 28 Nov 2022 05:51 )  PTT:24.9 sec    CAPILLARY BLOOD GLUCOSE      POCT Blood Glucose.: 163 mg/dL (29 Nov 2022 10:36)  POCT Blood Glucose.: 98 mg/dL (29 Nov 2022 07:33)  POCT Blood Glucose.: 250 mg/dL (28 Nov 2022 22:12)  POCT Blood Glucose.: 140 mg/dL (28 Nov 2022 15:22)    ABG - ( 29 Nov 2022 08:47 )  pH, Arterial: 7.37  pH, Blood: x     /  pCO2: 67    /  pO2: 107   / HCO3: 39    / Base Excess: 10.6  /  SaO2: 98.2                RADIOLOGY & ADDITIONAL TESTS:    Consultant(s) Notes Reviewed:  [x ] YES  [ ] NO    MEDICATIONS  (STANDING):  AQUAPHOR (petrolatum Ointment) 1 Application(s) Topical two times a day  benzocaine 15 mG/menthol 3.6 mG Lozenge 2 Lozenge Oral every 6 hours  benzonatate 200 milliGRAM(s) Oral three times a day  chlorhexidine 2% Cloths 1 Application(s) Topical daily  dextrose 5%. 1000 milliLiter(s) (50 mL/Hr) IV Continuous <Continuous>  dextrose 5%. 1000 milliLiter(s) (100 mL/Hr) IV Continuous <Continuous>  dextrose 50% Injectable 25 Gram(s) IV Push once  dextrose 50% Injectable 12.5 Gram(s) IV Push once  dextrose 50% Injectable 25 Gram(s) IV Push once  enoxaparin Injectable 40 milliGRAM(s) SubCutaneous every 24 hours  fluticasone propionate 50 MICROgram(s)/spray Nasal Spray 2 Spray(s) Both Nostrils two times a day  glucagon  Injectable 1 milliGRAM(s) IntraMuscular once  influenza   Vaccine 0.5 milliLiter(s) IntraMuscular once  insulin lispro (ADMELOG) corrective regimen sliding scale   SubCutaneous Before meals and at bedtime  ipratropium    for Nebulization 500 MICROGram(s) Nebulizer every 6 hours  methylPREDNISolone sodium succinate Injectable 40 milliGRAM(s) IV Push every 12 hours  milrinone Infusion 0.125 MICROgram(s)/kG/Min (3.68 mL/Hr) IV Continuous <Continuous>  nystatin    Suspension 580431 Unit(s) Swish and Swallow three times a day  pantoprazole    Tablet 40 milliGRAM(s) Oral before breakfast  polyethylene glycol 3350 17 Gram(s) Oral daily  senna 2 Tablet(s) Oral at bedtime  trimethoprim  160 mG/sulfamethoxazole 800 mG 1 Tablet(s) Oral daily    MEDICATIONS  (PRN):  dextrose Oral Gel 15 Gram(s) Oral once PRN Blood Glucose LESS THAN 70 milliGRAM(s)/deciliter  hydrocodone/homatropine Syrup 5 milliLiter(s) Oral every 4 hours PRN Cough      PHYSICAL EXAM:  GENERAL: Middle aged male, obese, in NAD   HEENT: PERRLA, EOMI, no conjunctival or scleral injection, OP clear, MMM, neck supple   RESP: Coarse ronchi and wheezing heard bilaterally. Poor inspiratory effort   CV: Tachycardic. Regular rhythm. +S1S2. No murmurs, rubs, or gallops   ABD: Obese, soft, nontender, nondistended, no rebound tenderness or guarding, no palpable masses  EXT: 1+ pitting edema. Extremities WWP, 2+ peripheral pulses.   NEURO: A&Ox3, no focal neurologic deficits   PSYCH: Normal mood and affect    Care Discussed with Consultants/Other Providers [ x] YES  [ ] NO Patient is a 61y old  Male who presents with a chief complaint of SOB (29 Nov 2022 11:55)    HOSPITAL COURSE:  Patient is a 60 yo M w/ PMH HLD, pre-DM, and pulmonary fibrosis 2/2 COVID-19 infection (wheelchair bound at baseline, on 4L home O2) who initially presented to [facility] on 11/22/2022 for several days of worsening dyspnea, productive cough, wheezing, and generalized weakness with increasing O2 requirements (up to 6L) and persistent dyspnea at rest. On day of admission, pt had syncopal event due to SOB. Pt was brought to [place] ED in respiratory distress where he was placed on BiPAP and started on broad spectrum antibiotics out of concern for pneumonia. CXR showed diffuse bilateral airspace opacities c/w multilobar pneumonia. CT PE showed no evidence of PE, however did show extensive diffuse interstitial and ground glass infiltrates bilaterally with associated hilar and mediastinal adenopathy, with suspected multilobar atypical pneumonia. Pt was admitted to  MICU for acute hypoxic respiratory failure 2/2 pulmonary fibrosis with superimposed bacterial pneumonia and acute decompensated pulmonary HTN. TTE was performed which showed mild TR, severely dilated RV, moderate-to-severe elevated RV systolic pressure, moderate-to severe PHTN (58 mmHg), normal LV size and wall thickness, normal LV systolic function (EF 58%), and grade II diastolic dysfunction. Pt was transferred to [facility] for further management and RHC given severity of pulmonary disease.     On admission to [facility] CCU, pt was weaned off BiPAP to HFNC and started on inhaled NO for afterload reduction and milrinone gtt for inotropic support. Received Solumedrol 40-50 mg IV BID for pulmonary fibrosis (pt had completed prednisone taper for PF exacerbation a few days prior to admission). Also received multiple doses of IV lasix for diuresis i/s/o fluid overload from R-sided HF. Completed course of vanc & zosyn for superimposed bacterial PNA, now being treated with Bactrim for Pneumocystis prophylaxis i/s/o chronic steroid use and nystatin for oropharyngeal thrush. Initial attempts at weaning Florentin and HFNC have been c/b desaturations, however currently pt has been weaned to Florentin 5-10 ppm (from 20 ppm) and is stable on HFNC 50/50. Pt underwent RHC on 11/28 which showed CO 8.2, CI 4.2, CVP 2, PA pressure 44/14 (25), wedge pressure 6, RV 30/5. TTE on 11/28 showed improved RV systolic function from initial presentation. Pt remains on milrinone gtt for inotropic support and is being followed by PHTN team with plans to evaluate for possible lung transplant. Mercedes dc'd yesterday.    INTERVAL HPI/OVERNIGHT EVENTS:   Patient seen and examined at bedside. Overnight, urinated 250 cc at 9 PM. Had 2 large BMs Received final dose of Zosyn. Decreased HFNC from 50/50 to 40/50. AM bladder scan 331, straight cath 400. This morning, pt sitting up in bed, in NAD, with no complaints. s/p RHC yesterday. Respiratory status at baseline with no recent episodes of coughing. Remains hemodynamically stable on milrinone gtt. Denies fever, chills, HA, dizziness, CP, palpitations, abdominal pain, n/v, diarrhea, constipation, flank pain, back pain, numbness, or tingling.      ICU Vital Signs Last 24 Hrs  T(C): 36.6 (29 Nov 2022 12:00), Max: 36.9 (28 Nov 2022 22:00)  T(F): 97.9 (29 Nov 2022 12:00), Max: 98.4 (28 Nov 2022 22:00)  HR: 100 (29 Nov 2022 14:00) (86 - 111)  BP: 120/79 (29 Nov 2022 14:00) (120/79 - 146/82)  BP(mean): 92 (29 Nov 2022 14:00) (84 - 108)  ABP: 124/68 (29 Nov 2022 14:00) (123/70 - 166/80)  ABP(mean): 85 (29 Nov 2022 14:00) (80 - 117)  RR: 29 (29 Nov 2022 14:00) (8 - 39)  SpO2: 96% (29 Nov 2022 14:00) (95% - 100%)    O2 Parameters below as of 29 Nov 2022 11:40  Patient On (Oxygen Delivery Method): nasal cannula, high flow  O2 Flow (L/min): 40  O2 Concentration (%): 50      I&O's Summary    28 Nov 2022 07:01  -  29 Nov 2022 07:00  --------------------------------------------------------  IN: 186.5 mL / OUT: 1180 mL / NET: -993.5 mL    29 Nov 2022 07:01  -  29 Nov 2022 14:04  --------------------------------------------------------  IN: 23.8 mL / OUT: 280 mL / NET: -256.2 mL          LABS:                        12.3   15.43 )-----------( 252      ( 29 Nov 2022 05:36 )             37.7     11-29    135  |  93<L>  |  22  ----------------------------<  157<H>  4.0   |  39<H>  |  0.74    Ca    8.3<L>      29 Nov 2022 05:36  Phos  2.7     11-29  Mg     2.4     11-29    TPro  6.9  /  Alb  3.1<L>  /  TBili  0.5  /  DBili  <0.2  /  AST  21  /  ALT  30  /  AlkPhos  72  11-29    PT/INR - ( 28 Nov 2022 05:51 )   PT: 13.2 sec;   INR: 1.11          PTT - ( 28 Nov 2022 05:51 )  PTT:24.9 sec    CAPILLARY BLOOD GLUCOSE      POCT Blood Glucose.: 163 mg/dL (29 Nov 2022 10:36)  POCT Blood Glucose.: 98 mg/dL (29 Nov 2022 07:33)  POCT Blood Glucose.: 250 mg/dL (28 Nov 2022 22:12)  POCT Blood Glucose.: 140 mg/dL (28 Nov 2022 15:22)    ABG - ( 29 Nov 2022 08:47 )  pH, Arterial: 7.37  pH, Blood: x     /  pCO2: 67    /  pO2: 107   / HCO3: 39    / Base Excess: 10.6  /  SaO2: 98.2                RADIOLOGY & ADDITIONAL TESTS:    Consultant(s) Notes Reviewed:  [x ] YES  [ ] NO    MEDICATIONS  (STANDING):  AQUAPHOR (petrolatum Ointment) 1 Application(s) Topical two times a day  benzocaine 15 mG/menthol 3.6 mG Lozenge 2 Lozenge Oral every 6 hours  benzonatate 200 milliGRAM(s) Oral three times a day  chlorhexidine 2% Cloths 1 Application(s) Topical daily  dextrose 5%. 1000 milliLiter(s) (50 mL/Hr) IV Continuous <Continuous>  dextrose 5%. 1000 milliLiter(s) (100 mL/Hr) IV Continuous <Continuous>  dextrose 50% Injectable 25 Gram(s) IV Push once  dextrose 50% Injectable 12.5 Gram(s) IV Push once  dextrose 50% Injectable 25 Gram(s) IV Push once  enoxaparin Injectable 40 milliGRAM(s) SubCutaneous every 24 hours  fluticasone propionate 50 MICROgram(s)/spray Nasal Spray 2 Spray(s) Both Nostrils two times a day  glucagon  Injectable 1 milliGRAM(s) IntraMuscular once  influenza   Vaccine 0.5 milliLiter(s) IntraMuscular once  insulin lispro (ADMELOG) corrective regimen sliding scale   SubCutaneous Before meals and at bedtime  ipratropium    for Nebulization 500 MICROGram(s) Nebulizer every 6 hours  methylPREDNISolone sodium succinate Injectable 40 milliGRAM(s) IV Push every 12 hours  milrinone Infusion 0.125 MICROgram(s)/kG/Min (3.68 mL/Hr) IV Continuous <Continuous>  nystatin    Suspension 818825 Unit(s) Swish and Swallow three times a day  pantoprazole    Tablet 40 milliGRAM(s) Oral before breakfast  polyethylene glycol 3350 17 Gram(s) Oral daily  senna 2 Tablet(s) Oral at bedtime  trimethoprim  160 mG/sulfamethoxazole 800 mG 1 Tablet(s) Oral daily    MEDICATIONS  (PRN):  dextrose Oral Gel 15 Gram(s) Oral once PRN Blood Glucose LESS THAN 70 milliGRAM(s)/deciliter  hydrocodone/homatropine Syrup 5 milliLiter(s) Oral every 4 hours PRN Cough      PHYSICAL EXAM:  GENERAL: Middle aged male, obese, in NAD   HEENT: PERRLA, EOMI, no conjunctival or scleral injection, OP clear, MMM, neck supple   RESP: Coarse ronchi and wheezing heard bilaterally. Poor inspiratory effort   CV: Tachycardic. Regular rhythm. +S1S2. No murmurs, rubs, or gallops   ABD: Obese, soft, nontender, nondistended, no rebound tenderness or guarding, no palpable masses  EXT: 1+ pitting edema. Extremities WWP, 2+ peripheral pulses.   NEURO: A&Ox3, no focal neurologic deficits   PSYCH: Normal mood and affect    Care Discussed with Consultants/Other Providers [ x] YES  [ ] NO

## 2022-11-29 NOTE — PROGRESS NOTE ADULT - ASSESSMENT
Pt is 62 yo M w/ PMH HLD, pre-DM, and pulmonary fibrosis 2/2 COVID-19 infection (wheelchair bound at baseline, on 4L home O2) who presented to [facility] for several days of worsening SOB and generalized weakness with increasing O2 requirements, found to have acute hypoxic respiratory failure 2/2 pulmonary fibrosis with superimposed bacterial pneumonia and acute decompensated pulmonary HTN, transferred to [facility] for RHC i/s/o severe pulmonary disease.    NEURO:   No active issues   - Pt mentating well, no signs of cardiogenic shock    CARDIAC  #Acute decompensated R-sided HF   TTE shows severely dilated RV size, moderately-to-severely reduced RV systolic function, dilated RA, normal LV size & systolic function, mild symmetric LVH, grade I LV diastolic dysfunction, mild-moderate TR, pulmonary HTN with PASP 48 mmHg, trivial pericardial effusion. No evidence of septal bowing into LV. BNP elevated to 5k (baseline 100). Decompensated R-side HF most likely i/s/o worsening PHTN 2/2 superimposed bacterial pneumonia (with elevated procalcitonin to 0.79 and elevated WBC on admission to ). Pt likely has Group 3 PHTN 2/2 intrinsic lung disease.   - f/u RHC results   - f/u PHTN recs   - f/u transplant team recs   - c/w milrinone gtt 0.125 mcg/kg/hr  - s/p 20 mg IV Lasix today  - Strict I&Os, pardo catheter in place     PULMONARY  #Decompensated PHTN  Pt has Group 3 PHTN 2/2 superimposed bacterial pneumonia i/s/o chronic pulmonary fibrosis from COVID-19 disease. PASP 48 mmHg. Pt currently breathing comfortably on HFNC 60/60.   - c/w vancomycin 1500 mg IV daily and Zosyn 4.5 g IV q8h    - f/u repeat sputum culture   - c/w duo nebs q6h standing   - c/w inhaled NO at 15 ppm   - c/w HFNC 50/50, wean as tolerated   - Avoid positive pressure ventilation (increases pulmonary afterload)  - encourage OOBTC    #Pulmonary fibrosis:   i/s/o prior COVID-19 infection. Pt was receiving prolonged prednisone taper as early as mid-November and was also treated with stress-dose steroids at [facility].  - c/w Solumedrol 40 mg IV q12h     GI:  - c/w Protonix 40 mg IV daily     RENAL:  No active issues     ENDO:   #pre-DM  A1c 6.0%   - mISS   - DASH/TLC diet     ID:   #Superimposed bacterial PNA   ID was consulted at [facility] and recommended treatment with broad spectrum antibiotics. Pt received vancomycin and cefepime. RVP and COVID were negative.  - c/w vancomycin 1500 mg IV daily and Zosyn 4.5 g IV q8h  - f/u repeat sputum culture & Pneumocystis PCR    - f/u serum Fungitell   - MRSA swab positive   - f/u ID recs     HEME:  No active issues     F: PO  E: Replete PRN  N: DASH/TLC   GI ppx: Protonix 40 mg IV daily   DVT ppx: Lovenox 40 mg SQ daily   Code status: Full code   Dispo: CCU   Pt is 62 yo M w/ PMH HLD, pre-DM, and pulmonary fibrosis 2/2 COVID-19 infection (wheelchair bound at baseline, on 4L home O2) who presented to [facility] for several days of worsening SOB and generalized weakness with increasing O2 requirements, found to have acute hypoxic respiratory failure 2/2 pulmonary fibrosis with superimposed bacterial pneumonia and acute decompensated pulmonary HTN, transferred to [facility] for RHC i/s/o severe pulmonary disease.    NEURO:   No active issues   - Pt mentating well, no signs of cardiogenic shock    CARDIAC  #Acute decompensated R-sided HF   TTE 11/23/22 shows severely dilated RV size, moderately-to-severely reduced RV systolic function, dilated RA, normal LV size & systolic function, mild symmetric LVH, grade I LV diastolic dysfunction, mild-moderate TR, pulmonary HTN with PASP 48 mmHg, trivial pericardial effusion. No evidence of septal bowing into LV. BNP elevated to 5k (baseline 100). Decompensated R-side HF most likely i/s/o worsening PHTN 2/2 superimposed bacterial pneumonia (with elevated procalcitonin to 0.79 and elevated WBC on admission to ). Pt likely has Group 3 PHTN 2/2 intrinsic lung disease.   RHC 11/28/22 showing CO/CI 8.2/4.2, CVP 2, PA pressure 44/14 (25), PCWP 6, RV 30/5.   TTE 11/28/22 showing improvement in RV size and systolic function.  - f/u PHTN recs   - c/w milrinone gtt 0.125 mcg/kg/hr  - s/p 20 mg IV Lasix yesterday, no additional diuresis today   - Maintain strict I&Os; pardo dc'd     PULMONARY  #Decompensated PHTN  Pt has Group 3 PHTN 2/2 superimposed bacterial pneumonia i/s/o chronic pulmonary fibrosis from COVID-19 disease. TTE 11/23/22 showed PASP 48 mmHg. Pt currently breathing comfortably on HFNC 50/50  - Finished courses of vancomycin 1500 mg IV daily and Zosyn 4.5 g IV q8h  - f/u repeat sputum culture   - c/w duo nebs q6h standing   - Wean inhaled NO, currently at 2 ppm   - c/w HFNC 50/50, wean as tolerated   - Avoid positive pressure ventilation (increases pulmonary afterload)  - encourage OOBTC and incentive spirometry     -------    #Pulmonary fibrosis:   i/s/o prior COVID-19 infection. Pt was receiving prolonged prednisone taper as early as mid-November and was also treated with stress-dose steroids at [facility].  - c/w Solumedrol 40 mg IV q12h     GI:  - c/w Protonix 40 mg IV daily     RENAL:  No active issues     ENDO:   #pre-DM  A1c 6.0%   - mISS   - DASH/TLC diet     ID:   #Superimposed bacterial PNA   ID was consulted at [facility] and recommended treatment with broad spectrum antibiotics. Pt received vancomycin and cefepime. RVP and COVID were negative.  - c/w vancomycin 1500 mg IV daily and Zosyn 4.5 g IV q8h  - f/u repeat sputum culture & Pneumocystis PCR    - f/u serum Fungitell   - MRSA swab positive   - f/u ID recs     HEME:  No active issues     F: PO  E: Replete PRN  N: DASH/TLC   GI ppx: Protonix 40 mg IV daily   DVT ppx: Lovenox 40 mg SQ daily   Code status: Full code   Dispo: CCU   Pt is 60 yo M w/ PMH HLD, pre-DM, and pulmonary fibrosis 2/2 COVID-19 infection (wheelchair bound at baseline, on 4L home O2) who presented to [facility] for several days of worsening SOB and generalized weakness with increasing O2 requirements, found to have acute hypoxic respiratory failure 2/2 pulmonary fibrosis with superimposed bacterial pneumonia and acute decompensated pulmonary HTN, transferred to [facility] for RHC i/s/o severe pulmonary disease.    NEURO:   No active issues   - Pt mentating well, no signs of cardiogenic shock    CARDIAC  #Acute decompensated R-sided HF   TTE 11/23/22 shows severely dilated RV size, moderately-to-severely reduced RV systolic function, dilated RA, normal LV size & systolic function, mild symmetric LVH, grade I LV diastolic dysfunction, mild-moderate TR, pulmonary HTN with PASP 48 mmHg, trivial pericardial effusion. No evidence of septal bowing into LV. BNP elevated to 5k (baseline 100). Decompensated R-side HF most likely i/s/o worsening PHTN 2/2 superimposed bacterial pneumonia (with elevated procalcitonin to 0.79 and elevated WBC on admission to ). Pt likely has Group 3 PHTN 2/2 intrinsic lung disease.   RHC 11/28/22 showing CO/CI 8.2/4.2, CVP 2, PA pressure 44/14 (25), PCWP 6, RV 30/5.   TTE 11/28/22 showing improvement in RV size and systolic function.  - f/u PHTN recs   - c/w milrinone gtt 0.125 mcg/kg/hr  - s/p 20 mg IV Lasix yesterday, no additional diuresis today   - Maintain strict I&Os; pardo dc'd     PULMONARY  #Decompensated PHTN  Pt has Group 3 PHTN 2/2 superimposed bacterial pneumonia i/s/o chronic pulmonary fibrosis from COVID-19 disease. TTE 11/23/22 showed PASP 48 mmHg. Pt currently breathing comfortably on HFNC 50/50  - Finished courses of vancomycin 1500 mg IV daily and Zosyn 4.5 g IV q8h  - f/u repeat sputum culture   - c/w duo nebs q6h standing   - Wean inhaled NO, currently at 2 ppm   - c/w HFNC 50/50, wean as tolerated   - Avoid positive pressure ventilation (increases pulmonary afterload)  - encourage OOBTC and incentive spirometry     #Pulmonary fibrosis:   i/s/o prior COVID-19 infection. Pt was receiving prolonged prednisone taper as early as mid-November and was also treated with stress-dose steroids at [facility].  - c/w Solumedrol 40 mg IV q12h, taper to 30 mg IV q12h tomorrow     GI:  - c/w Protonix 40 mg IV daily     RENAL:  No active issues     ENDO:   #pre-DM  A1c 6.0%   - mISS   - DASH/TLC diet     ID:   #Superimposed bacterial PNA   ID was consulted at [facility] and recommended treatment with broad spectrum antibiotics. Pt received vancomycin and cefepime. RVP and COVID were negative. On admission to CCU, MRSA swab was positive.  - Finished courses of vancomycin 1500 mg IV daily and Zosyn 4.5 g IV q8h  - f/u repeat sputum culture  - f/u serum Fungitell   - Pneumocystis PCR negative   - Started Bactrim 160/800 1 tab daily for PCP ppx   - c/w Nystatin swish & swallow TID for thrush   - f/u ID recs     HEME:  No active issues     F: PO  E: Replete PRN  N: DASH/TLC   GI ppx: Protonix 40 mg IV daily   DVT ppx: Lovenox 40 mg SQ daily   Code status: Full code   Dispo: CCU   Pt is 60 yo M w/ PMH HLD, pre-DM, and pulmonary fibrosis 2/2 COVID-19 infection (wheelchair bound at baseline, on 4L home O2) who presented to [facility] for several days of worsening SOB and generalized weakness with increasing O2 requirements, found to have acute hypoxic respiratory failure 2/2 pulmonary fibrosis with superimposed bacterial pneumonia and acute decompensated pulmonary HTN, transferred to [facility] for RHC i/s/o severe pulmonary disease.    NEURO:   No active issues   - Pt mentating well, no signs of cardiogenic shock    CARDIAC  #Acute decompensated R-sided HF   TTE 11/23/22 shows severely dilated RV size, moderately-to-severely reduced RV systolic function, dilated RA, normal LV size & systolic function, mild symmetric LVH, grade I LV diastolic dysfunction, mild-moderate TR, pulmonary HTN with PASP 48 mmHg, trivial pericardial effusion. No evidence of septal bowing into LV. BNP elevated to 5k (baseline 100). Decompensated R-side HF most likely i/s/o worsening PHTN 2/2 superimposed bacterial pneumonia (with elevated procalcitonin to 0.79 and elevated WBC on admission to ). Pt likely has Group 3 PHTN 2/2 intrinsic lung disease.   RHC 11/28/22 showing CO/CI 8.2/4.2, CVP 2, PA pressure 44/14 (25), PCWP 6, RV 30/5.   TTE 11/28/22 showing improvement in RV size and systolic function.  - f/u PHTN recs   - c/w milrinone gtt 0.125 mcg/kg/hr  - s/p 20 mg IV Lasix yesterday, no additional diuresis today   - Maintain strict I&Os; pardo dc'd     PULMONARY  #Decompensated PHTN  Pt has Group 3 PHTN 2/2 superimposed bacterial pneumonia i/s/o chronic pulmonary fibrosis from COVID-19 disease. TTE 11/23/22 showed PASP 48 mmHg. Pt currently breathing comfortably on HFNC 50/50  - Finished courses of vancomycin 1500 mg IV daily and Zosyn 4.5 g IV q8h  - f/u repeat sputum culture   - c/w duo nebs q6h standing   - Wean inhaled NO, currently at 2 ppm   - c/w HFNC 50/50, wean to 40/40 once off Florentin   - Trend ABG   - Avoid positive pressure ventilation (increases pulmonary afterload)  - encourage OOBTC and incentive spirometry     #Pulmonary fibrosis:   i/s/o prior COVID-19 infection. Pt was receiving prolonged prednisone taper as early as mid-November and was also treated with stress-dose steroids at [facility].  - c/w Solumedrol 40 mg IV q12h, taper to 30 mg IV q12h tomorrow   - Will likely need pulmonary rehab to improve functional status prior to lung transplant     GI:  - c/w Protonix 40 mg IV daily     RENAL:  No active issues     ENDO:   #pre-DM  A1c 6.0%   - mISS   - DASH/TLC diet     ID:   #Superimposed bacterial PNA   ID was consulted at [facility] and recommended treatment with broad spectrum antibiotics. Pt received vancomycin and cefepime. RVP and COVID were negative. On admission to CCU, MRSA swab was positive.  - Finished courses of vancomycin 1500 mg IV daily and Zosyn 4.5 g IV q8h  - f/u repeat sputum culture  - f/u serum Fungitell   - Pneumocystis PCR negative   - Started Bactrim 1DS daily for PCP ppx while on steroids   - c/w Nystatin swish & swallow TID x7 days for thrush   - f/u ID recs     #Lung transplant:   - f/u pre-lung transplant ID recs: CMV IgG, EBV, serum QFT, Strongyloides IgG, hepatitis panel, HIV, varicella IgG, MMR IgG, toxoplasma IgG, influenza vaccine, Prevnar-20    HEME:  No active issues     F: PO  E: Replete PRN  N: DASH/TLC   GI ppx: Protonix 40 mg IV daily   DVT ppx: Lovenox 40 mg SQ daily   Code status: Full code   Dispo: CCU

## 2022-11-29 NOTE — PROGRESS NOTE ADULT - SUBJECTIVE AND OBJECTIVE BOX
PULMONARY TRANSPLANT SERVICE FOLLOW-UP NOTE    INTERVAL HPI:  Reviewed chart and overnight events; patient seen and examined at bedside. Feeling comfortable. No change in functional status. ROS unchanged. NO weaned to 10 ppm.    MEDICATIONS:  Pulmonary:  benzonatate 200 milliGRAM(s) Oral three times a day  hydrocodone/homatropine Syrup 5 milliLiter(s) Oral every 4 hours PRN  ipratropium    for Nebulization 500 MICROGram(s) Nebulizer every 6 hours    Antimicrobials:  nystatin    Suspension 117756 Unit(s) Swish and Swallow three times a day    Anticoagulants:  enoxaparin Injectable 40 milliGRAM(s) SubCutaneous every 24 hours    Cardiac:  milrinone Infusion 0.125 MICROgram(s)/kG/Min IV Continuous <Continuous>    Allergies  No Known Allergies    Vital Signs Last 24 Hrs  T(C): 36.2 (2022 05:10), Max: 36.9 (2022 22:00)  T(F): 97.2 (2022 05:10), Max: 98.4 (2022 22:00)  HR: 95 (2022 07:00) (86 - 111)  BP: 121/71 (2022 07:00) (112/66 - 144/79)  BP(mean): 91 (2022 07:00) (82 - 108)  RR: 31 (2022 07:00) (17 - 40)  SpO2: 97% (2022 07:00) (93% - 100%)    PHYSICAL EXAM:  Constitutional: WD, comfortable  HEENT: NC/AT; PERRL, anicteric sclera; MMM  Neck: supple  Cardiovascular: +S1/S2, RRR  Respiratory: bibasilar crackles; no W/R  Gastrointestinal: soft, NT/ND  Extremities: WWP; no edema, clubbing or cyanosis  Vascular: 2+ radial and pedal pulses  Neurological: alert, oriented    LABS:  CBC Full  -  ( 2022 05:36 )  WBC Count : 15.43 K/uL  RBC Count : 4.18 M/uL  Hemoglobin : 12.3 g/dL  Hematocrit : 37.7 %  Platelet Count - Automated : 252 K/uL  Mean Cell Volume : 90.2 fl  Mean Cell Hemoglobin : 29.4 pg  Mean Cell Hemoglobin Concentration : 32.6 gm/dL  Auto Neutrophil # : 12.66 K/uL  Auto Lymphocyte # : 1.44 K/uL  Auto Monocyte # : 1.08 K/uL  Auto Eosinophil # : 0.03 K/uL  Auto Basophil # : 0.02 K/uL  Auto Neutrophil % : 82.1 %  Auto Lymphocyte % : 9.3 %  Auto Monocyte % : 7.0 %  Auto Eosinophil % : 0.2 %  Auto Basophil % : 0.1 %        135  |  93<L>  |  22  ----------------------------<  157<H>  4.0   |  39<H>  |  0.74    Ca    8.3<L>      2022 05:36  Phos  2.7       Mg     2.4         TPro  6.9  /  Alb  3.1<L>  /  TBili  0.5  /  DBili  <0.2  /  AST  21  /  ALT  30  /  AlkPhos  72      PT/INR - ( 2022 05:51 )   PT: 13.2 sec;   INR: 1.11          PTT - ( 2022 05:51 )  PTT:24.9 sec      Urinalysis Basic - ( 2022 10:28 )    Color: Red / Appearance: Cloudy / S.015 / pH: x  Gluc: x / Ketone: NEGATIVE  / Bili: Negative / Urobili: 0.2 E.U./dL   Blood: x / Protein: 30 mg/dL / Nitrite: NEGATIVE   Leuk Esterase: Trace / RBC: Many /HPF / WBC < 5 /HPF   Sq Epi: x / Non Sq Epi: 0-5 /HPF / Bacteria: None /HPF    RADIOLOGY & ADDITIONAL STUDIES: Reviewed.  < from: Xray Chest 1 View- PORTABLE-Urgent (Xray Chest 1 View- PORTABLE-Urgent .) (22 @ 15:49) >  FINDINGS:    Single frontal view of the chest demonstrates diffuse bilateral   infiltrates. [place]-Anat catheter tip in the right main pulmonary artery.   The cardiomediastinal silhouette is enlarged. Low lung volumes. No acute   osseous abnormalities. Overlying EKG leads and wires are noted    IMPRESSION: No interval change.    --- End of Report ---    < end of copied text >   PULMONARY SERVICE FOLLOW-UP NOTE    INTERVAL HPI:  Reviewed chart and overnight events; patient seen and examined at bedside. Feeling comfortable. No change in functional status. ROS unchanged. NO weaned to 10 ppm.    MEDICATIONS:  Pulmonary:  benzonatate 200 milliGRAM(s) Oral three times a day  hydrocodone/homatropine Syrup 5 milliLiter(s) Oral every 4 hours PRN  ipratropium    for Nebulization 500 MICROGram(s) Nebulizer every 6 hours    Antimicrobials:  nystatin    Suspension 275080 Unit(s) Swish and Swallow three times a day    Anticoagulants:  enoxaparin Injectable 40 milliGRAM(s) SubCutaneous every 24 hours    Cardiac:  milrinone Infusion 0.125 MICROgram(s)/kG/Min IV Continuous <Continuous>    Allergies  No Known Allergies    Vital Signs Last 24 Hrs  T(C): 36.2 (2022 05:10), Max: 36.9 (2022 22:00)  T(F): 97.2 (2022 05:10), Max: 98.4 (2022 22:00)  HR: 95 (2022 07:00) (86 - 111)  BP: 121/71 (2022 07:00) (112/66 - 144/79)  BP(mean): 91 (2022 07:00) (82 - 108)  RR: 31 (2022 07:00) (17 - 40)  SpO2: 97% (2022 07:00) (93% - 100%)    PHYSICAL EXAM:  Constitutional: WD, comfortable  HEENT: NC/AT; PERRL, anicteric sclera; MMM  Neck: supple  Cardiovascular: +S1/S2, RRR  Respiratory: bibasilar crackles; no W/R  Gastrointestinal: soft, NT/ND  Extremities: WWP; no edema, clubbing or cyanosis  Vascular: 2+ radial and pedal pulses  Neurological: alert, oriented    LABS:  CBC Full  -  ( 2022 05:36 )  WBC Count : 15.43 K/uL  RBC Count : 4.18 M/uL  Hemoglobin : 12.3 g/dL  Hematocrit : 37.7 %  Platelet Count - Automated : 252 K/uL  Mean Cell Volume : 90.2 fl  Mean Cell Hemoglobin : 29.4 pg  Mean Cell Hemoglobin Concentration : 32.6 gm/dL  Auto Neutrophil # : 12.66 K/uL  Auto Lymphocyte # : 1.44 K/uL  Auto Monocyte # : 1.08 K/uL  Auto Eosinophil # : 0.03 K/uL  Auto Basophil # : 0.02 K/uL  Auto Neutrophil % : 82.1 %  Auto Lymphocyte % : 9.3 %  Auto Monocyte % : 7.0 %  Auto Eosinophil % : 0.2 %  Auto Basophil % : 0.1 %        135  |  93<L>  |  22  ----------------------------<  157<H>  4.0   |  39<H>  |  0.74    Ca    8.3<L>      2022 05:36  Phos  2.7       Mg     2.4         TPro  6.9  /  Alb  3.1<L>  /  TBili  0.5  /  DBili  <0.2  /  AST  21  /  ALT  30  /  AlkPhos  72      PT/INR - ( 2022 05:51 )   PT: 13.2 sec;   INR: 1.11          PTT - ( 2022 05:51 )  PTT:24.9 sec      Urinalysis Basic - ( 2022 10:28 )    Color: Red / Appearance: Cloudy / S.015 / pH: x  Gluc: x / Ketone: NEGATIVE  / Bili: Negative / Urobili: 0.2 E.U./dL   Blood: x / Protein: 30 mg/dL / Nitrite: NEGATIVE   Leuk Esterase: Trace / RBC: Many /HPF / WBC < 5 /HPF   Sq Epi: x / Non Sq Epi: 0-5 /HPF / Bacteria: None /HPF    RADIOLOGY & ADDITIONAL STUDIES: Reviewed.  < from: Xray Chest 1 View- PORTABLE-Urgent (Xray Chest 1 View- PORTABLE-Urgent .) (22 @ 15:49) >  FINDINGS:    Single frontal view of the chest demonstrates diffuse bilateral   infiltrates. [place]-Anat catheter tip in the right main pulmonary artery.   The cardiomediastinal silhouette is enlarged. Low lung volumes. No acute   osseous abnormalities. Overlying EKG leads and wires are noted    IMPRESSION: No interval change.    --- End of Report ---    < end of copied text >

## 2022-11-30 NOTE — PROGRESS NOTE ADULT - ASSESSMENT
Pt is 60 yo M w/ PMH HLD, pre-DM, and pulmonary fibrosis 2/2 COVID-19 infection (wheelchair bound at baseline, on 4L home O2) who presented to [facility] for several days of worsening SOB and generalized weakness with increasing O2 requirements, found to have acute hypoxic respiratory failure 2/2 pulmonary fibrosis with superimposed bacterial pneumonia and acute decompensated pulmonary HTN, transferred to [facility] for RHC i/s/o severe pulmonary disease.    NEURO:   No active issues; AOx3.     CARDIAC  #Acute decompensated R-sided HF   TTE 11/23/22 shows severely dilated RV size, moderately-to-severely reduced RV systolic function, dilated RA, normal LV size & systolic function, mild symmetric LVH, grade I LV diastolic dysfunction, mild-moderate TR, pulmonary HTN with PASP 48 mmHg, trivial pericardial effusion. No evidence of septal bowing into LV. BNP elevated to 5k (baseline 100). Decompensated R-side HF most likely i/s/o worsening PHTN 2/2 superimposed bacterial pneumonia (with elevated procalcitonin to 0.79 and elevated WBC on admission to ). Pt likely has Group 3 PHTN 2/2 intrinsic lung disease.   RHC 11/28/22 showing CO/CI 8.2/4.2, CVP 2, PA pressure 44/14 (25), PCWP 6, RV 30/5.   TTE 11/28/22 showing improvement in RV size and systolic function.  - f/u PHTN recs   - c/w milrinone gtt 0.125 mcg/kg/hr  - No additional diuresis.   - Maintain strict I&Os; pardo dc'd     PULMONARY  #Decompensated PHTN  Pt has Group 3 PHTN 2/2 superimposed bacterial pneumonia i/s/o chronic pulmonary fibrosis from COVID-19 disease. TTE 11/23/22 showed PASP 48 mmHg. Pt currently breathing comfortably on HFNC 50/50. Finished courses of vancomycin 1500 mg IV daily and Zosyn 4.5 g IV q8h.   - f/u repeat sputum culture.  - c/w duo nebs q6h standing.   - Wean off Florentin.   - c/w HFNC 50/50, wean to 40/40 once off Florentin   - Trend ABG.  - Avoid positive pressure ventilation (increases pulmonary afterload)  - encourage OOBTC and incentive spirometry     #Pulmonary fibrosis:   i/s/o prior COVID-19 infection. Pt was receiving prolonged prednisone taper as early as mid-November and was also treated with stress-dose steroids at [facility].  - c/w Solumedrol 30 mg IV q12h tomorrow.  - Will likely need pulmonary rehab to improve functional status prior to lung transplant.    GI:  - c/w Protonix 40 mg IV daily     RENAL:  No active issues     ENDO:   #pre-DM  A1c 6.0%   - mISS   - DASH/TLC diet     ID:   #Superimposed bacterial PNA   ID was consulted at [facility] and recommended treatment with broad spectrum antibiotics. Pt received vancomycin and cefepime. RVP and COVID were negative. On admission to CCU, MRSA swab was positive.  - Finished courses of vancomycin 1500 mg IV daily and Zosyn 4.5 g IV q8h  - f/u repeat sputum culture  - f/u serum Fungitell   - Pneumocystis PCR negative   - Started Bactrim 1DS daily for PCP ppx while on steroids   - c/w Nystatin swish & swallow TID x7 days for thrush   - f/u ID recs     #Lung transplant:   - f/u pre-lung transplant ID recs: CMV IgG, EBV, serum QFT, Strongyloides IgG, hepatitis panel, HIV, varicella IgG, MMR IgG, toxoplasma IgG, influenza vaccine, Prevnar-20    HEME:  No active issues     F: PO  E: Replete PRN  N: DASH/TLC   GI ppx: Protonix 40 mg IV daily   DVT ppx: Lovenox 40 mg SQ daily   Code status: Full code   Dispo: CCU   Pt is 60 yo M w/ PMH HLD, pre-DM, and pulmonary fibrosis 2/2 COVID-19 infection (wheelchair bound at baseline, on 4L home O2) who presented to [facility] for several days of worsening SOB and generalized weakness with increasing O2 requirements, found to have acute hypoxic respiratory failure 2/2 pulmonary fibrosis with superimposed bacterial pneumonia and acute decompensated pulmonary HTN, transferred to [facility] for RHC i/s/o severe pulmonary disease.    NEURO:   No active issues; AOx3.     CARDIAC  #Acute decompensated R-sided HF   TTE 11/23/22 shows severely dilated RV size, moderately-to-severely reduced RV systolic function, dilated RA, normal LV size & systolic function, mild symmetric LVH, grade I LV diastolic dysfunction, mild-moderate TR, pulmonary HTN with PASP 48 mmHg, trivial pericardial effusion. No evidence of septal bowing into LV. BNP elevated to 5k (baseline 100). Decompensated R-side HF most likely i/s/o worsening PHTN 2/2 superimposed bacterial pneumonia (with elevated procalcitonin to 0.79 and elevated WBC on admission to ). Pt likely has Group 3 PHTN 2/2 intrinsic lung disease.   RHC 11/28/22 showing CO/CI 8.2/4.2, CVP 2, PA pressure 44/14 (25), PCWP 6, RV 30/5.   TTE 11/28/22 showing improvement in RV size and systolic function.  - f/u PHTN recs   - c/w milrinone gtt 0.125 mcg/kg/hr.  - No additional diuresis.   - Maintain strict I&Os; char dc'd.    PULMONARY  #Decompensated PHTN  Pt has Group 3 PHTN 2/2 superimposed bacterial pneumonia i/s/o chronic pulmonary fibrosis from COVID-19 disease. TTE 11/23/22 showed PASP 48 mmHg. Pt currently breathing comfortably on HFNC 50/50. Finished courses of vancomycin 1500 mg IV daily and Zosyn 4.5 g IV q8h.   - f/u repeat sputum culture.  - c/w duo nebs q6h standing.   - Wean off Florentin.   - c/w HFNC 50/50.  - Wean off milrinone on 12/1.   - Trend ABG.  - Avoid positive pressure ventilation (increases pulmonary afterload)  - encourage OOBTC and incentive spirometry     #Pulmonary fibrosis:   i/s/o prior COVID-19 infection. Pt was receiving prolonged prednisone taper as early as mid-November and was also treated with stress-dose steroids at [facility].  - c/w Solumedrol 30 mg IV q12h tomorrow.  - Will likely need pulmonary rehab to improve functional status prior to lung transplant.    GI:  - c/w Protonix 40 mg IV daily     RENAL:  No active issues     ENDO:   #pre-DM  A1c 6.0%   - mISS   - DASH/TLC diet     ID:   #Superimposed bacterial PNA   ID was consulted at [facility] and recommended treatment with broad spectrum antibiotics. Pt received vancomycin and cefepime. RVP and COVID were negative. On admission to CCU, MRSA swab was positive.  - Finished courses of vancomycin 1500 mg IV daily and Zosyn 4.5 g IV q8h  - f/u repeat sputum culture  - f/u serum Fungitell   - Pneumocystis PCR negative   - Started Bactrim 1DS daily for PCP ppx while on steroids.   - c/w Nystatin swish & swallow TID x7 days for thrush.   - f/u ID recs.     #Lung transplant:   - f/u pre-lung transplant ID recs: CMV IgG, EBV, serum QFT, Strongyloides IgG, hepatitis panel, HIV, varicella IgG, MMR IgG, toxoplasma IgG, influenza vaccine, Prevnar-20    HEME:  No active issues     F: PO  E: Replete PRN  N: DASH/TLC   GI ppx: Protonix 40 mg IV daily   DVT ppx: Lovenox 40 mg SQ daily   Code status: Full code   Dispo: CCU

## 2022-11-30 NOTE — PROGRESS NOTE ADULT - ASSESSMENT
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    #WHO group 3 pHTN   Mild precapillary pulmonary hypertension with normal CO/CI by Td on low dose milrinone, normal filling pressures suggesting precapillary PH 2/2 underlying severe fibrotic ILD and adequate volume status.  - Continue to wean Florentin, once off Florentin can attempt to wean HFNC back to baseline 4L home oxygen  - Continue to wean milerenone  - received prior auth for tyvaso discussed with patient and nursing at bedside-patient to be delivered tyvaso from specialty pharmacy, will need to be stored on floor and receive medicine qid.   - would repeat echo now that patient volume status is optimized, on bedside ultrasound RVOT improved in size although still with some systolic and diastolic flattening  - PT evaluation   - Bowel regimen    #ILD flare with PNA  Post COVID ILD now presenting with worsening cough and elevated WBC count with elevated procalcitonin and CT chest performed at OSH with concern for newly developing ggos when compared to prior CT scan done in october.   - completed course of zosyn  - agree with continued steroids, will continue to evaluate daily, agree with bactrim ppx     Refer to prior notes for transplant discussion

## 2022-11-30 NOTE — PROGRESS NOTE ADULT - SUBJECTIVE AND OBJECTIVE BOX
INTERVAL HPI/OVERNIGHT EVENTS:   No acute overnight events. Patient voided spontaneously, right IJ dressing is CDI.     Subjective: Patient seen and examined at bedside, case discussed with the attending physician during AM rounds. Patient is in no acute distress and denies acute complaints at this time. Patient endorses baseline shortness of breath, denies exacerbation. PAteint deneis chest apin, abdominal pain, constipation, diarrhea, nausea, vomiting, difficulties with urination.     ICU Vital Signs Last 24 Hrs  T(C): 36.6 (30 Nov 2022 09:00), Max: 37.1 (29 Nov 2022 18:08)  T(F): 97.8 (30 Nov 2022 09:00), Max: 98.7 (29 Nov 2022 18:08)  HR: 111 (30 Nov 2022 15:05) (88 - 117)  BP: 137/69 (30 Nov 2022 15:05) (116/54 - 144/83)  BP(mean): 99 (30 Nov 2022 15:05) (78 - 108)  ABP: 119/63 (30 Nov 2022 15:05) (110/57 - 165/85)  ABP(mean): 81 (30 Nov 2022 15:05) (73 - 113)  RR: 34 (30 Nov 2022 15:05) (16 - 48)  SpO2: 94% (30 Nov 2022 15:05) (87% - 100%)    O2 Parameters below as of 30 Nov 2022 15:05  Patient On (Oxygen Delivery Method): nasal cannula, high flow  O2 Flow (L/min): 50  O2 Concentration (%): 50      I&O's Summary    29 Nov 2022 07:01  -  30 Nov 2022 07:00  --------------------------------------------------------  IN: 201.6 mL / OUT: 1005 mL / NET: -803.4 mL    30 Nov 2022 07:01  -  30 Nov 2022 15:42  --------------------------------------------------------  IN: 27.2 mL / OUT: 260 mL / NET: -232.8 mL          PHYSICAL EXAM:  GENERAL: No acute distress, resting comfortably in bed on HFNC.   HENNT:  Atraumatic, Normocephalic, conjunctiva and sclera clear, No tonsillar erythema, exudates, or enlargement; Supple, No JVD, Normal thyroid  HEART: Regular rate and rhythm; No murmurs, rubs, or gallops.  RESPIRATORY: Mild expiratory wheezes but limited airflow and low lung volumes on auscultation.  ABDOMEN: Soft, Nontender, mild distended; Bowel sounds present.  NEUROLOGY: A&Ox3, nonfocal, moving all extremities.  EXTREMITIES:  2+ Peripheral Pulses, (+) bilateral clubbing of fingers, (+) edema to the bilateral lower extremities.   SKIN: warm, dry, normal color, no rash or abnormal lesions    LABS:                        11.8   14.54 )-----------( 204      ( 30 Nov 2022 05:30 )             36.6     11-30    136  |  96  |  21  ----------------------------<  123<H>  4.5   |  39<H>  |  0.67    Ca    8.6      30 Nov 2022 05:30  Phos  2.5     11-30  Mg     2.4     11-30    TPro  6.8  /  Alb  3.1<L>  /  TBili  0.4  /  DBili  x   /  AST  18  /  ALT  29  /  AlkPhos  71  11-30        CAPILLARY BLOOD GLUCOSE      POCT Blood Glucose.: 246 mg/dL (30 Nov 2022 10:59)  POCT Blood Glucose.: 118 mg/dL (30 Nov 2022 06:37)  POCT Blood Glucose.: 236 mg/dL (29 Nov 2022 21:09)  POCT Blood Glucose.: 134 mg/dL (29 Nov 2022 16:07)    ABG - ( 29 Nov 2022 08:47 )  pH, Arterial: 7.37  pH, Blood: x     /  pCO2: 67    /  pO2: 107   / HCO3: 39    / Base Excess: 10.6  /  SaO2: 98.2                Consultant(s) Notes Reviewed:  [x ] YES  [ ] NO    MEDICATIONS  (STANDING):  AQUAPHOR (petrolatum Ointment) 1 Application(s) Topical two times a day  benzocaine 15 mG/menthol 3.6 mG Lozenge 2 Lozenge Oral every 6 hours  benzonatate 200 milliGRAM(s) Oral three times a day  chlorhexidine 2% Cloths 1 Application(s) Topical daily  dextrose 5%. 1000 milliLiter(s) (100 mL/Hr) IV Continuous <Continuous>  dextrose 5%. 1000 milliLiter(s) (50 mL/Hr) IV Continuous <Continuous>  dextrose 50% Injectable 25 Gram(s) IV Push once  dextrose 50% Injectable 12.5 Gram(s) IV Push once  dextrose 50% Injectable 25 Gram(s) IV Push once  enoxaparin Injectable 40 milliGRAM(s) SubCutaneous every 24 hours  fluticasone propionate 50 MICROgram(s)/spray Nasal Spray 2 Spray(s) Both Nostrils two times a day  glucagon  Injectable 1 milliGRAM(s) IntraMuscular once  influenza   Vaccine 0.5 milliLiter(s) IntraMuscular once  insulin lispro (ADMELOG) corrective regimen sliding scale   SubCutaneous Before meals and at bedtime  ipratropium    for Nebulization 500 MICROGram(s) Nebulizer every 6 hours  methylPREDNISolone sodium succinate Injectable 30 milliGRAM(s) IV Push every 12 hours  milrinone Infusion 0.125 MICROgram(s)/kG/Min (3.68 mL/Hr) IV Continuous <Continuous>  nystatin    Suspension [id number] Unit(s) Swish and Swallow three times a day  pantoprazole    Tablet 40 milliGRAM(s) Oral before breakfast  polyethylene glycol 3350 17 Gram(s) Oral daily  senna 2 Tablet(s) Oral at bedtime  tamsulosin 0.4 milliGRAM(s) Oral at bedtime  trimethoprim  160 mG/sulfamethoxazole 800 mG 1 Tablet(s) Oral daily    MEDICATIONS  (PRN):  dextrose Oral Gel 15 Gram(s) Oral once PRN Blood Glucose LESS THAN 70 milliGRAM(s)/deciliter  hydrocodone/homatropine Syrup 5 milliLiter(s) Oral every 4 hours PRN Cough      Care Discussed with Consultants/Other Providers [ x] YES  [ ] NO    RADIOLOGY & ADDITIONAL TESTS: INTERVAL HPI/OVERNIGHT EVENTS:   No acute overnight events. Patient voided spontaneously, right IJ dressing is CDI.     Subjective: Patient seen and examined at bedside, case discussed with the attending physician during AM rounds. Patient is in no acute distress and denies acute complaints at this time. Patient endorses baseline shortness of breath, denies exacerbation. PAteint deneis chest apin, abdominal pain, constipation, diarrhea, nausea, vomiting, difficulties with urination.     ICU Vital Signs Last 24 Hrs  T(C): 36.6 (30 Nov 2022 09:00), Max: 37.1 (29 Nov 2022 18:08)  T(F): 97.8 (30 Nov 2022 09:00), Max: 98.7 (29 Nov 2022 18:08)  HR: 111 (30 Nov 2022 15:05) (88 - 117)  BP: 137/69 (30 Nov 2022 15:05) (116/54 - 144/83)  BP(mean): 99 (30 Nov 2022 15:05) (78 - 108)  ABP: 119/63 (30 Nov 2022 15:05) (110/57 - 165/85)  ABP(mean): 81 (30 Nov 2022 15:05) (73 - 113)  RR: 34 (30 Nov 2022 15:05) (16 - 48)  SpO2: 94% (30 Nov 2022 15:05) (87% - 100%)    O2 Parameters below as of 30 Nov 2022 15:05  Patient On (Oxygen Delivery Method): nasal cannula, high flow  O2 Flow (L/min): 50  O2 Concentration (%): 50      I&O's Summary    29 Nov 2022 07:01  -  30 Nov 2022 07:00  --------------------------------------------------------  IN: 201.6 mL / OUT: 1005 mL / NET: -803.4 mL    30 Nov 2022 07:01  -  30 Nov 2022 15:42  --------------------------------------------------------  IN: 27.2 mL / OUT: 260 mL / NET: -232.8 mL          PHYSICAL EXAM:  GENERAL: No acute distress, resting comfortably in bed on HFNC.   HENNT:  Atraumatic, Normocephalic, conjunctiva and sclera clear, No tonsillar erythema, exudates, or enlargement; Supple, No JVD.  HEART: Regular rate and rhythm; No murmurs, rubs, or gallops.  RESPIRATORY: Mild expiratory wheezes but limited airflow and low lung volumes on auscultation.  ABDOMEN: Soft, Nontender, mild distention; Bowel sounds present.  NEUROLOGY: A&Ox3, moving all extremities though weak.  EXTREMITIES:  2+ Peripheral Pulses, (+) bilateral clubbing of fingers, (+) edema to the bilateral lower extremities.   SKIN: warm, dry, normal color, no rash or abnormal lesions    LABS:                        11.8   14.54 )-----------( 204      ( 30 Nov 2022 05:30 )             36.6     11-30    136  |  96  |  21  ----------------------------<  123<H>  4.5   |  39<H>  |  0.67    Ca    8.6      30 Nov 2022 05:30  Phos  2.5     11-30  Mg     2.4     11-30    TPro  6.8  /  Alb  3.1<L>  /  TBili  0.4  /  DBili  x   /  AST  18  /  ALT  29  /  AlkPhos  71  11-30        CAPILLARY BLOOD GLUCOSE      POCT Blood Glucose.: 246 mg/dL (30 Nov 2022 10:59)  POCT Blood Glucose.: 118 mg/dL (30 Nov 2022 06:37)  POCT Blood Glucose.: 236 mg/dL (29 Nov 2022 21:09)  POCT Blood Glucose.: 134 mg/dL (29 Nov 2022 16:07)    ABG - ( 29 Nov 2022 08:47 )  pH, Arterial: 7.37  pH, Blood: x     /  pCO2: 67    /  pO2: 107   / HCO3: 39    / Base Excess: 10.6  /  SaO2: 98.2                Consultant(s) Notes Reviewed:  [x ] YES  [ ] NO    MEDICATIONS  (STANDING):  AQUAPHOR (petrolatum Ointment) 1 Application(s) Topical two times a day  benzocaine 15 mG/menthol 3.6 mG Lozenge 2 Lozenge Oral every 6 hours  benzonatate 200 milliGRAM(s) Oral three times a day  chlorhexidine 2% Cloths 1 Application(s) Topical daily  dextrose 5%. 1000 milliLiter(s) (100 mL/Hr) IV Continuous <Continuous>  dextrose 5%. 1000 milliLiter(s) (50 mL/Hr) IV Continuous <Continuous>  dextrose 50% Injectable 25 Gram(s) IV Push once  dextrose 50% Injectable 12.5 Gram(s) IV Push once  dextrose 50% Injectable 25 Gram(s) IV Push once  enoxaparin Injectable 40 milliGRAM(s) SubCutaneous every 24 hours  fluticasone propionate 50 MICROgram(s)/spray Nasal Spray 2 Spray(s) Both Nostrils two times a day  glucagon  Injectable 1 milliGRAM(s) IntraMuscular once  influenza   Vaccine 0.5 milliLiter(s) IntraMuscular once  insulin lispro (ADMELOG) corrective regimen sliding scale   SubCutaneous Before meals and at bedtime  ipratropium    for Nebulization 500 MICROGram(s) Nebulizer every 6 hours  methylPREDNISolone sodium succinate Injectable 30 milliGRAM(s) IV Push every 12 hours  milrinone Infusion 0.125 MICROgram(s)/kG/Min (3.68 mL/Hr) IV Continuous <Continuous>  nystatin    Suspension [id number] Unit(s) Swish and Swallow three times a day  pantoprazole    Tablet 40 milliGRAM(s) Oral before breakfast  polyethylene glycol 3350 17 Gram(s) Oral daily  senna 2 Tablet(s) Oral at bedtime  tamsulosin 0.4 milliGRAM(s) Oral at bedtime  trimethoprim  160 mG/sulfamethoxazole 800 mG 1 Tablet(s) Oral daily    MEDICATIONS  (PRN):  dextrose Oral Gel 15 Gram(s) Oral once PRN Blood Glucose LESS THAN 70 milliGRAM(s)/deciliter  hydrocodone/homatropine Syrup 5 milliLiter(s) Oral every 4 hours PRN Cough      Care Discussed with Consultants/Other Providers [ x] YES  [ ] NO    RADIOLOGY & ADDITIONAL TESTS: INTERVAL HPI/OVERNIGHT EVENTS:   No acute overnight events. Patient voided spontaneously, right IJ dressing is CDI.     Subjective: Patient seen and examined at bedside, case discussed with the attending physician during AM rounds. Patient is in no acute distress and denies acute complaints at this time. Patient states he is at baseline shortness of breath, denies exacerbation. Patient denies chest pain, abdominal pain, constipation, diarrhea, nausea, vomiting, difficulties with urination.     ICU Vital Signs Last 24 Hrs  T(C): 36.6 (30 Nov 2022 09:00), Max: 37.1 (29 Nov 2022 18:08)  T(F): 97.8 (30 Nov 2022 09:00), Max: 98.7 (29 Nov 2022 18:08)  HR: 111 (30 Nov 2022 15:05) (88 - 117)  BP: 137/69 (30 Nov 2022 15:05) (116/54 - 144/83)  BP(mean): 99 (30 Nov 2022 15:05) (78 - 108)  ABP: 119/63 (30 Nov 2022 15:05) (110/57 - 165/85)  ABP(mean): 81 (30 Nov 2022 15:05) (73 - 113)  RR: 34 (30 Nov 2022 15:05) (16 - 48)  SpO2: 94% (30 Nov 2022 15:05) (87% - 100%)    O2 Parameters below as of 30 Nov 2022 15:05  Patient On (Oxygen Delivery Method): nasal cannula, high flow  O2 Flow (L/min): 50  O2 Concentration (%): 50      I&O's Summary    29 Nov 2022 07:01  -  30 Nov 2022 07:00  --------------------------------------------------------  IN: 201.6 mL / OUT: 1005 mL / NET: -803.4 mL    30 Nov 2022 07:01  -  30 Nov 2022 15:42  --------------------------------------------------------  IN: 27.2 mL / OUT: 260 mL / NET: -232.8 mL          PHYSICAL EXAM:  GENERAL: No acute distress, resting comfortably in bed on HFNC.   HENNT:  Atraumatic, Normocephalic, conjunctiva and sclera clear, No tonsillar erythema, exudates, or enlargement; Supple, No JVD.  HEART: Regular rate and rhythm; No murmurs, rubs, or gallops.  RESPIRATORY: Mild expiratory wheezes but limited airflow and low lung volumes on auscultation. Speaking in full sentences.   ABDOMEN: Soft, Nontender, mild distention; Bowel sounds present.  NEUROLOGY: A&Ox3, moving all extremities though weak.  EXTREMITIES:  2+ Peripheral Pulses, (+) bilateral clubbing of fingers, (+) edema to the bilateral lower extremities.   SKIN: warm, dry, normal color, no rash or abnormal lesions    LABS:                        11.8   14.54 )-----------( 204      ( 30 Nov 2022 05:30 )             36.6     11-30    136  |  96  |  21  ----------------------------<  123<H>  4.5   |  39<H>  |  0.67    Ca    8.6      30 Nov 2022 05:30  Phos  2.5     11-30  Mg     2.4     11-30    TPro  6.8  /  Alb  3.1<L>  /  TBili  0.4  /  DBili  x   /  AST  18  /  ALT  29  /  AlkPhos  71  11-30        CAPILLARY BLOOD GLUCOSE      POCT Blood Glucose.: 246 mg/dL (30 Nov 2022 10:59)  POCT Blood Glucose.: 118 mg/dL (30 Nov 2022 06:37)  POCT Blood Glucose.: 236 mg/dL (29 Nov 2022 21:09)  POCT Blood Glucose.: 134 mg/dL (29 Nov 2022 16:07)    ABG - ( 29 Nov 2022 08:47 )  pH, Arterial: 7.37  pH, Blood: x     /  pCO2: 67    /  pO2: 107   / HCO3: 39    / Base Excess: 10.6  /  SaO2: 98.2                Consultant(s) Notes Reviewed:  [x ] YES  [ ] NO    MEDICATIONS  (STANDING):  AQUAPHOR (petrolatum Ointment) 1 Application(s) Topical two times a day  benzocaine 15 mG/menthol 3.6 mG Lozenge 2 Lozenge Oral every 6 hours  benzonatate 200 milliGRAM(s) Oral three times a day  chlorhexidine 2% Cloths 1 Application(s) Topical daily  dextrose 5%. 1000 milliLiter(s) (100 mL/Hr) IV Continuous <Continuous>  dextrose 5%. 1000 milliLiter(s) (50 mL/Hr) IV Continuous <Continuous>  dextrose 50% Injectable 25 Gram(s) IV Push once  dextrose 50% Injectable 12.5 Gram(s) IV Push once  dextrose 50% Injectable 25 Gram(s) IV Push once  enoxaparin Injectable 40 milliGRAM(s) SubCutaneous every 24 hours  fluticasone propionate 50 MICROgram(s)/spray Nasal Spray 2 Spray(s) Both Nostrils two times a day  glucagon  Injectable 1 milliGRAM(s) IntraMuscular once  influenza   Vaccine 0.5 milliLiter(s) IntraMuscular once  insulin lispro (ADMELOG) corrective regimen sliding scale   SubCutaneous Before meals and at bedtime  ipratropium    for Nebulization 500 MICROGram(s) Nebulizer every 6 hours  methylPREDNISolone sodium succinate Injectable 30 milliGRAM(s) IV Push every 12 hours  milrinone Infusion 0.125 MICROgram(s)/kG/Min (3.68 mL/Hr) IV Continuous <Continuous>  nystatin    Suspension [id number] Unit(s) Swish and Swallow three times a day  pantoprazole    Tablet 40 milliGRAM(s) Oral before breakfast  polyethylene glycol 3350 17 Gram(s) Oral daily  senna 2 Tablet(s) Oral at bedtime  tamsulosin 0.4 milliGRAM(s) Oral at bedtime  trimethoprim  160 mG/sulfamethoxazole 800 mG 1 Tablet(s) Oral daily    MEDICATIONS  (PRN):  dextrose Oral Gel 15 Gram(s) Oral once PRN Blood Glucose LESS THAN 70 milliGRAM(s)/deciliter  hydrocodone/homatropine Syrup 5 milliLiter(s) Oral every 4 hours PRN Cough      Care Discussed with Consultants/Other Providers [ x] YES  [ ] NO    RADIOLOGY & ADDITIONAL TESTS: HOSPITAL COURSE:  Patient is a 60 yo M w/ PMH HLD, pre-DM, and pulmonary fibrosis 2/2 COVID-19 infection (wheelchair bound at baseline, on 4L home O2) who initially presented to [facility] on 11/22/2022 for several days of worsening dyspnea, productive cough, wheezing, and generalized weakness with increasing O2 requirements (up to 6L) and persistent dyspnea at rest. On day of admission, pt had syncopal event due to SOB. Pt was brought to [place] ED in respiratory distress where he was placed on BiPAP and started on broad spectrum antibiotics out of concern for pneumonia. CXR showed diffuse bilateral airspace opacities c/w multilobar pneumonia. CT PE showed no evidence of PE, however did show extensive diffuse interstitial and ground glass infiltrates bilaterally with associated hilar and mediastinal adenopathy, with suspected multilobar atypical pneumonia. Pt was admitted to  MICU for acute hypoxic respiratory failure 2/2 pulmonary fibrosis with superimposed bacterial pneumonia and acute decompensated pulmonary HTN. TTE was performed which showed mild TR, severely dilated RV, moderate-to-severe elevated RV systolic pressure, moderate-to severe PHTN (58 mmHg), normal LV size and wall thickness, normal LV systolic function (EF 58%), and grade II diastolic dysfunction. Pt was transferred to [facility] for further management and RHC given severity of pulmonary disease.     On admission to [facility] CCU, pt was weaned off BiPAP to HFNC and started on inhaled NO for afterload reduction and milrinone gtt for inotropic support. Received Solumedrol 40-50 mg IV BID for pulmonary fibrosis (pt had completed prednisone taper for PF exacerbation a few days prior to admission). Also received multiple doses of IV lasix for diuresis i/s/o fluid overload from R-sided HF. Completed course of vanc & zosyn for superimposed bacterial PNA, now being treated with Bactrim for Pneumocystis prophylaxis i/s/o chronic steroid use and nystatin for oropharyngeal thrush. Initial attempts at weaning Florentin and HFNC have been c/b desaturations, however currently pt has been weaned to Florentin 5-10 ppm (from 20 ppm) and is stable on HFNC 50/50. Pt underwent RHC on 11/28 which showed CO 8.2, CI 4.2, CVP 2, PA pressure 44/14 (25), wedge pressure 6, RV 30/5. TTE on 11/28 showed improved RV systolic function from initial presentation. Pt remains on milrinone gtt for inotropic support and is being followed by PHTN team with plans to evaluate for possible lung transplant. Daren simon'ashley yesterday. Florentin weaned off today, will hold on weaning HFNC for oxygen support.     INTERVAL HPI/OVERNIGHT EVENTS:   No acute overnight events. Patient voided spontaneously, right IJ dressing is CDI.     Subjective: Patient seen and examined at bedside, case discussed with the attending physician during AM rounds. Patient is in no acute distress and denies acute complaints at this time. Patient states he is at baseline shortness of breath, denies exacerbation. Patient denies chest pain, abdominal pain, constipation, diarrhea, nausea, vomiting, difficulties with urination.     ICU Vital Signs Last 24 Hrs  T(C): 36.6 (30 Nov 2022 09:00), Max: 37.1 (29 Nov 2022 18:08)  T(F): 97.8 (30 Nov 2022 09:00), Max: 98.7 (29 Nov 2022 18:08)  HR: 111 (30 Nov 2022 15:05) (88 - 117)  BP: 137/69 (30 Nov 2022 15:05) (116/54 - 144/83)  BP(mean): 99 (30 Nov 2022 15:05) (78 - 108)  ABP: 119/63 (30 Nov 2022 15:05) (110/57 - 165/85)  ABP(mean): 81 (30 Nov 2022 15:05) (73 - 113)  RR: 34 (30 Nov 2022 15:05) (16 - 48)  SpO2: 94% (30 Nov 2022 15:05) (87% - 100%)    O2 Parameters below as of 30 Nov 2022 15:05  Patient On (Oxygen Delivery Method): nasal cannula, high flow  O2 Flow (L/min): 50  O2 Concentration (%): 50      I&O's Summary    29 Nov 2022 07:01  -  30 Nov 2022 07:00  --------------------------------------------------------  IN: 201.6 mL / OUT: 1005 mL / NET: -803.4 mL    30 Nov 2022 07:01  -  30 Nov 2022 15:42  --------------------------------------------------------  IN: 27.2 mL / OUT: 260 mL / NET: -232.8 mL          PHYSICAL EXAM:  GENERAL: No acute distress, resting comfortably in bed on HFNC.   HENNT:  Atraumatic, Normocephalic, conjunctiva and sclera clear, No tonsillar erythema, exudates, or enlargement; Supple, No JVD.  HEART: Regular rate and rhythm; No murmurs, rubs, or gallops.  RESPIRATORY: Mild expiratory wheezes but limited airflow and low lung volumes on auscultation. Speaking in full sentences.   ABDOMEN: Soft, Nontender, mild distention; Bowel sounds present.  NEUROLOGY: A&Ox3, moving all extremities though weak.  EXTREMITIES:  2+ Peripheral Pulses, (+) bilateral clubbing of fingers, (+) edema to the bilateral lower extremities.   SKIN: warm, dry, normal color, no rash or abnormal lesions    LABS:                        11.8   14.54 )-----------( 204      ( 30 Nov 2022 05:30 )             36.6     11-30    136  |  96  |  21  ----------------------------<  123<H>  4.5   |  39<H>  |  0.67    Ca    8.6      30 Nov 2022 05:30  Phos  2.5     11-30  Mg     2.4     11-30    TPro  6.8  /  Alb  3.1<L>  /  TBili  0.4  /  DBili  x   /  AST  18  /  ALT  29  /  AlkPhos  71  11-30        CAPILLARY BLOOD GLUCOSE      POCT Blood Glucose.: 246 mg/dL (30 Nov 2022 10:59)  POCT Blood Glucose.: 118 mg/dL (30 Nov 2022 06:37)  POCT Blood Glucose.: 236 mg/dL (29 Nov 2022 21:09)  POCT Blood Glucose.: 134 mg/dL (29 Nov 2022 16:07)    ABG - ( 29 Nov 2022 08:47 )  pH, Arterial: 7.37  pH, Blood: x     /  pCO2: 67    /  pO2: 107   / HCO3: 39    / Base Excess: 10.6  /  SaO2: 98.2                Consultant(s) Notes Reviewed:  [x ] YES  [ ] NO    MEDICATIONS  (STANDING):  AQUAPHOR (petrolatum Ointment) 1 Application(s) Topical two times a day  benzocaine 15 mG/menthol 3.6 mG Lozenge 2 Lozenge Oral every 6 hours  benzonatate 200 milliGRAM(s) Oral three times a day  chlorhexidine 2% Cloths 1 Application(s) Topical daily  dextrose 5%. 1000 milliLiter(s) (100 mL/Hr) IV Continuous <Continuous>  dextrose 5%. 1000 milliLiter(s) (50 mL/Hr) IV Continuous <Continuous>  dextrose 50% Injectable 25 Gram(s) IV Push once  dextrose 50% Injectable 12.5 Gram(s) IV Push once  dextrose 50% Injectable 25 Gram(s) IV Push once  enoxaparin Injectable 40 milliGRAM(s) SubCutaneous every 24 hours  fluticasone propionate 50 MICROgram(s)/spray Nasal Spray 2 Spray(s) Both Nostrils two times a day  glucagon  Injectable 1 milliGRAM(s) IntraMuscular once  influenza   Vaccine 0.5 milliLiter(s) IntraMuscular once  insulin lispro (ADMELOG) corrective regimen sliding scale   SubCutaneous Before meals and at bedtime  ipratropium    for Nebulization 500 MICROGram(s) Nebulizer every 6 hours  methylPREDNISolone sodium succinate Injectable 30 milliGRAM(s) IV Push every 12 hours  milrinone Infusion 0.125 MICROgram(s)/kG/Min (3.68 mL/Hr) IV Continuous <Continuous>  nystatin    Suspension [id number] Unit(s) Swish and Swallow three times a day  pantoprazole    Tablet 40 milliGRAM(s) Oral before breakfast  polyethylene glycol 3350 17 Gram(s) Oral daily  senna 2 Tablet(s) Oral at bedtime  tamsulosin 0.4 milliGRAM(s) Oral at bedtime  trimethoprim  160 mG/sulfamethoxazole 800 mG 1 Tablet(s) Oral daily    MEDICATIONS  (PRN):  dextrose Oral Gel 15 Gram(s) Oral once PRN Blood Glucose LESS THAN 70 milliGRAM(s)/deciliter  hydrocodone/homatropine Syrup 5 milliLiter(s) Oral every 4 hours PRN Cough      Care Discussed with Consultants/Other Providers [ x] YES  [ ] NO    RADIOLOGY & ADDITIONAL TESTS:

## 2022-11-30 NOTE — PROGRESS NOTE ADULT - SUBJECTIVE AND OBJECTIVE BOX
PULMONARY CONSULT SERVICE FOLLOW-UP NOTE    INTERVAL HPI:  Reviewed chart and overnight events; patient seen and examined at bedside.    MEDICATIONS:  Pulmonary:  benzonatate 200 milliGRAM(s) Oral three times a day  hydrocodone/homatropine Syrup 5 milliLiter(s) Oral every 4 hours PRN  ipratropium    for Nebulization 500 MICROGram(s) Nebulizer every 6 hours    Antimicrobials:  nystatin    Suspension 389977 Unit(s) Swish and Swallow three times a day  trimethoprim  160 mG/sulfamethoxazole 800 mG 1 Tablet(s) Oral daily    Anticoagulants:  enoxaparin Injectable 40 milliGRAM(s) SubCutaneous every 24 hours    Cardiac:  milrinone Infusion 0.125 MICROgram(s)/kG/Min IV Continuous <Continuous>      Allergies    No Known Allergies    Intolerances        Vital Signs Last 24 Hrs  T(C): 36.6 (30 Nov 2022 09:00), Max: 37.1 (29 Nov 2022 18:08)  T(F): 97.8 (30 Nov 2022 09:00), Max: 98.7 (29 Nov 2022 18:08)  HR: 105 (30 Nov 2022 13:00) (88 - 117)  BP: 124/68 (30 Nov 2022 13:00) (117/69 - 144/83)  BP(mean): 91 (30 Nov 2022 13:00) (84 - 108)  RR: 34 (30 Nov 2022 13:00) (16 - 48)  SpO2: 94% (30 Nov 2022 13:00) (87% - 100%)    Parameters below as of 30 Nov 2022 13:00  Patient On (Oxygen Delivery Method): nasal cannula, high flow  O2 Flow (L/min): 40  O2 Concentration (%): 50    11-29 @ 07:01  -  11-30 @ 07:00  --------------------------------------------------------  IN: 201.6 mL / OUT: 1005 mL / NET: -803.4 mL    11-30 @ 07:01  -  11-30 @ 14:14  --------------------------------------------------------  IN: 20.4 mL / OUT: 0 mL / NET: 20.4 mL          PHYSICAL EXAM:  Constitutional: WDWN  HEENT: NC/AT; PERRL, anicteric sclera; MMM  Neck: supple  Cardiovascular: +S1/S2, RRR  Respiratory: CTA B/L; no W/R/R  Gastrointestinal: soft, NT/ND  Extremities: WWP; no edema, clubbing or cyanosis  Vascular: 2+ radial pulses B/L  Neurological: awake and alert; AMARAL    LABS:  ABG - ( 29 Nov 2022 08:47 )  pH, Arterial: 7.37  pH, Blood: x     /  pCO2: 67    /  pO2: 107   / HCO3: 39    / Base Excess: 10.6  /  SaO2: 98.2                CBC Full  -  ( 30 Nov 2022 05:30 )  WBC Count : 14.54 K/uL  RBC Count : 4.02 M/uL  Hemoglobin : 11.8 g/dL  Hematocrit : 36.6 %  Platelet Count - Automated : 204 K/uL  Mean Cell Volume : 91.0 fl  Mean Cell Hemoglobin : 29.4 pg  Mean Cell Hemoglobin Concentration : 32.2 gm/dL  Auto Neutrophil # : 11.97 K/uL  Auto Lymphocyte # : 1.53 K/uL  Auto Monocyte # : 0.84 K/uL  Auto Eosinophil # : 0.02 K/uL  Auto Basophil # : 0.02 K/uL  Auto Neutrophil % : 82.4 %  Auto Lymphocyte % : 10.5 %  Auto Monocyte % : 5.8 %  Auto Eosinophil % : 0.1 %  Auto Basophil % : 0.1 %    11-30    136  |  96  |  21  ----------------------------<  123<H>  4.5   |  39<H>  |  0.67    Ca    8.6      30 Nov 2022 05:30  Phos  2.5     11-30  Mg     2.4     11-30    TPro  6.8  /  Alb  3.1<L>  /  TBili  0.4  /  DBili  x   /  AST  18  /  ALT  29  /  AlkPhos  71  11-30                      RADIOLOGY & ADDITIONAL STUDIES: PULMONARY CONSULT SERVICE FOLLOW-UP NOTE    INTERVAL HPI:  Reviewed chart and overnight events; patient seen and examined at bedside. No new complaints. Dyspnea improved.    MEDICATIONS:  Pulmonary:  benzonatate 200 milliGRAM(s) Oral three times a day  hydrocodone/homatropine Syrup 5 milliLiter(s) Oral every 4 hours PRN  ipratropium    for Nebulization 500 MICROGram(s) Nebulizer every 6 hours    Antimicrobials:  nystatin    Suspension [id number] Unit(s) Swish and Swallow three times a day  trimethoprim  160 mG/sulfamethoxazole 800 mG 1 Tablet(s) Oral daily    Anticoagulants:  enoxaparin Injectable 40 milliGRAM(s) SubCutaneous every 24 hours    Cardiac:  milrinone Infusion 0.125 MICROgram(s)/kG/Min IV Continuous <Continuous>      Allergies    No Known Allergies    Intolerances        Vital Signs Last 24 Hrs  T(C): 36.6 (30 Nov 2022 09:00), Max: 37.1 (29 Nov 2022 18:08)  T(F): 97.8 (30 Nov 2022 09:00), Max: 98.7 (29 Nov 2022 18:08)  HR: 105 (30 Nov 2022 13:00) (88 - 117)  BP: 124/68 (30 Nov 2022 13:00) (117/69 - 144/83)  BP(mean): 91 (30 Nov 2022 13:00) (84 - 108)  RR: 34 (30 Nov 2022 13:00) (16 - 48)  SpO2: 94% (30 Nov 2022 13:00) (87% - 100%)    Parameters below as of 30 Nov 2022 13:00  Patient On (Oxygen Delivery Method): nasal cannula, high flow  O2 Flow (L/min): 40  O2 Concentration (%): 50    11-29 @ 07:01  -  11-30 @ 07:00  --------------------------------------------------------  IN: 201.6 mL / OUT: 1005 mL / NET: -803.4 mL    11-30 @ 07:01  -  11-30 @ 14:14  --------------------------------------------------------  IN: 20.4 mL / OUT: 0 mL / NET: 20.4 mL          PHYSICAL EXAM:  Constitutional: Sitting in bed eating breakfast  HEENT: NC/AT; MMM  Neck: -JVD  Cardiovascular: +S1/S2, RRR  Respiratory: bibasilar crackles   Gastrointestinal: Distended-tympanetic  Extremities: WWP; trace edema  Vascular: 2+ radial pulses B/L  Neurological: awake and alert; AMARAL    LABS:  ABG - ( 29 Nov 2022 08:47 )  pH, Arterial: 7.37  pH, Blood: x     /  pCO2: 67    /  pO2: 107   / HCO3: 39    / Base Excess: 10.6  /  SaO2: 98.2                CBC Full  -  ( 30 Nov 2022 05:30 )  WBC Count : 14.54 K/uL  RBC Count : 4.02 M/uL  Hemoglobin : 11.8 g/dL  Hematocrit : 36.6 %  Platelet Count - Automated : 204 K/uL  Mean Cell Volume : 91.0 fl  Mean Cell Hemoglobin : 29.4 pg  Mean Cell Hemoglobin Concentration : 32.2 gm/dL  Auto Neutrophil # : 11.97 K/uL  Auto Lymphocyte # : 1.53 K/uL  Auto Monocyte # : 0.84 K/uL  Auto Eosinophil # : 0.02 K/uL  Auto Basophil # : 0.02 K/uL  Auto Neutrophil % : 82.4 %  Auto Lymphocyte % : 10.5 %  Auto Monocyte % : 5.8 %  Auto Eosinophil % : 0.1 %  Auto Basophil % : 0.1 %    11-30    136  |  96  |  21  ----------------------------<  123<H>  4.5   |  39<H>  |  0.67    Ca    8.6      30 Nov 2022 05:30  Phos  2.5     11-30  Mg     2.4     11-30    TPro  6.8  /  Alb  3.1<L>  /  TBili  0.4  /  DBili  x   /  AST  18  /  ALT  29  /  AlkPhos  71  11-30                      RADIOLOGY & ADDITIONAL STUDIES:

## 2022-12-01 NOTE — PHYSICAL THERAPY INITIAL EVALUATION ADULT - ADDITIONAL COMMENTS
Pt resides in home with 4 steps to negotiate, uses w/c mostly, able to ambulate few steps with RW, has regular bed, shower chair and toilet bars. Pt states his sons are able to help him as needed, and he is able to transfer to w/c independently

## 2022-12-01 NOTE — PHYSICAL THERAPY INITIAL EVALUATION ADULT - DIAGNOSIS, PT EVAL
6A: Primary Prevention/Risk Reduction for Cardiovascular/Pulmonary Disorders 6B: Impaired Aerobic Capacity/Endurance Associated with Deconditioning 6D: Impaired Aerobic Capacity/Endurance Associated with Cardiovascular Pump Dysfunction or Failure

## 2022-12-01 NOTE — PROGRESS NOTE ADULT - ASSESSMENT
Pt is 62 yo M w/ PMH HLD, pre-DM, and pulmonary fibrosis 2/2 COVID-19 infection (wheelchair bound at baseline, on 4L home O2) who presented to [facility] for several days of worsening SOB and generalized weakness with increasing O2 requirements, found to have acute hypoxic respiratory failure 2/2 pulmonary fibrosis with superimposed bacterial pneumonia and acute decompensated pulmonary HTN, transferred to [facility] for RHC i/s/o severe pulmonary disease.    NEURO:   No active issues; AOx3.     CARDIAC  #Acute decompensated R-sided HF   TTE 11/23/22 shows severely dilated RV size, moderately-to-severely reduced RV systolic function, dilated RA, normal LV size & systolic function, mild symmetric LVH, grade I LV diastolic dysfunction, mild-moderate TR, pulmonary HTN with PASP 48 mmHg, trivial pericardial effusion. No evidence of septal bowing into LV. BNP elevated to 5k (baseline 100). Decompensated R-side HF most likely i/s/o worsening PHTN 2/2 superimposed bacterial pneumonia (with elevated procalcitonin to 0.79 and elevated WBC on admission to ). Pt likely has Group 3 PHTN 2/2 intrinsic lung disease.   RHC 11/28/22 showing CO/CI 8.2/4.2, CVP 2, PA pressure 44/14 (25), PCWP 6, RV 30/5.   TTE 11/28/22 showing improvement in RV size and systolic function.  - f/u PHTN recs   - c/w milrinone gtt 0.125 mcg/kg/hr.  - No additional diuresis.   - Maintain strict I&Os; char dc'd.    PULMONARY  #Decompensated PHTN  Pt has Group 3 PHTN 2/2 superimposed bacterial pneumonia i/s/o chronic pulmonary fibrosis from COVID-19 disease. TTE 11/23/22 showed PASP 48 mmHg. Pt currently breathing comfortably on HFNC 50/50. Finished courses of vancomycin 1500 mg IV daily and Zosyn 4.5 g IV q8h.   - f/u repeat sputum culture.  - c/w duo nebs q6h standing.   - Wean off Florentin.   - c/w HFNC 50/50.  - Wean off milrinone on 12/1.   - Trend ABG.  - Avoid positive pressure ventilation (increases pulmonary afterload)  - encourage OOBTC and incentive spirometry     #Pulmonary fibrosis:   i/s/o prior COVID-19 infection. Pt was receiving prolonged prednisone taper as early as mid-November and was also treated with stress-dose steroids at [facility].  - c/w Solumedrol 30 mg IV q12h tomorrow.  - Will likely need pulmonary rehab to improve functional status prior to lung transplant.    GI:  - c/w Protonix 40 mg IV daily     RENAL:  No active issues     ENDO:   #pre-DM  A1c 6.0%   - mISS   - DASH/TLC diet     ID:   #Superimposed bacterial PNA   ID was consulted at [facility] and recommended treatment with broad spectrum antibiotics. Pt received vancomycin and cefepime. RVP and COVID were negative. On admission to CCU, MRSA swab was positive.  - Finished courses of vancomycin 1500 mg IV daily and Zosyn 4.5 g IV q8h  - f/u repeat sputum culture  - f/u serum Fungitell   - Pneumocystis PCR negative   - Started Bactrim 1DS daily for PCP ppx while on steroids.   - c/w Nystatin swish & swallow TID x7 days for thrush.   - f/u ID recs.     #Lung transplant:   - f/u pre-lung transplant ID recs: CMV IgG, EBV, serum QFT, Strongyloides IgG, hepatitis panel, HIV, varicella IgG, MMR IgG, toxoplasma IgG, influenza vaccine, Prevnar-20    HEME:  No active issues     F: PO  E: Replete PRN  N: DASH/TLC   GI ppx: Protonix 40 mg IV daily   DVT ppx: Lovenox 40 mg SQ daily   Code status: Full code   Dispo: CCU   62 yo M w/ PMH HLD, pre-DM, and pulmonary fibrosis 2/2 COVID-19 infection (wheelchair bound at baseline, on 4L home O2) who presented to [facility] for several days of worsening SOB and generalized weakness with increasing O2 requirements, found to have acute hypoxic respiratory failure 2/2 pulmonary fibrosis with superimposed bacterial pneumonia and acute decompensated pulmonary HTN, transferred to [facility] for RHC i/s/o severe pulmonary disease.    NEURO:   No active issues; AOx3.     CARDIAC  #Acute decompensated R-sided HF   TTE 11/23/22 shows severely dilated RV size, moderately-to-severely reduced RV systolic function, dilated RA, normal LV size & systolic function, mild symmetric LVH, grade I LV diastolic dysfunction, mild-moderate TR, pulmonary HTN with PASP 48 mmHg, trivial pericardial effusion. No evidence of septal bowing into LV. BNP elevated to 5k (baseline 100). Decompensated R-side HF most likely i/s/o worsening PHTN 2/2 superimposed bacterial pneumonia (with elevated procalcitonin to 0.79 and elevated WBC on admission to ).  RHC 11/28/22 showing CO/CI 8.2/4.2, CVP 2, PA pressure 44/14 (25), PCWP 6, RV 30/5.   TTE 11/28/22 showing improvement in RV size and systolic function.  - f/u PHTN recs   - d/c milrinone gtt 0.125 mcg/kg/hr today  - No additional diuresis.   - Maintain strict I&Os; pardo dc'd yesterday    PULMONARY  #Decompensated PHTN  Pt has Group 3 PHTN 2/2 chronic pulmonary fibrosis from COVID-19 disease with superimposed bacterial pneumonia. TTE 11/23/22 showed PASP 48 mmHg. Pt currently breathing comfortably on HFNC 50/50. Finished courses of vancomycin 1500 mg IV daily and Zosyn 4.5 g IV q8h. Initial sputum cx with contam  - f/u repeat sputum culture.  - c/w duo nebs q6h standing.   - Wean off Florentin.   - c/w HFNC 50/50.  - Wean off milrinone on 12/1.   - Trend ABG.  - Avoid positive pressure ventilation (increases pulmonary afterload)  - encourage OOBTC and incentive spirometry     #Pulmonary fibrosis:   i/s/o prior COVID-19 infection. Pt was receiving prolonged prednisone taper as early as mid-November and was also treated with stress-dose steroids at [facility].  - c/w Solumedrol 30 mg IV q12h tomorrow.  - Will likely need pulmonary rehab to improve functional status prior to lung transplant.    GI:  - c/w Protonix 40 mg IV daily     RENAL:  No active issues     ENDO:   #pre-DM  A1c 6.0%   - mISS   - DASH/TLC diet     ID:   #Superimposed bacterial PNA   ID was consulted at [facility] and recommended treatment with broad spectrum antibiotics. Pt received vancomycin and cefepime. RVP and COVID were negative. On admission to CCU, MRSA swab was positive.  - Finished courses of vancomycin 1500 mg IV daily and Zosyn 4.5 g IV q8h  - f/u repeat sputum culture  - f/u serum Fungitell   - Pneumocystis PCR negative   - Started Bactrim 1DS daily for PCP ppx while on steroids.   - c/w Nystatin swish & swallow TID x7 days for thrush.   - f/u ID recs.     #Lung transplant:   - f/u pre-lung transplant ID recs: CMV IgG, EBV, serum QFT, Strongyloides IgG, hepatitis panel, HIV, varicella IgG, MMR IgG, toxoplasma IgG, influenza vaccine, Prevnar-20    HEME:  No active issues     F: PO  E: Replete PRN  N: DASH/TLC   GI ppx: Protonix 40 mg IV daily   DVT ppx: Lovenox 40 mg SQ daily   Code status: Full code   Dispo: CCU   62 yo M w/ PMH HLD, pre-DM, and pulmonary fibrosis 2/2 COVID-19 infection (wheelchair bound at baseline, on 4L home O2) who presented to [facility] for several days of worsening SOB and generalized weakness with increasing O2 requirements, found to have acute hypoxic respiratory failure 2/2 pulmonary fibrosis with superimposed bacterial pneumonia and acute decompensated pulmonary HTN, transferred to [facility] for RHC i/s/o severe pulmonary disease.    NEURO:   No active issues; AOx3.     CARDIAC  #Acute decompensated R-sided HF   TTE 11/23/22 shows severely dilated RV size, moderately-to-severely reduced RV systolic function, dilated RA, normal LV size & systolic function, mild symmetric LVH, grade I LV diastolic dysfunction, mild-moderate TR, pulmonary HTN with PASP 48 mmHg, trivial pericardial effusion. No evidence of septal bowing into LV. BNP elevated to 5k (baseline 100). Decompensated R-side HF most likely i/s/o worsening PHTN 2/2 superimposed bacterial pneumonia (with elevated procalcitonin to 0.79 and elevated WBC on admission to ).  RHC 11/28/22 showing CO/CI 8.2/4.2, CVP 2, PA pressure 44/14 (25), PCWP 6, RV 30/5.   TTE 11/28/22 showing improvement in RV size and systolic function.  - f/u PHTN recs   - d/c milrinone gtt 0.125 mcg/kg/hr today  - start torsemide 10mg PO qD  - f/u CMP & lactate after diuresis  - Maintain strict I&Os; pardo dc'd yesterday    PULMONARY  #Decompensated PHTN  Pt has Group 3 PHTN 2/2 chronic pulmonary fibrosis from COVID-19 disease with superimposed bacterial pneumonia. TTE 11/23/22 showed PASP 48 mmHg. Pt currently breathing comfortably on HFNC 50/50. Finished courses of vancomycin 1500 mg IV daily and Zosyn 4.5 g IV q8h. Initial sputum cx with contam  - f/u repeat sputum culture.  - c/w duo nebs q6h standing.   - Wean off Florentin.   - c/w HFNC 50/50.  - Wean off milrinone on 12/1.   - Trend ABG.  - Avoid positive pressure ventilation (increases pulmonary afterload)  - encourage OOBTC and incentive spirometry     #Pulmonary fibrosis:   i/s/o prior COVID-19 infection. Pt was receiving prolonged prednisone taper as early as mid-November and was also treated with stress-dose steroids at [facility].  - c/w Solumedrol 30 mg IV q12h tomorrow.  - Will likely need pulmonary rehab to improve functional status prior to lung transplant.    GI:  - c/w Protonix 40 mg IV daily     RENAL:  No active issues     ENDO:   #pre-DM  A1c 6.0%   - mISS   - DASH/TLC diet     ID:   #Superimposed bacterial PNA   ID was consulted at [facility] and recommended treatment with broad spectrum antibiotics. Pt received vancomycin and cefepime. RVP and COVID were negative. On admission to CCU, MRSA swab was positive.  - Finished courses of vancomycin 1500 mg IV daily and Zosyn 4.5 g IV q8h  - f/u repeat sputum culture  - f/u serum Fungitell   - Pneumocystis PCR negative   - Started Bactrim 1DS daily for PCP ppx while on steroids.   - c/w Nystatin swish & swallow TID x7 days for thrush.   - f/u ID recs.     #Lung transplant:   - f/u pre-lung transplant ID recs: CMV IgG, EBV, serum QFT, Strongyloides IgG, hepatitis panel, HIV, varicella IgG, MMR IgG, toxoplasma IgG, influenza vaccine, Prevnar-20    HEME:  No active issues     F: PO  E: Replete PRN  N: DASH/TLC   GI ppx: Protonix 40 mg IV daily   DVT ppx: Lovenox 40 mg SQ daily   Code status: Full code   Dispo: CCU   62 yo M w/ PMH HLD, pre-DM, and pulmonary fibrosis 2/2 COVID-19 infection (wheelchair bound at baseline, on 4L home O2) who presented to [facility] for several days of worsening SOB and generalized weakness with increasing O2 requirements, found to have acute hypoxic respiratory failure 2/2 pulmonary fibrosis with superimposed bacterial pneumonia and acute decompensated pulmonary HTN, transferred to [facility] for RHC i/s/o severe pulmonary disease.    NEURO:   No active issues; AOx3.     CARDIAC  #Acute decompensated R-sided HF   TTE 11/23/22 shows severely dilated RV size, moderately-to-severely reduced RV systolic function, dilated RA, normal LV size & systolic function, mild symmetric LVH, grade I LV diastolic dysfunction, mild-moderate TR, pulmonary HTN with PASP 48 mmHg, trivial pericardial effusion. No evidence of septal bowing into LV. BNP elevated to 5k (baseline 100). Decompensated R-side HF most likely i/s/o worsening PHTN 2/2 superimposed bacterial pneumonia (with elevated procalcitonin to 0.79 and elevated WBC on admission to ).  RHC 11/28/22 showing CO/CI 8.2/4.2, CVP 2, PA pressure 44/14 (25), PCWP 6, RV 30/5.   TTE 11/28/22 showing improvement in RV size and systolic function.  - f/u PHTN recs   - d/c milrinone gtt 0.125 mcg/kg/hr today  - start torsemide 10mg PO qD  - f/u CMP & lactate after diuresis  - Maintain strict I&Os; pardo dc'd yesterday    PULMONARY  #Decompensated PHTN  Pt has Group 3 PHTN 2/2 chronic pulmonary fibrosis from COVID-19 disease with superimposed bacterial pneumonia. TTE 11/23/22 showed PASP 48 mmHg. Pt currently breathing comfortably on HFNC 50/50. Finished courses of vancomycin 1500 mg IV daily and Zosyn 4.5 g IV q8h. Initial sputum cx contaminated specimen. Weaned off Florentin.  - c/w duo nebs q6h standing.   - c/w HFNC 50/50 for pulmonary vasodilation  - Wean off milrinone on 12/1.   - Avoid positive pressure ventilation (increases pulmonary afterload)  - encourage OOBTC and incentive spirometry   - f/u pulm recs, coordinating Tyvaso to be started at home    #Pulmonary Fibrosis:   Sequelae of prior COVID-19 infection. Pt received prolonged prednisone taper starting in mid-November. Also treated with stress-dose steroids at [facility].  - c/w Solumedrol 30 mg IV q12h, wean as tolerated  - will likely need pulmonary rehab to improve functional status prior to lung transplant.  - f/u pulmonary recs    GI:  - transitioned from Protonix 40 mg IV --> PO daily     RENAL:  No active issues     ENDO:   #pre-DM  A1c 6.0%   - mISS   - DASH/TLC diet     ID:   #Superimposed bacterial PNA   ID was consulted at [facility] and recommended treatment with broad spectrum antibiotics. Pt received vancomycin and cefepime. RVP and COVID were negative. On admission to CCU, MRSA swab was positive. Fungitell negative. Pneumocystis PCR negative   - Finished courses of vancomycin 1500 mg IV daily and Zosyn 4.5 g IV q8h  - c/w Bactrim 1DS daily for PCP ppx while on steroids.   - c/w Nystatin swish & swallow TID x7 days for thrush.   - f/u ID recs.     #Lung transplant:   - f/u pre-lung transplant ID recs: CMV IgG, EBV, serum QFT, Strongyloides IgG, hepatitis panel, HIV, varicella IgG, MMR IgG, toxoplasma IgG, influenza vaccine, Prevnar-20    HEME:  No active issues     F: PO  E: Replete PRN  N: DASH/TLC   GI ppx: Protonix 40 mg PO daily   DVT ppx: Lovenox 40 mg SQ daily   Code status: Full code   Dispo: CCU --> [facility]   60 yo M w/ PMH HLD, pre-DM, and pulmonary fibrosis 2/2 COVID-19 infection (wheelchair bound at baseline, on 4L home O2) who presented to [facility] for several days of worsening SOB and generalized weakness with increasing O2 requirements, found to have acute hypoxic respiratory failure 2/2 pulmonary fibrosis with superimposed bacterial pneumonia and acute decompensated pulmonary HTN, transferred to [facility] for RHC i/s/o severe pulmonary disease.    NEURO:   No active issues; AOx3.     CARDIAC  #Acute decompensated R-sided HF   TTE 11/23/22 shows severely dilated RV size, moderately-to-severely reduced RV systolic function, dilated RA, normal LV size & systolic function, mild symmetric LVH, grade I LV diastolic dysfunction, mild-moderate TR, pulmonary HTN with PASP 48 mmHg, trivial pericardial effusion. No evidence of septal bowing into LV. BNP elevated to 5k (baseline 100). Decompensated R-side HF most likely i/s/o worsening PHTN 2/2 superimposed bacterial pneumonia (with elevated procalcitonin to 0.79 and elevated WBC on admission to ).  RHC 11/28/22 showing CO/CI 8.2/4.2, CVP 2, PA pressure 44/14 (25), PCWP 6, RV 30/5.   TTE 11/28/22 showing improvement in RV size and systolic function.  - f/u PHTN recs   - d/c milrinone gtt 0.125 mcg/kg/hr today  - start torsemide 10mg PO qD  - f/u CMP & lactate after diuresis  - Maintain strict I&Os; pardo dc'd yesterday    #Hyponatremia 2/2 HF  - continue to trend    PULMONARY  #Decompensated PHTN  Pt has Group 3 PHTN 2/2 chronic pulmonary fibrosis from COVID-19 disease with superimposed bacterial pneumonia. TTE 11/23/22 showed PASP 48 mmHg. Pt currently breathing comfortably on HFNC 50/50. Finished courses of vancomycin 1500 mg IV daily and Zosyn 4.5 g IV q8h. Initial sputum cx contaminated specimen. Weaned off Florentin.  - c/w duo nebs q6h standing.   - c/w HFNC 50/50 for pulmonary vasodilation  - Wean off milrinone on 12/1.   - Avoid positive pressure ventilation (increases pulmonary afterload)  - encourage OOBTC and incentive spirometry   - f/u pulm recs, coordinating Tyvaso to be started at home    #Pulmonary Fibrosis:   Sequelae of prior COVID-19 infection. Pt received prolonged prednisone taper starting in mid-November. Also treated with stress-dose steroids at [facility].  - c/w Solumedrol 30 mg IV q12h, wean as tolerated  - will likely need pulmonary rehab to improve functional status prior to lung transplant.  - f/u pulmonary recs    GI:  - transitioned from Protonix 40 mg IV --> PO daily     RENAL:  No active issues     ENDO:   #pre-DM  A1c 6.0%   - mISS   - DASH/TLC diet     ID:   #Superimposed bacterial PNA   ID was consulted at [facility] and recommended treatment with broad spectrum antibiotics. Pt received vancomycin and cefepime. RVP and COVID were negative. On admission to CCU, MRSA swab was positive. Fungitell negative. Pneumocystis PCR negative   - Finished courses of vancomycin 1500 mg IV daily and Zosyn 4.5 g IV q8h  - c/w Bactrim 1DS daily for PCP ppx while on steroids.   - c/w Nystatin swish & swallow TID x7 days for thrush.   - f/u ID recs.     #Lung transplant:   - f/u pre-lung transplant ID recs: CMV IgG, EBV, serum QFT, Strongyloides IgG, hepatitis panel, HIV, varicella IgG, MMR IgG, toxoplasma IgG, influenza vaccine, Prevnar-20    HEME:  No active issues     F: PO  E: Replete PRN  N: DASH/TLC   GI ppx: Protonix 40 mg PO daily   DVT ppx: Lovenox 40 mg SQ daily   Code status: Full code   Dispo: CCU --> [facility]

## 2022-12-01 NOTE — PHYSICAL THERAPY INITIAL EVALUATION ADULT - PERTINENT HX OF CURRENT PROBLEM, REHAB EVAL
60 yo M w/ PMH HLD, pre-DM, and pulmonary fibrosis 2/2 COVID-19 infection (wheelchair bound at baseline, on 4L home O2) who presented to [facility] for several days of worsening SOB and generalized weakness with increasing O2 requirements, found to have acute hypoxic respiratory failure 2/2 pulmonary fibrosis with superimposed bacterial pneumonia and acute decompensated pulmonary HTN, transferred to [facility] for RHC i/s/o severe pulmonary disease.

## 2022-12-01 NOTE — PROGRESS NOTE ADULT - ASSESSMENT
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    #WHO group 3 pHTN   Mild precapillary pulmonary hypertension with normal CO/CI by Td on low dose milrinone, normal filling pressures suggesting precapillary PH 2/2 underlying severe fibrotic ILD and adequate volume status.  - Discontinue Milrinone today.   - Plan to wean HFNC as tolerated.   - Further planning of Tyvaso acquisition from the specialty pharmacy,  received prior auth for tyvaso discussed with patient and nursing at bedside, patient to be delivered tyvaso from specialty pharmacy, will need to be stored on floor and receive medicine qid.   - Echo that was performed noted to have poor quality but with improvement in RV function.  - PT evaluation, OOBTC.   - Bowel regimen    #ILD flare with PNA  Post COVID ILD now presenting with worsening cough and elevated WBC count with elevated procalcitonin and CT chest performed at OSH with concern for newly developing ggos when compared to prior CT scan done in october. Completed course of zosyn.   - Continue steroids, will continue to evaluate daily.  - Bactrim for PCP ppx.     Refer to prior notes for transplant discussion

## 2022-12-01 NOTE — PHYSICAL THERAPY INITIAL EVALUATION ADULT - GENERAL OBSERVATIONS, REHAB EVAL
Pt received in bed in no acute distress, +HFNC, IV, EKG. Pt. presents with decreased functional endurance 2/2 medical hx and aerobic capacity.

## 2022-12-01 NOTE — PROGRESS NOTE ADULT - SUBJECTIVE AND OBJECTIVE BOX
PULMONARY CONSULT SERVICE FOLLOW-UP NOTE    INTERVAL HPI/OVERNIGHT EVENTS:  Patient was seen and examined at bedside. Case discussed with attending physician during rounds.     As per nurse and patient, no o/n events, patient resting comfortably. Patient with no new acute complaints. Patient reports that he has been coughing less overnight and breathing at baseline with HFNC. Patient deneis chest pain, shortness of breath, headache, nausea/ vomiting.     VITAL SIGNS:  T(F): 98.3 (12-01-22 @ 18:05)  HR: 109 (12-01-22 @ 18:00)  BP: 115/75 (12-01-22 @ 18:00)  RR: 43 (12-01-22 @ 18:00)  SpO2: 94% (12-01-22 @ 18:00)  Wt(kg): --    PHYSICAL EXAM:    Constitutional: Male on HFNC, resting in bed in no acute distress.   Respiratory: Bilateral scattered ronchi with end expiratory wheezing, diminished airflow bilaterally.  Cardiovascular: Regular rate and rhythm, normal S1S2.  Extremities: Warm, well perfused, Non pitting edema in bilateral lower extremities Bilateral clubbing.   Neurological: AAOx3.  Skin: Normal temperature, warm, dry.    MEDICATIONS  (STANDING):  AQUAPHOR (petrolatum Ointment) 1 Application(s) Topical two times a day  benzocaine 15 mG/menthol 3.6 mG Lozenge 2 Lozenge Oral every 6 hours  benzonatate 200 milliGRAM(s) Oral three times a day  chlorhexidine 2% Cloths 1 Application(s) Topical daily  dextrose 5%. 1000 milliLiter(s) (100 mL/Hr) IV Continuous <Continuous>  dextrose 5%. 1000 milliLiter(s) (50 mL/Hr) IV Continuous <Continuous>  dextrose 50% Injectable 25 Gram(s) IV Push once  dextrose 50% Injectable 12.5 Gram(s) IV Push once  dextrose 50% Injectable 25 Gram(s) IV Push once  enoxaparin Injectable 40 milliGRAM(s) SubCutaneous every 24 hours  fluticasone propionate 50 MICROgram(s)/spray Nasal Spray 2 Spray(s) Both Nostrils two times a day  glucagon  Injectable 1 milliGRAM(s) IntraMuscular once  influenza   Vaccine 0.5 milliLiter(s) IntraMuscular once  insulin lispro (ADMELOG) corrective regimen sliding scale   SubCutaneous Before meals and at bedtime  ipratropium    for Nebulization 500 MICROGram(s) Nebulizer every 6 hours  methylPREDNISolone sodium succinate Injectable 30 milliGRAM(s) IV Push every 12 hours  nystatin    Suspension 157376 Unit(s) Swish and Swallow three times a day  pantoprazole    Tablet 40 milliGRAM(s) Oral before breakfast  polyethylene glycol 3350 17 Gram(s) Oral daily  senna 2 Tablet(s) Oral at bedtime  tamsulosin 0.4 milliGRAM(s) Oral at bedtime  torsemide 10 milliGRAM(s) Oral daily  trimethoprim  160 mG/sulfamethoxazole 800 mG 1 Tablet(s) Oral daily    MEDICATIONS  (PRN):  acetaminophen     Tablet .. 650 milliGRAM(s) Oral every 6 hours PRN Mild Pain (1 - 3)  dextrose Oral Gel 15 Gram(s) Oral once PRN Blood Glucose LESS THAN 70 milliGRAM(s)/deciliter  hydrocodone/homatropine Syrup 5 milliLiter(s) Oral every 4 hours PRN Cough      Allergies    No Known Allergies    Intolerances        LABS:                        12.4   13.70 )-----------( 210      ( 01 Dec 2022 05:29 )             37.4     12-01    133<L>  |  90<L>  |  22  ----------------------------<  121<H>  4.6   |  34<H>  |  0.76    Ca    9.0      01 Dec 2022 14:40  Phos  2.8     12-01  Mg     2.4     12-01    TPro  8.0  /  Alb  3.8  /  TBili  0.5  /  DBili  x   /  AST  22  /  ALT  32  /  AlkPhos  90  12-01            RADIOLOGY & ADDITIONAL TESTS:  Reviewed

## 2022-12-01 NOTE — CHART NOTE - NSCHARTNOTEFT_GEN_A_CORE
Admitting Diagnosis:   Patient is a 61y old  Male who presents with a chief complaint of SOB       PAST MEDICAL & SURGICAL HISTORY:  Dyslipidemia  Borderline diabetes  Pulmonary fibrosis  S/p knee surgery    Current Nutrition Order: DASH/TLC     PO Intake: Good (%) [ x ]  Fair (50-75%) [   ] Poor (<25%) [   ]    GI Issues: LBM 11/29; ordered for miralax, senna. Noted with large stool burden on CXR, given lactulose 11/30 per chart. No other documented GI distress at this time.    Pain: Denies pain/discomfort per chart review.    Skin Integrity: Edema 1+ R/L leg. No pressure injuries noted. Connor 18.    Labs:   12-01    131<L>  |  92<L>  |  19  ----------------------------<  140<H>  4.7   |  37<H>  |  0.73    Ca    8.9      01 Dec 2022 05:29  Phos  2.8     12-01  Mg     2.4     12-01    TPro  7.0  /  Alb  3.3  /  TBili  0.5  /  DBili  x   /  AST  17  /  ALT  26  /  AlkPhos  72  12-01    CAPILLARY BLOOD GLUCOSE    POCT Blood Glucose.: 209 mg/dL (01 Dec 2022 11:15)  POCT Blood Glucose.: 128 mg/dL (01 Dec 2022 07:17)  POCT Blood Glucose.: 151 mg/dL (30 Nov 2022 21:41)  POCT Blood Glucose.: 119 mg/dL (30 Nov 2022 16:17)      Medications:  MEDICATIONS  (STANDING):  AQUAPHOR (petrolatum Ointment) 1 Application(s) Topical two times a day  benzocaine 15 mG/menthol 3.6 mG Lozenge 2 Lozenge Oral every 6 hours  benzonatate 200 milliGRAM(s) Oral three times a day  chlorhexidine 2% Cloths 1 Application(s) Topical daily  dextrose 5%. 1000 milliLiter(s) (100 mL/Hr) IV Continuous <Continuous>  dextrose 5%. 1000 milliLiter(s) (50 mL/Hr) IV Continuous <Continuous>  dextrose 50% Injectable 25 Gram(s) IV Push once  dextrose 50% Injectable 12.5 Gram(s) IV Push once  dextrose 50% Injectable 25 Gram(s) IV Push once  enoxaparin Injectable 40 milliGRAM(s) SubCutaneous every 24 hours  fluticasone propionate 50 MICROgram(s)/spray Nasal Spray 2 Spray(s) Both Nostrils two times a day  glucagon  Injectable 1 milliGRAM(s) IntraMuscular once  influenza   Vaccine 0.5 milliLiter(s) IntraMuscular once  insulin lispro (ADMELOG) corrective regimen sliding scale   SubCutaneous Before meals and at bedtime  ipratropium    for Nebulization 500 MICROGram(s) Nebulizer every 6 hours  methylPREDNISolone sodium succinate Injectable 30 milliGRAM(s) IV Push every 12 hours  nystatin    Suspension [id number] Unit(s) Swish and Swallow three times a day  pantoprazole    Tablet 40 milliGRAM(s) Oral before breakfast  polyethylene glycol 3350 17 Gram(s) Oral daily  senna 2 Tablet(s) Oral at bedtime  tamsulosin 0.4 milliGRAM(s) Oral at bedtime  torsemide 10 milliGRAM(s) Oral daily  trimethoprim  160 mG/sulfamethoxazole 800 mG 1 Tablet(s) Oral daily    MEDICATIONS  (PRN):  acetaminophen     Tablet .. 650 milliGRAM(s) Oral every 6 hours PRN Mild Pain (1 - 3)  dextrose Oral Gel 15 Gram(s) Oral once PRN Blood Glucose LESS THAN 70 milliGRAM(s)/deciliter  hydrocodone/homatropine Syrup 5 milliLiter(s) Oral every 4 hours PRN Cough    Weight Change:   11/23: 216 pounds  11/24: 209.8 pounds  11/27: 197.7 pounds  11/30: 191.8 pounds  **Suspect ~25 pound weight change r/t fluid shifts, diuresis. Pt admitted with edema 3+ to L/R leg, L/R ankle, L/R foot. Pt edema status improved and currently with edema 1+ L/R leg.    Estimated energy needs:   Pt exceeds 120% IBW (%), thus pt's IBW used for all calculations. Needs adjusted for HF, infection. Fluids per team - pt noted to be hyponatremic.  Kcal (25-30 kcal/kg): 3110-2463 kcal  Protein (1.1-1.3 g/kg pro): 68-80 g protein    Subjective:   62 yo M w/ PMH HLD, pre-DM, and pulmonary fibrosis 2/2 COVID-19 infection (wheelchair bound at baseline, on 4L home O2) who presented to [facility] for several days of worsening SOB and generalized weakness with increasing O2 requirements, found to have acute hypoxic respiratory failure 2/2 pulmonary fibrosis with superimposed bacterial pneumonia and acute decompensated pulmonary HTN, transferred to [facility] for RHC i/s/o severe pulmonary disease. MIREYA 11/23 shows decompensated R-sided HF, most likely iso worsening PHTN. S/p diuresis and RHC showing precapillary pulmonary hypertension likely in the setting of WHO group 3 pHTN. Currently being evaluated for lung transplantation.    Pt seen on HFNC at bedside for follow up. Follow up limited d/t pt lethargic, falling asleep during interview. /68, MAP 84 - milrinone infusing. Currently ordered for DASH/TLC diet. Pt endorses eating most of his breakfast this morning and having a good appetite. Confirms NKFA. LBM 11/29; ordered for miralax, senna. Noted with large stool burden on CXR, given lactulose 11/30 per chart. No other documented GI distress at this time. Labs reviewed noted with elevated WBC, low Na, HDL 31, . POCT 128 151 119; ordered for sliding scale. Please see nutrition recommendations below.     Previous Nutrition Diagnosis: No active nutrition diagnosis identified previously    Active [   ]  Resolved [ x  ]    If resolved, new PES: Increased Nutrient Needs (protein) RT hypermetabolic demands AEB HF, bacterial pneumonia    Goal: Pt to meet at least 75% nutrition needs during hospital stay.     Recommendations:  - Continue DASH/TLC Diet.   - Monitor %PO intake, encourage as able. If PO intake declines, monitor need for ONS.  - Monitor BG, electrolytes; replete PRN  - Monitor weights, GI distress, skin  - Pain and bowel regimen per team  - RD to provide diet edu PRN    Education: Deferred at this time - pt lethargic    Risk Level: High [   ] Moderate [ x  ] Low [   ] Admitting Diagnosis:   Patient is a 61y old  Male who presents with a chief complaint of SOB       PAST MEDICAL & SURGICAL HISTORY:  Dyslipidemia  Borderline diabetes  Pulmonary fibrosis  S/p knee surgery    Current Nutrition Order: DASH/TLC     PO Intake: Good (%) [ x ]  Fair (50-75%) [   ] Poor (<25%) [   ]    GI Issues: LBM 11/29; ordered for miralax, senna. Noted with large stool burden on CXR, given lactulose 11/30 per chart. No other documented GI distress at this time.    Pain: Denies pain/discomfort per chart review.    Skin Integrity: Edema 1+ R/L leg. L ear Stage II pressure ulcer, Connor 18.    Labs:   12-01    131<L>  |  92<L>  |  19  ----------------------------<  140<H>  4.7   |  37<H>  |  0.73    Ca    8.9      01 Dec 2022 05:29  Phos  2.8     12-01  Mg     2.4     12-01    TPro  7.0  /  Alb  3.3  /  TBili  0.5  /  DBili  x   /  AST  17  /  ALT  26  /  AlkPhos  72  12-01    CAPILLARY BLOOD GLUCOSE    POCT Blood Glucose.: 209 mg/dL (01 Dec 2022 11:15)  POCT Blood Glucose.: 128 mg/dL (01 Dec 2022 07:17)  POCT Blood Glucose.: 151 mg/dL (30 Nov 2022 21:41)  POCT Blood Glucose.: 119 mg/dL (30 Nov 2022 16:17)      Medications:  MEDICATIONS  (STANDING):  AQUAPHOR (petrolatum Ointment) 1 Application(s) Topical two times a day  benzocaine 15 mG/menthol 3.6 mG Lozenge 2 Lozenge Oral every 6 hours  benzonatate 200 milliGRAM(s) Oral three times a day  chlorhexidine 2% Cloths 1 Application(s) Topical daily  dextrose 5%. 1000 milliLiter(s) (100 mL/Hr) IV Continuous <Continuous>  dextrose 5%. 1000 milliLiter(s) (50 mL/Hr) IV Continuous <Continuous>  dextrose 50% Injectable 25 Gram(s) IV Push once  dextrose 50% Injectable 12.5 Gram(s) IV Push once  dextrose 50% Injectable 25 Gram(s) IV Push once  enoxaparin Injectable 40 milliGRAM(s) SubCutaneous every 24 hours  fluticasone propionate 50 MICROgram(s)/spray Nasal Spray 2 Spray(s) Both Nostrils two times a day  glucagon  Injectable 1 milliGRAM(s) IntraMuscular once  influenza   Vaccine 0.5 milliLiter(s) IntraMuscular once  insulin lispro (ADMELOG) corrective regimen sliding scale   SubCutaneous Before meals and at bedtime  ipratropium    for Nebulization 500 MICROGram(s) Nebulizer every 6 hours  methylPREDNISolone sodium succinate Injectable 30 milliGRAM(s) IV Push every 12 hours  nystatin    Suspension [id number] Unit(s) Swish and Swallow three times a day  pantoprazole    Tablet 40 milliGRAM(s) Oral before breakfast  polyethylene glycol 3350 17 Gram(s) Oral daily  senna 2 Tablet(s) Oral at bedtime  tamsulosin 0.4 milliGRAM(s) Oral at bedtime  torsemide 10 milliGRAM(s) Oral daily  trimethoprim  160 mG/sulfamethoxazole 800 mG 1 Tablet(s) Oral daily    MEDICATIONS  (PRN):  acetaminophen     Tablet .. 650 milliGRAM(s) Oral every 6 hours PRN Mild Pain (1 - 3)  dextrose Oral Gel 15 Gram(s) Oral once PRN Blood Glucose LESS THAN 70 milliGRAM(s)/deciliter  hydrocodone/homatropine Syrup 5 milliLiter(s) Oral every 4 hours PRN Cough    5'5''  pounds +-10%  Wt 198 pounds BMI 33 %[id number]     Weight Change:   11/23: 216 pounds  11/24: 209.8 pounds  11/27: 197.7 pounds  11/30: 191.8 pounds  **Suspect ~25 pound weight change r/t fluid shifts, diuresis. Pt admitted with edema 3+ to L/R leg, L/R ankle, L/R foot. Pt edema status improved and currently with edema 1+ L/R leg.    Estimated energy needs:   Pt exceeds 120% IBW (%), thus pt's IBW used for all calculations. Needs adjusted for HF, infection, pressure ulcer; Fluids per team - pt noted to be hyponatremic/CHF.  Kcal (25-30 kcal/kg): 5723-2217 kcal  Protein (1.2-1.4 g/kg pro): 74-88 g protein    Subjective:   62 yo M w/ PMH HLD, pre-DM, and pulmonary fibrosis 2/2 COVID-19 infection (wheelchair bound at baseline, on 4L home O2) who presented to [facility] for several days of worsening SOB and generalized weakness with increasing O2 requirements, found to have acute hypoxic respiratory failure 2/2 pulmonary fibrosis with superimposed bacterial pneumonia and acute decompensated pulmonary HTN, transferred to [facility] for RHC i/s/o severe pulmonary disease. MIREYA 11/23 shows decompensated R-sided HF, most likely iso worsening PHTN. Pt underwent RHC on 11/28 which showed CO 8.2, CI 4.2, CVP 2, PA pressure 44/14 (25), wedge pressure 6, RV 30/5. TTE on 11/28 showed improved RV systolic function from initial presentation. Pt likely has Group 3 PHTN 2/2 intrinsic lung disease, is being followed by PHTN team with plans to evaluate for possible lung transplant.     Pt seen on [facility] at bedside for follow up. Follow up limited d/t pt lethargic, falling asleep during interview. /68, MAP 84 - milrinone infusing, plan to wean noted. Currently ordered for DASH/TLC diet. Pt endorses eating most of his breakfast this morning and having a good appetite. Confirms NKFA. LBM 11/29; ordered for miralax, senna. Noted with large stool burden on CXR, given lactulose 11/30 per chart. No other documented GI distress at this time. Labs reviewed noted with elevated WBC, low Na, HDL 31, NON , . POCT 128 151 119; ordered for steroids and sliding scale.  Please see nutrition recommendations below. Recs made with team.     Previous Nutrition Diagnosis: No active nutrition diagnosis identified previously    Active [   ]  Resolved [ x  ]    If resolved, new PES: Increased Nutrient Needs (protein) RT hypermetabolic demands AEB HF, bacterial pneumonia    Goal: Pt to meet at least 75% nutrition needs during hospital stay.     Recommendations:  - Continue DASH/TLC Diet.   - Monitor Diet tolerance and %PO intake, encourage as able. If PO intake declines, monitor need for ONS.  - Monitor BG, electrolytes; replete PRN  - Monitor weights, GI distress, skin  - Pain and bowel regimen per team  - MVI, VitC 500mg/day, Zinc 220mg h96alne use   - RD to provide diet edu PRN    Education: Deferred at this time - pt lethargic    Risk Level: High [   ] Moderate [ x  ] Low [   ]

## 2022-12-01 NOTE — PHYSICAL THERAPY INITIAL EVALUATION ADULT - PLANNED THERAPY INTERVENTIONS, PT EVAL
balance training/bed mobility training/gait training/strengthening/transfer training/wheelchair management/propulsion training

## 2022-12-01 NOTE — PROGRESS NOTE ADULT - SUBJECTIVE AND OBJECTIVE BOX
**Incomplete Note**   ***TRANSFER NOTE***      HOSPITAL COURSE:  Patient is a 62 yo M w/ PMHx of HLD, pre-DM, and pulmonary fibrosis 2/2 COVID-19 infection (wheelchair bound at baseline, on 4L home O2), who initially presented to [facility] on 11/22/2022 for several days of worsening dyspnea, productive cough, wheezing, and generalized weakness. Patient had increasing O2 requirements (up to 6L) and persistent dyspnea at rest. On day of admission, pt had syncopal event due to SOB. Pt was brought to [place] ED in respiratory distress where he was placed on BiPAP and started on broad spectrum antibiotics out of concern for pneumonia. CXR showed diffuse bilateral airspace opacities c/w multilobar pneumonia. CT PE showed no evidence of PE, however did show extensive diffuse interstitial and ground glass infiltrates bilaterally with associated hilar and mediastinal adenopathy, with suspected multilobar atypical pneumonia. Pt was admitted to  MICU for acute hypoxic respiratory failure 2/2 pulmonary fibrosis with superimposed bacterial pneumonia and acute decompensated pulmonary HTN. TTE was performed which showed mild TR, severely dilated RV, moderate-to-severe elevated RV systolic pressure, moderate-to severe PHTN (58 mmHg), normal LV size and wall thickness, normal LV systolic function (EF 58%), and grade II diastolic dysfunction. Pt was transferred to [facility] for further management and RHC given severity of pulmonary disease.     On admission to [facility] CCU, pt was weaned off BiPAP to HFNC and started on inhaled NO for afterload reduction and milrinone gtt for inotropic support. Received Solumedrol 40-50 mg IV BID for pulmonary fibrosis (pt had completed prednisone taper for PF exacerbation a few days prior to admission). Also received multiple doses of IV lasix for diuresis i/s/o fluid overload from R-sided HF. Completed course of vanc & zosyn for superimposed bacterial PNA, now being treated with Bactrim for Pneumocystis prophylaxis i/s/o chronic steroid use and nystatin for oropharyngeal thrush. Initial attempts at weaning Florentin and HFNC have been c/b desaturations, however currently pt has been weaned to Florentin 5-10 ppm (from 20 ppm) and is stable on HFNC 50/50. Pt underwent RHC on 11/28 which showed CO 8.2, CI 4.2, CVP 2, PA pressure 44/14 (25), wedge pressure 6, RV 30/5. TTE on 11/28 showed improved RV systolic function from initial presentation. Pt remains on milrinone gtt for inotropic support and is being followed by PHTN team with plans to evaluate for possible lung transplant. Daren mosqueda yesterday. Florentin weaned off today, will hold on weaning HFNC for oxygen support.     CC: Patient is a 61y old  Male who presents with a chief complaint of SOB (01 Dec 2022 08:02)      INTERVAL EVENTS: JOSE    SUBJECTIVE / INTERVAL HPI: Patient seen and examined at bedside.     ROS: negative unless otherwise stated above.    VITAL SIGNS:  Vital Signs Last 24 Hrs  T(C): 36.8 (01 Dec 2022 09:00), Max: 36.9 (01 Dec 2022 01:01)  T(F): 98.3 (01 Dec 2022 09:00), Max: 98.5 (01 Dec 2022 01:01)  HR: 110 (01 Dec 2022 14:00) (89 - 121)  BP: 124/74 (01 Dec 2022 14:00) (110/67 - 138/82)  BP(mean): 94 (01 Dec 2022 14:00) (81 - 104)  RR: 43 (01 Dec 2022 14:00) (19 - 43)  SpO2: 95% (01 Dec 2022 14:00) (90% - 100%)    Parameters below as of 01 Dec 2022 14:00  Patient On (Oxygen Delivery Method): nasal cannula, high flow  O2 Flow (L/min): 40  O2 Concentration (%): 50      11-30-22 @ 07:01  -  12-01-22 @ 07:00  --------------------------------------------------------  IN: 271.6 mL / OUT: 1100 mL / NET: -828.4 mL    12-01-22 @ 07:01  -  12-01-22 @ 15:20  --------------------------------------------------------  IN: 17 mL / OUT: 900 mL / NET: -883 mL        PHYSICAL EXAM:    General: NAD  HEENT: MMM  Neck: supple  Cardiovascular: +S1/S2; RRR  Respiratory: CTA B/L; no W/R/R  Gastrointestinal: soft, NT/ND  Extremities: WWP; no edema, clubbing or cyanosis  Vascular: 2+ radial, DP/PT pulses B/L  Neurological: AAOx3; no focal deficits    MEDICATIONS:  MEDICATIONS  (STANDING):  AQUAPHOR (petrolatum Ointment) 1 Application(s) Topical two times a day  benzocaine 15 mG/menthol 3.6 mG Lozenge 2 Lozenge Oral every 6 hours  benzonatate 200 milliGRAM(s) Oral three times a day  chlorhexidine 2% Cloths 1 Application(s) Topical daily  dextrose 5%. 1000 milliLiter(s) (50 mL/Hr) IV Continuous <Continuous>  dextrose 5%. 1000 milliLiter(s) (100 mL/Hr) IV Continuous <Continuous>  dextrose 50% Injectable 25 Gram(s) IV Push once  dextrose 50% Injectable 12.5 Gram(s) IV Push once  dextrose 50% Injectable 25 Gram(s) IV Push once  enoxaparin Injectable 40 milliGRAM(s) SubCutaneous every 24 hours  fluticasone propionate 50 MICROgram(s)/spray Nasal Spray 2 Spray(s) Both Nostrils two times a day  glucagon  Injectable 1 milliGRAM(s) IntraMuscular once  influenza   Vaccine 0.5 milliLiter(s) IntraMuscular once  insulin lispro (ADMELOG) corrective regimen sliding scale   SubCutaneous Before meals and at bedtime  ipratropium    for Nebulization 500 MICROGram(s) Nebulizer every 6 hours  methylPREDNISolone sodium succinate Injectable 30 milliGRAM(s) IV Push every 12 hours  nystatin    Suspension 734746 Unit(s) Swish and Swallow three times a day  pantoprazole    Tablet 40 milliGRAM(s) Oral before breakfast  polyethylene glycol 3350 17 Gram(s) Oral daily  senna 2 Tablet(s) Oral at bedtime  tamsulosin 0.4 milliGRAM(s) Oral at bedtime  torsemide 10 milliGRAM(s) Oral daily  trimethoprim  160 mG/sulfamethoxazole 800 mG 1 Tablet(s) Oral daily    MEDICATIONS  (PRN):  acetaminophen     Tablet .. 650 milliGRAM(s) Oral every 6 hours PRN Mild Pain (1 - 3)  dextrose Oral Gel 15 Gram(s) Oral once PRN Blood Glucose LESS THAN 70 milliGRAM(s)/deciliter  hydrocodone/homatropine Syrup 5 milliLiter(s) Oral every 4 hours PRN Cough      ALLERGIES:  Allergies    No Known Allergies    Intolerances        LABS:                        12.4   13.70 )-----------( 210      ( 01 Dec 2022 05:29 )             37.4     12-01    131<L>  |  92<L>  |  19  ----------------------------<  140<H>  4.7   |  37<H>  |  0.73    Ca    8.9      01 Dec 2022 05:29  Phos  2.8     12-01  Mg     2.4     12-01    TPro  7.0  /  Alb  3.3  /  TBili  0.5  /  DBili  x   /  AST  17  /  ALT  26  /  AlkPhos  72  12-01        CAPILLARY BLOOD GLUCOSE      POCT Blood Glucose.: 209 mg/dL (01 Dec 2022 11:15)      RADIOLOGY & ADDITIONAL TESTS: Reviewed. ***TRANSFER NOTE***      HOSPITAL COURSE:  Patient is a 62 yo M w/ PMHx of HLD, pre-DM, and pulmonary fibrosis 2/2 COVID-19 infection (wheelchair bound at baseline, on 4L home O2), who initially presented to [facility] on 11/22/2022 for several days of worsening dyspnea, productive cough, wheezing, and generalized weakness with increasing O2 requirements (up to 6L) and persistent dyspnea at rest. On day of admission, pt had syncopal event due to SOB. He was brought to [place] ED in respiratory distress where he was placed on BiPAP. CXR showed diffuse bilateral airspace opacities c/w multilobar pneumonia. CT PE showed no evidence of PE, however did show extensive diffuse interstitial and ground glass infiltrates bilaterally with associated hilar and mediastinal adenopathy, suspected multilobar atypical pneumonia. Patient was started on broad spectrum antibiotics due to concern for pneumonia. He was admitted to  MICU for acute hypoxic respiratory failure 2/2 pulmonary fibrosis with superimposed bacterial pneumonia and acute decompensated pulmonary HTN. TTE showed mild TR, severely dilated RV, moderate-to-severe elevated RV systolic pressure, moderate-to severe pHTN (58 mmHg), normal LV size and wall thickness, normal LV systolic function (EF 58%), and grade II diastolic dysfunction. Pt was transferred to [facility] for further management and RHC given severity of pulmonary disease.     On admission to [facility] CCU, pt was weaned off BiPAP to HFNC and started on inhaled NO for pulmonary vasodilation and milrinone gtt for inotropic support. Received Solumedrol IV BID for pulmonary fibrosis (pt had completed prednisone taper for PF exacerbation a few days prior to admission), now weaned to 30mg IV BID. Also received multiple doses of IV lasix for diuresis i/s/o fluid overload from R-sided HF. Completed course of vanc & zosyn for superimposed bacterial PNA, now being treated with Bactrim for Pneumocystis prophylaxis i/s/o chronic steroid use and nystatin for oropharyngeal thrush. Initial attempts at weaning Florentin and HFNC were c/b desaturation. Pt underwent RHC on 11/28 which showed CO 8.2, CI 4.2, CVP 2, PA pressure 44/14 (25), wedge pressure 6, RV 30/5. TTE on 11/28 showed improved RV systolic function from initial presentation. Pt is being followed by pHTN team with plans to evaluate for possible lung transplant and start trivago. Today patient started on torsemide 10mg qD and weaned off milrinone gtt, remains stable on HFNC 50/50.     CC: Patient is a 61y old  Male who presents with a chief complaint of SOB (01 Dec 2022 08:02)      INTERVAL EVENTS: A-line removed    SUBJECTIVE / INTERVAL HPI: Patient seen and examined at bedside. Reports SOB with exertion. Denies SOB at rest and orthopnea. Still reports productive cough with clear phlegm and poor sleep 2/2 coughing. Denies chest pain, pleuritic pain, abdominal pain, fever, chills.    ROS: negative unless otherwise stated above.    VITAL SIGNS:  Vital Signs Last 24 Hrs  T(C): 36.8 (01 Dec 2022 09:00), Max: 36.9 (01 Dec 2022 01:01)  T(F): 98.3 (01 Dec 2022 09:00), Max: 98.5 (01 Dec 2022 01:01)  HR: 110 (01 Dec 2022 14:00) (89 - 121)  BP: 124/74 (01 Dec 2022 14:00) (110/67 - 138/82)  BP(mean): 94 (01 Dec 2022 14:00) (81 - 104)  RR: 43 (01 Dec 2022 14:00) (19 - 43)  SpO2: 95% (01 Dec 2022 14:00) (90% - 100%)    Parameters below as of 01 Dec 2022 14:00  Patient On (Oxygen Delivery Method): nasal cannula, high flow  O2 Flow (L/min): 40  O2 Concentration (%): 50      11-30-22 @ 07:01  -  12-01-22 @ 07:00  --------------------------------------------------------  IN: 271.6 mL / OUT: 1100 mL / NET: -828.4 mL    12-01-22 @ 07:01  -  12-01-22 @ 15:20  --------------------------------------------------------  IN: 17 mL / OUT: 900 mL / NET: -883 mL        PHYSICAL EXAM:    General: NAD  HEENT: MMM  Neck: supple  Cardiovascular: +S1/S2; RRR, no JVD  Respiratory: diffuse rhonci and rales in bilateral lung fields, productive cough elicited from deep inhalation  Gastrointestinal: soft, NT/ND, +BS  Extremities: WWP; trace edema, clubbing or cyanosis  Vascular: 2+ radial, DP/PT pulses B/L  Neurological: AAOx3; no focal deficits    MEDICATIONS:  MEDICATIONS  (STANDING):  AQUAPHOR (petrolatum Ointment) 1 Application(s) Topical two times a day  benzocaine 15 mG/menthol 3.6 mG Lozenge 2 Lozenge Oral every 6 hours  benzonatate 200 milliGRAM(s) Oral three times a day  chlorhexidine 2% Cloths 1 Application(s) Topical daily  dextrose 5%. 1000 milliLiter(s) (50 mL/Hr) IV Continuous <Continuous>  dextrose 5%. 1000 milliLiter(s) (100 mL/Hr) IV Continuous <Continuous>  dextrose 50% Injectable 25 Gram(s) IV Push once  dextrose 50% Injectable 12.5 Gram(s) IV Push once  dextrose 50% Injectable 25 Gram(s) IV Push once  enoxaparin Injectable 40 milliGRAM(s) SubCutaneous every 24 hours  fluticasone propionate 50 MICROgram(s)/spray Nasal Spray 2 Spray(s) Both Nostrils two times a day  glucagon  Injectable 1 milliGRAM(s) IntraMuscular once  influenza   Vaccine 0.5 milliLiter(s) IntraMuscular once  insulin lispro (ADMELOG) corrective regimen sliding scale   SubCutaneous Before meals and at bedtime  ipratropium    for Nebulization 500 MICROGram(s) Nebulizer every 6 hours  methylPREDNISolone sodium succinate Injectable 30 milliGRAM(s) IV Push every 12 hours  nystatin    Suspension [id number] Unit(s) Swish and Swallow three times a day  pantoprazole    Tablet 40 milliGRAM(s) Oral before breakfast  polyethylene glycol 3350 17 Gram(s) Oral daily  senna 2 Tablet(s) Oral at bedtime  tamsulosin 0.4 milliGRAM(s) Oral at bedtime  torsemide 10 milliGRAM(s) Oral daily  trimethoprim  160 mG/sulfamethoxazole 800 mG 1 Tablet(s) Oral daily    MEDICATIONS  (PRN):  acetaminophen     Tablet .. 650 milliGRAM(s) Oral every 6 hours PRN Mild Pain (1 - 3)  dextrose Oral Gel 15 Gram(s) Oral once PRN Blood Glucose LESS THAN 70 milliGRAM(s)/deciliter  hydrocodone/homatropine Syrup 5 milliLiter(s) Oral every 4 hours PRN Cough      ALLERGIES:  Allergies    No Known Allergies    Intolerances        LABS:                        12.4   13.70 )-----------( 210      ( 01 Dec 2022 05:29 )             37.4     12-01    131<L>  |  92<L>  |  19  ----------------------------<  140<H>  4.7   |  37<H>  |  0.73    Ca    8.9      01 Dec 2022 05:29  Phos  2.8     12-01  Mg     2.4     12-01    TPro  7.0  /  Alb  3.3  /  TBili  0.5  /  DBili  x   /  AST  17  /  ALT  26  /  AlkPhos  72  12-01        CAPILLARY BLOOD GLUCOSE      POCT Blood Glucose.: 209 mg/dL (01 Dec 2022 11:15)      RADIOLOGY & ADDITIONAL TESTS: Reviewed. ***TRANSFER NOTE***      HOSPITAL COURSE:  Patient is a 60 yo M w/ PMHx of HLD, pre-DM, and pulmonary fibrosis 2/2 COVID-19 infection (wheelchair bound at baseline, on 4L home O2), who initially presented to [facility] on 11/22/2022 for several days of worsening dyspnea, productive cough, wheezing, and generalized weakness with increasing O2 requirements (up to 6L) and persistent dyspnea at rest. On day of admission, pt had syncopal event due to SOB. He was brought to [place] ED in respiratory distress where he was placed on BiPAP. CXR showed diffuse bilateral airspace opacities c/w multilobar pneumonia. CT PE showed no evidence of PE, however did show extensive diffuse interstitial and ground glass infiltrates bilaterally with associated hilar and mediastinal adenopathy, suspected multilobar atypical pneumonia. Patient was started on broad spectrum antibiotics due to concern for pneumonia. He was admitted to  MICU for acute hypoxic respiratory failure 2/2 pulmonary fibrosis with superimposed bacterial pneumonia and acute decompensated pulmonary HTN. TTE showed mild TR, severely dilated RV, moderate-to-severe elevated RV systolic pressure, moderate-to severe pHTN (58 mmHg), normal LV size and wall thickness, normal LV systolic function (EF 58%), and grade II diastolic dysfunction. Pt was transferred to [facility] for further management and RHC given severity of pulmonary disease.     On admission to [facility] CCU, pt was weaned off BiPAP to HFNC and started on inhaled NO for pulmonary vasodilation and milrinone gtt for inotropic support. Received Solumedrol IV BID for pulmonary fibrosis (pt had completed prednisone taper for PF exacerbation a few days prior to admission), now weaned to 30mg IV BID. Also received multiple doses of IV lasix for diuresis i/s/o fluid overload from R-sided HF. Completed course of vanc & zosyn for superimposed bacterial PNA, now being treated with Bactrim for Pneumocystis prophylaxis i/s/o chronic steroid use and nystatin for oropharyngeal thrush. Initial attempts at weaning Florentin and HFNC were c/b desaturation. Pt underwent RHC on 11/28 which showed CO 8.2, CI 4.2, CVP 2, PA pressure 44/14 (25), wedge pressure 6, RV 30/5. TTE on 11/28 showed improved RV systolic function from initial presentation. Pt is being followed by pHTN team with plans to evaluate for possible lung transplant and start trivago. Today patient started on torsemide 10mg qD and weaned off milrinone gtt, remains stable on HFNC 50/50.     CC: Patient is a 61y old Male who presents with a chief complaint of SOB (01 Dec 2022 08:02)      INTERVAL EVENTS: A-line removed    SUBJECTIVE / INTERVAL HPI: Patient seen and examined at bedside. Reports SOB with exertion. Denies SOB at rest and orthopnea. Still reports productive cough with clear phlegm and poor sleep 2/2 coughing. Denies chest pain, pleuritic pain, abdominal pain, fever, chills.    ROS: negative unless otherwise stated above.    VITAL SIGNS:  Vital Signs Last 24 Hrs  T(C): 36.8 (01 Dec 2022 09:00), Max: 36.9 (01 Dec 2022 01:01)  T(F): 98.3 (01 Dec 2022 09:00), Max: 98.5 (01 Dec 2022 01:01)  HR: 110 (01 Dec 2022 14:00) (89 - 121)  BP: 124/74 (01 Dec 2022 14:00) (110/67 - 138/82)  BP(mean): 94 (01 Dec 2022 14:00) (81 - 104)  RR: 43 (01 Dec 2022 14:00) (19 - 43)  SpO2: 95% (01 Dec 2022 14:00) (90% - 100%)    Parameters below as of 01 Dec 2022 14:00  Patient On (Oxygen Delivery Method): nasal cannula, high flow  O2 Flow (L/min): 40  O2 Concentration (%): 50      11-30-22 @ 07:01  -  12-01-22 @ 07:00  --------------------------------------------------------  IN: 271.6 mL / OUT: 1100 mL / NET: -828.4 mL    12-01-22 @ 07:01  -  12-01-22 @ 15:20  --------------------------------------------------------  IN: 17 mL / OUT: 900 mL / NET: -883 mL        PHYSICAL EXAM:    General: NAD, eating breakfast  HEENT: MMM  Neck: supple  Cardiovascular: +S1/S2; RRR, no JVD  Respiratory: diffuse rhonchi and rales in bilateral lung fields, productive cough elicited from deep inhalation  Gastrointestinal: soft, NT/ND, +BS  Extremities: WWP; trace biltateral edema, clubbing or cyanosis  Vascular: 2+ radial, DP/PT pulses B/L  Neurological: AAOx3; no focal deficits    MEDICATIONS:  MEDICATIONS  (STANDING):  AQUAPHOR (petrolatum Ointment) 1 Application(s) Topical two times a day  benzocaine 15 mG/menthol 3.6 mG Lozenge 2 Lozenge Oral every 6 hours  benzonatate 200 milliGRAM(s) Oral three times a day  chlorhexidine 2% Cloths 1 Application(s) Topical daily  dextrose 5%. 1000 milliLiter(s) (50 mL/Hr) IV Continuous <Continuous>  dextrose 5%. 1000 milliLiter(s) (100 mL/Hr) IV Continuous <Continuous>  dextrose 50% Injectable 25 Gram(s) IV Push once  dextrose 50% Injectable 12.5 Gram(s) IV Push once  dextrose 50% Injectable 25 Gram(s) IV Push once  enoxaparin Injectable 40 milliGRAM(s) SubCutaneous every 24 hours  fluticasone propionate 50 MICROgram(s)/spray Nasal Spray 2 Spray(s) Both Nostrils two times a day  glucagon  Injectable 1 milliGRAM(s) IntraMuscular once  influenza   Vaccine 0.5 milliLiter(s) IntraMuscular once  insulin lispro (ADMELOG) corrective regimen sliding scale   SubCutaneous Before meals and at bedtime  ipratropium    for Nebulization 500 MICROGram(s) Nebulizer every 6 hours  methylPREDNISolone sodium succinate Injectable 30 milliGRAM(s) IV Push every 12 hours  nystatin    Suspension [id number] Unit(s) Swish and Swallow three times a day  pantoprazole    Tablet 40 milliGRAM(s) Oral before breakfast  polyethylene glycol 3350 17 Gram(s) Oral daily  senna 2 Tablet(s) Oral at bedtime  tamsulosin 0.4 milliGRAM(s) Oral at bedtime  torsemide 10 milliGRAM(s) Oral daily  trimethoprim  160 mG/sulfamethoxazole 800 mG 1 Tablet(s) Oral daily    MEDICATIONS  (PRN):  acetaminophen     Tablet .. 650 milliGRAM(s) Oral every 6 hours PRN Mild Pain (1 - 3)  dextrose Oral Gel 15 Gram(s) Oral once PRN Blood Glucose LESS THAN 70 milliGRAM(s)/deciliter  hydrocodone/homatropine Syrup 5 milliLiter(s) Oral every 4 hours PRN Cough      ALLERGIES:  Allergies    No Known Allergies    Intolerances        LABS:                        12.4   13.70 )-----------( 210      ( 01 Dec 2022 05:29 )             37.4     12-01    131<L>  |  92<L>  |  19  ----------------------------<  140<H>  4.7   |  37<H>  |  0.73    Ca    8.9      01 Dec 2022 05:29  Phos  2.8     12-01  Mg     2.4     12-01    TPro  7.0  /  Alb  3.3  /  TBili  0.5  /  DBili  x   /  AST  17  /  ALT  26  /  AlkPhos  72  12-01        CAPILLARY BLOOD GLUCOSE      POCT Blood Glucose.: 209 mg/dL (01 Dec 2022 11:15)      RADIOLOGY & ADDITIONAL TESTS: Reviewed. ***TRANSFER NOTE***    HOSPITAL COURSE:  60 yo M w/ PMHx of HLD, pre-DM, and pulmonary fibrosis 2/2 COVID-19 infection (wheelchair bound at baseline, on 4L home O2), who initially presented to [facility] on 11/22/2022 for several days of worsening dyspnea, productive cough, wheezing, and generalized weakness with increasing O2 requirements (up to 6L) and persistent dyspnea at rest. On day of admission, pt had syncopal event due to SOB. He was brought to [place] ED in respiratory distress where he was placed on BiPAP. CXR showed diffuse bilateral airspace opacities c/w multilobar pneumonia. CT PE showed no evidence of PE, however did show extensive diffuse interstitial and ground glass infiltrates bilaterally with associated hilar and mediastinal adenopathy, suspected multilobar atypical pneumonia. Patient was started on broad spectrum antibiotics due to concern for pneumonia. He was admitted to  MICU for acute hypoxic respiratory failure 2/2 pulmonary fibrosis with superimposed bacterial pneumonia and acute decompensated pulmonary HTN. TTE showed mild TR, severely dilated RV, moderate-to-severe elevated RV systolic pressure, moderate-to severe pHTN, normal LV size and wall thickness, normal LV systolic function (EF 58%), and grade II diastolic dysfunction. Pt was transferred to [facility] for further management and RHC given severity of pulmonary disease.     On admission to [facility] CCU, pt was weaned off BiPAP to HFNC and started on inhaled NO for pulmonary vasodilation and milrinone gtt for inotropic support. Received Solumedrol IV BID for pulmonary fibrosis (pt had completed prednisone taper for PF exacerbation a few days prior to admission), now weaned to 30mg IV BID. Also received multiple doses of IV lasix for diuresis i/s/o fluid overload from R-sided HF. Completed course of vanc & zosyn for superimposed bacterial PNA. He received Bactrim for Pneumocystis prophylaxis i/s/o chronic steroid use and Nystatin for oropharyngeal thrush. W  NO and HFNC were c/b desaturation. Pt underwent RHC on 11/28 which showed CO 8.2, CI 4.2, CVP 2, PA pressure 44/14 (25), wedge pressure 6, RV 30/5. TTE on 11/28 showed improved RV systolic function from initial presentation. Pt is being followed by pHTN team with plans to evaluate for possible lung transplant and start Tyvaso. Patient was started on torsemide 10mg qD and weaned off milrinone gtt, remains stable on HFNC 50/50.       CC: Patient is a 61y old Male who presents with a chief complaint of SOB (01 Dec 2022 08:02)    INTERVAL EVENTS: A-line removed    SUBJECTIVE / INTERVAL HPI: Patient seen and examined at bedside. Reports SOB with exertion. Denies SOB at rest and orthopnea. Still reports productive cough with clear phlegm and poor sleep 2/2 coughing. Denies chest pain, pleuritic pain, abdominal pain, fever, chills.    ROS: negative unless otherwise stated above.    VITAL SIGNS:  Vital Signs Last 24 Hrs  T(C): 36.8 (01 Dec 2022 09:00), Max: 36.9 (01 Dec 2022 01:01)  T(F): 98.3 (01 Dec 2022 09:00), Max: 98.5 (01 Dec 2022 01:01)  HR: 110 (01 Dec 2022 14:00) (89 - 121)  BP: 124/74 (01 Dec 2022 14:00) (110/67 - 138/82)  BP(mean): 94 (01 Dec 2022 14:00) (81 - 104)  RR: 43 (01 Dec 2022 14:00) (19 - 43)  SpO2: 95% (01 Dec 2022 14:00) (90% - 100%)    Parameters below as of 01 Dec 2022 14:00  Patient On (Oxygen Delivery Method): nasal cannula, high flow  O2 Flow (L/min): 40  O2 Concentration (%): 50      11-30-22 @ 07:01  -  12-01-22 @ 07:00  --------------------------------------------------------  IN: 271.6 mL / OUT: 1100 mL / NET: -828.4 mL    12-01-22 @ 07:01  -  12-01-22 @ 15:20  --------------------------------------------------------  IN: 17 mL / OUT: 900 mL / NET: -883 mL        PHYSICAL EXAM:    General: NAD, eating breakfast  HEENT: MMM  Neck: supple  Cardiovascular: +S1/S2; RRR, no JVD  Respiratory: diffuse rhonchi and rales in bilateral lung fields, productive cough elicited from deep inhalation  Gastrointestinal: soft, NT/ND, +BS  Extremities: WWP; trace biltateral edema, clubbing or cyanosis  Vascular: 2+ radial, DP/PT pulses B/L  Neurological: AAOx3; no focal deficits    MEDICATIONS:  MEDICATIONS  (STANDING):  AQUAPHOR (petrolatum Ointment) 1 Application(s) Topical two times a day  benzocaine 15 mG/menthol 3.6 mG Lozenge 2 Lozenge Oral every 6 hours  benzonatate 200 milliGRAM(s) Oral three times a day  chlorhexidine 2% Cloths 1 Application(s) Topical daily  dextrose 5%. 1000 milliLiter(s) (50 mL/Hr) IV Continuous <Continuous>  dextrose 5%. 1000 milliLiter(s) (100 mL/Hr) IV Continuous <Continuous>  dextrose 50% Injectable 25 Gram(s) IV Push once  dextrose 50% Injectable 12.5 Gram(s) IV Push once  dextrose 50% Injectable 25 Gram(s) IV Push once  enoxaparin Injectable 40 milliGRAM(s) SubCutaneous every 24 hours  fluticasone propionate 50 MICROgram(s)/spray Nasal Spray 2 Spray(s) Both Nostrils two times a day  glucagon  Injectable 1 milliGRAM(s) IntraMuscular once  influenza   Vaccine 0.5 milliLiter(s) IntraMuscular once  insulin lispro (ADMELOG) corrective regimen sliding scale   SubCutaneous Before meals and at bedtime  ipratropium    for Nebulization 500 MICROGram(s) Nebulizer every 6 hours  methylPREDNISolone sodium succinate Injectable 30 milliGRAM(s) IV Push every 12 hours  nystatin    Suspension 645001 Unit(s) Swish and Swallow three times a day  pantoprazole    Tablet 40 milliGRAM(s) Oral before breakfast  polyethylene glycol 3350 17 Gram(s) Oral daily  senna 2 Tablet(s) Oral at bedtime  tamsulosin 0.4 milliGRAM(s) Oral at bedtime  torsemide 10 milliGRAM(s) Oral daily  trimethoprim  160 mG/sulfamethoxazole 800 mG 1 Tablet(s) Oral daily    MEDICATIONS  (PRN):  acetaminophen     Tablet .. 650 milliGRAM(s) Oral every 6 hours PRN Mild Pain (1 - 3)  dextrose Oral Gel 15 Gram(s) Oral once PRN Blood Glucose LESS THAN 70 milliGRAM(s)/deciliter  hydrocodone/homatropine Syrup 5 milliLiter(s) Oral every 4 hours PRN Cough      ALLERGIES:  Allergies    No Known Allergies    Intolerances        LABS:                        12.4   13.70 )-----------( 210      ( 01 Dec 2022 05:29 )             37.4     12-01    131<L>  |  92<L>  |  19  ----------------------------<  140<H>  4.7   |  37<H>  |  0.73    Ca    8.9      01 Dec 2022 05:29  Phos  2.8     12-01  Mg     2.4     12-01    TPro  7.0  /  Alb  3.3  /  TBili  0.5  /  DBili  x   /  AST  17  /  ALT  26  /  AlkPhos  72  12-01        CAPILLARY BLOOD GLUCOSE      POCT Blood Glucose.: 209 mg/dL (01 Dec 2022 11:15)      RADIOLOGY & ADDITIONAL TESTS: Reviewed.

## 2022-12-02 NOTE — PROGRESS NOTE ADULT - SUBJECTIVE AND OBJECTIVE BOX
INTERVAL HPI/OVERNIGHT EVENTS:  Patient was seen and examined at bedside. Case discussed with attending physician during morning rounds.     Patient denies acute complaints at this time. Patient states that he had cough overnight still productive of clear sputum. This cough has improved with medications but the patient feels that he is taking a lot of medication with little effect. Patient denies fever, chills, shortness of breath, chest pain, nausea, vomiting, abdominal pain.     Patient states that he was able to get OOBTC yesterday and work with PT on leg exercises.     VITAL SIGNS:  T(F): 98.6 (12-02-22 @ 09:25)  HR: 107 (12-02-22 @ 10:00)  BP: 126/72 (12-02-22 @ 10:00)  RR: 25 (12-02-22 @ 10:00)  SpO2: 92% (12-02-22 @ 10:00)  Wt(kg): --    PHYSICAL EXAM:    Constitutional: Male on HFNC in no acute distress, resting in bed.    Respiratory: Mild bilateral ronchi with adequate airflow bilaterally.   Cardiovascular: Regular rate and rhythm, normal S1S2, no murmurs, rubs, gallops.  Gastrointestinal: soft, non-tender and non-distended, no masses palpable, Normoactive bowel sounds.  Extremities: Warm, well perfused, pulses equal bilateral upper and lower extremities, non pitting edema, (+) clubbing  Neurological: AAOx3.   Skin: Normal temperature, warm, dry.    MEDICATIONS  (STANDING):  AQUAPHOR (petrolatum Ointment) 1 Application(s) Topical two times a day  benzocaine 15 mG/menthol 3.6 mG Lozenge 2 Lozenge Oral every 6 hours  benzonatate 200 milliGRAM(s) Oral three times a day  chlorhexidine 2% Cloths 1 Application(s) Topical daily  dextrose 5%. 1000 milliLiter(s) (50 mL/Hr) IV Continuous <Continuous>  dextrose 5%. 1000 milliLiter(s) (100 mL/Hr) IV Continuous <Continuous>  dextrose 50% Injectable 25 Gram(s) IV Push once  dextrose 50% Injectable 12.5 Gram(s) IV Push once  dextrose 50% Injectable 25 Gram(s) IV Push once  enoxaparin Injectable 40 milliGRAM(s) SubCutaneous every 24 hours  fluticasone propionate 50 MICROgram(s)/spray Nasal Spray 2 Spray(s) Both Nostrils two times a day  glucagon  Injectable 1 milliGRAM(s) IntraMuscular once  influenza   Vaccine 0.5 milliLiter(s) IntraMuscular once  insulin lispro (ADMELOG) corrective regimen sliding scale   SubCutaneous Before meals and at bedtime  ipratropium    for Nebulization 500 MICROGram(s) Nebulizer every 6 hours  methylPREDNISolone sodium succinate Injectable 30 milliGRAM(s) IV Push every 12 hours  nystatin    Suspension [id number] Unit(s) Swish and Swallow three times a day  pantoprazole    Tablet 40 milliGRAM(s) Oral before breakfast  polyethylene glycol 3350 17 Gram(s) Oral daily  senna 2 Tablet(s) Oral at bedtime  sodium chloride 0.9%. 250 milliLiter(s) (250 mL/Hr) IV Continuous <Continuous>  tamsulosin 0.4 milliGRAM(s) Oral at bedtime  trimethoprim  160 mG/sulfamethoxazole 800 mG 1 Tablet(s) Oral daily    MEDICATIONS  (PRN):  acetaminophen     Tablet .. 650 milliGRAM(s) Oral every 6 hours PRN Mild Pain (1 - 3)  dextrose Oral Gel 15 Gram(s) Oral once PRN Blood Glucose LESS THAN 70 milliGRAM(s)/deciliter  hydrocodone/homatropine Syrup 5 milliLiter(s) Oral every 4 hours PRN Cough      Allergies    No Known Allergies    Intolerances        LABS:                        12.5   13.07 )-----------( 251      ( 02 Dec 2022 05:42 )             38.8     12-02    131<L>  |  90<L>  |  24<H>  ----------------------------<  137<H>  4.8   |  36<H>  |  0.88    Ca    8.7      02 Dec 2022 05:42  Phos  3.1     12-02  Mg     2.2     12-02    TPro  7.1  /  Alb  3.2<L>  /  TBili  0.5  /  DBili  x   /  AST  18  /  ALT  25  /  AlkPhos  73  12-02            RADIOLOGY & ADDITIONAL TESTS:  Reviewed

## 2022-12-02 NOTE — PROGRESS NOTE ADULT - SUBJECTIVE AND OBJECTIVE BOX
ACCEPTANCE NOTE FROM CCU to [facility]  Patient is a 60 yo M w/ PMHx of HLD, pre-DM, and pulmonary fibrosis 2/2 COVID-19 infection (wheelchair bound at baseline, on 4L home O2), who initially presented to [facility] on 11/22/2022 for several days of worsening dyspnea, productive cough, wheezing, and generalized weakness with increasing O2 requirements (up to 6L) and persistent dyspnea at rest. On day of admission, pt had syncopal event due to SOB. He was brought to [facility] ED in respiratory distress where he was placed on BiPAP. CXR showed diffuse bilateral airspace opacities c/w multilobar pneumonia. CT PE showed no evidence of PE, however did show extensive diffuse interstitial and ground glass infiltrates bilaterally with associated hilar and mediastinal adenopathy, suspected multilobar atypical pneumonia. Patient was started on broad spectrum antibiotics due to concern for pneumonia. He was admitted to  MICU for acute hypoxic respiratory failure 2/2 pulmonary fibrosis with superimposed bacterial pneumonia and acute decompensated pulmonary HTN. TTE showed mild TR, severely dilated RV, moderate-to-severe elevated RV systolic pressure, moderate-to severe pHTN (58 mmHg), normal LV size and wall thickness, normal LV systolic function (EF 58%), and grade II diastolic dysfunction. Pt was transferred to [facility] for further management and RHC given severity of pulmonary disease.     On admission to [facility] CCU, pt was weaned off BiPAP to HFNC and started on inhaled NO for pulmonary vasodilation and milrinone gtt for inotropic support. Received Solumedrol IV BID for pulmonary fibrosis (pt had completed prednisone taper for PF exacerbation a few days prior to admission), now weaned to 30mg IV BID. Also received multiple doses of IV lasix for diuresis i/s/o fluid overload from R-sided HF. Completed course of vanc & zosyn for superimposed bacterial PNA, now being treated with Bactrim for Pneumocystis prophylaxis i/s/o chronic steroid use and nystatin for oropharyngeal thrush. Initial attempts at weaning Florentin and HFNC were c/b desaturation. Pt underwent RHC on 11/28 which showed CO 8.2, CI 4.2, CVP 2, PA pressure 44/14 (25), wedge pressure 6, RV 30/5. TTE on 11/28 showed improved RV systolic function from initial presentation. Pt is being followed by pHTN team with plans to evaluate for possible lung transplant and start trivago. Labs sent for potential lung transplant in the future. Weaned off Florentin, and milrinone. Torsemide 10mg x 2 given. Patient HDS, off vasodilators. On HFNC. Stable for transfer to [facility] step down.      SUBJECTIVE/ OVERNIGHT EVENTS: Patient denies acute complaints at this time. Patient states that he had cough overnight still productive of clear sputum. This cough has improved with medications but the patient feels that he is taking a lot of medication with little effect. Patient denies fever, chills, shortness of breath, chest pain, nausea, vomiting, abdominal pain.     Vital Signs Last 12 Hrs  T(F): 98.6 (12-02-22 @ 09:25), Max: 99.1 (12-02-22 @ 05:00)  HR: 97 (12-02-22 @ 14:00) (90 - 119)  BP: 130/67 (12-02-22 @ 14:00) (114/72 - 139/71)  BP(mean): 92 (12-02-22 @ 14:00) (88 - 99)  RR: 29 (12-02-22 @ 14:00) (21 - 36)  SpO2: 90% (12-02-22 @ 14:00) (88% - 98%)  I&O's Summary    01 Dec 2022 07:01  -  02 Dec 2022 07:00  --------------------------------------------------------  IN: 237 mL / OUT: 2565 mL / NET: -2328 mL    02 Dec 2022 07:01  -  02 Dec 2022 14:09  --------------------------------------------------------  IN: 250 mL / OUT: 775 mL / NET: -525 mL    PHYSICAL EXAM:  Constitutional: middle aged Male on HFNC, in no acute distress, resting in bed.    Respiratory: Mild bilateral rhonchi with adequate airflow bilaterally.   Cardiovascular: Regular rate and rhythm, normal S1S2, no murmurs, rubs, gallops.  Gastrointestinal: soft, non-tender and non-distended, no masses palpable, Normoactive bowel sounds.  Extremities: Warm, well perfused, pulses equal bilateral upper and lower extremities, non pitting edema, (+) clubbing  Neurological: AAOx3. Mentating well. EOMI, PERRLA.  Skin: Normal temperature, warm, dry.        LABS:                        12.5   13.07 )-----------( 251      ( 02 Dec 2022 05:42 )             38.8     12-02    131<L>  |  90<L>  |  24<H>  ----------------------------<  137<H>  4.8   |  36<H>  |  0.88    Ca    8.7      02 Dec 2022 05:42  Phos  3.1     12-02  Mg     2.2     12-02    TPro  7.1  /  Alb  3.2<L>  /  TBili  0.5  /  DBili  x   /  AST  18  /  ALT  25  /  AlkPhos  73  12-02            RADIOLOGY & ADDITIONAL TESTS:    MEDICATIONS  (STANDING):  AQUAPHOR (petrolatum Ointment) 1 Application(s) Topical two times a day  benzocaine 15 mG/menthol 3.6 mG Lozenge 2 Lozenge Oral every 6 hours  benzonatate 200 milliGRAM(s) Oral three times a day  chlorhexidine 2% Cloths 1 Application(s) Topical daily  dextrose 5%. 1000 milliLiter(s) (50 mL/Hr) IV Continuous <Continuous>  dextrose 5%. 1000 milliLiter(s) (100 mL/Hr) IV Continuous <Continuous>  dextrose 50% Injectable 25 Gram(s) IV Push once  dextrose 50% Injectable 12.5 Gram(s) IV Push once  dextrose 50% Injectable 25 Gram(s) IV Push once  enoxaparin Injectable 40 milliGRAM(s) SubCutaneous every 24 hours  fluticasone propionate 50 MICROgram(s)/spray Nasal Spray 2 Spray(s) Both Nostrils two times a day  glucagon  Injectable 1 milliGRAM(s) IntraMuscular once  influenza   Vaccine 0.5 milliLiter(s) IntraMuscular once  insulin lispro (ADMELOG) corrective regimen sliding scale   SubCutaneous Before meals and at bedtime  ipratropium    for Nebulization 500 MICROGram(s) Nebulizer every 6 hours  methylPREDNISolone sodium succinate Injectable 30 milliGRAM(s) IV Push every 12 hours  nystatin    Suspension 600484 Unit(s) Swish and Swallow three times a day  pantoprazole    Tablet 40 milliGRAM(s) Oral before breakfast  polyethylene glycol 3350 17 Gram(s) Oral daily  senna 2 Tablet(s) Oral at bedtime  sodium chloride 0.9%. 250 milliLiter(s) (250 mL/Hr) IV Continuous <Continuous>  tamsulosin 0.4 milliGRAM(s) Oral at bedtime  trimethoprim  160 mG/sulfamethoxazole 800 mG 1 Tablet(s) Oral daily    MEDICATIONS  (PRN):  acetaminophen     Tablet .. 650 milliGRAM(s) Oral every 6 hours PRN Mild Pain (1 - 3)  dextrose Oral Gel 15 Gram(s) Oral once PRN Blood Glucose LESS THAN 70 milliGRAM(s)/deciliter  hydrocodone/homatropine Syrup 5 milliLiter(s) Oral every 4 hours PRN Cough

## 2022-12-02 NOTE — PROGRESS NOTE ADULT - PROBLEM SELECTOR PLAN 3
Leukocytosis to 13k, stable, slowly downtrending, likely elevated i/s/o acute sickness, afebrile, no s/s of infxn, CXR  Plan:  - Continue to monitor, CXR w poor airway entry, elevated b/l hemidaphragms

## 2022-12-02 NOTE — PROGRESS NOTE ADULT - SUBJECTIVE AND OBJECTIVE BOX
**Incomplete Note**   *****TRANSFER NOTE******    HOSPITAL COURSE:    62 yo M w/ PMHx of HLD, pre-DM, and pulmonary fibrosis 2/2 COVID-19 infection (wheelchair bound at baseline, on 4L home O2), who initially presented to [facility] on 11/22/2022 for several days of worsening dyspnea, productive cough, wheezing, and generalized weakness with increasing O2 requirements (up to 6L). On day of admission, pt had syncopal event which he attributes to SOB. At [facility] he was in respiratory distress and placed on BiPAP. CXR showed diffuse bilateral airspace opacities. CT PE showed no evidence of PE, however did show extensive diffuse interstitial and ground glass infiltrates bilaterally with associated hilar and mediastinal adenopathy, c/w  suspected multilobar atypical pneumonia. Patient was started on broad spectrum antibiotics. He was admitted to  MICU for AHRF 2/2 pulmonary fibrosis with superimposed bacterial pneumonia and acute decompensated pulmonary HTN. TTE showed mild TR, severely dilated RV, moderate-to-severe elevated RV systolic pressure, moderate-to severe pHTN (58 mmHg), normal LV size and wall thickness, normal LV systolic function (EF 58%), and grade II diastolic dysfunction. Pt was transferred to [facility] for further management and RHC given severity of pulmonary disease.     On admission to [facility] CCU, pt was weaned off BiPAP to HFNC and started on inhaled NO for pulmonary vasodilation and milrinone gtt for inotropic support. Received Solumedrol IV BID for pulmonary fibrosis (pt had completed prednisone taper for PF exacerbation a few days prior to admission), now weaned to 30mg IV BID. Also received multiple doses of IV lasix for diuresis i/s/o fluid overload from R-sided HF. Completed course of vanc & zosyn for superimposed bacterial PNA, now being treated with Bactrim for Pneumocystis prophylaxis i/s/o chronic steroid use and nystatin for oropharyngeal thrush. Initial attempts at weaning Florentin and HFNC were c/b desaturation. Pt underwent RHC on 11/28 which showed CO 8.2, CI 4.2, CVP 2, PA pressure 44/14 (25), wedge pressure 6, RV 30/5. TTE on 11/28 showed improved RV systolic function from initial presentation. Pt is being followed by pHTN team with plans to evaluate for possible lung transplant and start Tyvaso. Today patient started on torsemide 10mg qD and weaned off milrinone gtt, remains stable on HFNC 50/50.      *****TRANSFER NOTE******    HOSPITAL COURSE:    60 yo M w/ PMHx of HLD, pre-DM, and pulmonary fibrosis 2/2 COVID-19 infection (wheelchair bound at baseline, on 4L home O2), who initially presented to [facility] on 11/22/2022 for several days of worsening dyspnea, productive cough, wheezing, and generalized weakness with increasing O2 requirements (up to 6L). On day of admission, pt had syncopal event which he attributes to SOB. At [facility] he was in respiratory distress and placed on BiPAP. CXR showed diffuse bilateral airspace opacities. CT PE showed no evidence of PE, however did show extensive diffuse interstitial and ground glass infiltrates bilaterally with associated hilar and mediastinal adenopathy, c/w  suspected multilobar atypical pneumonia. Patient was started on broad spectrum antibiotics. He was admitted to  MICU for AHRF 2/2 pulmonary fibrosis with superimposed bacterial pneumonia and acute decompensated pulmonary HTN. TTE showed mild TR, severely dilated RV, moderate-to-severe elevated RV systolic pressure, moderate-to severe pHTN (58 mmHg), normal LV size and wall thickness, normal LV systolic function (EF 58%), and grade II diastolic dysfunction. Pt was transferred to [facility] for further management and RHC given severity of pulmonary disease.     On admission to [facility] CCU, pt was weaned off BiPAP to HFNC and started on inhaled NO for pulmonary vasodilation and milrinone gtt for inotropic support. Received Solumedrol IV BID for pulmonary fibrosis (pt had completed prednisone taper for PF exacerbation a few days prior to admission), now weaned to 30mg IV BID. Also received multiple doses of IV lasix for diuresis i/s/o fluid overload from R-sided HF. Completed course of vanc & zosyn for superimposed bacterial PNA, now being treated with Bactrim for Pneumocystis prophylaxis i/s/o chronic steroid use and nystatin for oropharyngeal thrush. Initial attempts at weaning Florentin and HFNC were c/b desaturation. Pt underwent RHC on 11/28 which showed CO 8.2, CI 4.2, CVP 2, PA pressure 44/14 (25), wedge pressure 6, RV 30/5. TTE on 11/28 showed improved RV systolic function from initial presentation. Pt is being followed by pHTN team with plans to evaluate for possible lung transplant and start Tyvaso. Patient was diuresed PRN with torsemide 10mg qD and weaned off milrinone gtt, remains stable on HFNC 50/50.     CC: Patient is a 61y old  Male who presents with a chief complaint of SOB (02 Dec 2022 14:06)      INTERVAL EVENTS: JOSE    SUBJECTIVE / INTERVAL HPI: Patient seen and examined at bedside. Still endorses SOB, cough. Denies nausea, vomiting, fever, chills, chest pain, palpitations.     ROS: negative unless otherwise stated above.    VITAL SIGNS:  Vital Signs Last 24 Hrs  T(C): 37 (02 Dec 2022 09:25), Max: 37.3 (02 Dec 2022 05:00)  T(F): 98.6 (02 Dec 2022 09:25), Max: 99.1 (02 Dec 2022 05:00)  HR: 97 (02 Dec 2022 14:00) (90 - 119)  BP: 130/67 (02 Dec 2022 14:00) (112/73 - 139/71)  BP(mean): 92 (02 Dec 2022 14:00) (86 - 111)  RR: 29 (02 Dec 2022 14:00) (21 - 43)  SpO2: 90% (02 Dec 2022 14:00) (76% - 99%)    Parameters below as of 02 Dec 2022 14:00  Patient On (Oxygen Delivery Method): nasal cannula, high flow  O2 Flow (L/min): 40  O2 Concentration (%): 50      12-01-22 @ 07:01  -  12-02-22 @ 07:00  --------------------------------------------------------  IN: 237 mL / OUT: 2565 mL / NET: -2328 mL    12-02-22 @ 07:01  -  12-02-22 @ 15:28  --------------------------------------------------------  IN: 250 mL / OUT: 775 mL / NET: -525 mL        PHYSICAL EXAM:  General: NAD, eating breakfast  HEENT: MMM  Neck: supple  Cardiovascular: +S1/S2; RRR, no JVD  Respiratory: diffuse rales in bilateral middle lung fields, productive cough elicited from deep inhalation  Gastrointestinal: soft, NT/ND, +BS  Extremities: WWP; trace bilateral edema, clubbing or cyanosis  Vascular: 2+ radial, DP/PT pulses B/L  Neurological: AAOx3; no focal deficits    MEDICATIONS:  MEDICATIONS  (STANDING):  AQUAPHOR (petrolatum Ointment) 1 Application(s) Topical two times a day  benzocaine 15 mG/menthol 3.6 mG Lozenge 2 Lozenge Oral every 6 hours  benzonatate 200 milliGRAM(s) Oral three times a day  chlorhexidine 2% Cloths 1 Application(s) Topical daily  dextrose 5%. 1000 milliLiter(s) (50 mL/Hr) IV Continuous <Continuous>  dextrose 5%. 1000 milliLiter(s) (100 mL/Hr) IV Continuous <Continuous>  dextrose 50% Injectable 25 Gram(s) IV Push once  dextrose 50% Injectable 12.5 Gram(s) IV Push once  dextrose 50% Injectable 25 Gram(s) IV Push once  enoxaparin Injectable 40 milliGRAM(s) SubCutaneous every 24 hours  fluticasone propionate 50 MICROgram(s)/spray Nasal Spray 2 Spray(s) Both Nostrils two times a day  glucagon  Injectable 1 milliGRAM(s) IntraMuscular once  influenza   Vaccine 0.5 milliLiter(s) IntraMuscular once  insulin lispro (ADMELOG) corrective regimen sliding scale   SubCutaneous Before meals and at bedtime  ipratropium    for Nebulization 500 MICROGram(s) Nebulizer every 6 hours  methylPREDNISolone sodium succinate Injectable 30 milliGRAM(s) IV Push every 12 hours  nystatin    Suspension [id number] Unit(s) Swish and Swallow three times a day  pantoprazole    Tablet 40 milliGRAM(s) Oral before breakfast  polyethylene glycol 3350 17 Gram(s) Oral daily  senna 2 Tablet(s) Oral at bedtime  sodium chloride 0.9%. 250 milliLiter(s) (250 mL/Hr) IV Continuous <Continuous>  tamsulosin 0.4 milliGRAM(s) Oral at bedtime  trimethoprim  160 mG/sulfamethoxazole 800 mG 1 Tablet(s) Oral daily    MEDICATIONS  (PRN):  acetaminophen     Tablet .. 650 milliGRAM(s) Oral every 6 hours PRN Mild Pain (1 - 3)  dextrose Oral Gel 15 Gram(s) Oral once PRN Blood Glucose LESS THAN 70 milliGRAM(s)/deciliter  hydrocodone/homatropine Syrup 5 milliLiter(s) Oral every 4 hours PRN Cough      ALLERGIES:  Allergies    No Known Allergies    Intolerances        LABS:                        12.5   13.07 )-----------( 251      ( 02 Dec 2022 05:42 )             38.8     12-02    131<L>  |  90<L>  |  24<H>  ----------------------------<  137<H>  4.8   |  36<H>  |  0.88    Ca    8.7      02 Dec 2022 05:42  Phos  3.1     12-02  Mg     2.2     12-02    TPro  7.1  /  Alb  3.2<L>  /  TBili  0.5  /  DBili  x   /  AST  18  /  ALT  25  /  AlkPhos  73  12-02        CAPILLARY BLOOD GLUCOSE      POCT Blood Glucose.: 176 mg/dL (02 Dec 2022 11:03)      RADIOLOGY & ADDITIONAL TESTS: Reviewed.

## 2022-12-02 NOTE — PROGRESS NOTE ADULT - ASSESSMENT
60 yo M w/ PMH HLD, pre-DM, and pulmonary fibrosis 2/2 COVID-19 infection (wheelchair bound at baseline, on 4L home O2) who presented to [facility] for several days of worsening SOB and generalized weakness with increasing O2 requirements, found to have acute hypoxic respiratory failure 2/2 pulmonary fibrosis with superimposed bacterial pneumonia and acute decompensated pulmonary HTN, transferred to [facility] for RHC i/s/o severe pulmonary disease.    NEURO:   No active issues; AOx3.     CARDIAC  #Acute decompensated R-sided HF   TTE 11/23/22 shows severely dilated RV size, moderately-to-severely reduced RV systolic function, dilated RA, normal LV size & systolic function, mild symmetric LVH, grade I LV diastolic dysfunction, mild-moderate TR, pulmonary HTN with PASP 48 mmHg, trivial pericardial effusion. No evidence of septal bowing into LV. BNP elevated to 5k (baseline 100). Decompensated R-side HF most likely i/s/o worsening PHTN 2/2 superimposed bacterial pneumonia (with elevated procalcitonin to 0.79 and elevated WBC on admission to ).  RHC 11/28/22 showing CO/CI 8.2/4.2, CVP 2, PA pressure 44/14 (25), PCWP 6, RV 30/5.   TTE 11/28/22 showing improvement in RV size and systolic function.  - f/u PHTN recs   - d/c milrinone gtt 0.125 mcg/kg/hr today  - start torsemide 10mg PO qD  - f/u CMP & lactate after diuresis  - Maintain strict I&Os; pardo dc'd yesterday    #Hyponatremia 2/2 HF  - continue to trend    PULMONARY  #Decompensated PHTN  Pt has Group 3 PHTN 2/2 chronic pulmonary fibrosis from COVID-19 disease with superimposed bacterial pneumonia. TTE 11/23/22 showed PASP 48 mmHg. Pt currently breathing comfortably on HFNC 50/50. Finished courses of vancomycin 1500 mg IV daily and Zosyn 4.5 g IV q8h. Initial sputum cx contaminated specimen. Weaned off Florentin.  - c/w duo nebs q6h standing.   - c/w HFNC 50/50 for pulmonary vasodilation  - Wean off milrinone on 12/1.   - Avoid positive pressure ventilation (increases pulmonary afterload)  - encourage OOBTC and incentive spirometry   - f/u pulm recs, coordinating Tyvaso to be started at home    #Pulmonary Fibrosis:   Sequelae of prior COVID-19 infection. Pt received prolonged prednisone taper starting in mid-November. Also treated with stress-dose steroids at [facility].  - c/w Solumedrol 30 mg IV q12h, wean as tolerated  - will likely need pulmonary rehab to improve functional status prior to lung transplant.  - f/u pulmonary recs    GI:  - transitioned from Protonix 40 mg IV --> PO daily     RENAL:  No active issues     ENDO:   #pre-DM  A1c 6.0%   - mISS   - DASH/TLC diet     ID:   #Superimposed bacterial PNA   ID was consulted at [facility] and recommended treatment with broad spectrum antibiotics. Pt received vancomycin and cefepime. RVP and COVID were negative. On admission to CCU, MRSA swab was positive. Fungitell negative. Pneumocystis PCR negative   - Finished courses of vancomycin 1500 mg IV daily and Zosyn 4.5 g IV q8h  - c/w Bactrim 1DS daily for PCP ppx while on steroids.   - c/w Nystatin swish & swallow TID x7 days for thrush.   - f/u ID recs.     #Lung transplant:   - f/u pre-lung transplant ID recs: CMV IgG, EBV, serum QFT, Strongyloides IgG, hepatitis panel, HIV, varicella IgG, MMR IgG, toxoplasma IgG, influenza vaccine, Prevnar-20    HEME:  No active issues     F: PO  E: Replete PRN  N: DASH/TLC   GI ppx: Protonix 40 mg PO daily   DVT ppx: Lovenox 40 mg SQ daily   Code status: Full code   Dispo: CCU --> [facility]   62 yo M w/ PMH HLD, pre-DM, and pulmonary fibrosis 2/2 COVID-19 infection (wheelchair bound at baseline, on 4L home O2) who presented to [facility] for several days of worsening SOB and generalized weakness with increasing O2 requirements, found to have acute hypoxic respiratory failure 2/2 pulmonary fibrosis with superimposed bacterial pneumonia and acute decompensated pulmonary HTN, transferred to [facility] for RHC i/s/o severe pulmonary disease.    NEURO:   No active issues; AOx3.     CARDIAC  #Acute decompensated R-sided HF   TTE 11/23/22 shows severely dilated RV size, moderately-to-severely reduced RV systolic function, dilated RA, normal LV size & systolic function, mild symmetric LVH, grade I LV diastolic dysfunction, mild-moderate TR, pulmonary HTN with PASP 48 mmHg, trivial pericardial effusion. No evidence of septal bowing into LV. BNP elevated to 5k (baseline 100). Decompensated R-side HF most likely i/s/o worsening PHTN 2/2 superimposed bacterial pneumonia (with elevated procalcitonin to 0.79 and elevated WBC on admission to ).  RHC 11/28/22 showing CO/CI 8.2/4.2, CVP 2, PA pressure 44/14 (25), PCWP 6, RV 30/5.   TTE 11/28/22 showing improvement in RV size and systolic function, but still reduced  - f/u PHTN recs   - d/c milrinone gtt 0.125 mcg/kg/hr today  - diuresis PRN    #Hyponatremia 2/2 HF  - continue to trend    PULMONARY  #Decompensated PHTN  Pt has Group 3 PHTN 2/2 chronic pulmonary fibrosis from COVID-19 disease. TTE 11/23/22 showed PASP 48 mmH and signs of R heart strain. Pt currently breathing comfortably on HFNC 50/50. Finished courses of vancomycin 1500 mg IV daily and Zosyn 4.5 g IV q8h. Initial sputum cx contaminated specimen. Weaned off Florentin.  - c/w duo nebs q6h standing.   - c/w HFNC 50/50 for pulmonary vasodilation  - Avoid positive pressure ventilation (increases pulmonary afterload)  - Encourage OOBTC and incentive spirometry   - f/u pulm recs, coordinating Tyvaso to be started at home    #Pulmonary Fibrosis:   Sequelae of prior COVID-19 infection. Pt received prolonged prednisone taper starting in mid-November. Also treated with stress-dose steroids at [facility].  - c/w Solumedrol 30 mg IV q12h, wean as tolerated  - will likely need pulmonary rehab to improve functional status prior to lung transplant.  - f/u pulmonary recs    GI:  RIKKI    RENAL:  RIKKI    ENDO:   #pre-DM  A1c 6.0%   - mISS   - DASH/TLC diet     ID:   #Superimposed bacterial PNA   ID was consulted at [facility] and recommended treatment with broad spectrum antibiotics. Pt received vancomycin and cefepime. RVP and COVID were negative. On admission to CCU, MRSA swab was positive. Fungitell negative. Pneumocystis PCR negative.  - s/p Vancomycin 1500 mg IV daily and Zosyn 4.5 g IV q8h  - c/w Bactrim 1DS daily for PCP ppx while on steroids  - c/w Nystatin swish & swallow TID x7 days for thrush  - f/u ID recs    #Lung transplant:   - f/u pre-lung transplant ID recs: CMV IgG, EBV, serum QFT, Strongyloides IgG, hepatitis panel, HIV, varicella IgG, MMR IgG, toxoplasma IgG, influenza vaccine, Prevnar-20    HEME:  RIKKI    F: PO  E: Replete PRN  N: DASH/TLC   GI ppx: Protonix 40 mg PO daily   DVT ppx: Lovenox 40 mg SQ daily   Code status: Full code   Dispo: CCU --> [facility]

## 2022-12-02 NOTE — PROGRESS NOTE ADULT - ASSESSMENT
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    #WHO group 3 pHTN   Mild precapillary pulmonary hypertension with normal CO/CI by Td on low dose milrinone, normal filling pressures suggesting precapillary PH 2/2 underlying severe fibrotic ILD and adequate volume status.  - Discontinue Milrinone today.   - Plan to wean HFNC as tolerated.   - Further planning of Tyvaso acquisition from the specialty pharmacy,  received prior auth for tyvaso discussed with patient and nursing at bedside, patient to be delivered tyvaso from specialty pharmacy, will need to be stored on floor and receive medicine qid.   - Echo that was performed noted to have poor quality but with improvement in RV function.  - PT evaluation, OOBTC.   - Bowel regimen    #ILD flare with PNA  Post COVID ILD now presenting with worsening cough and elevated WBC count with elevated procalcitonin and CT chest performed at OSH with concern for newly developing ggos when compared to prior CT scan done in october. Completed course of zosyn.   - Plan to transition to Prednisone 50 mg PO daily. Steroid taper, decrease by 10mg every 3-4 days as tolerated.  - Bactrim for PCP ppx.    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    #WHO group 3 pHTN   Mild precapillary pulmonary hypertension with normal CO/CI by Td on low dose milrinone, normal filling pressures suggesting precapillary PH 2/2 underlying severe fibrotic ILD and adequate volume status.  - Plan to wean HFNC as tolerated.   - Per specialty pharmacy, will set up time for delivery with daughter tomorrow, Saturday. Continue to follow access to medication over the weekend.   - PT evaluation, OOBTC.   - Bowel regimen.    #ILD flare with PNA  Post COVID ILD now presenting with worsening cough and elevated WBC count with elevated procalcitonin and CT chest performed at OSH with concern for newly developing ggos when compared to prior CT scan done in october. Completed course of zosyn.   - Plan to transition to Prednisone 50 mg PO daily per Dr. Ng's note.  Plan for steroid taper, decrease by 10mg every 3-4 days as tolerated.  - Bactrim for PCP ppx.       Please see attending attestation below for further recommendations.

## 2022-12-02 NOTE — PROGRESS NOTE ADULT - ASSESSMENT
62 yo M w/ PMH HLD, pre-DM, and pulmonary fibrosis 2/2 COVID-19 infection (wheelchair bound at baseline, on 4L home O2) who presented to [facility] for several days of worsening SOB and generalized weakness with increasing O2 requirements, found to have acute hypoxic respiratory failure 2/2 pulmonary fibrosis with superimposed bacterial pneumonia and acute decompensated pulmonary HTN, transferred to [facility] for RHC i/s/o severe pulmonary disease.

## 2022-12-02 NOTE — PROGRESS NOTE ADULT - PROBLEM SELECTOR PLAN 6
Pt developed thrush while in CCU 2/2 to inhalation, started on nystatin swish and swallow, 50,000U TID, course concludes tomorrow 12/3

## 2022-12-02 NOTE — PROGRESS NOTE ADULT - PROBLEM SELECTOR PLAN 1
Post COVID ILD/ pulmonary fibrosis, now presenting with worsening cough and elevated WBC count with elevated procalcitonin and CT chest performed at OSH with concern for newly developing ggos when compared to prior CT scan done in october. Completed course of zosyn.   Plan:  - C/w solumedrol 30mg IV BID  - Plan to eventually transition to Prednisone 50 mg PO daily. Steroid taper, decrease by 10mg every 3-4 days as tolerated.  - Bactrim for PCP ppx.    # F/u lung transplant   f/u pre-lung transplant ID recs: CMV IgG, EBV, serum QFT, Strongyloides IgG, hepatitis panel, HIV, varicella IgG, MMR IgG, toxoplasma IgG, influenza vaccine, Prevnar-20

## 2022-12-02 NOTE — PROGRESS NOTE ADULT - PROBLEM SELECTOR PLAN 2
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  Weaned off milrinone and Florentin  Plan:  - Plan to wean HFNC as tolerated.   - Echo that was performed with improvement in RV function.  - PT evaluation, OOBTC.   - C/w Bowel regimen  - Pulm following, appreciate recs

## 2022-12-03 NOTE — PROGRESS NOTE ADULT - PROBLEM SELECTOR PLAN 1
Post COVID ILD/ pulmonary fibrosis, now presenting with worsening cough and elevated WBC count with elevated procalcitonin and CT chest performed at OSH with concern for newly developing ggos when compared to prior CT scan done in october. Completed course of zosyn.   Plan:  - C/w solumedrol 30mg IV BID, plan to taper to solumedrol 20 12/4.  - Plan to eventually transition to Prednisone 50 mg PO daily. Steroid taper, decrease by 10mg every 3-4 days as tolerated.  - Bactrim for PCP ppx.    # F/u lung transplant   f/u pre-lung transplant ID recs: CMV IgG, EBV, serum QFT, Strongyloides IgG, hepatitis panel, HIV, varicella IgG, MMR IgG, toxoplasma IgG, influenza vaccine, Prevnar-20

## 2022-12-03 NOTE — PROGRESS NOTE ADULT - PROBLEM SELECTOR PLAN 6
Pt developed thrush while in CCU 2/2 to inhalation, started on nystatin swish and swallow, 50,000U TID, course concludes 12/3  - Continue to monitor

## 2022-12-03 NOTE — PROGRESS NOTE ADULT - SUBJECTIVE AND OBJECTIVE BOX
OVERNIGHT EVENTS: None    SUBJECTIVE:  Patient seen and examined at bedside, sitting comfortably, hungry     Vital Signs Last 12 Hrs  T(F): 97.5 (12-03-22 @ 10:27), Max: 97.7 (12-03-22 @ 05:00)  HR: 90 (12-03-22 @ 08:29) (87 - 104)  BP: 124/66 (12-03-22 @ 08:29) (121/80 - 129/76)  BP(mean): 87 (12-03-22 @ 08:29) (87 - 97)  RR: 20 (12-03-22 @ 08:29) (19 - 20)  SpO2: 99% (12-03-22 @ 08:29) (95% - 100%)  I&O's Summary    02 Dec 2022 07:01  -  03 Dec 2022 07:00  --------------------------------------------------------  IN: 250 mL / OUT: 1815 mL / NET: -1565 mL    03 Dec 2022 07:01  -  03 Dec 2022 10:54  --------------------------------------------------------  IN: 250 mL / OUT: 0 mL / NET: 250 mL        PHYSICAL EXAM:  Constitutional: middle aged Male on HFNC, in no acute distress, resting in bed.    Respiratory: Mild bilateral rhonchi with adequate airflow bilaterally.   Cardiovascular: Regular rate and rhythm, normal S1S2, no murmurs, rubs, gallops.  Gastrointestinal: soft, non-tender and non-distended, no masses palpable, Normoactive bowel sounds.  Extremities: Warm, well perfused, pulses equal bilateral upper and lower extremities, non pitting edema, (+) clubbing  Neurological: AAOx3. Mentating well. EOMI, PERRLA.  Skin: Normal temperature, warm, dry.      LABS:                        13.4   12.19 )-----------( 250      ( 03 Dec 2022 09:16 )             41.6     12-03    132<L>  |  90<L>  |  22  ----------------------------<  176<H>  4.9   |  33<H>  |  0.76    Ca    9.2      03 Dec 2022 09:16  Phos  3.4     12-03  Mg     2.3     12-03    TPro  7.6  /  Alb  3.5  /  TBili  0.8  /  DBili  x   /  AST  18  /  ALT  24  /  AlkPhos  79  12-03            RADIOLOGY & ADDITIONAL TESTS:    MEDICATIONS  (STANDING):  AQUAPHOR (petrolatum Ointment) 1 Application(s) Topical two times a day  benzocaine 15 mG/menthol 3.6 mG Lozenge 2 Lozenge Oral every 6 hours  benzonatate 200 milliGRAM(s) Oral three times a day  chlorhexidine 2% Cloths 1 Application(s) Topical daily  dextrose 5%. 1000 milliLiter(s) (50 mL/Hr) IV Continuous <Continuous>  dextrose 5%. 1000 milliLiter(s) (100 mL/Hr) IV Continuous <Continuous>  dextrose 50% Injectable 25 Gram(s) IV Push once  dextrose 50% Injectable 12.5 Gram(s) IV Push once  dextrose 50% Injectable 25 Gram(s) IV Push once  enoxaparin Injectable 40 milliGRAM(s) SubCutaneous every 24 hours  fluticasone propionate 50 MICROgram(s)/spray Nasal Spray 2 Spray(s) Both Nostrils two times a day  glucagon  Injectable 1 milliGRAM(s) IntraMuscular once  influenza   Vaccine 0.5 milliLiter(s) IntraMuscular once  insulin lispro (ADMELOG) corrective regimen sliding scale   SubCutaneous Before meals and at bedtime  ipratropium    for Nebulization 500 MICROGram(s) Nebulizer every 6 hours  methylPREDNISolone sodium succinate Injectable 30 milliGRAM(s) IV Push every 12 hours  nystatin    Suspension 919022 Unit(s) Swish and Swallow three times a day  pantoprazole    Tablet 40 milliGRAM(s) Oral before breakfast  polyethylene glycol 3350 17 Gram(s) Oral daily  senna 2 Tablet(s) Oral at bedtime  sodium chloride 0.9%. 250 milliLiter(s) (250 mL/Hr) IV Continuous <Continuous>  tamsulosin 0.4 milliGRAM(s) Oral at bedtime  trimethoprim  160 mG/sulfamethoxazole 800 mG 1 Tablet(s) Oral daily    MEDICATIONS  (PRN):  acetaminophen     Tablet .. 650 milliGRAM(s) Oral every 6 hours PRN Mild Pain (1 - 3)  dextrose Oral Gel 15 Gram(s) Oral once PRN Blood Glucose LESS THAN 70 milliGRAM(s)/deciliter  hydrocodone/homatropine Syrup 5 milliLiter(s) Oral every 4 hours PRN Cough

## 2022-12-03 NOTE — PROGRESS NOTE ADULT - ASSESSMENT
60 yo M w/ PMH HLD, pre-DM, and pulmonary fibrosis 2/2 COVID-19 infection (wheelchair bound at baseline, on 4L home O2) who presented to [facility] for several days of worsening SOB and generalized weakness with increasing O2 requirements, found to have acute hypoxic respiratory failure 2/2 pulmonary fibrosis with superimposed bacterial pneumonia and acute decompensated pulmonary HTN, transferred to [facility] for RHC i/s/o severe pulmonary disease and evaluation for potential lung transplant.

## 2022-12-04 NOTE — PROGRESS NOTE ADULT - PROBLEM SELECTOR PLAN 1
Post COVID ILD/ pulmonary fibrosis, now presenting with worsening cough and elevated WBC count with elevated procalcitonin and CT chest performed at OSH with concern for newly developing ggos when compared to prior CT scan done in october. Completed course of zosyn.   Plan:  - C/w prednisone 50mg qd, taper to 40mg qd next on 12/6.  - Bactrim for PCP ppx.    # F/u lung transplant   f/u pre-lung transplant ID recs: CMV IgG, EBV, serum QFT, Strongyloides IgG, hepatitis panel, HIV, varicella IgG, MMR IgG, toxoplasma IgG, influenza vaccine, Prevnar-20  - F/ u results

## 2022-12-04 NOTE — PROCEDURE NOTE - NSICDXPROCEDURE_GEN_ALL_CORE_FT
PROCEDURES:  Arterial blood gas 04-Dec-2022 16:33:56  Gini Coyle  Arterial puncture 04-Dec-2022 16:34:01  Gini Coyle

## 2022-12-04 NOTE — PROCEDURE NOTE - NSPROCDETAILS_GEN_ALL_CORE
location identified, draped/prepped, sterile technique used, needle inserted/introduced/positive blood return obtained via catheter/connected to a pressurized flush line/sutured in place/all materials/supplies accounted for at end of procedure
location identified, draped/prepped, sterile technique used, needle inserted/introduced/positive blood return obtained via catheter/hemostasis with direct pressure, dressing applied/Seldinger technique/all materials/supplies accounted for at end of procedure

## 2022-12-04 NOTE — PROGRESS NOTE ADULT - ASSESSMENT
62 yo M w/ PMH HLD, pre-DM, and pulmonary fibrosis 2/2 COVID-19 infection (wheelchair bound at baseline, on 4L home O2) who presented to [facility] for several days of worsening SOB and generalized weakness with increasing O2 requirements, found to have acute hypoxic respiratory failure 2/2 pulmonary fibrosis with superimposed bacterial pneumonia and acute decompensated pulmonary HTN, transferred to [facility] for RHC i/s/o severe pulmonary disease and evaluation for potential lung transplant. On HiFLO, respiratory status improving. HDS.

## 2022-12-04 NOTE — PROVIDER CONTACT NOTE (OTHER) - ACTION/TREATMENT ORDERED:
Provider notified. Pt refused BIBAB. As per provider high flow is okay. Provider assessed pt. Robitussin will be ordered.

## 2022-12-04 NOTE — PROGRESS NOTE ADULT - PROBLEM SELECTOR PLAN 3
Leukocytosis to 13k, stable, slowly downtrending, likely elevated i/s/o acute sickness, afebrile, no s/s of infxn, CXR  Plan:  - Stable leukocytosis,   - Continue to monitor, CXR w poor airway entry, elevated b/l hemidaphragms

## 2022-12-04 NOTE — PROGRESS NOTE ADULT - SUBJECTIVE AND OBJECTIVE BOX
OVERNIGHT EVENTS: Patient de-satted to high 80s due to coughing fit, HiFlo increased t 40/60. Repeat CXR unchanged. Afebrile, HDS.     SUBJECTIVE:  Patient seen and examined at bedside, sitting comfortably, pleasant. Reports breathing has improved but reports some trouble sleeping due to persistent coughing fits which cause him to remain awake. Otherwise reports he is eating well and drinking liquids. Reports that he does not have other complaints apart from the coughing. Denies any fever, chills, vision change, headache, c/p, palpitations, abdominal pain, nausea, vomiting, diarrhea, constipation, dysuria, melena, or hematochezia.    Vital Signs Last 12 Hrs  T(F): 97.5 (12-03-22 @ 10:27), Max: 97.7 (12-03-22 @ 05:00)  HR: 90 (12-03-22 @ 08:29) (87 - 104)  BP: 124/66 (12-03-22 @ 08:29) (121/80 - 129/76)  BP(mean): 87 (12-03-22 @ 08:29) (87 - 97)  RR: 20 (12-03-22 @ 08:29) (19 - 20)  SpO2: 99% (12-03-22 @ 08:29) (95% - 100%)  I&O's Summary    02 Dec 2022 07:01  -  03 Dec 2022 07:00  --------------------------------------------------------  IN: 250 mL / OUT: 1815 mL / NET: -1565 mL    03 Dec 2022 07:01  -  03 Dec 2022 10:54  --------------------------------------------------------  IN: 250 mL / OUT: 0 mL / NET: 250 mL        PHYSICAL EXAM:  Constitutional: middle aged Male on HFNC, in no acute distress, resting in bed.    Respiratory: Mild bilateral rhonchi with adequate airflow bilaterally.   Cardiovascular: Regular rate and rhythm, normal S1S2, no murmurs, rubs, gallops.  Gastrointestinal: soft, non-tender and non-distended, no masses palpable, Normoactive bowel sounds.  Extremities: Warm, well perfused, pulses equal bilateral upper and lower extremities, non pitting edema, (+) clubbing  Neurological: AAOx3. Mentating well. EOMI, PERRLA.  Skin: Normal temperature, warm, dry.      LABS:                        13.4   12.19 )-----------( 250      ( 03 Dec 2022 09:16 )             41.6     12-03    132<L>  |  90<L>  |  22  ----------------------------<  176<H>  4.9   |  33<H>  |  0.76    Ca    9.2      03 Dec 2022 09:16  Phos  3.4     12-03  Mg     2.3     12-03    TPro  7.6  /  Alb  3.5  /  TBili  0.8  /  DBili  x   /  AST  18  /  ALT  24  /  AlkPhos  79  12-03            RADIOLOGY & ADDITIONAL TESTS:    MEDICATIONS  (STANDING):  AQUAPHOR (petrolatum Ointment) 1 Application(s) Topical two times a day  benzocaine 15 mG/menthol 3.6 mG Lozenge 2 Lozenge Oral every 6 hours  benzonatate 200 milliGRAM(s) Oral three times a day  chlorhexidine 2% Cloths 1 Application(s) Topical daily  dextrose 5%. 1000 milliLiter(s) (50 mL/Hr) IV Continuous <Continuous>  dextrose 5%. 1000 milliLiter(s) (100 mL/Hr) IV Continuous <Continuous>  dextrose 50% Injectable 25 Gram(s) IV Push once  dextrose 50% Injectable 12.5 Gram(s) IV Push once  dextrose 50% Injectable 25 Gram(s) IV Push once  enoxaparin Injectable 40 milliGRAM(s) SubCutaneous every 24 hours  fluticasone propionate 50 MICROgram(s)/spray Nasal Spray 2 Spray(s) Both Nostrils two times a day  glucagon  Injectable 1 milliGRAM(s) IntraMuscular once  influenza   Vaccine 0.5 milliLiter(s) IntraMuscular once  insulin lispro (ADMELOG) corrective regimen sliding scale   SubCutaneous Before meals and at bedtime  ipratropium    for Nebulization 500 MICROGram(s) Nebulizer every 6 hours  methylPREDNISolone sodium succinate Injectable 30 milliGRAM(s) IV Push every 12 hours  nystatin    Suspension 555875 Unit(s) Swish and Swallow three times a day  pantoprazole    Tablet 40 milliGRAM(s) Oral before breakfast  polyethylene glycol 3350 17 Gram(s) Oral daily  senna 2 Tablet(s) Oral at bedtime  sodium chloride 0.9%. 250 milliLiter(s) (250 mL/Hr) IV Continuous <Continuous>  tamsulosin 0.4 milliGRAM(s) Oral at bedtime  trimethoprim  160 mG/sulfamethoxazole 800 mG 1 Tablet(s) Oral daily    MEDICATIONS  (PRN):  acetaminophen     Tablet .. 650 milliGRAM(s) Oral every 6 hours PRN Mild Pain (1 - 3)  dextrose Oral Gel 15 Gram(s) Oral once PRN Blood Glucose LESS THAN 70 milliGRAM(s)/deciliter  hydrocodone/homatropine Syrup 5 milliLiter(s) Oral every 4 hours PRN Cough

## 2022-12-04 NOTE — PROGRESS NOTE ADULT - PROBLEM SELECTOR PLAN 4
Chronic hypoNa likely i/s/o o CHF 2/2 to RHF and pHTN  Plan:  - Continue to monitor, improving    #Hyperkalemia, AM K 5.6  - Lokelma 5mg ordered, f/u 2Pm BMP.

## 2022-12-04 NOTE — PROGRESS NOTE ADULT - PROBLEM SELECTOR PLAN 2
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  Weaned off milrinone and Florentin  Plan:  - Plan to wean HFNC as tolerated.   - Echo that was performed with improvement in RV function.  - PT evaluation, OOBTC.   - C/w Bowel regimen  - Pulm following, appreciate recs, slow prednisone taper, last day of 50mg 12/5. transition to 40mg 12/6.

## 2022-12-05 NOTE — CONSULT NOTE ADULT - SUBJECTIVE AND OBJECTIVE BOX
PULMONARY SERVICE INITIAL CONSULT NOTE    HPI:  Patient is a 62 yo M w/ PMHx of HLD, pre-DM, and pulmonary fibrosis 2/2 COVID-19 infection (wheelchair bound at baseline, on 4L home O2), who initially presented to [facility] on 11/22/2022 for several days of worsening dyspnea, productive cough, wheezing, and generalized weakness with increasing O2 requirements (up to 6L) and persistent dyspnea at rest. Prior to admission he reports he has been using a wheelchair, needed significant assistance with ADLs. On day of admission, pt had syncopal event due to SOB. He was brought to [place] ED in respiratory distress and was placed on BiPAP. CXR showed diffuse bilateral airspace opacities c/w multilobar pneumonia. CT PE showed no evidence of PE, however did show extensive diffuse interstitial and ground glass infiltrates bilaterally with associated hilar and mediastinal adenopathy, suspected multilobar atypical pneumonia. Patient was started on broad spectrum antibiotics due to concern for pneumonia. He was admitted to  MICU for acute hypoxic respiratory failure 2/2 pulmonary fibrosis with superimposed bacterial pneumonia and acute decompensated pulmonary HTN. TTE showed mild TR, severely dilated RV, moderate-to-severe elevated RV systolic pressure, moderate-to severe pHTN (58 mmHg), normal LV size and wall thickness, normal LV systolic function (EF 58%), and grade II diastolic dysfunction. Pt was transferred to [facility] for further management and RHC given severity of pulmonary disease.     On admission to [facility] CCU, pt was weaned off BiPAP to HFNC and started on Florentin and milrinone gtt. He Received Solumedrol IV BID for pulmonary fibrosis (pt had completed prednisone taper for PF exacerbation a few days prior to admission), and was transitioned to prednisone. He was diuresed in setting of fluid overload from RHF. Completed course of vanc & zosyn for superimposed bacterial PNA. Pt underwent RHC on 11/28 which showed CO 8.2, CI 4.2, CVP 2, PA pressure 44/14 (25), wedge pressure 6, RV 30/5. TTE on 11/28 showed improved RV systolic function from initial presentation. He was weaned off Florentin, and milrinone and continued on HFNC.    INTERVAL HPI:  Reviewed chart and overnight events; patient seen and examined at bedside. He continues to feel SOB and have cough. He is ambulating to the bathroom with assistance while on HFNC. No new complaints.    REVIEW OF SYSTEMS:  All additional ROS negative.    PAST MEDICAL & SURGICAL HISTORY:  Dyslipidemia      Borderline diabetes      Pulmonary fibrosis      S/P knee surgery          FAMILY HISTORY:      SOCIAL HISTORY:  Smoking Status: [ ] Current, [ ] Former, [ ] Never  Pack Years:    MEDICATIONS:  Pulmonary:  guaiFENesin Oral Liquid (Sugar-Free) 200 milliGRAM(s) Oral every 6 hours PRN  ipratropium    for Nebulization 500 MICROGram(s) Nebulizer every 6 hours    Antimicrobials:  trimethoprim  160 mG/sulfamethoxazole 800 mG 1 Tablet(s) Oral daily    Anticoagulants:  enoxaparin Injectable 40 milliGRAM(s) SubCutaneous every 24 hours    Onc:    GI/:  pantoprazole    Tablet 40 milliGRAM(s) Oral before breakfast  polyethylene glycol 3350 17 Gram(s) Oral daily  senna 2 Tablet(s) Oral at bedtime  tamsulosin 0.4 milliGRAM(s) Oral at bedtime    Endocrine:  dextrose 50% Injectable 25 Gram(s) IV Push once  dextrose 50% Injectable 12.5 Gram(s) IV Push once  dextrose 50% Injectable 25 Gram(s) IV Push once  dextrose Oral Gel 15 Gram(s) Oral once PRN  glucagon  Injectable 1 milliGRAM(s) IntraMuscular once  insulin glargine Injectable (LANTUS) 4 Unit(s) SubCutaneous at bedtime  insulin lispro (ADMELOG) corrective regimen sliding scale   SubCutaneous Before meals and at bedtime  predniSONE   Tablet 50 milliGRAM(s) Oral daily    Cardiac:    Other Medications:  acetaminophen     Tablet .. 650 milliGRAM(s) Oral every 6 hours PRN  AQUAPHOR (petrolatum Ointment) 1 Application(s) Topical two times a day  benzocaine 15 mG/menthol 3.6 mG Lozenge 2 Lozenge Oral every 6 hours  dextrose 5%. 1000 milliLiter(s) IV Continuous <Continuous>  dextrose 5%. 1000 milliLiter(s) IV Continuous <Continuous>  fluticasone propionate 50 MICROgram(s)/spray Nasal Spray 2 Spray(s) Both Nostrils two times a day  influenza   Vaccine 0.5 milliLiter(s) IntraMuscular once  melatonin 5 milliGRAM(s) Oral at bedtime PRN      Allergies    No Known Allergies    Intolerances        Vital Signs Last 24 Hrs  T(C): 36.7 (05 Dec 2022 14:00), Max: 36.8 (05 Dec 2022 05:14)  T(F): 98.1 (05 Dec 2022 14:00), Max: 98.2 (05 Dec 2022 05:14)  HR: 108 (05 Dec 2022 16:18) (93 - 114)  BP: 109/64 (05 Dec 2022 16:18) (106/66 - 143/68)  BP(mean): 82 (05 Dec 2022 16:18) (79 - 93)  RR: 20 (05 Dec 2022 16:18) (18 - 22)  SpO2: 92% (05 Dec 2022 16:18) (92% - 99%)    Parameters below as of 05 Dec 2022 16:18  Patient On (Oxygen Delivery Method): nasal cannula, high flow  O2 Flow (L/min): 40  O2 Concentration (%): 40    12-04 @ 07:01  -  12-05 @ 07:00  --------------------------------------------------------  IN: 420 mL / OUT: 930 mL / NET: -510 mL    PHYSICAL EXAM:  Constitutional: NAD  Head: NC/AT  EENT: PERRL, anicteric sclera; oropharynx clear, MMM  Neck: supple, no appreciable JVD  Respiratory: Mild bilateral rhonchi  Cardiovascular: +S1/S2, RRR  Gastrointestinal: soft, NT/ND  Extremities: WWP; no edema or cyanosis  Vascular: 2+ radial pulses B/L  Neurological: awake and alert; AMARAL    LABS:  ABG - ( 04 Dec 2022 16:31 )  pH, Arterial: 7.40  pH, Blood: x     /  pCO2: 56    /  pO2: 115   / HCO3: 35    / Base Excess: 8.0   /  SaO2: 99.2        CBC Full  -  ( 05 Dec 2022 05:30 )  WBC Count : 16.57 K/uL  RBC Count : 4.34 M/uL  Hemoglobin : 12.9 g/dL  Hematocrit : 40.2 %  Platelet Count - Automated : 269 K/uL  Mean Cell Volume : 92.6 fl  Mean Cell Hemoglobin : 29.7 pg  Mean Cell Hemoglobin Concentration : 32.1 gm/dL  Auto Neutrophil # : 12.04 K/uL  Auto Lymphocyte # : 2.81 K/uL  Auto Monocyte # : 1.09 K/uL  Auto Eosinophil # : 0.40 K/uL  Auto Basophil # : 0.01 K/uL  Auto Neutrophil % : 72.6 %  Auto Lymphocyte % : 17.0 %  Auto Monocyte % : 6.6 %  Auto Eosinophil % : 2.4 %  Auto Basophil % : 0.1 %    12-05    134<L>  |  94<L>  |  28<H>  ----------------------------<  131<H>  4.6   |  35<H>  |  0.79    Ca    8.5      05 Dec 2022 05:30  Phos  2.6     12-05  Mg     2.2     12-05    TPro  7.0  /  Alb  3.4  /  TBili  0.8  /  DBili  x   /  AST  18  /  ALT  21  /  AlkPhos  73  12-05    RADIOLOGY & ADDITIONAL STUDIES:  < from: Xray Chest 1 View- PORTABLE-Urgent (Xray Chest 1 View- PORTABLE-Urgent .) (12.03.22 @ 20:29) >  INTERPRETATION:  Clinical History: Desaturation    Frontal examination of the chest demonstrates limited inspiration.   Congestion and/orinfiltrates. Right effusion.      IMPRESSION: Limited inspiration. Congestion and/or infiltrates. Right   effusion. Elevation right hemidiaphragm    --- End of Report ---    < end of copied text >

## 2022-12-05 NOTE — PROGRESS NOTE ADULT - SUBJECTIVE AND OBJECTIVE BOX
Consultation Requested by:    Patient is a 61y old  Male who presents with a chief complaint of SOB (05 Dec 2022 17:34)    HPI:    Patient is a 61y old  Male who presents with a chief complaint of SOB (23 Nov 2022 16:44)      HPI:  Patient is a 62 yo M w/ PMH HLD, pre-DM, and pulmonary fibrosis 2/2 COVID-19 infection (wheelchair bound at baseline, on 4L home O2) who initially presented to [facility] on 11/22/2022 for several days of worsening dyspnea, cough productive of off-white sputum, wheezing, and generalized weakness with increasing O2 requirements (up to 6L) and persistent dyspnea at rest. On day of admission, pt had syncopal event due to SOB. Pt was brought to [place] ED in respiratory distress where he was placed on BiPAP and given one dose of vanc & zosyn out of concern for pneumonia. CXR showed diffuse bilateral airspace opacities c/w multilobar pneumonia. CT PE showed no evidence of PE, however did show extensive diffuse interstitial and ground glass infiltrates bilaterally with associated hilar and mediastinal adenopathy, with suspected multilobar atypical pneumonia. Pt was admitted to  MICU for acute hypoxic respiratory failure 2/2 pulmonary fibrosis with superimposed bacterial pneumonia and acute decompensated pulmonary HTN. ID was consulted at [place] and recommended treatment with Cefepime and Vancomycin (Urine legionella, strep negative). TTE was performed which showed mild TR, severely dilated RV, moderate-to-severe elevated RV systolic pressure, moderate-to severe PHTN (58 mmHg), normal LV size and wall thickness, normal LV systolic function (EF 58%), and grade II diastolic dysfunction. Cardiology was consulted at [facility] and recommended transfer to [facility] for RHC given severity of pulmonary disease.     On arrival to [facility] CCU, pt was tachypneic and tachycardic, however reported that his breathing was much improved compared to admission at [facility]. Vitals on admission were T 98.8, , /84, RR 44, SpO2 97% on BiPAP 70% FiO2. Labs showed WBC 17.50, Hgb 12.4, INR 1.51, BNP 5k, Trop 0.01, and Lactate 1.2. ROS was positive for SOB, cough, and LE swelling. Pt denied fever, chills, HA, dizziness, CP, palpitations, abdominal pain, n/v, constipation, diarrhea, dysuria, hematuria, urinary frequency/urgency, flank pain, back pain, numbness, or tingling. Pt was admitted to CCU for RHC.     (23 Nov 2022 16:44)      REVIEW OF SYSTEMS  All review of systems negative, except for those marked:  General:		[] Abnormal:  	[] Night Sweats		[] Fever		[] Weight Loss  Pulmonary/Cough:	[] Abnormal:  Cardiac/Chest Pain:	[] Abnormal:  Gastrointestinal:   	[] Abnormal:  Eyes:			        [] Abnormal:  ENT:		         	[] Abnormal:  Dysuria:		                [] Abnormal:  Musculoskeletal	:	[] Abnormal:  Endocrine:		        [] Abnormal:  Lymph Nodes:		[] Abnormal:  Headache:		        [] Abnormal:  Skin:			        [] Abnormal:  Allergy/Immune:       	[] Abnormal:  Psychiatric:		        [] Abnormal:  [] All other review of systems negative  [] Unable to obtain (explain):    Recent Ill Contacts:	[] No	[] Yes:  Recent Travel History:	[] No	[] Yes:  Recent Animal/Insect Exposure/Tick Bites:	[] No	[] Yes:    Allergies    No Known Allergies    Intolerances      Antimicrobials:  trimethoprim  160 mG/sulfamethoxazole 800 mG 1 Tablet(s) Oral daily      Other Medications:  acetaminophen     Tablet .. 650 milliGRAM(s) Oral every 6 hours PRN  AQUAPHOR (petrolatum Ointment) 1 Application(s) Topical two times a day  benzocaine 15 mG/menthol 3.6 mG Lozenge 2 Lozenge Oral every 6 hours  dextrose 5%. 1000 milliLiter(s) IV Continuous <Continuous>  dextrose 5%. 1000 milliLiter(s) IV Continuous <Continuous>  dextrose 50% Injectable 25 Gram(s) IV Push once  dextrose 50% Injectable 12.5 Gram(s) IV Push once  dextrose 50% Injectable 25 Gram(s) IV Push once  dextrose Oral Gel 15 Gram(s) Oral once PRN  enoxaparin Injectable 40 milliGRAM(s) SubCutaneous every 24 hours  fluticasone propionate 50 MICROgram(s)/spray Nasal Spray 2 Spray(s) Both Nostrils two times a day  glucagon  Injectable 1 milliGRAM(s) IntraMuscular once  guaiFENesin Oral Liquid (Sugar-Free) 200 milliGRAM(s) Oral every 6 hours PRN  influenza   Vaccine 0.5 milliLiter(s) IntraMuscular once  insulin glargine Injectable (LANTUS) 4 Unit(s) SubCutaneous at bedtime  insulin lispro (ADMELOG) corrective regimen sliding scale   SubCutaneous Before meals and at bedtime  ipratropium    for Nebulization 500 MICROGram(s) Nebulizer every 6 hours  melatonin 5 milliGRAM(s) Oral at bedtime PRN  pantoprazole    Tablet 40 milliGRAM(s) Oral before breakfast  polyethylene glycol 3350 17 Gram(s) Oral daily  predniSONE   Tablet 50 milliGRAM(s) Oral daily  senna 2 Tablet(s) Oral at bedtime  tamsulosin 0.4 milliGRAM(s) Oral at bedtime      FAMILY HISTORY:    PAST MEDICAL & SURGICAL HISTORY:  Dyslipidemia      Borderline diabetes      Pulmonary fibrosis      S/P knee surgery        SOCIAL HISTORY:    IMMUNIZATIONS  [] Up to Date		[] Not Up to Date:  Recent Immunizations:	[] No	[] Yes:    Daily     Daily   Head Circumference:  Vital Signs Last 24 Hrs  T(C): 36.9 (05 Dec 2022 18:14), Max: 36.9 (05 Dec 2022 18:14)  T(F): 98.5 (05 Dec 2022 18:14), Max: 98.5 (05 Dec 2022 18:14)  HR: 108 (05 Dec 2022 16:18) (93 - 114)  BP: 109/64 (05 Dec 2022 16:18) (106/66 - 143/68)  BP(mean): 82 (05 Dec 2022 16:18) (79 - 93)  RR: 20 (05 Dec 2022 16:18) (18 - 22)  SpO2: 92% (05 Dec 2022 16:18) (92% - 99%)    Parameters below as of 05 Dec 2022 16:18  Patient On (Oxygen Delivery Method): nasal cannula, high flow  O2 Flow (L/min): 40  O2 Concentration (%): 40    PHYSICAL EXAM    Respiratory Support:		[] No	[] Yes:  Vasoactive medication infusion:	[] No	[] Yes:  Venous catheters:		[] No	[] Yes:  Bladder catheter:		        [] No	[] Yes:  Other catheters or tubes:	[] No	[] Yes:    Lab Results:                        12.9   16.57 )-----------( 269      ( 05 Dec 2022 05:30 )             40.2     12-05    134<L>  |  94<L>  |  28<H>  ----------------------------<  131<H>  4.6   |  35<H>  |  0.79    Ca    8.5      05 Dec 2022 05:30  Phos  2.6     12-05  Mg     2.2     12-05    TPro  7.0  /  Alb  3.4  /  TBili  0.8  /  DBili  x   /  AST  18  /  ALT  21  /  AlkPhos  73  12-05    LIVER FUNCTIONS - ( 05 Dec 2022 05:30 )  Alb: 3.4 g/dL / Pro: 7.0 g/dL / ALK PHOS: 73 U/L / ALT: 21 U/L / AST: 18 U/L / GGT: x                 MICROBIOLOGY  [] Pathology slides reviewed and/or discussed with pathologist  [] Microbiology findings discussed with microbiologist or slides reviewed  [] Images erviewed with radiologist  [] Case discussed with an attending physician in addition to the patient's primary physician  [] Records, reports from outside [facility] reviewed    [] Patient requires continued monitoring for:  [] Total time spent by attending physician: __ minutes, excluding procedure time. Pulmonology Progress note:   Patient seen and examined at bedside. Case discussed with the attending physician during rounds.     Patient has no acute complaints at this time. Patient with intermittent desaturation events with coughing episodes over the weekend but improved after coughing stopped. Patient denies fever, chest pain, changes in Shortness of breath, abdominal pain, nausea, vomiting.      VITAL SIGNS:  T(F): 98.5 (12-05-22 @ 18:14)  HR: 108 (12-05-22 @ 16:18)  BP: 109/64 (12-05-22 @ 16:18)  RR: 20 (12-05-22 @ 16:18)  SpO2: 92% (12-05-22 @ 16:18)  Wt(kg): --    PHYSICAL EXAM:  Constitutional: Male on HFNC in no acute distress, resting in bed.    Respiratory: Mild bilateral ronchi with adequate airflow bilaterally.   Cardiovascular: Regular rate and rhythm, normal S1S2, no murmurs, rubs, gallops.  Gastrointestinal: soft, non-tender and non-distended, no masses palpable, Normoactive bowel sounds.  Extremities: Warm, well perfused, pulses equal bilateral upper and lower extremities, non pitting edema, (+) clubbing  Neurological: AAOx3.   Skin: Normal temperature, warm, dry.    MEDICATIONS  (STANDING):  AQUAPHOR (petrolatum Ointment) 1 Application(s) Topical two times a day  benzocaine 15 mG/menthol 3.6 mG Lozenge 2 Lozenge Oral every 6 hours  dextrose 5%. 1000 milliLiter(s) (50 mL/Hr) IV Continuous <Continuous>  dextrose 5%. 1000 milliLiter(s) (100 mL/Hr) IV Continuous <Continuous>  dextrose 50% Injectable 25 Gram(s) IV Push once  dextrose 50% Injectable 12.5 Gram(s) IV Push once  dextrose 50% Injectable 25 Gram(s) IV Push once  enoxaparin Injectable 40 milliGRAM(s) SubCutaneous every 24 hours  fluticasone propionate 50 MICROgram(s)/spray Nasal Spray 2 Spray(s) Both Nostrils two times a day  glucagon  Injectable 1 milliGRAM(s) IntraMuscular once  influenza   Vaccine 0.5 milliLiter(s) IntraMuscular once  insulin glargine Injectable (LANTUS) 4 Unit(s) SubCutaneous at bedtime  insulin lispro (ADMELOG) corrective regimen sliding scale   SubCutaneous Before meals and at bedtime  ipratropium    for Nebulization 500 MICROGram(s) Nebulizer every 6 hours  pantoprazole    Tablet 40 milliGRAM(s) Oral before breakfast  polyethylene glycol 3350 17 Gram(s) Oral daily  predniSONE   Tablet 50 milliGRAM(s) Oral daily  senna 2 Tablet(s) Oral at bedtime  tamsulosin 0.4 milliGRAM(s) Oral at bedtime  trimethoprim  160 mG/sulfamethoxazole 800 mG 1 Tablet(s) Oral daily    MEDICATIONS  (PRN):  acetaminophen     Tablet .. 650 milliGRAM(s) Oral every 6 hours PRN Mild Pain (1 - 3)  dextrose Oral Gel 15 Gram(s) Oral once PRN Blood Glucose LESS THAN 70 milliGRAM(s)/deciliter  guaiFENesin Oral Liquid (Sugar-Free) 200 milliGRAM(s) Oral every 6 hours PRN Cough  melatonin 5 milliGRAM(s) Oral at bedtime PRN Insomnia      Allergies    No Known Allergies    Intolerances        LABS:                        12.9   16.57 )-----------( 269      ( 05 Dec 2022 05:30 )             40.2     12-05    134<L>  |  94<L>  |  28<H>  ----------------------------<  131<H>  4.6   |  35<H>  |  0.79    Ca    8.5      05 Dec 2022 05:30  Phos  2.6     12-05  Mg     2.2     12-05    TPro  7.0  /  Alb  3.4  /  TBili  0.8  /  DBili  x   /  AST  18  /  ALT  21  /  AlkPhos  73  12-05            RADIOLOGY & ADDITIONAL TESTS:  Reviewed

## 2022-12-05 NOTE — PROGRESS NOTE ADULT - PROBLEM SELECTOR PLAN 5
A1C of 6.0, BSG while inpatient, 140s-low 300s.  Plan:  - Lantus 4U ordered, improved BSG  - mISS  - DASH/TLC

## 2022-12-05 NOTE — PROGRESS NOTE ADULT - ASSESSMENT
. 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    #WHO group 3 pHTN   Mild precapillary pulmonary hypertension with normal CO/CI by Td on low dose milrinone, normal filling pressures suggesting precapillary PH 2/2 underlying severe fibrotic ILD and adequate volume status.   - Plan to wean HFNC as tolerated.   - Still pending Tyvaso delivery, will follow up tomorrow.   - PT evaluation, OOBTC.   - Bowel regimen.    #ILD flare with PNA  Post COVID ILD now presenting with worsening cough and elevated WBC count with elevated procalcitonin and CT chest performed at OSH with concern for newly developing ggos when compared to prior CT scan done in october. Completed course of zosyn.   - Continue Prednisone taper 10 mg every 3-4 days.   - Bactrim for PCP ppx.       Please see attending attestation below for further recommendations.

## 2022-12-05 NOTE — PROGRESS NOTE ADULT - ASSESSMENT
62 yo M w/ PMH HLD, pre-DM, and pulmonary fibrosis 2/2 COVID-19 infection (wheelchair bound at baseline, on 4L home O2) who presented to [facility] for several days of worsening SOB and generalized weakness with increasing O2 requirements, found to have acute hypoxic respiratory failure 2/2 pulmonary fibrosis with superimposed bacterial pneumonia and acute decompensated pulmonary HTN, transferred to [facility] for RHC i/s/o severe pulmonary disease and evaluation for potential lung transplant. On HiFLO, respiratory status improving. HDS.

## 2022-12-05 NOTE — CONSULT NOTE ADULT - ATTENDING COMMENTS
60 yo M with chronic hypoxic respiratory failure 2/2 fibrotic ILD 2/2 covid-19 admitted for acute on chronic hypoxic resp failure 2/2 right heart failure s/p milrinone and Florentin, awaiting initiation of tyvaso. supplemental oxygen requirements are improving, he is on 50%/60lpm saturating in the mid 90s. overall, limited mobility. recommend PT, and f/u outpatient with the transplant team.

## 2022-12-05 NOTE — PROGRESS NOTE ADULT - PROBLEM SELECTOR PLAN 4
Chronic hypoNa likely i/s/o o CHF 2/2 to RHF and pHTN  Plan:  - Continue to monitor, improving    #Hyperkalemia, AM K 5.6->5.0 s/p lokelma  - This AM______ Chronic hypoNa likely i/s/o o CHF 2/2 to RHF and pHTN  Plan:  - Continue to monitor, improving    #Hyperkalemia, AM K 5.6->5.0 s/p lokelma, wnl this AM. RESOLVED.

## 2022-12-05 NOTE — CONSULT NOTE ADULT - ASSESSMENT
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    #WHO group 3 pHTN   Mild precapillary pulmonary hypertension with normal CO/CI by Td on low dose milrinone, normal filling pressures suggesting precapillary PH 2/2 underlying severe fibrotic ILD and adequate volume status.  - management per pulmonary HTN  - Plan to wean HFNC as tolerated.   - PT evaluation, OOBTC.   - Bowel regimen.    #ILD flare with PNA  Post COVID ILD, presented with worsening cough and elevated WBC with elevated procalcitonin and CT chest performed at OSH with concern for newly developing ggos when compared to prior CT scan done in october. Completed course of zosyn, transitioned from solumedrol to prednisone.    - Prednisone taper per pulmonology  - wean HFNC as tolerated  - Bactrim for PCP ppx.     #Transplant evaluation  Patient with significant central obesity and deconditioning with worsening of functional status prior to admission, requiring significant help with all ADLs and was wheelchair bound.   -Titrate off HFNC as tolerated  -please send QuantiFeron, CMV titers  -recommended aggressive weight loss  -patient would require significant rehabilitation as he is currently not a transplant candidate due to poor functional status

## 2022-12-05 NOTE — PROGRESS NOTE ADULT - PROBLEM SELECTOR PLAN 1
Post COVID ILD/ pulmonary fibrosis, now presenting with worsening cough and elevated WBC count with elevated procalcitonin and CT chest performed at OSH with concern for newly developing ggos when compared to prior CT scan done in october. Completed course of zosyn.   Plan:  - C/w prednisone 50mg qd, taper to 40mg qd next on 12/6.  - Bactrim for PCP ppx.  - Pt Tyvaso will be delivered home on Tuesday    # F/u lung transplant   f/u pre-lung transplant ID recs: CMV IgG, EBV, serum QFT, Strongyloides IgG, hepatitis panel, HIV, varicella IgG, MMR IgG, toxoplasma IgG, influenza vaccine, Prevnar-20  - F/ u results

## 2022-12-06 NOTE — PROGRESS NOTE ADULT - PROBLEM SELECTOR PLAN 1
Post COVID ILD/ pulmonary fibrosis, now presenting with worsening cough and elevated WBC count with elevated procalcitonin and CT chest performed at OSH with concern for newly developing ggos when compared to prior CT scan done in october. Completed course of zosyn.   Plan:  - C/w prednisone 40mg qd  - Bactrim for PCP ppx.  - Pt Tyvaso will be delivered home on Tuesday, will call daughter to touch base    # F/u lung transplant   f/u pre-lung transplant ID recs: CMV IgG, EBV, serum QFT, Strongyloides IgG, hepatitis panel, HIV, varicella IgG, MMR IgG, toxoplasma IgG, influenza vaccine, Prevnar-20  - Quantiferon, and CMV titers sent for transplant  - Encourage OOBTC

## 2022-12-06 NOTE — PROGRESS NOTE ADULT - PROBLEM SELECTOR PLAN 3
Leukocytosis to 13k, stable, slowly downtrending, likely elevated i/s/o acute sickness, afebrile, no s/s of infxn, CXR  Plan:  - Stable leukocytosis, likely 2/2 to steroid use.  - Continue to monitor, CXR w poor airway entry, elevated b/l hemidaphragms

## 2022-12-06 NOTE — PROGRESS NOTE ADULT - PROBLEM SELECTOR PLAN 4
Chronic hypoNa likely i/s/o o CHF 2/2 to RHF and pHTN  Plan:  - Continue to monitor, improving    #Hyperkalemia, AM K 5.6->5.0 s/p lokelma, wnl this AM. RESOLVED.

## 2022-12-06 NOTE — PROGRESS NOTE ADULT - ASSESSMENT
60 yo M w/ PMH HLD, pre-DM, and pulmonary fibrosis 2/2 COVID-19 infection (wheelchair bound at baseline, on 4L home O2) who presented to [facility] for several days of worsening SOB and generalized weakness with increasing O2 requirements, found to have acute hypoxic respiratory failure 2/2 pulmonary fibrosis with superimposed bacterial pneumonia and acute decompensated pulmonary HTN, transferred to [facility] for RHC i/s/o severe pulmonary disease and evaluation for potential lung transplant. On HiFLO, respiratory status improving. HDS.

## 2022-12-06 NOTE — PROGRESS NOTE ADULT - SUBJECTIVE AND OBJECTIVE BOX
PULMONARY CONSULT SERVICE FOLLOW-UP NOTE    INTERVAL HPI:  Reviewed chart and overnight events; patient seen and examined at bedside.    MEDICATIONS:  Pulmonary:  benzonatate 200 milliGRAM(s) Oral every 8 hours  guaiFENesin Oral Liquid (Sugar-Free) 200 milliGRAM(s) Oral every 6 hours PRN  ipratropium    for Nebulization 500 MICROGram(s) Nebulizer every 6 hours    Antimicrobials:  trimethoprim  160 mG/sulfamethoxazole 800 mG 1 Tablet(s) Oral daily    Anticoagulants:  enoxaparin Injectable 40 milliGRAM(s) SubCutaneous every 24 hours    Cardiac:      Allergies    No Known Allergies    Intolerances        Vital Signs Last 24 Hrs  T(C): 37.2 (06 Dec 2022 17:54), Max: 37.2 (06 Dec 2022 17:54)  T(F): 99 (06 Dec 2022 17:54), Max: 99 (06 Dec 2022 17:54)  HR: 110 (06 Dec 2022 19:03) (95 - 110)  BP: 122/76 (06 Dec 2022 19:03) (100/59 - 151/64)  BP(mean): 95 (06 Dec 2022 19:03) (77 - 95)  RR: 20 (06 Dec 2022 16:25) (16 - 20)  SpO2: 92% (06 Dec 2022 19:03) (92% - 100%)    Parameters below as of 06 Dec 2022 19:03  Patient On (Oxygen Delivery Method): nasal cannula w/ humidification  O2 Flow (L/min): 6      12-05 @ 07:01  -  12-06 @ 07:00  --------------------------------------------------------  IN: 0 mL / OUT: 850 mL / NET: -850 mL    12-06 @ 07:01  -  12-06 @ 19:33  --------------------------------------------------------  IN: 580 mL / OUT: 800 mL / NET: -220 mL          PHYSICAL EXAM:  Constitutional: WDWN  HEENT: NC/AT; PERRL, anicteric sclera; MMM  Neck: supple  Cardiovascular: +S1/S2, RRR  Respiratory: CTA B/L; no W/R/R  Gastrointestinal: soft, NT/ND  Extremities: WWP; no edema, clubbing or cyanosis  Vascular: 2+ radial pulses B/L  Neurological: awake and alert; AMARAL    LABS:      CBC Full  -  ( 06 Dec 2022 11:09 )  WBC Count : 15.83 K/uL  RBC Count : 4.05 M/uL  Hemoglobin : 12.0 g/dL  Hematocrit : 37.3 %  Platelet Count - Automated : 234 K/uL  Mean Cell Volume : 92.1 fl  Mean Cell Hemoglobin : 29.6 pg  Mean Cell Hemoglobin Concentration : 32.2 gm/dL  Auto Neutrophil # : 11.59 K/uL  Auto Lymphocyte # : 2.54 K/uL  Auto Monocyte # : 0.98 K/uL  Auto Eosinophil # : 0.46 K/uL  Auto Basophil # : 0.02 K/uL  Auto Neutrophil % : 73.3 %  Auto Lymphocyte % : 16.0 %  Auto Monocyte % : 6.2 %  Auto Eosinophil % : 2.9 %  Auto Basophil % : 0.1 %    12-06    132<L>  |  92<L>  |  20  ----------------------------<  227<H>  4.3   |  34<H>  |  0.75    Ca    8.5      06 Dec 2022 11:09  Phos  2.3     12-06  Mg     2.1     12-06    TPro  6.6  /  Alb  3.1<L>  /  TBili  0.7  /  DBili  x   /  AST  18  /  ALT  19  /  AlkPhos  74  12-06                      RADIOLOGY & ADDITIONAL STUDIES:  Reviewed.

## 2022-12-06 NOTE — PROGRESS NOTE ADULT - ASSESSMENT
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    #WHO group 3 pHTN   Mild precapillary pulmonary hypertension with normal CO/CI by Td on low dose milrinone, normal filling pressures suggesting precapillary PH 2/2 underlying severe fibrotic ILD and adequate volume status.   - continue NC-- has been able to tolerate nasal canula at approximately home O2 setting.  - Still pending Tyvaso delivery, will follow up tomorrow.   - PT evaluation, OOBTC.   - Bowel regimen.  -please add azelastine for congestion    #ILD flare with PNA  Post COVID ILD now presenting with worsening cough and elevated WBC count with elevated procalcitonin and CT chest performed at OSH with concern for newly developing ggos when compared to prior CT scan done in october. Completed course of zosyn.   - Continue Prednisone taper 10 mg every 3-4 days.   - Bactrim for PCP ppx.       Please see attending attestation below for further recommendations.

## 2022-12-06 NOTE — PROGRESS NOTE ADULT - SUBJECTIVE AND OBJECTIVE BOX
OVERNIGHT EVENTS: Patient was on BiPAP for 3 hours yesterday. No BiPAP overnight.    SUBJECTIVE:  Patient seen and examined at bedside, sitting comfortably, pleasant. Reports breathing has improved and cough had some subjective improvement with robitussin. Otherwise reports he is eating well and drinking liquids. Reports that he does not have other complaints apart from the coughing. Denies any fever, chills, vision change, headache, c/p, palpitations, abdominal pain, nausea, vomiting, diarrhea, constipation, dysuria, melena, or hematochezia.    Vital Signs Last 24 Hrs  T(C): 36.8 (06 Dec 2022 10:31), Max: 36.9 (05 Dec 2022 18:14)  T(F): 98.3 (06 Dec 2022 10:31), Max: 98.5 (05 Dec 2022 18:14)  HR: 97 (06 Dec 2022 08:47) (95 - 114)  BP: 107/74 (06 Dec 2022 08:47) (100/59 - 143/68)  BP(mean): 86 (06 Dec 2022 08:47) (77 - 92)  ABP: --  ABP(mean): --  RR: 16 (06 Dec 2022 08:47) (16 - 20)  SpO2: 97% (06 Dec 2022 08:47) (92% - 100%)    O2 Parameters below as of 06 Dec 2022 08:47  Patient On (Oxygen Delivery Method): nasal cannula, high flow  O2 Flow (L/min): 40  O2 Concentration (%): 60      PHYSICAL EXAM:  Constitutional: middle aged Male on HFNC, in no acute distress, resting in bed.    Respiratory: Mild bilateral rhonchi with adequate airflow bilaterally. No w/r.  Cardiovascular: Regular rate and rhythm, normal S1S2, no murmurs, rubs, gallops.  Gastrointestinal: soft, non-tender and non-distended, no masses palpable, Normoactive bowel sounds.  Extremities: Warm, well perfused, pulses equal bilateral upper and lower extremities, non pitting edema, (+) clubbing  Neurological: AAOx3. Mentating well. EOMI, PERRLA.  Skin: Normal temperature, warm, dry.    LABS:                         13.3   13.77 )-----------( 282      ( 04 Dec 2022 05:30 )             40.4     12-04    133<L>  |  93<L>  |  25<H>  ----------------------------<  216<H>  5.0   |  35<H>  |  0.78    Ca    9.2      04 Dec 2022 15:23  Phos  3.4     12-04  Mg     2.6     12-04    TPro  7.6  /  Alb  3.5  /  TBili  0.8  /  DBili  x   /  AST  18  /  ALT  24  /  AlkPhos  79  12-03                  RADIOLOGY, EKG & ADDITIONAL TESTS:             RADIOLOGY & ADDITIONAL TESTS:    MEDICATIONS  (STANDING):  AQUAPHOR (petrolatum Ointment) 1 Application(s) Topical two times a day  benzocaine 15 mG/menthol 3.6 mG Lozenge 2 Lozenge Oral every 6 hours  benzonatate 200 milliGRAM(s) Oral three times a day  chlorhexidine 2% Cloths 1 Application(s) Topical daily  dextrose 5%. 1000 milliLiter(s) (50 mL/Hr) IV Continuous <Continuous>  dextrose 5%. 1000 milliLiter(s) (100 mL/Hr) IV Continuous <Continuous>  dextrose 50% Injectable 25 Gram(s) IV Push once  dextrose 50% Injectable 12.5 Gram(s) IV Push once  dextrose 50% Injectable 25 Gram(s) IV Push once  enoxaparin Injectable 40 milliGRAM(s) SubCutaneous every 24 hours  fluticasone propionate 50 MICROgram(s)/spray Nasal Spray 2 Spray(s) Both Nostrils two times a day  glucagon  Injectable 1 milliGRAM(s) IntraMuscular once  influenza   Vaccine 0.5 milliLiter(s) IntraMuscular once  insulin lispro (ADMELOG) corrective regimen sliding scale   SubCutaneous Before meals and at bedtime  ipratropium    for Nebulization 500 MICROGram(s) Nebulizer every 6 hours  methylPREDNISolone sodium succinate Injectable 30 milliGRAM(s) IV Push every 12 hours  nystatin    Suspension 675699 Unit(s) Swish and Swallow three times a day  pantoprazole    Tablet 40 milliGRAM(s) Oral before breakfast  polyethylene glycol 3350 17 Gram(s) Oral daily  senna 2 Tablet(s) Oral at bedtime  sodium chloride 0.9%. 250 milliLiter(s) (250 mL/Hr) IV Continuous <Continuous>  tamsulosin 0.4 milliGRAM(s) Oral at bedtime  trimethoprim  160 mG/sulfamethoxazole 800 mG 1 Tablet(s) Oral daily    MEDICATIONS  (PRN):  acetaminophen     Tablet .. 650 milliGRAM(s) Oral every 6 hours PRN Mild Pain (1 - 3)  dextrose Oral Gel 15 Gram(s) Oral once PRN Blood Glucose LESS THAN 70 milliGRAM(s)/deciliter  hydrocodone/homatropine Syrup 5 milliLiter(s) Oral every 4 hours PRN Cough

## 2022-12-06 NOTE — PROGRESS NOTE ADULT - PROBLEM SELECTOR PLAN 2
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  Weaned off milrinone and Florentin  Plan:  - Plan to wean HFNC as tolerated. Weaned to 40/55 this AM.  - Echo that was performed with improvement in RV function.  - PT evaluation, OOBTC.   - C/w Bowel regimen  - Pulm following, appreciate recs, slow prednisone taper, last day of 50mg 12/5. transitioned to 40mg this AM.

## 2022-12-07 NOTE — CHART NOTE - NSCHARTNOTEFT_GEN_A_CORE
Admitting Diagnosis:   Patient is a 61y old  Male who presents with a chief complaint of SOB (07 Dec 2022 12:28)      PAST MEDICAL & SURGICAL HISTORY:  Dyslipidemia      Borderline diabetes      Pulmonary fibrosis      S/P knee surgery      Current Nutrition Order:  DASH/TLC     PO Intake: Good (%) [ x ]  Fair (50-75%) [   ] Poor (<25%) [   ]    GI Issues: LBM 12/3; ordered for miralax, senna. No documented GI distress at this time.    Pain: Denies pain/discomfort.    Skin Integrity: Edema 1+ R/L leg. L ear Stage II pressure ulcer, Connor 19    Labs:   12-07    136  |  94<L>  |  19  ----------------------------<  95  See Note   |  35<H>  |  0.88    Ca    8.7      07 Dec 2022 09:28  Phos  3.0     12-07  Mg     2.2     12-07    TPro  6.8  /  Alb  3.1<L>  /  TBili  0.9  /  DBili  x   /  AST  See Note  /  ALT  See Note  /  AlkPhos  69  12-07    CAPILLARY BLOOD GLUCOSE      POCT Blood Glucose.: 128 mg/dL (07 Dec 2022 11:44)  POCT Blood Glucose.: 102 mg/dL (07 Dec 2022 06:54)  POCT Blood Glucose.: 131 mg/dL (06 Dec 2022 21:34)  POCT Blood Glucose.: 197 mg/dL (06 Dec 2022 16:36)      Medications:  MEDICATIONS  (STANDING):  AQUAPHOR (petrolatum Ointment) 1 Application(s) Topical two times a day  benzocaine 15 mG/menthol 3.6 mG Lozenge 2 Lozenge Oral every 6 hours  benzonatate 200 milliGRAM(s) Oral every 8 hours  dextrose 5%. 1000 milliLiter(s) (50 mL/Hr) IV Continuous <Continuous>  dextrose 5%. 1000 milliLiter(s) (100 mL/Hr) IV Continuous <Continuous>  dextrose 50% Injectable 25 Gram(s) IV Push once  dextrose 50% Injectable 12.5 Gram(s) IV Push once  dextrose 50% Injectable 25 Gram(s) IV Push once  enoxaparin Injectable 40 milliGRAM(s) SubCutaneous every 24 hours  fluticasone propionate 50 MICROgram(s)/spray Nasal Spray 2 Spray(s) Both Nostrils two times a day  glucagon  Injectable 1 milliGRAM(s) IntraMuscular once  influenza   Vaccine 0.5 milliLiter(s) IntraMuscular once  insulin glargine Injectable (LANTUS) 4 Unit(s) SubCutaneous at bedtime  insulin lispro (ADMELOG) corrective regimen sliding scale   SubCutaneous Before meals and at bedtime  ipratropium    for Nebulization 500 MICROGram(s) Nebulizer every 6 hours  pantoprazole    Tablet 40 milliGRAM(s) Oral before breakfast  polyethylene glycol 3350 17 Gram(s) Oral daily  predniSONE   Tablet 40 milliGRAM(s) Oral every 24 hours  senna 2 Tablet(s) Oral at bedtime  tamsulosin 0.4 milliGRAM(s) Oral at bedtime  trimethoprim  160 mG/sulfamethoxazole 800 mG 1 Tablet(s) Oral daily    MEDICATIONS  (PRN):  acetaminophen     Tablet .. 650 milliGRAM(s) Oral every 6 hours PRN Mild Pain (1 - 3)  dextrose Oral Gel 15 Gram(s) Oral once PRN Blood Glucose LESS THAN 70 milliGRAM(s)/deciliter  guaiFENesin Oral Liquid (Sugar-Free) 200 milliGRAM(s) Oral every 6 hours PRN Cough  melatonin 5 milliGRAM(s) Oral at bedtime PRN Insomnia    5'5''  pounds +-10%  Wt 198 pounds BMI 33 %[id number]     Weight Change:   11/23: 216 pounds  11/24: 209.8 pounds  11/27: 197.7 pounds  11/30: 191.8 pounds  **Suspect ~25 pound weight change r/t fluid shifts, diuresis. Pt admitted with edema 3+ to L/R leg, L/R ankle, L/R foot. Pt edema status improved and currently with edema 1+ L/R leg.    Estimated energy needs:   Pt exceeds 120% IBW (%), thus pt's IBW used for all calculations. Needs adjusted for HF, infection, pressure ulcer; Fluids per team - pt noted to be hyponatremic/CHF.  Kcal (25-30 kcal/kg): 0918-7398 kcal  Protein (1.2-1.4 g/kg pro): 74-88 g protein    Subjective:   62 yo M w/ PMH HLD, pre-DM, and pulmonary fibrosis 2/2 COVID-19 infection (wheelchair bound at baseline, on 4L home O2) who presented to [facility] for several days of worsening SOB and generalized weakness with increasing O2 requirements, found to have acute hypoxic respiratory failure 2/2 pulmonary fibrosis with superimposed bacterial pneumonia and acute decompensated pulmonary HTN, transferred to [facility] for RHC i/s/o severe pulmonary disease. MIREYA 11/23 shows decompensated R-sided HF, most likely iso worsening PHTN. Pt underwent RHC on 11/28 which showed CO 8.2, CI 4.2, CVP 2, PA pressure 44/14 (25), wedge pressure 6, RV 30/5. TTE on 11/28 showed improved RV systolic function from initial presentation. Pt likely has Group 3 PHTN 2/2 intrinsic lung disease, is being followed by PHTN team with plans to evaluate for possible lung transplant. 12/1: Weaned off milrinone. 12/2: Transferred to [facility]. 12/6: Patient desatted to 84% w air hunger on 6 LNC, increased to HFNC.    Pt seen on HFNC at bedside for follow up. Off all drips. Currently ordered for DASH/TLC diet. Pt endorses eating most of his breakfast this morning and having a good appetite. Labs reviewed pt noted w/ hyponatremia. Fingersticks 12/6-12/7:  mg/dL; ordered for steroids and sliding scale. Of note, pt reports appetite in house is best it has been in two years. Reports poor PO intake PTA secondary to current clinical course; notes ~15 pound weight loss x 2 years, not clinically significant.   Please see nutrition recommendations below. Recs made with team.     Previous Nutrition Diagnosis: Increased Nutrient Needs (protein) RT hypermetabolic demands AEB HF, bacterial pneumonia    Active [ x  ]  Resolved [  ]    If resolved, new PES:     Goal: Pt to meet at least 75% nutrition needs during hospital stay.     Recommendations:  - Continue DASH/TLC Diet.   - Monitor Diet tolerance and %PO intake, encourage as able. If PO intake declines, monitor need for ONS.  - Monitor BG, electrolytes; replete PRN  - Monitor weights, GI distress, skin  - Pain and bowel regimen per team  - MVI, VitC 500mg/day, Zinc 220mg [id number] use   - RD to provide diet edu PRN  - Monitor risk for malnutrition    Education: Encouraged pt to meet nutritional needs as able    Risk Level: High [   ] Moderate [ x  ] Low [   ].

## 2022-12-07 NOTE — PROGRESS NOTE ADULT - SUBJECTIVE AND OBJECTIVE BOX
OVERNIGHT EVENTS: Desatted to mid 80s, placed on HFNC. Otherwise, JOSE.     SUBJECTIVE:  Patient seen and examined at bedside, sitting comfortably, pleasant. Reports breathing has improved and cough had some subjective improvement. Otherwise reports he is eating well and drinking liquids. Reports that he does not have other complaints apart from the coughing but it is improved. Denies any fever, chills, vision change, headache, c/p, palpitations, abdominal pain, nausea, vomiting, diarrhea, constipation, dysuria, melena, or hematochezia.    Vital Signs Last 24 Hrs  T(C): 37.1 (07 Dec 2022 13:59), Max: 37.2 (06 Dec 2022 17:54)  T(F): 98.7 (07 Dec 2022 13:59), Max: 99 (06 Dec 2022 17:54)  HR: 100 (07 Dec 2022 12:45) (92 - 110)  BP: 115/59 (07 Dec 2022 12:45) (113/72 - 151/64)  BP(mean): 79 (07 Dec 2022 12:45) (79 - 95)  RR: 19 (07 Dec 2022 12:45) (19 - 22)  SpO2: 89% (07 Dec 2022 12:45) (89% - 100%)    Parameters below as of 07 Dec 2022 12:45  Patient On (Oxygen Delivery Method): nasal cannula, high flow  O2 Flow (L/min): 40  O2 Concentration (%): 40    PHYSICAL EXAM:  Constitutional: Middle aged Male on HFNC, in no acute distress, resting in bed.    Respiratory: Mild bilateral rhonchi with adequate airflow bilaterally. No w/r.  Cardiovascular: Regular rate and rhythm, normal S1S2, no murmurs, rubs, gallops.  Gastrointestinal: soft, non-tender and non-distended, no masses palpable, Normoactive bowel sounds.  Extremities: Warm, well perfused, pulses equal bilateral upper and lower extremities, non pitting edema, (+) clubbing  Neurological: AAOx3. Mentating well. EOMI, PERRLA.  Skin: Normal temperature, warm, dry.    LABS:                         12.0   12.15 )-----------( 237      ( 07 Dec 2022 09:28 )             37.5     12-07    136  |  94<L>  |  19  ----------------------------<  95  See Note   |  35<H>  |  0.88    Ca    8.7      07 Dec 2022 09:28  Phos  3.0     12-07  Mg     2.2     12-07    TPro  6.8  /  Alb  3.1<L>  /  TBili  0.9  /  DBili  x   /  AST  See Note  /  ALT  See Note  /  AlkPhos  69  12-07                  RADIOLOGY, EKG & ADDITIONAL TESTS:                 RADIOLOGY, EKG & ADDITIONAL TESTS:             RADIOLOGY & ADDITIONAL TESTS:    MEDICATIONS  (STANDING):  AQUAPHOR (petrolatum Ointment) 1 Application(s) Topical two times a day  benzocaine 15 mG/menthol 3.6 mG Lozenge 2 Lozenge Oral every 6 hours  benzonatate 200 milliGRAM(s) Oral three times a day  chlorhexidine 2% Cloths 1 Application(s) Topical daily  dextrose 5%. 1000 milliLiter(s) (50 mL/Hr) IV Continuous <Continuous>  dextrose 5%. 1000 milliLiter(s) (100 mL/Hr) IV Continuous <Continuous>  dextrose 50% Injectable 25 Gram(s) IV Push once  dextrose 50% Injectable 12.5 Gram(s) IV Push once  dextrose 50% Injectable 25 Gram(s) IV Push once  enoxaparin Injectable 40 milliGRAM(s) SubCutaneous every 24 hours  fluticasone propionate 50 MICROgram(s)/spray Nasal Spray 2 Spray(s) Both Nostrils two times a day  glucagon  Injectable 1 milliGRAM(s) IntraMuscular once  influenza   Vaccine 0.5 milliLiter(s) IntraMuscular once  insulin lispro (ADMELOG) corrective regimen sliding scale   SubCutaneous Before meals and at bedtime  ipratropium    for Nebulization 500 MICROGram(s) Nebulizer every 6 hours  methylPREDNISolone sodium succinate Injectable 30 milliGRAM(s) IV Push every 12 hours  nystatin    Suspension 105013 Unit(s) Swish and Swallow three times a day  pantoprazole    Tablet 40 milliGRAM(s) Oral before breakfast  polyethylene glycol 3350 17 Gram(s) Oral daily  senna 2 Tablet(s) Oral at bedtime  sodium chloride 0.9%. 250 milliLiter(s) (250 mL/Hr) IV Continuous <Continuous>  tamsulosin 0.4 milliGRAM(s) Oral at bedtime  trimethoprim  160 mG/sulfamethoxazole 800 mG 1 Tablet(s) Oral daily    MEDICATIONS  (PRN):  acetaminophen     Tablet .. 650 milliGRAM(s) Oral every 6 hours PRN Mild Pain (1 - 3)  dextrose Oral Gel 15 Gram(s) Oral once PRN Blood Glucose LESS THAN 70 milliGRAM(s)/deciliter  hydrocodone/homatropine Syrup 5 milliLiter(s) Oral every 4 hours PRN Cough

## 2022-12-07 NOTE — PROGRESS NOTE ADULT - ASSESSMENT
60 yo M w/ PMH HLD, pre-DM, and pulmonary fibrosis 2/2 COVID-19 infection (wheelchair bound at baseline, on 4L home O2) who presented to [facility] for several days of worsening SOB and generalized weakness with increasing O2 requirements, found to have acute hypoxic respiratory failure 2/2 pulmonary fibrosis with superimposed bacterial pneumonia and acute decompensated pulmonary HTN, transferred to [facility] for RHC i/s/o severe pulmonary disease and evaluation for potential lung transplant. On HiFLO, respiratory status improving. HDS.

## 2022-12-07 NOTE — PROGRESS NOTE ADULT - PROBLEM SELECTOR PLAN 2
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  Weaned off milrinone and Florentin  Plan:  - Plan to wean HFNC as tolerated. Weaned to 40/40 this AM. Satting high 80s.  - Echo that was performed with improvement in RV function.  - PT evaluation, OOBTC.   - C/w Bowel regimen  - Pulm following, appreciate recs, slow prednisone taper, last day of 50mg 12/5. transitioned to 40mg, taper to 30mg 12/9.

## 2022-12-07 NOTE — PROGRESS NOTE ADULT - PROBLEM SELECTOR PLAN 1
Post COVID ILD/ pulmonary fibrosis, now presenting with worsening cough and elevated WBC count with elevated procalcitonin and CT chest performed at OSH with concern for newly developing ggos when compared to prior CT scan done in october. Completed course of zosyn.   Plan:  - C/w prednisone 40mg qd, taper to 30mg on qd 12/9.  - Bactrim for PCP ppx.  - Pt Tyvaso has not been delivered yet.    # F/u lung transplant   f/u pre-lung transplant ID recs: CMV IgG, EBV, serum QFT, Strongyloides IgG, hepatitis panel, HIV, varicella IgG, MMR IgG, toxoplasma IgG, influenza vaccine, Prevnar-20  - Quantiferon, and CMV titers sent for transplant  - Encourage OOBTC

## 2022-12-08 NOTE — PROGRESS NOTE ADULT - PROBLEM SELECTOR PLAN 1
Post COVID ILD/ pulmonary fibrosis, now presenting with worsening cough and elevated WBC count with elevated procalcitonin and CT chest performed at OSH with concern for newly developing ggos when compared to prior CT scan done in october. Completed course of zosyn.   Plan:  - C/w prednisone 40mg qd, taper to 30mg on qd 12/9.  - Bactrim for PCP ppx.  - Pt Tyvaso has not been delivered yet. Dr. Ng confirmed w specialty pharmacy that it will be arriving today, with RN who will teach pt on use.    # F/u lung transplant   f/u pre-lung transplant ID recs: CMV IgG, EBV, serum QFT, Strongyloides IgG, hepatitis panel, HIV, varicella IgG, MMR IgG, toxoplasma IgG, influenza vaccine, Prevnar-20  - Quantiferon, and CMV titers sent for transplant, CMV IgG +, IgM negative.  - Encourage OOBTC Post COVID ILD/ pulmonary fibrosis, now presenting with worsening cough and elevated WBC count with elevated procalcitonin and CT chest performed at OSH with concern for newly developing ggos when compared to prior CT scan done in october. Completed course of zosyn.   Plan:  - C/w prednisone 40mg qd, taper to 30mg on qd 12/9.  - Bactrim for PCP ppx.  - Pt Tyvaso has not been delivered yet. Dr. Ng confirmed w specialty pharmacy that it will be arriving today, with RN who will teach pt on use.    #Sinusitis  - CT of sinus ordered due to chronic sinusitis  - Afrin ordered for 3 day course for symptomatic treatment of sinusitis like symptoms    # F/u lung transplant   f/u pre-lung transplant ID recs: CMV IgG, EBV, serum QFT, Strongyloides IgG, hepatitis panel, HIV, varicella IgG, MMR IgG, toxoplasma IgG, influenza vaccine, Prevnar-20  - Quantiferon, and CMV titers sent for transplant, CMV IgG +, IgM negative.  - Encourage OOBTC

## 2022-12-08 NOTE — PROGRESS NOTE ADULT - PROBLEM SELECTOR PLAN 5
A1C of 6.0, BSG while inpatient, 140s-low 300s.  Plan:  - Lantus 4U ordered, improved BSG, will d/c tomorrow night.  - mISS  - DASH/TLC

## 2022-12-08 NOTE — PROGRESS NOTE ADULT - SUBJECTIVE AND OBJECTIVE BOX
OVERNIGHT EVENTS: NAEON.    SUBJECTIVE:  Patient seen and examined at bedside, sitting comfortably, pleasant. Reports breathing has improved and cough is resolving. Reports that he feels like he has had chronic sinusitis, and there has been no relief with intranasal sprays. Otherwise reports he is eating well and drinking liquids. Reports that he does not have other complaints apart from the coughing but it is improved. Denies any fever, chills, vision change, headache, c/p, palpitations, abdominal pain, nausea, vomiting, diarrhea, constipation, dysuria, melena, or hematochezia.    Vital Signs Last 24 Hrs  T(C): 37.1 (08 Dec 2022 09:22), Max: 37.1 (07 Dec 2022 13:59)  T(F): 98.7 (08 Dec 2022 09:22), Max: 98.7 (07 Dec 2022 13:59)  HR: 95 (08 Dec 2022 12:00) (85 - 113)  BP: 111/64 (08 Dec 2022 08:50) (105/67 - 133/77)  BP(mean): 81 (08 Dec 2022 08:50) (81 - 99)  RR: 18 (08 Dec 2022 12:00) (18 - 20)  SpO2: 92% (08 Dec 2022 12:00) (89% - 98%)    Parameters below as of 08 Dec 2022 12:00  Patient On (Oxygen Delivery Method): nasal cannula, high flow  O2 Flow (L/min): 35  O2 Concentration (%): 45     PHYSICAL EXAM:  Constitutional: Middle aged Male on HFNC, in no acute distress, resting in bed.    Respiratory: Mild bilateral rhonchi with adequate airflow bilaterally. No w/r.  Cardiovascular: Regular rate and rhythm, normal S1S2, no murmurs, rubs, gallops.  Gastrointestinal: soft, non-tender and non-distended, no masses palpable, Normoactive bowel sounds.  Extremities: Warm, well perfused, pulses equal bilateral upper and lower extremities, non pitting edema, (+) clubbing  Neurological: AAOx3. Mentating well. EOMI, PERRLA.  Skin: Normal temperature, warm, dry.    LABS:                         12.0   11.71 )-----------( 256      ( 08 Dec 2022 07:21 )             37.6     12-08    135  |  92<L>  |  18  ----------------------------<  119<H>  4.3   |  34<H>  |  0.82    Ca    8.7      08 Dec 2022 07:21  Phos  3.5     12-08  Mg     2.4     12-08    TPro  7.1  /  Alb  3.4  /  TBili  0.8  /  DBili  x   /  AST  24  /  ALT  21  /  AlkPhos  72  12-08                  RADIOLOGY, EKG & ADDITIONAL TESTS:                 RADIOLOGY, EKG & ADDITIONAL TESTS:                 RADIOLOGY, EKG & ADDITIONAL TESTS:             RADIOLOGY & ADDITIONAL TESTS:    MEDICATIONS  (STANDING):  AQUAPHOR (petrolatum Ointment) 1 Application(s) Topical two times a day  benzocaine 15 mG/menthol 3.6 mG Lozenge 2 Lozenge Oral every 6 hours  benzonatate 200 milliGRAM(s) Oral three times a day  chlorhexidine 2% Cloths 1 Application(s) Topical daily  dextrose 5%. 1000 milliLiter(s) (50 mL/Hr) IV Continuous <Continuous>  dextrose 5%. 1000 milliLiter(s) (100 mL/Hr) IV Continuous <Continuous>  dextrose 50% Injectable 25 Gram(s) IV Push once  dextrose 50% Injectable 12.5 Gram(s) IV Push once  dextrose 50% Injectable 25 Gram(s) IV Push once  enoxaparin Injectable 40 milliGRAM(s) SubCutaneous every 24 hours  fluticasone propionate 50 MICROgram(s)/spray Nasal Spray 2 Spray(s) Both Nostrils two times a day  glucagon  Injectable 1 milliGRAM(s) IntraMuscular once  influenza   Vaccine 0.5 milliLiter(s) IntraMuscular once  insulin lispro (ADMELOG) corrective regimen sliding scale   SubCutaneous Before meals and at bedtime  ipratropium    for Nebulization 500 MICROGram(s) Nebulizer every 6 hours  methylPREDNISolone sodium succinate Injectable 30 milliGRAM(s) IV Push every 12 hours  nystatin    Suspension 206028 Unit(s) Swish and Swallow three times a day  pantoprazole    Tablet 40 milliGRAM(s) Oral before breakfast  polyethylene glycol 3350 17 Gram(s) Oral daily  senna 2 Tablet(s) Oral at bedtime  sodium chloride 0.9%. 250 milliLiter(s) (250 mL/Hr) IV Continuous <Continuous>  tamsulosin 0.4 milliGRAM(s) Oral at bedtime  trimethoprim  160 mG/sulfamethoxazole 800 mG 1 Tablet(s) Oral daily    MEDICATIONS  (PRN):  acetaminophen     Tablet .. 650 milliGRAM(s) Oral every 6 hours PRN Mild Pain (1 - 3)  dextrose Oral Gel 15 Gram(s) Oral once PRN Blood Glucose LESS THAN 70 milliGRAM(s)/deciliter  hydrocodone/homatropine Syrup 5 milliLiter(s) Oral every 4 hours PRN Cough

## 2022-12-08 NOTE — PROGRESS NOTE ADULT - ASSESSMENT
60 yo M w/ PMH HLD, pre-DM, and pulmonary fibrosis 2/2 COVID-19 infection (wheelchair bound at baseline, on 4L home O2) who presented to [facility] for several days of worsening SOB and generalized weakness with increasing O2 requirements, found to have acute hypoxic respiratory failure 2/2 pulmonary fibrosis with superimposed bacterial pneumonia and acute decompensated pulmonary HTN, transferred to [facility] for RHC i/s/o severe pulmonary disease and evaluation for potential lung transplant. On HiFLO, respiratory status improving, weaned to 35/45.

## 2022-12-08 NOTE — PROGRESS NOTE ADULT - PROBLEM SELECTOR PLAN 2
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  Weaned off milrinone and Florentin  Plan:  - Plan to wean HFNC as tolerated. Weaned to 35/45 this AM. Saturating well.  - Echo that was performed with improvement in RV function.  - PT evaluation, OOBTC.   - C/w Bowel regimen  - Pulm following, appreciate recs, slow prednisone taper, last day of 50mg 12/5. transitioned to 40mg, taper to 30mg 12/9.

## 2022-12-09 NOTE — PROGRESS NOTE ADULT - PROBLEM SELECTOR PLAN 1
Post COVID ILD/ pulmonary fibrosis, now presenting with worsening cough and elevated WBC count with elevated procalcitonin and CT chest performed at OSH with concern for newly developing ggos when compared to prior CT scan done in october. Completed course of zosyn.   Plan:  - C/w prednisone 40mg qd, taper to 30mg on qd 12/9. Next taper to 20mg 12/12.  - Bactrim for PCP ppx.  - Pt Tyvaso has been delivered, confirmed w pharmacy. 4 times a day, 6AM, 12PM, 6PM, 10PM.    #Sinusitis  - CT of sinus ordered due to chronic sinusitis  - Afrin ordered for 3 day course for symptomatic treatment of sinusitis like symptoms, will d/c 12/10/10.    # F/u lung transplant   F/u pre-lung transplant ID recs: CMV IgG, EBV, serum QFT, Strongyloides IgG, hepatitis panel, HIV, varicella IgG, MMR IgG, toxoplasma IgG, influenza vaccine, Prevnar-20  - Quantiferon, and CMV titers sent for transplant, CMV IgG +, IgM negative. Quantiferon indeterminate.  - Encourage OOBTC

## 2022-12-09 NOTE — PROGRESS NOTE ADULT - ASSESSMENT
CT SCAN LEFT KNEE:

2/23/18

 

Multiple axial tomograms obtained from mid femur to mid tibia fibula with IV enhancement. 

 

HISTORY: 

Cellulitis left knee. Patellar bursa abscess. 

 

FINDINGS:  

There is diffuse inflammatory stranding and haziness in the subcutaneous tissues involving the anteri
or distal thigh, left knee, and proximal tibial region. The findings are consistent with cellulitis. 
There is a more confluent area of density in the prepatellar tissues, although this is not fluid dens
e by CT and may represent phlegmonous tissue rather than drainable abscess. There is a small joint ef
fusion noted. There are degenerative changes at the left knee. Mild medial joint narrowing is seen wi
th hypertrophic spurring from tibial spines and femoral condyles. Minimal spurring from tibial condyl
es. No focal lytic or destructive process seen that would indicate osteomyelitis. 

 

IMPRESSION:  

1.      Inflammatory changes in the subcutaneous tissues anterior left knee. 

2.      Confluent opacity in the prepatellar tissues consistent with history of prepatellar bursitis.
 

3.      Localized fluid or abscess collection is not identified. There may be some fluid in the prepa
tellar bursa, although the densities are not fluid by CT. 

4.      Small joint effusion.

5.      Degenerative changes at the left knee as described. 

 

POS: [facility] 62 yo M w/ PMH HLD, pre-DM, and pulmonary fibrosis 2/2 COVID-19 infection (wheelchair bound at baseline, on 4L home O2) who presented to [facility] for several days of worsening SOB and generalized weakness with increasing O2 requirements, found to have acute hypoxic respiratory failure 2/2 pulmonary fibrosis with superimposed bacterial pneumonia and acute decompensated pulmonary HTN, transferred to [facility] for RHC i/s/o severe pulmonary disease and evaluation for potential lung transplant. On HiFLO, respiratory status improving, weaned to 35/45. Tyvazo will be added this AM.

## 2022-12-09 NOTE — PROGRESS NOTE ADULT - SUBJECTIVE AND OBJECTIVE BOX
OVERNIGHT EVENTS: NAEON. CT sinus without any acute abnormality.    SUBJECTIVE:  Patient seen and examined at bedside, sitting comfortably, pleasant. Reports breathing has improved and cough is resolving apart from his occasional fits. Reports that he feels like he has had chronic sinusitis, and there has been no relief with intranasal sprays. Otherwise reports he is eating well and drinking liquids. Denies any fever, chills, vision change, headache, c/p, palpitations, abdominal pain, nausea, vomiting, diarrhea, constipation, dysuria, melena, or hematochezia.    Vital Signs Last 24 Hrs  T(C): 36.5 (09 Dec 2022 09:01), Max: 37.2 (08 Dec 2022 13:31)  T(F): 97.7 (09 Dec 2022 09:01), Max: 98.9 (08 Dec 2022 13:31)  HR: 90 (09 Dec 2022 11:16) (88 - 109)  BP: 117/69 (09 Dec 2022 09:16) (99/72 - 129/77)  BP(mean): 85 (09 Dec 2022 09:16) (81 - 97)  RR: 18 (09 Dec 2022 11:16) (18 - 20)  SpO2: 97% (09 Dec 2022 11:16) (89% - 99%)    Parameters below as of 09 Dec 2022 11:16  Patient On (Oxygen Delivery Method): nasal cannula, high flow  O2 Flow (L/min): 35  O2 Concentration (%): 45    Parameters below as of 08 Dec 2022 12:00  Patient On (Oxygen Delivery Method): nasal cannula, high flow  O2 Flow (L/min): 35  O2 Concentration (%): 45     PHYSICAL EXAM:  Constitutional: Middle aged Male on HFNC, in no acute distress, resting in bed.    Respiratory: CTA B/L. No w/r/r.  Cardiovascular: Regular rate and rhythm, normal S1S2, no murmurs, rubs, gallops.  Gastrointestinal: soft, non-tender and non-distended, no masses palpable, Normoactive bowel sounds.  Extremities: Warm, well perfused, pulses equal bilateral upper and lower extremities, non pitting edema, (+) clubbing  Neurological: AAOx3. Mentating well. EOMI, PERRLA.  Skin: Normal temperature, warm, dry.    LABS:                         12.0   11.71 )-----------( 256      ( 08 Dec 2022 07:21 )             37.6     12-08    135  |  92<L>  |  18  ----------------------------<  119<H>  4.3   |  34<H>  |  0.82    Ca    8.7      08 Dec 2022 07:21  Phos  3.5     12-08  Mg     2.4     12-08    TPro  7.1  /  Alb  3.4  /  TBili  0.8  /  DBili  x   /  AST  24  /  ALT  21  /  AlkPhos  72  12-08                  RADIOLOGY, EKG & ADDITIONAL TESTS:           RADIOLOGY, EKG & ADDITIONAL TESTS:                 RADIOLOGY, EKG & ADDITIONAL TESTS:                 RADIOLOGY, EKG & ADDITIONAL TESTS:             RADIOLOGY & ADDITIONAL TESTS:    MEDICATIONS  (STANDING):  AQUAPHOR (petrolatum Ointment) 1 Application(s) Topical two times a day  benzocaine 15 mG/menthol 3.6 mG Lozenge 2 Lozenge Oral every 6 hours  benzonatate 200 milliGRAM(s) Oral three times a day  chlorhexidine 2% Cloths 1 Application(s) Topical daily  dextrose 5%. 1000 milliLiter(s) (50 mL/Hr) IV Continuous <Continuous>  dextrose 5%. 1000 milliLiter(s) (100 mL/Hr) IV Continuous <Continuous>  dextrose 50% Injectable 25 Gram(s) IV Push once  dextrose 50% Injectable 12.5 Gram(s) IV Push once  dextrose 50% Injectable 25 Gram(s) IV Push once  enoxaparin Injectable 40 milliGRAM(s) SubCutaneous every 24 hours  fluticasone propionate 50 MICROgram(s)/spray Nasal Spray 2 Spray(s) Both Nostrils two times a day  glucagon  Injectable 1 milliGRAM(s) IntraMuscular once  influenza   Vaccine 0.5 milliLiter(s) IntraMuscular once  insulin lispro (ADMELOG) corrective regimen sliding scale   SubCutaneous Before meals and at bedtime  ipratropium    for Nebulization 500 MICROGram(s) Nebulizer every 6 hours  methylPREDNISolone sodium succinate Injectable 30 milliGRAM(s) IV Push every 12 hours  nystatin    Suspension [id number] Unit(s) Swish and Swallow three times a day  pantoprazole    Tablet 40 milliGRAM(s) Oral before breakfast  polyethylene glycol 3350 17 Gram(s) Oral daily  senna 2 Tablet(s) Oral at bedtime  sodium chloride 0.9%. 250 milliLiter(s) (250 mL/Hr) IV Continuous <Continuous>  tamsulosin 0.4 milliGRAM(s) Oral at bedtime  trimethoprim  160 mG/sulfamethoxazole 800 mG 1 Tablet(s) Oral daily    MEDICATIONS  (PRN):  acetaminophen     Tablet .. 650 milliGRAM(s) Oral every 6 hours PRN Mild Pain (1 - 3)  dextrose Oral Gel 15 Gram(s) Oral once PRN Blood Glucose LESS THAN 70 milliGRAM(s)/deciliter  hydrocodone/homatropine Syrup 5 milliLiter(s) Oral every 4 hours PRN Cough

## 2022-12-09 NOTE — PROGRESS NOTE ADULT - PROBLEM SELECTOR PLAN 5
A1C of 6.0, BSG while inpatient, 140s-low 300s.  Plan:  - Lantus 4U ordered, improved BSG, will d/c if sugars improved w prednisone 30mg qd.  - mISS  - DASH/TLC

## 2022-12-09 NOTE — PROGRESS NOTE ADULT - PROBLEM SELECTOR PLAN 2
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  Weaned off milrinone and Florentin  Plan:  - Plan to wean HFNC as tolerated. Weaned to 35/45 this AM. Saturating well. Wean as tolerated.  - Echo that was performed with improvement in RV function.  - PT evaluation, OOBTC.   - C/w Bowel regimen  - Pulm following, appreciate recs, slow prednisone taper, last day of 50mg 12/5. transitioned to 40mg, taper to 30mg 12/9.

## 2022-12-10 NOTE — PROGRESS NOTE ADULT - PROBLEM SELECTOR PLAN 1
Post COVID ILD/ pulmonary fibrosis, now presenting with worsening cough and elevated WBC count with elevated procalcitonin and CT chest performed at OSH with concern for newly developing ggos when compared to prior CT scan done in october. Completed course of zosyn.   Plan:  - C/w prednisone 40mg qd, taper to 30mg on qd 12/9. Next taper to 20mg 12/12.  - Bactrim for PCP ppx.  - Pt Tyvaso has been delivered, confirmed w pharmacy. 4 times a day, specialty RN reported q4, 8AM, 12PM, 4PM, 8PM    #Sinusitis  - CT of sinus ordered due to chronic sinusitis-> wnl.  - Afrin ordered for 3 day course for symptomatic treatment of sinusitis like symptoms, will d/c 12/10/10.    # F/u lung transplant   F/u pre-lung transplant ID recs: CMV IgG, EBV, serum QFT, Strongyloides IgG, hepatitis panel, HIV, varicella IgG, MMR IgG, toxoplasma IgG, influenza vaccine, Prevnar-20  - Quantiferon, and CMV titers sent for transplant, CMV IgG +, IgM negative. Quantiferon indeterminate.  - Encourage OOBTC

## 2022-12-10 NOTE — PROGRESS NOTE ADULT - SUBJECTIVE AND OBJECTIVE BOX
OVERNIGHT EVENTS: NAEON. Coughing fit so transitioned from NC back to HFNC due to de-sat.    SUBJECTIVE:  Patient seen and examined at bedside, sitting comfortably, pleasant. Reports breathing has improved and cough is resolving apart from his occasional fits. Reports started Tyvazo yesterday, and has not felt improvement yet. Otherwise reports he is Vital Signs Last 24 Hrs  T(C): 36.4 (10 Dec 2022 04:25), Max: 36.6 (09 Dec 2022 14:02)  T(F): 97.6 (10 Dec 2022 04:25), Max: 97.9 (09 Dec 2022 14:02)  HR: 96 (10 Dec 2022 06:52) (90 - 106)  BP: 118/76 (10 Dec 2022 04:25) (117/59 - 127/75)  BP(mean): 92 (10 Dec 2022 04:25) (85 - 96)  RR: 16 (10 Dec 2022 06:52) (16 - 19)  SpO2: 95% (10 Dec 2022 06:52) (94% - 98%)    Parameters below as of 10 Dec 2022 06:52  Patient On (Oxygen Delivery Method): nasal cannula, high flow  O2 Flow (L/min): 35  O2 Concentration (%): 50eating well and drinking liquids. Denies any fever, chills, vision change, headache, c/p, palpitations, abdominal pain, nausea, vomiting, diarrhea, constipation, dysuria, melena, or hematochezia.        PHYSICAL EXAM:  Constitutional: Middle aged Male on HFNC, in no acute distress, resting in bed.    Respiratory: CTA B/L. No w/r/r.  Cardiovascular: Regular rate and rhythm, normal S1S2, no murmurs, rubs, gallops.  Gastrointestinal: soft, non-tender and non-distended, no masses palpable, Normoactive bowel sounds.  Extremities: Warm, well perfused, pulses equal bilateral upper and lower extremities, non pitting edema  Neurological: AAOx3. Mentating well. EOMI, PERRLA.  Skin: Normal temperature, warm, dry.    LABS:                         RADIOLOGY, EKG & ADDITIONAL TESTS:                   RADIOLOGY, EKG & ADDITIONAL TESTS:           RADIOLOGY, EKG & ADDITIONAL TESTS:                 RADIOLOGY, EKG & ADDITIONAL TESTS:                 RADIOLOGY, EKG & ADDITIONAL TESTS:             RADIOLOGY & ADDITIONAL TESTS:    MEDICATIONS  (STANDING):  AQUAPHOR (petrolatum Ointment) 1 Application(s) Topical two times a day  benzocaine 15 mG/menthol 3.6 mG Lozenge 2 Lozenge Oral every 6 hours  benzonatate 200 milliGRAM(s) Oral three times a day  chlorhexidine 2% Cloths 1 Application(s) Topical daily  dextrose 5%. 1000 milliLiter(s) (50 mL/Hr) IV Continuous <Continuous>  dextrose 5%. 1000 milliLiter(s) (100 mL/Hr) IV Continuous <Continuous>  dextrose 50% Injectable 25 Gram(s) IV Push once  dextrose 50% Injectable 12.5 Gram(s) IV Push once  dextrose 50% Injectable 25 Gram(s) IV Push once  enoxaparin Injectable 40 milliGRAM(s) SubCutaneous every 24 hours  fluticasone propionate 50 MICROgram(s)/spray Nasal Spray 2 Spray(s) Both Nostrils two times a day  glucagon  Injectable 1 milliGRAM(s) IntraMuscular once  influenza   Vaccine 0.5 milliLiter(s) IntraMuscular once  insulin lispro (ADMELOG) corrective regimen sliding scale   SubCutaneous Before meals and at bedtime  ipratropium    for Nebulization 500 MICROGram(s) Nebulizer every 6 hours  methylPREDNISolone sodium succinate Injectable 30 milliGRAM(s) IV Push every 12 hours  nystatin    Suspension [id number] Unit(s) Swish and Swallow three times a day  pantoprazole    Tablet 40 milliGRAM(s) Oral before breakfast  polyethylene glycol 3350 17 Gram(s) Oral daily  senna 2 Tablet(s) Oral at bedtime  sodium chloride 0.9%. 250 milliLiter(s) (250 mL/Hr) IV Continuous <Continuous>  tamsulosin 0.4 milliGRAM(s) Oral at bedtime  trimethoprim  160 mG/sulfamethoxazole 800 mG 1 Tablet(s) Oral daily    MEDICATIONS  (PRN):  acetaminophen     Tablet .. 650 milliGRAM(s) Oral every 6 hours PRN Mild Pain (1 - 3)  dextrose Oral Gel 15 Gram(s) Oral once PRN Blood Glucose LESS THAN 70 milliGRAM(s)/deciliter  hydrocodone/homatropine Syrup 5 milliLiter(s) Oral every 4 hours PRN Cough

## 2022-12-10 NOTE — PROGRESS NOTE ADULT - ASSESSMENT
60 yo M w/ PMH HLD, pre-DM, and pulmonary fibrosis 2/2 COVID-19 infection (wheelchair bound at baseline, on 4L home O2) who presented to [facility] for several days of worsening SOB and generalized weakness with increasing O2 requirements, found to have acute hypoxic respiratory failure 2/2 pulmonary fibrosis with superimposed bacterial pneumonia and acute decompensated pulmonary HTN, transferred to [facility] for RHC i/s/o severe pulmonary disease and evaluation for potential lung transplant. On HiFLO, respiratory status improving, weaned to 35/45. Tyvazo added y/day.

## 2022-12-10 NOTE — PROGRESS NOTE ADULT - PROBLEM SELECTOR PLAN 2
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  Weaned off milrinone and Florentin  Plan:  - Plan to wean HFNC as tolerated. Weaned to 35/45 this AM. Saturating well. Wean as tolerated. Was comfortable on NC yesterday, will inform RN to keep on NC, and use brief NRBM for coughing fits.  - PT, OOBTC.   - C/w Bowel regimen  - Pulm following, appreciate recs, slow prednisone taper, last day of 50mg 12/5. transitioned to 40mg, taper to 30mg 12/9.

## 2022-12-10 NOTE — PROGRESS NOTE ADULT - PROBLEM SELECTOR PLAN 5
A1C of 6.0, BSG while inpatient, 140s-low 300s.  Plan:  - Lantus 4U ordered, improved BSG, will d/c today.  - mISS  - DASH/TLC

## 2022-12-11 NOTE — PROGRESS NOTE ADULT - PROBLEM SELECTOR PLAN 1
Post COVID ILD/ pulmonary fibrosis, now presenting with worsening cough and elevated WBC count with elevated procalcitonin and CT chest performed at OSH with concern for newly developing ggos when compared to prior CT scan done in october. Completed course of zosyn.   Plan:  - C/w prednisone 40mg qd, taper to 30mg on qd 12/9. Next taper to 20mg 12/12.  - Bactrim for PCP ppx.  - Pt Tyvaso has been delivered, confirmed w pharmacy. 4 times a day, specialty RN reported q4, 8AM, 12PM, 4PM, 8PM    #Sinusitis  - CT of sinus ordered due to chronic sinusitis-> wnl.  -  s/p Afrin ordered for 3 day course (12/8-12/10)     # F/u lung transplant   F/u pre-lung transplant ID recs: CMV IgG, EBV, serum QFT, Strongyloides IgG, hepatitis panel, HIV, varicella IgG, MMR IgG, toxoplasma IgG, influenza vaccine, Prevnar-20  - Quantiferon, and CMV titers sent for transplant, CMV IgG +, IgM negative. Quantiferon indeterminate.  - Encourage OOBTC

## 2022-12-11 NOTE — PROGRESS NOTE ADULT - PROBLEM SELECTOR PLAN 2
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  Weaned off milrinone and Florentin  Plan:  - Plan to wean HFNC as tolerated. Weaned to 35/45 this AM. Saturating well. Wean as tolerated. Was comfortable on NC yesterday, will inform RN to keep on NC, and use brief NRBM for coughing fits.  - PT, OOBTC.   - C/w Bowel regimen  - Pulm following, appreciate recs, slow prednisone taper, last day of 50mg 12/5. transitioned to 40mg, tapered to 30mg 12/9.

## 2022-12-11 NOTE — PROGRESS NOTE ADULT - SUBJECTIVE AND OBJECTIVE BOX
INTERVAL HPI/OVERNIGHT EVENTS: Put on HFNC, saturating well no desaturation events. Order for HFNC removed.     SUBJECTIVE: Patient seen and examined at bedside breathing comfortably on 6L NC. Endorsed feeling subjectively the same, wanted to give treatment some time to kick in.        VITAL SIGNS:  ICU Vital Signs Last 24 Hrs  T(C): 36.3 (11 Dec 2022 06:18), Max: 36.6 (10 Dec 2022 17:42)  T(F): 97.4 (11 Dec 2022 06:18), Max: 97.9 (10 Dec 2022 17:42)  HR: 97 (11 Dec 2022 08:40) (84 - 116)  BP: 108/68 (11 Dec 2022 08:40) (108/68 - 127/77)  BP(mean): 83 (11 Dec 2022 08:40) (83 - 101)  ABP: --  ABP(mean): --  RR: 20 (11 Dec 2022 08:40) (17 - 20)  SpO2: 93% (11 Dec 2022 08:40) (90% - 97%)    O2 Parameters below as of 11 Dec 2022 08:40  Patient On (Oxygen Delivery Method): nasal cannula w/ humidification  O2 Flow (L/min): 6          Plateau pressure:   P/F ratio:     12-10 @ 07:01  -  12-11 @ 07:00  --------------------------------------------------------  IN: 445 mL / OUT: 1225 mL / NET: -780 mL      CAPILLARY BLOOD GLUCOSE      POCT Blood Glucose.: 113 mg/dL (11 Dec 2022 06:59)    ECG:    PHYSICAL EXAM:  Constitutional: Middle aged Male on 6L NC, in no acute distress, resting in bed.    Respiratory: CTA B/L. No w/r/r.  Cardiovascular: Regular rate and rhythm, normal S1S2, no murmurs, rubs, gallops.  Gastrointestinal: soft, non-tender and non-distended, no masses palpable, Normoactive bowel sounds.  Extremities: Warm, well perfused, pulses equal bilateral upper and lower extremities, non pitting edema  Neurological: AAOx3. Mentating well. EOMI, PERRLA.  Skin: Normal temperature, warm, dry.    MEDICATIONS:  MEDICATIONS  (STANDING):  AQUAPHOR (petrolatum Ointment) 1 Application(s) Topical two times a day  benzocaine 15 mG/menthol 3.6 mG Lozenge 2 Lozenge Oral every 6 hours  benzonatate 200 milliGRAM(s) Oral every 8 hours  dextrose 5%. 1000 milliLiter(s) (50 mL/Hr) IV Continuous <Continuous>  dextrose 5%. 1000 milliLiter(s) (100 mL/Hr) IV Continuous <Continuous>  dextrose 50% Injectable 25 Gram(s) IV Push once  dextrose 50% Injectable 12.5 Gram(s) IV Push once  dextrose 50% Injectable 25 Gram(s) IV Push once  enoxaparin Injectable 40 milliGRAM(s) SubCutaneous every 24 hours  fluticasone propionate 50 MICROgram(s)/spray Nasal Spray 2 Spray(s) Both Nostrils two times a day  glucagon  Injectable 1 milliGRAM(s) IntraMuscular once  influenza   Vaccine 0.5 milliLiter(s) IntraMuscular once  insulin lispro (ADMELOG) corrective regimen sliding scale   SubCutaneous Before meals and at bedtime  ipratropium    for Nebulization 500 MICROGram(s) Nebulizer every 6 hours  pantoprazole    Tablet 40 milliGRAM(s) Oral before breakfast  polyethylene glycol 3350 17 Gram(s) Oral daily  predniSONE   Tablet 30 milliGRAM(s) Oral every 24 hours  senna 2 Tablet(s) Oral at bedtime  tamsulosin 0.4 milliGRAM(s) Oral at bedtime  Treprostinil (Tyvaso) DPI 1 Inhalation 1 Inhalation Inhalation <User Schedule>  trimethoprim  160 mG/sulfamethoxazole 800 mG 1 Tablet(s) Oral daily    MEDICATIONS  (PRN):  acetaminophen     Tablet .. 650 milliGRAM(s) Oral every 6 hours PRN Mild Pain (1 - 3)  dextrose Oral Gel 15 Gram(s) Oral once PRN Blood Glucose LESS THAN 70 milliGRAM(s)/deciliter  guaiFENesin Oral Liquid (Sugar-Free) 200 milliGRAM(s) Oral every 6 hours PRN Cough  melatonin 5 milliGRAM(s) Oral at bedtime PRN Insomnia      ALLERGIES:  Allergies    No Known Allergies    Intolerances        LABS:                        12.3   13.83 )-----------( 235      ( 10 Dec 2022 09:24 )             38.8     12-10    134<L>  |  94<L>  |  17  ----------------------------<  94  4.1   |  35<H>  |  0.83    Ca    9.0      10 Dec 2022 09:24  Phos  3.0     12-10  Mg     2.2     12-10            RADIOLOGY & ADDITIONAL TESTS: Reviewed.

## 2022-12-11 NOTE — PROGRESS NOTE ADULT - ASSESSMENT
60 yo M w/ PMH HLD, pre-DM, and pulmonary fibrosis 2/2 COVID-19 infection (wheelchair bound at baseline, on 4L home O2) who presented to [facility] for several days of worsening SOB and generalized weakness with increasing O2 requirements, found to have acute hypoxic respiratory failure 2/2 pulmonary fibrosis with superimposed bacterial pneumonia and acute decompensated pulmonary HTN, transferred to [facility] for RHC i/s/o severe pulmonary disease and evaluation for potential lung transplant. Respiratory status improving, weaned to 6LNC. Tyvazo added 12/9.

## 2022-12-12 NOTE — DISCHARGE NOTE PROVIDER - PROVIDER TOKENS
PROVIDER:[TOKEN:[[id number]],FOLLOWUP:[2 weeks]] PROVIDER:[TOKEN:[[id number]],SCHEDULEDAPPT:[01/06/2023]]

## 2022-12-12 NOTE — PROGRESS NOTE ADULT - PROBLEM SELECTOR PLAN 2
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  Weaned off milrinone and Florentin  Plan:  - Plan to wean HFNC as tolerated. Weaned to 35/45 this AM. Saturating well. Wean as tolerated. Was comfortable on NC yesterday, will inform RN to keep on NC, and use brief NRBM for coughing fits.  - PT, OOBTC.   - C/w Bowel regimen  - Pulm following, appreciate recs, slow prednisone tape, tapered to 30mg 12/9. 20mg qd starting today.

## 2022-12-12 NOTE — DISCHARGE NOTE PROVIDER - NSDCCPCAREPLAN_GEN_ALL_CORE_FT
PRINCIPAL DISCHARGE DIAGNOSIS  Diagnosis: Pulmonary hypertension  Assessment and Plan of Treatment:       SECONDARY DISCHARGE DIAGNOSES  Diagnosis: Pulmonary fibrosis  Assessment and Plan of Treatment:      PRINCIPAL DISCHARGE DIAGNOSIS  Diagnosis: Pulmonary hypertension  Assessment and Plan of Treatment: Patient previously requiring intermittnet home oxygen, however since admission has been requiring 5-6L nasal cannula oxygen. When patient is on 5-6L nasal cannula, his oxygen saturation remains 90-93% and patient is off oxygen or less than 5-6L he desaturates to the low 80s while  Mr. Abdelrahman Felder will now require 5 to 6L nasal cannula upon discharge.      SECONDARY DISCHARGE DIAGNOSES  Diagnosis: Pulmonary fibrosis  Assessment and Plan of Treatment:      PRINCIPAL DISCHARGE DIAGNOSIS  Diagnosis: Pulmonary hypertension  Assessment and Plan of Treatment: Patient previously requiring intermittnet home oxygen, however since admission has been requiring 5-6L nasal cannula oxygen. When patient is on 5-6L nasal cannula, his oxygen saturation remains 90-93% and patient is off oxygen or less than 5-6L he desaturates to the low 80s while  Mr. Abdelrahman Felder will now require 5 to 6L nasal cannula upon discharge.   You will be discharged with a new device called [id number] as well as bipap machine, which you have been instructed on how to use by the [id number] company. Please continue to use as directed. A respiratory therapist from [employer] will meet you at your home at time of discharge to make sure your home device and BiPAP machine is set up. Continue to take you home medication Tyvaso as scheduled 4x per day.   Please follow up with Pulmonary hypertension specialist Dr. Halima Ng        SECONDARY DISCHARGE DIAGNOSES  Diagnosis: Pulmonary fibrosis  Assessment and Plan of Treatment: Pulmonary fibrosis is the scarring of your lung tissues over time. It is also called interstitial lung disease. The air sacs and tissues in your lungs swell, scars form, and the tissues become thick and stiff. This affects how much oxygen you get and makes it hard to breathe.  In the hospital you were started on a higher dose of oral steroids (prednisone) which you will continue as an outpatient decreasing the dose every few days as follows.   12/30/2022 - 40mg (Four 10mg tablets)  12/31/2022-1/3/2023 - 30mg   1/4/2023-1/7/2023 - 20mg   1/8/2023-1/11/2023 - 10mg   While taking the prednisone we have also prescribe an antibiotic called bactrim which will help protect you against an opprotunistic bacterial called PCP (pneumocystus jirovecii). Also steroids can cause acid reflux like symptoms so we also recommend that you contiue to take pantoprozaole while on the higer dose of steroid. Both prescriptions were sent to your pharmacy.

## 2022-12-12 NOTE — PROGRESS NOTE ADULT - PROBLEM SELECTOR PLAN 4
RESOLVED,  Chronic hypoNa likely i/s/o o CHF 2/2 to RHF and pHTN  Plan:  - Continue to monitor, improving

## 2022-12-12 NOTE — PROGRESS NOTE ADULT - ASSESSMENT
60 yo M w/ PMH HLD, pre-DM, and pulmonary fibrosis 2/2 COVID-19 infection (wheelchair bound at baseline, on 4L home O2) who presented to [facility] for several days of worsening SOB and generalized weakness with increasing O2 requirements, found to have acute hypoxic respiratory failure 2/2 pulmonary fibrosis with superimposed bacterial pneumonia and acute decompensated pulmonary HTN, transferred to [facility] for RHC i/s/o severe pulmonary disease and evaluation for potential lung transplant. Respiratory status improving, weaned to 6LNC. Tyvazo added 12/9.

## 2022-12-12 NOTE — PROGRESS NOTE ADULT - PROBLEM SELECTOR PLAN 6
RESOLVED  Pt developed thrush while in CCU 2/2 to inhalation, started on nystatin swish and swallow, 50,000U TID, course concludes 12/3  - Continue to monitor

## 2022-12-12 NOTE — DISCHARGE NOTE PROVIDER - HOSPITAL COURSE
Patient is a 60 yo M with a history of pulmonary fibrosis 2/2 Covid PNA (on home O2 4L) who p/w subaacute onset of 7 days of progressive sob, increasing O2 requirements, and syncopal episode, inititally presented to [facility] but later transferred to [facility] 2/2 profound RV failure 2/2 pulmonary HTN       ·  Problem: Pulmonary fibrosis.   ·  Plan: Post COVID ILD/ pulmonary fibrosis, now presenting with worsening cough and elevated WBC count with elevated procalcitonin and CT chest performed at OSH with concern for newly developing ggos when compared to prior CT scan done in october. Completed course of zosyn.   Plan:  - C/w prednisone 20mg qd. Next taper to 10mg 12/15. Once tapered to 10mg qd will discontinue bactrim prophylaxis.   - Bactrim for PCP ppx.  - Pt Tyvaso has been delivered, confirmed w pharmacy. 4 times a day, specialty RN reported q4, 8AM, 12PM, 4PM, 8PM    # F/u lung transplant   F/u pre-lung transplant ID recs: CMV IgG, EBV, serum QFT, Strongyloides IgG, hepatitis panel, HIV, varicella IgG, MMR IgG, toxoplasma IgG, influenza vaccine, Prevnar-20  - Quantiferon, and CMV titers sent for transplant, CMV IgG +, IgM negative. Quantiferon indeterminate.  - Encourage OOBTC.    ·  Problem: Pulmonary hypertension.   ·  Plan: Mild precapillary pulmonary hypertension with normal CO/CI by Td was on Florentin, low dose milrinone, normal filling pressures suggesting precapillary PH 2/2 underlying severe fibrotic ILD and adequate volume status.  Weaned off milrinone and Florentin  Plan:  - Plan to wean HFNC as tolerated. Weaned to 35/45 this AM. Saturating well. Wean as tolerated. Was comfortable on NC yesterday, will inform RN to keep on NC, and use brief NRBM for coughing fits.  - PT, OOBTC.   - C/w Bowel regimen  - Pulm following, appreciate recs, slow prednisone tape, tapered to 30mg 12/9. 20mg qd starting today.    ·  Problem: Leukocytosis.   ·  Plan: Leukocytosis to 13k, stable, slowly downtrending, likely elevated i/s/o acute sickness, afebrile, no s/s of infxn, CXR  Plan:  - Stable leukocytosis, likely 2/2 to steroid use.  - Continue to monitor, CXR w poor airway entry, elevated b/l hemidaphragms.      ·  Problem: Hyponatremia.   ·  Plan: RESOLVED,  Chronic hypoNa likely i/s/o o CHF 2/2 to RHF and pHTN  Plan:  - Continue to monitor, improving.      ·  Problem: Pre-diabetes.   ·  Plan: A1C of 6.0, BSG while inpatient, 140s-low 300s.  Plan:  - mISS  - DASH/TLC.             Patient is a 60 yo M with a history of pulmonary fibrosis 2/2 Covid PNA (on home O2 4L) who p/w subaacute onset of 7 days of progressive sob, increasing O2 requirements, and syncopal episode, inititally presented to [facility] but later transferred to [facility] 2/2 profound RV failure 2/2 pulmonary HTN       ·  Problem: Pulmonary fibrosis.   ·  Plan: Post COVID ILD/ pulmonary fibrosis, now presenting with worsening cough and elevated WBC count with elevated procalcitonin and CT chest performed at OSH with concern for newly developing ggos when compared to prior CT scan done in october. Completed course of zosyn.   Plan:  - C/w prednisone 20mg qd. Next taper to 10mg 12/15. Once tapered to 10mg qd will discontinue bactrim prophylaxis.   - Bactrim for PCP ppx.  - Pt Tyvaso has been delivered, confirmed w pharmacy. 4 times a day, specialty RN reported q4, 8AM, 12PM, 4PM, 8PM    # F/u lung transplant   F/u pre-lung transplant ID recs: CMV IgG, EBV, serum QFT, Strongyloides IgG, hepatitis panel, HIV, varicella IgG, MMR IgG, toxoplasma IgG, influenza vaccine, Prevnar-20  - Quantiferon, and CMV titers sent for transplant, CMV IgG +, IgM negative. Quantiferon indeterminate.  - Follow-up with Dr. Ng outpatient    ·  Problem: Pulmonary hypertension.   ·  Plan: Mild precapillary pulmonary hypertension with normal CO/CI by Td was on Florentin, low dose milrinone, normal filling pressures suggesting precapillary PH 2/2 underlying severe fibrotic ILD and adequate volume status.  Weaned off milrinone and Florentin  Plan:  - Continue w NC 6L.   - C/w Bowel regimen  - Slow prednisone taper as above.    ·  Problem: Leukocytosis.   ·  Plan: Leukocytosis to 13k, stable, slowly downtrending, likely elevated i/s/o acute sickness, afebrile, no s/s of infxn, CXR  Plan:  - Stable leukocytosis, likely 2/2 to steroid use.      ·  Problem: Hyponatremia.   ·  Plan: RESOLVED,  Chronic hypoNa likely i/s/o o CHF 2/2 to RHF and pHTN  Plan:  - Continue to monitor, improving.      ·  Problem: Pre-diabetes.   ·  Plan: A1C of 6.0, BSG while inpatient, 140s-low 300s.  Plan:  - mISS  - DASH/TLC.                 62 yo M w/ PMH HLD, pre-DM, and pulmonary fibrosis 2/2 COVID-19 infection c/b pulmonary HTN (uses wheelchair at baseline for SOB, on 4L home O2) who initially presented to [facility] on 11/22/2022 for several days of worsening dyspnea, productive cough, wheezing, and generalized weakness with increasing O2 requirements (up to 6L) and persistent dyspnea at rest. On day of admission, pt had syncopal event due to SOB. He was brought to [place] ED in respiratory distress where he was placed on BiPAP. CXR showed diffuse bilateral airspace opacities c/w multilobar pneumonia. CTA showed no evidence of PE, however did show extensive diffuse interstitial and ground glass infiltrates bilaterally with associated hilar and mediastinal adenopathy, suspected multilobar atypical pneumonia. Patient was started on broad spectrum antibiotics due to concern for pneumonia. He was admitted to  MICU for acute hypoxic respiratory failure 2/2 pulmonary fibrosis with superimposed bacterial pneumonia and acute decompensated pulmonary HTN. TTE showed mild TR, severely dilated RV, moderate-to-severe elevated RV systolic pressure, moderate-to severe pHTN (58 mmHg), normal LV size and systolic function (EF 58%), and grade II diastolic dysfunction. Pt was transferred to [facility] for further management and RHC given severity of pulmonary disease.     On admission to [facility] CCU, pt was weaned off BiPAP to HFNC and started on inhaled NO for pulmonary vasodilation and milrinone gtt for inotropic support. Pt was also started on high dose steroids for pulmonary fibrosis and given multiple rounds of diuresis i/s/o fluid overload from R-sided HF. Completed course of vanc & zosyn for superimposed bacterial PNA. Initial attempts at weaning Florentin and HFNC were c/b desaturation. Pt underwent RHC on 11/28 which showed CO 8.2, CI 4.2, CVP 2, PA pressure 44/14 (25), wedge pressure 6, RV 30/5. TTE on 11/28 showed improved RV systolic function from initial presentation. Patient was followed by the pulmonary hypertension team and started Tyvaso on 12/9. Process to start evaluation for lung and heart transplanted were started. Weaned off Florentin, and milrinone and pt was stepped down to telemetry, where he was continued on regimen of tyvaso, atrovent and steroids with intermittent diuresis. Pt was unable to be weaned from HFNC. Pt was discharged in stable medical condition with Life 2000 device and BiPAP with follow up scheduled with Dr. Ng (pulmonary hypertension).    #Pulmonary fibrosis  Post COVID ILD/pulmonary fibrosis, now presenting with worsening cough and elevated WBC count with elevated procalcitonin and CT chest performed at OSH with concern for newly developing ground glass opacities when compared to prior CT scan done in october. Completed course of vanc and zosyn for suspected multifocal bacterial pneumonia and put on high dose steroids pulmonary fibrosis with Bactrim for PCP prophylaxis. Pt unable to be weaned from HFNC, d/c w/ [id number] device.   - c/w prednisone 40 PO BID  - c/w bactrim for PCP ppx  - [id number] device during day, BiPAP at night (sent to home)    #Pulmonary hypertension  Mild precapillary pulmonary hypertension with normal CO/CI by thermodilution. Pt also with normal filling pressures suggesting precapillary PH 2/2 underlying severe fibrotic ILD and adequate volume status. Pt was on Florentin and milrinone gtt with difficult weaning. Intermittently diuresed. Followed by the pulmonary HTN team inpatient and started on Tyvaso 12/9.  - TTE 12/19/22: PASP 57mmHg (prior 44mmHg 11/28/22), septal "knuckle" w/o LVOT obstruction, grossly normal-appearing LV function, possible diastolic dysfunction, trace MR/UT, mild TR, trivial pericardial effusion  Plan:  - c/w steroids and breathing devices, as above  - c/w Tyvaso 32mcg QID (8AM, 12PM, 4PM, 8PM)  - c/w bowel regimen  - f/u outpt w/ Dr. Ng (appointment scheduled)    #Lung transplant candidate  Patient started pre-lung transplant studies. Multiple ABGs, echocardiograms, and CT chests. CMV IgG, EBV, serum QFT, Strongyloides IgG, hepatitis panel, HIV, varicella IgG, MMR IgG, toxoplasma IgG sent.   - f/u outpt w/ pulm for continued transplant workup    #Acute on chronic respiratory failure with hypoxia and hypercarbia  Patient w/ chronic respiratory failure 2/2 pulmonary fibrosis, as above. Patient w/ prolonged hospital stay due to shortness of breath and inability to wean patient off HFNC. ABG showing hypercarbia (pCO2 73).  - plan as above    #Leukocytosis - RESOLVED  Pt w/ leukocytosis, initially likely due to multifocal PNA. Persistent mildly elevated white count likely from steroid use. Leukocytosis resolved.   -no acute intervention    #Hyponatremia - RESOLVED  Chronic hypoNa likely i/s/o o CHF 2/2 to RHF and pHTN. Na stable at around 132-136.  - no acute intervention    #Oropharyngeal Thrush - RESOLVED  Pt developed thrush while in CCU 2/2 to inhalation steroids, started on nystatin swish and swallow, 50,000U TID. Course concluded 12/3 w/ resolution of thrush.  - no acute intervention    #Pre-diabetes  A1C of 6.0. Patient was on sliding scale while inpatient.  - f/u outpt     Patient was discharged to: home w/ [id number] device and BiPAP (already has home O2)    New medications: Tyvaso 32mcg QID (8AM, 12PM, 4PM, 8PM), prednisone 40 BID, bactrim 160/800 qD, pantoprazole 40mg PO qD, tessalon perles, senna + miralax  Medications that were stopped: stop previous prednisone 10mg dose  Items to follow up as outpatient: lung/heart transplant evaluation    Physical exam at the time of discharge:  General: Resting comfortably in bed; NAD; HFNC 50/40 saturating 94%  HEENT: NC/AT, EOMI, anicteric sclera, neck supple, no nasal discharge  Cardiac: regular rhythm, mildly tachycardic; normal S1/S2, no MRG  Respiratory: BL diffuse ronchi, more prominent in basilar regions; no increased work of breathing, retractions while at rest; HFNC as above  Gastrointestinal: +BSx4, abdomen soft, NT/ND; no rebound or guarding  Extremities: WWPx4; no peripheral edema; clubbing in fingers and toes BL  Dermatologic: skin warm, dry and intact; no rashes, open wounds  Neurologic: AAOx4; no focal deficits 62 yo M w/ PMH HLD, pre-DM, and pulmonary fibrosis 2/2 COVID-19 infection c/b pulmonary HTN (uses wheelchair at baseline for SOB, on 4L home O2) who initially presented to [facility] on 11/22/2022 for several days of worsening dyspnea, productive cough, wheezing, and generalized weakness with increasing O2 requirements (up to 6L) and persistent dyspnea at rest. On day of admission, pt had syncopal event due to SOB. He was brought to [place] ED in respiratory distress where he was placed on BiPAP. CXR showed diffuse bilateral airspace opacities c/w multilobar pneumonia. CTA showed no evidence of PE, however did show extensive diffuse interstitial and ground glass infiltrates bilaterally with associated hilar and mediastinal adenopathy, suspected multilobar atypical pneumonia. Patient was started on broad spectrum antibiotics due to concern for pneumonia. He was admitted to  MICU for acute hypoxic respiratory failure 2/2 pulmonary fibrosis with superimposed bacterial pneumonia and acute decompensated pulmonary HTN. TTE showed mild TR, severely dilated RV, moderate-to-severe elevated RV systolic pressure, moderate-to severe pHTN (58 mmHg), normal LV size and systolic function (EF 58%), and grade II diastolic dysfunction. Pt was transferred to [facility] for further management and RHC given severity of pulmonary disease.     On admission to [facility] CCU, pt was weaned off BiPAP to HFNC and started on inhaled NO for pulmonary vasodilation and milrinone gtt for inotropic support. Pt was also started on high dose steroids for pulmonary fibrosis and given multiple rounds of diuresis i/s/o fluid overload from R-sided HF. Completed course of vanc & zosyn for superimposed bacterial PNA. Initial attempts at weaning Florentin and HFNC were c/b desaturation. Pt underwent RHC on 11/28 which showed CO 8.2, CI 4.2, CVP 2, PA pressure 44/14 (25), wedge pressure 6, RV 30/5. TTE on 11/28 showed improved RV systolic function from initial presentation. Patient was followed by the pulmonary hypertension team and started Tyvaso on 12/9. Process to start evaluation for lung and heart transplanted were started. Weaned off Florentin, and milrinone and pt was stepped down to telemetry, where he was continued on regimen of tyvaso, atrovent and steroids with intermittent diuresis. Pt was unable to be weaned from HFNC. Pt was discharged in stable medical condition with Life 2000 device and BiPAP with follow up scheduled with Dr. Ng (pulmonary hypertension).    #Pulmonary fibrosis  Post COVID ILD/pulmonary fibrosis, now presenting with worsening cough and elevated WBC count with elevated procalcitonin and CT chest performed at OSH with concern for newly developing ground glass opacities when compared to prior CT scan done in october. Completed course of vanc and zosyn for suspected multifocal bacterial pneumonia and put on high dose steroids pulmonary fibrosis with Bactrim for PCP prophylaxis. Pt unable to be weaned from HFNC, d/c w/ [id number] device.   - c/w prednisone taper, sent to patient's pharmacy on file   - c/w bactrim for PCP ppx  - [id number] device during day, BiPAP at night (sent to home)    #Pulmonary hypertension  Mild precapillary pulmonary hypertension with normal CO/CI by thermodilution. Pt also with normal filling pressures suggesting precapillary PH 2/2 underlying severe fibrotic ILD and adequate volume status. Pt was on Florentin and milrinone gtt with difficult weaning. Intermittently diuresed. Followed by the pulmonary HTN team inpatient and started on Tyvaso 12/9.  - TTE 12/19/22: PASP 57mmHg (prior 44mmHg 11/28/22), septal "knuckle" w/o LVOT obstruction, grossly normal-appearing LV function, possible diastolic dysfunction, trace MR/WA, mild TR, trivial pericardial effusion  Plan:  - c/w steroids and breathing devices, as above  - c/w Tyvaso 32mcg QID (8AM, 12PM, 4PM, 8PM)  - c/w bowel regimen  - f/u outpt w/ Dr. Ng (appointment scheduled)    #Lung transplant candidate  Patient started pre-lung transplant studies. Multiple ABGs, echocardiograms, and CT chests. CMV IgG, EBV, serum QFT, Strongyloides IgG, hepatitis panel, HIV, varicella IgG, MMR IgG, toxoplasma IgG sent.   - f/u outpt w/ pulm for continued transplant workup    #Acute on chronic respiratory failure with hypoxia and hypercarbia  Patient w/ chronic respiratory failure 2/2 pulmonary fibrosis, as above. Patient w/ prolonged hospital stay due to shortness of breath and inability to wean patient off HFNC. ABG showing hypercarbia (pCO2 73).  - plan as above    #Leukocytosis - RESOLVED  Pt w/ leukocytosis, initially likely due to multifocal PNA. Persistent mildly elevated white count likely from steroid use. Leukocytosis resolved.   -no acute intervention    #Hyponatremia - RESOLVED  Chronic hypoNa likely i/s/o o CHF 2/2 to RHF and pHTN. Na stable at around 132-136.  - no acute intervention    #Oropharyngeal Thrush - RESOLVED  Pt developed thrush while in CCU 2/2 to inhalation steroids, started on nystatin swish and swallow, 50,000U TID. Course concluded 12/3 w/ resolution of thrush.  - no acute intervention    #Pre-diabetes  A1C of 6.0. Patient was on sliding scale while inpatient.  - f/u outpt     Patient was discharged to: home w/ [id number] device and BiPAP (already has home O2)    New medications: Tyvaso 32mcg QID (8AM, 12PM, 4PM, 8PM), prednisone 40 BID, bactrim 160/800 qD, pantoprazole 40mg PO qD, tessalon perles, senna + miralax  Medications that were stopped: stop previous prednisone 10mg dose  Items to follow up as outpatient: lung/heart transplant evaluation    Physical exam at the time of discharge:  General: Resting comfortably in bed; NAD; HFNC 50/40 saturating 94%  HEENT: NC/AT, EOMI, anicteric sclera, neck supple, no nasal discharge  Cardiac: regular rhythm, mildly tachycardic; normal S1/S2, no MRG  Respiratory: BL diffuse ronchi, more prominent in basilar regions; no increased work of breathing, retractions while at rest; HFNC as above  Gastrointestinal: +BSx4, abdomen soft, NT/ND; no rebound or guarding  Extremities: WWPx4; no peripheral edema; clubbing in fingers and toes BL  Dermatologic: skin warm, dry and intact; no rashes, open wounds  Neurologic: AAOx4; no focal deficits

## 2022-12-12 NOTE — PROGRESS NOTE ADULT - PROBLEM SELECTOR PLAN 1
Post COVID ILD/ pulmonary fibrosis, now presenting with worsening cough and elevated WBC count with elevated procalcitonin and CT chest performed at OSH with concern for newly developing ggos when compared to prior CT scan done in october. Completed course of zosyn.   Plan:  - C/w prednisone 20mg qd. Next taper to 10mg 12/15. Once tapered to 10mg qd will discontinue bactrim prophylaxis.   - Bactrim for PCP ppx.  - Pt Tyvaso has been delivered, confirmed w pharmacy. 4 times a day, specialty RN reported q4, 8AM, 12PM, 4PM, 8PM    # F/u lung transplant   F/u pre-lung transplant ID recs: CMV IgG, EBV, serum QFT, Strongyloides IgG, hepatitis panel, HIV, varicella IgG, MMR IgG, toxoplasma IgG, influenza vaccine, Prevnar-20  - Quantiferon, and CMV titers sent for transplant, CMV IgG +, IgM negative. Quantiferon indeterminate.  - Encourage OOBTC

## 2022-12-12 NOTE — DISCHARGE NOTE PROVIDER - NSDCFUSCHEDAPPT_GEN_ALL_CORE_FT
[facility]  [facility] [address]  Scheduled Appointment: 01/27/2023     Halima gN  [facility]  [address] [facility] [address]  Scheduled Appointment: 01/06/2023    [facility]  [facility]  [address]UL[id number]  Scheduled Appointment: 01/27/2023

## 2022-12-12 NOTE — PROGRESS NOTE ADULT - SUBJECTIVE AND OBJECTIVE BOX
INTERVAL HPI/OVERNIGHT EVENTS: Saturating well 99on 5L NC. Otherwise, brief desaturation episode noted on flowsheets to 85% on 5L NC.    SUBJECTIVE: Patient seen and examined at bedside breathing comfortably on 5L NC. Endorsed feeling subjectively the same, says he cannot feel the effect from the Tyvazo medication yet. Otherwise, reports he is feeling okay and he is eating well and passing BM and urinating. Otherwise denies fever, chills, headache, c/p, palpitations, SOB, cough, abdominal pain,, dysuria, hematuria, polyuria.    Vital Signs Last 24 Hrs  T(C): 36.4 (12 Dec 2022 06:14), Max: 36.8 (11 Dec 2022 10:30)  T(F): 97.5 (12 Dec 2022 06:14), Max: 98.3 (11 Dec 2022 10:30)  HR: 100 (12 Dec 2022 08:31) (90 - 121)  BP: 103/63 (12 Dec 2022 08:31) (103/56 - 163/81)  BP(mean): 77 (12 Dec 2022 08:31) (74 - 105)  RR: 24 (12 Dec 2022 08:31) (18 - 28)  SpO2: 85% (12 Dec 2022 08:31) (85% - 99%)    Parameters below as of 12 Dec 2022 08:31  Patient On (Oxygen Delivery Method): nasal cannula w/ humidification  O2 Flow (L/min): 5          Plateau pressure:   P/F ratio:     12-10 @ 07:01  -  12-11 @ 07:00  --------------------------------------------------------  IN: 445 mL / OUT: 1225 mL / NET: -780 mL      CAPILLARY BLOOD GLUCOSE      POCT Blood Glucose.: 113 mg/dL (11 Dec 2022 06:59)    ECG:    PHYSICAL EXAM:  Constitutional: Middle aged Male on 5L NC, in no acute distress, resting in bed.    Respiratory: CTA B/L. No w/r/r.  Cardiovascular: Regular rate and rhythm, normal S1S2, no murmurs, rubs, gallops.  Gastrointestinal: soft, non-tender and non-distended, no masses palpable, Normoactive bowel sounds.  Extremities: Warm, well perfused, pulses equal bilateral upper and lower extremities, non pitting edema  Neurological: AAOx3. Mentating well. EOMI, PERRLA.  Skin: Normal temperature, warm, dry.    MEDICATIONS:  MEDICATIONS  (STANDING):  AQUAPHOR (petrolatum Ointment) 1 Application(s) Topical two times a day  benzocaine 15 mG/menthol 3.6 mG Lozenge 2 Lozenge Oral every 6 hours  benzonatate 200 milliGRAM(s) Oral every 8 hours  dextrose 5%. 1000 milliLiter(s) (50 mL/Hr) IV Continuous <Continuous>  dextrose 5%. 1000 milliLiter(s) (100 mL/Hr) IV Continuous <Continuous>  dextrose 50% Injectable 25 Gram(s) IV Push once  dextrose 50% Injectable 12.5 Gram(s) IV Push once  dextrose 50% Injectable 25 Gram(s) IV Push once  enoxaparin Injectable 40 milliGRAM(s) SubCutaneous every 24 hours  fluticasone propionate 50 MICROgram(s)/spray Nasal Spray 2 Spray(s) Both Nostrils two times a day  glucagon  Injectable 1 milliGRAM(s) IntraMuscular once  influenza   Vaccine 0.5 milliLiter(s) IntraMuscular once  insulin lispro (ADMELOG) corrective regimen sliding scale   SubCutaneous Before meals and at bedtime  ipratropium    for Nebulization 500 MICROGram(s) Nebulizer every 6 hours  pantoprazole    Tablet 40 milliGRAM(s) Oral before breakfast  polyethylene glycol 3350 17 Gram(s) Oral daily  predniSONE   Tablet 30 milliGRAM(s) Oral every 24 hours  senna 2 Tablet(s) Oral at bedtime  tamsulosin 0.4 milliGRAM(s) Oral at bedtime  Treprostinil (Tyvaso) DPI 1 Inhalation 1 Inhalation Inhalation <User Schedule>  trimethoprim  160 mG/sulfamethoxazole 800 mG 1 Tablet(s) Oral daily    MEDICATIONS  (PRN):  acetaminophen     Tablet .. 650 milliGRAM(s) Oral every 6 hours PRN Mild Pain (1 - 3)  dextrose Oral Gel 15 Gram(s) Oral once PRN Blood Glucose LESS THAN 70 milliGRAM(s)/deciliter  guaiFENesin Oral Liquid (Sugar-Free) 200 milliGRAM(s) Oral every 6 hours PRN Cough  melatonin 5 milliGRAM(s) Oral at bedtime PRN Insomnia      ALLERGIES:  Allergies    No Known Allergies    Intolerances        LABS:                         12.1   11.18 )-----------( 185      ( 12 Dec 2022 06:09 )             37.4     12-12    135  |  95<L>  |  17  ----------------------------<  97  4.3   |  34<H>  |  0.78    Ca    8.6      12 Dec 2022 06:09  Phos  3.3     12-12  Mg     2.2     12-12    TPro  6.6  /  Alb  3.3  /  TBili  0.6  /  DBili  x   /  AST  16  /  ALT  18  /  AlkPhos  73  12-12                  RADIOLOGY, EKG & ADDITIONAL TESTS:

## 2022-12-12 NOTE — DISCHARGE NOTE PROVIDER - CARE PROVIDER_API CALL
Halima Ng (DO)  Critical Care Medicine; Internal Medicine; Pulmonary Disease  [address] [address]  Phone: [phone]  Fax: [phone]  Follow Up Time: 2 weeks   Halima Ng (DO)  Critical Care Medicine; Internal Medicine; Pulmonary Disease  [address] [address]  Phone: [phone]  Fax: [phone]  Scheduled Appointment: 01/06/2023

## 2022-12-12 NOTE — DISCHARGE NOTE PROVIDER - NSDCMRMEDTOKEN_GEN_ALL_CORE_FT
albuterol 0.63 mg/3 mL (0.021%) inhalation solution: 3 milliliter(s) by nebulizer every 6 hours   budesonide 0.25 mg/2 mL inhalation suspension: 4 milliliter(s) by nebulizer 2 times a day   Hycodan 5 mg-1.5 mg/5 mL oral syrup: 5 milliliter(s) orally every 6 hours as needed for cough MDD:20mL  predniSONE 10 mg oral tablet: 1 tab(s) orally once a day   albuterol 0.63 mg/3 mL (0.021%) inhalation solution: 3 milliliter(s) by nebulizer every 6 hours   budesonide 0.25 mg/2 mL inhalation suspension: 4 milliliter(s) by nebulizer 2 times a day   Hycodan 5 mg-1.5 mg/5 mL oral syrup: 5 milliliter(s) orally every 6 hours as needed for cough MDD:20mL  predniSONE 10 mg oral tablet: 1 tab(s) orally once a day  tamsulosin 0.4 mg oral capsule: 1 cap(s) orally once a day (at bedtime)   albuterol 0.63 mg/3 mL (0.021%) inhalation solution: 3 milliliter(s) by nebulizer every 6 hours   benzonatate 100 mg oral capsule: 2 cap(s) orally every 8 hours, As Needed -for cough   budesonide 0.25 mg/2 mL inhalation suspension: 4 milliliter(s) by nebulizer 2 times a day   freetext medication     -: 1 application inhaled 4 times a day  Donna-Tussin Expectorant 100 mg/5 mL oral liquid: 10 milliliter(s) orally every 6 hours, As needed, Cough  Hycodan 5 mg-1.5 mg/5 mL oral syrup: 5 milliliter(s) orally every 6 hours as needed for cough MDD:20mL  menthol-benzocaine 3.6 mg-15 mg mucous membrane lozenge: 2 lozenge mucous membrane every 6 hours, As Needed -for cough   pantoprazole 40 mg oral delayed release tablet: 1 tab(s) orally once a day (before a meal)  predniSONE 10 mg oral tablet: 4 tab(s) orally once a day on 12/30/2022  3 tab(s) orally once a day from 12/31/2022 - 1/3/2023  2 tab(s) orally once a day from 1/4/2023-1/7/2023  1 tab orally from 1/8/2023-1/11/2023  predniSONE 10 mg oral tablet: 10 milligram(s) orally once a day  sulfamethoxazole-trimethoprim 800 mg-160 mg oral tablet: 1 tab(s) orally every 24 hours  tamsulosin 0.4 mg oral capsule: 1 cap(s) orally once a day (at bedtime)

## 2022-12-13 NOTE — PROGRESS NOTE ADULT - SUBJECTIVE AND OBJECTIVE BOX
INTERVAL HPI/OVERNIGHT EVENTS: Saturating well 99on 5L NC. Otherwise, brief desaturation episode, was placed on NRB.    SUBJECTIVE: Patient seen and examined at bedside breathing comfortably on 5L NC. Endorsed feeling subjectively the same. Subjective SOB. Otherwise, reports he is feeling okay and he is eating well and passing BM and urinating. Otherwise denies fever, chills, headache, c/p, palpitations, SOB, cough, abdominal pain,, dysuria, hematuria, polyuria.    Vital Signs Last 24 Hrs  T(C): 36.2 (13 Dec 2022 09:08), Max: 37.1 (12 Dec 2022 22:08)  T(F): 97.1 (13 Dec 2022 09:08), Max: 98.8 (12 Dec 2022 22:08)  HR: 112 (13 Dec 2022 08:20) (99 - 112)  BP: 112/80 (13 Dec 2022 08:20) (101/59 - 131/62)  BP(mean): 93 (13 Dec 2022 08:20) (75 - 93)  RR: 20 (13 Dec 2022 08:20) (18 - 24)  SpO2: 88% (13 Dec 2022 08:20) (88% - 99%)    Parameters below as of 13 Dec 2022 08:20  Patient On (Oxygen Delivery Method): nasal cannula w/ humidification  O2 Flow (L/min): 6            Plateau pressure:   P/F ratio:     12-10 @ 07:01  -  12-11 @ 07:00  --------------------------------------------------------  IN: 445 mL / OUT: 1225 mL / NET: -780 mL      CAPILLARY BLOOD GLUCOSE      POCT Blood Glucose.: 113 mg/dL (11 Dec 2022 06:59)    ECG:    PHYSICAL EXAM:  Constitutional: Middle aged Male on 5L NC, in no acute distress, resting in bed.    Respiratory: Mild inspiratory crackles b/l LL. No w/r/r  Cardiovascular: Regular rate and rhythm, normal S1S2, no murmurs, rubs, gallops.  Gastrointestinal: soft, non-tender and non-distended, no masses palpable, Normoactive bowel sounds.  Extremities: Warm, well perfused, pulses equal bilateral upper and lower extremities, non pitting edema  Neurological: AAOx3. Mentating well. EOMI, PERRLA.  Skin: Normal temperature, warm, dry.    MEDICATIONS:  MEDICATIONS  (STANDING):  AQUAPHOR (petrolatum Ointment) 1 Application(s) Topical two times a day  benzocaine 15 mG/menthol 3.6 mG Lozenge 2 Lozenge Oral every 6 hours  benzonatate 200 milliGRAM(s) Oral every 8 hours  dextrose 5%. 1000 milliLiter(s) (50 mL/Hr) IV Continuous <Continuous>  dextrose 5%. 1000 milliLiter(s) (100 mL/Hr) IV Continuous <Continuous>  dextrose 50% Injectable 25 Gram(s) IV Push once  dextrose 50% Injectable 12.5 Gram(s) IV Push once  dextrose 50% Injectable 25 Gram(s) IV Push once  enoxaparin Injectable 40 milliGRAM(s) SubCutaneous every 24 hours  fluticasone propionate 50 MICROgram(s)/spray Nasal Spray 2 Spray(s) Both Nostrils two times a day  glucagon  Injectable 1 milliGRAM(s) IntraMuscular once  influenza   Vaccine 0.5 milliLiter(s) IntraMuscular once  insulin lispro (ADMELOG) corrective regimen sliding scale   SubCutaneous Before meals and at bedtime  ipratropium    for Nebulization 500 MICROGram(s) Nebulizer every 6 hours  pantoprazole    Tablet 40 milliGRAM(s) Oral before breakfast  polyethylene glycol 3350 17 Gram(s) Oral daily  predniSONE   Tablet 30 milliGRAM(s) Oral every 24 hours  senna 2 Tablet(s) Oral at bedtime  tamsulosin 0.4 milliGRAM(s) Oral at bedtime  Treprostinil (Tyvaso) DPI 1 Inhalation 1 Inhalation Inhalation <User Schedule>  trimethoprim  160 mG/sulfamethoxazole 800 mG 1 Tablet(s) Oral daily    MEDICATIONS  (PRN):  acetaminophen     Tablet .. 650 milliGRAM(s) Oral every 6 hours PRN Mild Pain (1 - 3)  dextrose Oral Gel 15 Gram(s) Oral once PRN Blood Glucose LESS THAN 70 milliGRAM(s)/deciliter  guaiFENesin Oral Liquid (Sugar-Free) 200 milliGRAM(s) Oral every 6 hours PRN Cough  melatonin 5 milliGRAM(s) Oral at bedtime PRN Insomnia      ALLERGIES:  Allergies    No Known Allergies    Intolerances      LABS:                         12.1   11.18 )-----------( 185      ( 12 Dec 2022 06:09 )             37.4     12-12    135  |  95<L>  |  17  ----------------------------<  97  4.3   |  34<H>  |  0.78    Ca    8.6      12 Dec 2022 06:09  Phos  3.3     12-12  Mg     2.2     12-12    TPro  6.6  /  Alb  3.3  /  TBili  0.6  /  DBili  x   /  AST  16  /  ALT  18  /  AlkPhos  73  12-12                  RADIOLOGY, EKG & ADDITIONAL TESTS:                   RADIOLOGY, EKG & ADDITIONAL TESTS:

## 2022-12-13 NOTE — PROGRESS NOTE ADULT - PROBLEM SELECTOR PLAN 1
Post COVID ILD/ pulmonary fibrosis, now presenting with worsening cough and elevated WBC count with elevated procalcitonin and CT chest performed at OSH with concern for newly developing ggos when compared to prior CT scan done in october. Completed course of zosyn.   Plan:  - C/w prednisone 20mg qd. Next taper to 10mg 12/15. Once tapered to 10mg qd will discontinue bactrim prophylaxis.   - Bactrim for PCP ppx.  - Pt Tyvaso has been delivered, confirmed w pharmacy. 4 times a day, specialty RN reported q4, 8AM, 12PM, 4PM, 8PM  - outpatient follow-up with Dr. Ng    # F/u lung transplant   F/u pre-lung transplant ID recs: CMV IgG, EBV, serum QFT, Strongyloides IgG, hepatitis panel, HIV, varicella IgG, MMR IgG, toxoplasma IgG, influenza vaccine, Prevnar-20  - Quantiferon, and CMV titers sent for transplant, CMV IgG +, IgM negative. Quantiferon indeterminate.  - Encourage OOBTC

## 2022-12-13 NOTE — PROGRESS NOTE ADULT - ASSESSMENT
62 yo M w/ PMH HLD, pre-DM, and pulmonary fibrosis 2/2 COVID-19 infection (wheelchair bound at baseline, on 4L home O2) who presented to [facility] for several days of worsening SOB and generalized weakness with increasing O2 requirements, found to have acute hypoxic respiratory failure 2/2 pulmonary fibrosis with superimposed bacterial pneumonia and acute decompensated pulmonary HTN, transferred to [facility] for RHC i/s/o severe pulmonary disease and evaluation for potential lung transplant. Respiratory status improving, weaned to 6LNC. Tyvazo added 12/9. HDS, stable O2 reqs.

## 2022-12-13 NOTE — PROGRESS NOTE ADULT - PROBLEM SELECTOR PLAN 2
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  Weaned off milrinone and Florentin  Plan:  - On 6L NC. Saturating well. Wean as tolerated. Was comfortable on NC yesterday, will inform RN to keep on NC, and use brief NRBM for coughing fits.  - PT, OOBTC.   - C/w Bowel regimen  - Pulm following, appreciate recs, slow prednisone taper, 20mg qd. Next wean to 10mg on 12/15/22.

## 2022-12-14 NOTE — PROGRESS NOTE ADULT - PROBLEM SELECTOR PLAN 1
Post COVID ILD/ pulmonary fibrosis, now presenting with worsening cough and elevated WBC count with elevated procalcitonin and CT chest performed at OSH with concern for newly developing ggos when compared to prior CT scan done in october. Completed course of zosyn.   Plan:  - C/w prednisone 20mg qd. Next taper to 10mg 12/15. Once tapered to 10mg qd will discontinue bactrim prophylaxis.   - Bactrim for PCP ppx.  - Pt Tyvaso has been delivered, confirmed w pharmacy. 4 times a day, specialty RN reported q4, 8AM, 12PM, 4PM, 8PM  - outpatient follow-up with Dr. Ng    # F/u lung transplant   F/u pre-lung transplant ID recs: CMV IgG, EBV, serum QFT, Strongyloides IgG, hepatitis panel, HIV, varicella IgG, MMR IgG, toxoplasma IgG, influenza vaccine, Prevnar-20  - Quantiferon, and CMV titers sent for transplant, CMV IgG +, IgM negative. Quantiferon indeterminate.  - Encourage OOBTC Post COVID ILD/ pulmonary fibrosis, now presenting with worsening cough and elevated WBC count with elevated procalcitonin and CT chest performed at OSH with concern for newly developing ggos when compared to prior CT scan done in october. Completed course of zosyn.   Plan:  - C/w prednisone 20mg qd. Next taper to 10mg planned 12/15, however will reevaluate taper after chest imaging. Once tapered to 10mg qd will discontinue bactrim prophylaxis.   - CXR and CT chest ordered 12/14 for worsening O2 requirements  - Bactrim for PCP ppx.  - Tyvaso 4 times a day, specialty RN reported q4, 8AM, 12PM, 4PM, 8PM  - outpatient follow-up with Dr. Ng    # F/u lung transplant   F/u pre-lung transplant ID recs: CMV IgG, EBV, serum QFT, Strongyloides IgG, hepatitis panel, HIV, varicella IgG, MMR IgG, toxoplasma IgG, influenza vaccine, Prevnar-20  - Quantiferon, and CMV titers sent for transplant, CMV IgG +, IgM negative. Quantiferon indeterminate.  - Encourage OOBTC

## 2022-12-14 NOTE — PROGRESS NOTE ADULT - PROBLEM SELECTOR PLAN 6
RESOLVED  Pt developed thrush while in CCU 2/2 to inhalation, started on nystatin swish and swallow, 50,000U TID, course concludes 12/3  - Continue to monitor RESOLVED  Pt developed thrush while in CCU 2/2 to inhalation, started on nystatin swish and swallow, 50,000U TID, course concluded 12/3.  - Continue to monitor

## 2022-12-14 NOTE — PROGRESS NOTE ADULT - SUBJECTIVE AND OBJECTIVE BOX
***INCOMPLETE NOTE    SUBJECTIVE / INTERVAL HPI: Patient seen and examined at bedside. No overnight events.    VITAL SIGNS:  Vital Signs Last 24 Hrs  T(C): 37.1 (13 Dec 2022 22:00), Max: 37.1 (13 Dec 2022 13:31)  T(F): 98.7 (13 Dec 2022 22:00), Max: 98.7 (13 Dec 2022 13:31)  HR: 99 (14 Dec 2022 04:45) (99 - 115)  BP: 110/65 (14 Dec 2022 04:45) (109/77 - 127/82)  BP(mean): 82 (14 Dec 2022 04:45) (82 - 99)  RR: 19 (14 Dec 2022 04:45) (18 - 20)  SpO2: 96% (14 Dec 2022 04:45) (86% - 98%)    Parameters below as of 14 Dec 2022 04:45  Patient On (Oxygen Delivery Method): nasal cannula w/ humidification  O2 Flow (L/min): 6      PHYSICAL EXAM:    General: Resting comfortably in bed; NAD  HEENT: NC/AT, EOMI, anicteric sclera, MMM, neck supple, no nasal discharge  Cardiac: RRR; normal S1/S2, no MRG, no LE edema, no JVD  Respiratory: CTAB; no wheezes, ronchi, increased work of breathing, retractions  Gastrointestinal: +BSx4, abdomen soft, NT/ND; no rebound or guarding  Genitourinary: no suprapubic tenderness; pardo*; normal external genitalia  Extremities: WWP, no clubbing or cyanosis; no peripheral edema  Vascular: 2+ radial and DP pulses bilaterally  Dermatologic: skin warm, dry and intact; no rashes, open wounds  Neurologic: AAOx3; no focal deficits  Psychiatric: affect and characteristics of appearance, verbalizations, behaviors are appropriate    MEDICATIONS:  MEDICATIONS  (STANDING):  AQUAPHOR (petrolatum Ointment) 1 Application(s) Topical two times a day  benzocaine 15 mG/menthol 3.6 mG Lozenge 2 Lozenge Oral every 6 hours  benzonatate 200 milliGRAM(s) Oral every 8 hours  dextrose 5%. 1000 milliLiter(s) (100 mL/Hr) IV Continuous <Continuous>  dextrose 5%. 1000 milliLiter(s) (50 mL/Hr) IV Continuous <Continuous>  dextrose 50% Injectable 25 Gram(s) IV Push once  dextrose 50% Injectable 12.5 Gram(s) IV Push once  dextrose 50% Injectable 25 Gram(s) IV Push once  enoxaparin Injectable 40 milliGRAM(s) SubCutaneous every 24 hours  fluticasone propionate 50 MICROgram(s)/spray Nasal Spray 2 Spray(s) Both Nostrils two times a day  glucagon  Injectable 1 milliGRAM(s) IntraMuscular once  guaiFENesin Oral Liquid (Sugar-Free) 200 milliGRAM(s) Oral every 6 hours  influenza   Vaccine 0.5 milliLiter(s) IntraMuscular once  insulin lispro (ADMELOG) corrective regimen sliding scale   SubCutaneous Before meals and at bedtime  ipratropium    for Nebulization 500 MICROGram(s) Nebulizer every 6 hours  pantoprazole    Tablet 40 milliGRAM(s) Oral before breakfast  polyethylene glycol 3350 17 Gram(s) Oral daily  predniSONE   Tablet 20 milliGRAM(s) Oral daily  senna 2 Tablet(s) Oral at bedtime  tamsulosin 0.4 milliGRAM(s) Oral at bedtime  Treprostinil (Tyvaso) DPI 1 Inhalation 1 Inhalation Inhalation <User Schedule>  trimethoprim  160 mG/sulfamethoxazole 800 mG 1 Tablet(s) Oral daily    MEDICATIONS  (PRN):  acetaminophen     Tablet .. 650 milliGRAM(s) Oral every 6 hours PRN Mild Pain (1 - 3)  dextrose Oral Gel 15 Gram(s) Oral once PRN Blood Glucose LESS THAN 70 milliGRAM(s)/deciliter  melatonin 5 milliGRAM(s) Oral at bedtime PRN Insomnia      ALLERGIES:  Allergies    No Known Allergies    Intolerances        LABS:              CAPILLARY BLOOD GLUCOSE      POCT Blood Glucose.: 101 mg/dL (13 Dec 2022 21:44)      RADIOLOGY & ADDITIONAL TESTS: Reviewed.   SUBJECTIVE / INTERVAL HPI: Patient seen and examined at bedside. Attempted to wean O2 overnight, desaturated to mid 80s, continued on 6L NC. This AM pt on nonrebreather. States he had a coughing fit and his saturations dropped. Attempted to wean off nonrebreather, but pt again desaturated to mid 80s. Pt without fever, SOB, chest pain. Cough is nonproductive. No dizziness, lightheadedness, increased lethargy.    VITAL SIGNS:  Vital Signs Last 24 Hrs  T(C): 37.1 (13 Dec 2022 22:00), Max: 37.1 (13 Dec 2022 13:31)  T(F): 98.7 (13 Dec 2022 22:00), Max: 98.7 (13 Dec 2022 13:31)  HR: 99 (14 Dec 2022 04:45) (99 - 115)  BP: 110/65 (14 Dec 2022 04:45) (109/77 - 127/82)  BP(mean): 82 (14 Dec 2022 04:45) (82 - 99)  RR: 19 (14 Dec 2022 04:45) (18 - 20)  SpO2: 96% (14 Dec 2022 04:45) (86% - 98%)    Parameters below as of 14 Dec 2022 04:45  Patient On (Oxygen Delivery Method): nasal cannula w/ humidification  O2 Flow (L/min): 6      PHYSICAL EXAM:    General: Resting comfortably in bed; NAD  HEENT: NC/AT, EOMI, anicteric sclera, MMM, neck supple, no nasal discharge  Cardiac: RRR; normal S1/S2, no MRG, no LE edema, no JVD  Respiratory: CTAB; no wheezes, ronchi, increased work of breathing, retractions  Gastrointestinal: +BSx4, abdomen soft, NT/ND; no rebound or guarding  Genitourinary: no suprapubic tenderness; pardo*; normal external genitalia  Extremities: WWP, no clubbing or cyanosis; no peripheral edema  Vascular: 2+ radial and DP pulses bilaterally  Dermatologic: skin warm, dry and intact; no rashes, open wounds  Neurologic: AAOx3; no focal deficits  Psychiatric: affect and characteristics of appearance, verbalizations, behaviors are appropriate    MEDICATIONS:  MEDICATIONS  (STANDING):  AQUAPHOR (petrolatum Ointment) 1 Application(s) Topical two times a day  benzocaine 15 mG/menthol 3.6 mG Lozenge 2 Lozenge Oral every 6 hours  benzonatate 200 milliGRAM(s) Oral every 8 hours  dextrose 5%. 1000 milliLiter(s) (100 mL/Hr) IV Continuous <Continuous>  dextrose 5%. 1000 milliLiter(s) (50 mL/Hr) IV Continuous <Continuous>  dextrose 50% Injectable 25 Gram(s) IV Push once  dextrose 50% Injectable 12.5 Gram(s) IV Push once  dextrose 50% Injectable 25 Gram(s) IV Push once  enoxaparin Injectable 40 milliGRAM(s) SubCutaneous every 24 hours  fluticasone propionate 50 MICROgram(s)/spray Nasal Spray 2 Spray(s) Both Nostrils two times a day  glucagon  Injectable 1 milliGRAM(s) IntraMuscular once  guaiFENesin Oral Liquid (Sugar-Free) 200 milliGRAM(s) Oral every 6 hours  influenza   Vaccine 0.5 milliLiter(s) IntraMuscular once  insulin lispro (ADMELOG) corrective regimen sliding scale   SubCutaneous Before meals and at bedtime  ipratropium    for Nebulization 500 MICROGram(s) Nebulizer every 6 hours  pantoprazole    Tablet 40 milliGRAM(s) Oral before breakfast  polyethylene glycol 3350 17 Gram(s) Oral daily  predniSONE   Tablet 20 milliGRAM(s) Oral daily  senna 2 Tablet(s) Oral at bedtime  tamsulosin 0.4 milliGRAM(s) Oral at bedtime  Treprostinil (Tyvaso) DPI 1 Inhalation 1 Inhalation Inhalation <User Schedule>  trimethoprim  160 mG/sulfamethoxazole 800 mG 1 Tablet(s) Oral daily    MEDICATIONS  (PRN):  acetaminophen     Tablet .. 650 milliGRAM(s) Oral every 6 hours PRN Mild Pain (1 - 3)  dextrose Oral Gel 15 Gram(s) Oral once PRN Blood Glucose LESS THAN 70 milliGRAM(s)/deciliter  melatonin 5 milliGRAM(s) Oral at bedtime PRN Insomnia      ALLERGIES:  Allergies    No Known Allergies    Intolerances        LABS:              CAPILLARY BLOOD GLUCOSE      POCT Blood Glucose.: 101 mg/dL (13 Dec 2022 21:44)      RADIOLOGY & ADDITIONAL TESTS: Reviewed.   SUBJECTIVE / INTERVAL HPI: Patient seen and examined at bedside. Attempted to wean O2 overnight, desaturated to mid 80s, continued on 6L NC. This AM pt on nonrebreather. States he had a coughing fit and his saturations dropped. Attempted to wean off nonrebreather, but pt again desaturated to mid 80s. Pt without fever, SOB, chest pain. Cough is nonproductive. No dizziness, lightheadedness, increased lethargy.    VITAL SIGNS:  Vital Signs Last 24 Hrs  T(C): 37.1 (13 Dec 2022 22:00), Max: 37.1 (13 Dec 2022 13:31)  T(F): 98.7 (13 Dec 2022 22:00), Max: 98.7 (13 Dec 2022 13:31)  HR: 99 (14 Dec 2022 04:45) (99 - 115)  BP: 110/65 (14 Dec 2022 04:45) (109/77 - 127/82)  BP(mean): 82 (14 Dec 2022 04:45) (82 - 99)  RR: 19 (14 Dec 2022 04:45) (18 - 20)  SpO2: 96% (14 Dec 2022 04:45) (86% - 98%)    Parameters below as of 14 Dec 2022 04:45  Patient On (Oxygen Delivery Method): nasal cannula w/ humidification  O2 Flow (L/min): 6      PHYSICAL EXAM:    General: Resting comfortably in bed; NAD  HEENT: NC/AT, EOMI, anicteric sclera, MMM, neck supple, no nasal discharge  Cardiac: RRR; normal S1/S2, no MRG, no LE edema, no JVD  Respiratory: diffuse ronchi; mild increased work of breathing w/ movement  Gastrointestinal: +BSx4, abdomen soft, NT/ND; no rebound or guarding  Extremities: WWP x4 extremities  Vascular: 2+ radial pulses bilaterally  Dermatologic: skin warm, dry and intact; no rashes, open wounds  Neurologic: AAOx3; no focal deficits    MEDICATIONS:  MEDICATIONS  (STANDING):  AQUAPHOR (petrolatum Ointment) 1 Application(s) Topical two times a day  benzocaine 15 mG/menthol 3.6 mG Lozenge 2 Lozenge Oral every 6 hours  benzonatate 200 milliGRAM(s) Oral every 8 hours  dextrose 5%. 1000 milliLiter(s) (100 mL/Hr) IV Continuous <Continuous>  dextrose 5%. 1000 milliLiter(s) (50 mL/Hr) IV Continuous <Continuous>  dextrose 50% Injectable 25 Gram(s) IV Push once  dextrose 50% Injectable 12.5 Gram(s) IV Push once  dextrose 50% Injectable 25 Gram(s) IV Push once  enoxaparin Injectable 40 milliGRAM(s) SubCutaneous every 24 hours  fluticasone propionate 50 MICROgram(s)/spray Nasal Spray 2 Spray(s) Both Nostrils two times a day  glucagon  Injectable 1 milliGRAM(s) IntraMuscular once  guaiFENesin Oral Liquid (Sugar-Free) 200 milliGRAM(s) Oral every 6 hours  influenza   Vaccine 0.5 milliLiter(s) IntraMuscular once  insulin lispro (ADMELOG) corrective regimen sliding scale   SubCutaneous Before meals and at bedtime  ipratropium    for Nebulization 500 MICROGram(s) Nebulizer every 6 hours  pantoprazole    Tablet 40 milliGRAM(s) Oral before breakfast  polyethylene glycol 3350 17 Gram(s) Oral daily  predniSONE   Tablet 20 milliGRAM(s) Oral daily  senna 2 Tablet(s) Oral at bedtime  tamsulosin 0.4 milliGRAM(s) Oral at bedtime  Treprostinil (Tyvaso) DPI 1 Inhalation 1 Inhalation Inhalation <User Schedule>  trimethoprim  160 mG/sulfamethoxazole 800 mG 1 Tablet(s) Oral daily    MEDICATIONS  (PRN):  acetaminophen     Tablet .. 650 milliGRAM(s) Oral every 6 hours PRN Mild Pain (1 - 3)  dextrose Oral Gel 15 Gram(s) Oral once PRN Blood Glucose LESS THAN 70 milliGRAM(s)/deciliter  melatonin 5 milliGRAM(s) Oral at bedtime PRN Insomnia      ALLERGIES:  Allergies    No Known Allergies    Intolerances        LABS:              CAPILLARY BLOOD GLUCOSE      POCT Blood Glucose.: 101 mg/dL (13 Dec 2022 21:44)      RADIOLOGY & ADDITIONAL TESTS: Reviewed.

## 2022-12-14 NOTE — PROGRESS NOTE ADULT - ASSESSMENT
62 yo M w/ PMH HLD, pre-DM, and pulmonary fibrosis 2/2 COVID-19 infection (wheelchair bound at baseline, on 4L home O2) who presented to [facility] for several days of worsening SOB and generalized weakness with increasing O2 requirements, found to have acute hypoxic respiratory failure 2/2 pulmonary fibrosis with superimposed bacterial pneumonia and acute decompensated pulmonary HTN, transferred to [facility] for RHC i/s/o severe pulmonary disease and evaluation for potential lung transplant. Respiratory status improving, weaned to 6LNC. Tyvazo added 12/9. HDS, stable O2 reqs. 60 yo M w/ PMH HLD, pre-DM, and pulmonary fibrosis 2/2 COVID-19 infection (wheelchair bound at baseline, on 4L home O2) who presented to [facility] for several days of worsening SOB and generalized weakness with increasing O2 requirements, found to have acute hypoxic respiratory failure 2/2 pulmonary fibrosis with superimposed bacterial pneumonia and acute decompensated pulmonary HTN, transferred to [facility] for RHC i/s/o severe pulmonary disease and evaluation for potential lung transplant. Respiratory status improving, weaned to 6LNC. Tyvazo added 12/9.

## 2022-12-14 NOTE — CHART NOTE - NSCHARTNOTEFT_GEN_A_CORE
Admitting Diagnosis:   Patient is a 61y old  Male who presents with a chief complaint of SOB (14 Dec 2022 06:17)      PAST MEDICAL & SURGICAL HISTORY:  Dyslipidemia      Borderline diabetes      Pulmonary fibrosis      S/P knee surgery    Current Nutrition Order: DASH/TLC Diet    PO Intake: Good (%) [ x  ]  Fair (50-75%) [   ] Poor (<25%) [   ]    GI Issues: No nausea/vomiting documented at this time. Last documented bowel movement 12/13.    Pain: No noted pain at time of RD interview.    Skin Integrity: Generalized edema 1+. Left ear pressure injury. Connor score: 18.    Labs:   12-14    134<L>  |  94<L>  |  13  ----------------------------<  107<H>  4.0   |  35<H>  |  0.77    Ca    8.8      14 Dec 2022 05:30  Phos  3.1     12-14  Mg     2.0     12-14      CAPILLARY BLOOD GLUCOSE      POCT Blood Glucose.: 110 mg/dL (14 Dec 2022 06:39)  POCT Blood Glucose.: 101 mg/dL (13 Dec 2022 21:44)  POCT Blood Glucose.: 110 mg/dL (13 Dec 2022 17:18)  POCT Blood Glucose.: 167 mg/dL (13 Dec 2022 11:15)      Medications:  MEDICATIONS  (STANDING):  AQUAPHOR (petrolatum Ointment) 1 Application(s) Topical two times a day  benzocaine 15 mG/menthol 3.6 mG Lozenge 2 Lozenge Oral every 6 hours  benzonatate 200 milliGRAM(s) Oral every 8 hours  dextrose 5%. 1000 milliLiter(s) (50 mL/Hr) IV Continuous <Continuous>  dextrose 5%. 1000 milliLiter(s) (100 mL/Hr) IV Continuous <Continuous>  dextrose 50% Injectable 25 Gram(s) IV Push once  dextrose 50% Injectable 12.5 Gram(s) IV Push once  dextrose 50% Injectable 25 Gram(s) IV Push once  enoxaparin Injectable 40 milliGRAM(s) SubCutaneous every 24 hours  fluticasone propionate 50 MICROgram(s)/spray Nasal Spray 2 Spray(s) Both Nostrils two times a day  glucagon  Injectable 1 milliGRAM(s) IntraMuscular once  guaiFENesin Oral Liquid (Sugar-Free) 200 milliGRAM(s) Oral every 6 hours  influenza   Vaccine 0.5 milliLiter(s) IntraMuscular once  insulin lispro (ADMELOG) corrective regimen sliding scale   SubCutaneous Before meals and at bedtime  ipratropium    for Nebulization 500 MICROGram(s) Nebulizer every 6 hours  pantoprazole    Tablet 40 milliGRAM(s) Oral before breakfast  polyethylene glycol 3350 17 Gram(s) Oral daily  predniSONE   Tablet 20 milliGRAM(s) Oral daily  senna 2 Tablet(s) Oral at bedtime  tamsulosin 0.4 milliGRAM(s) Oral at bedtime  Treprostinil (Tyvaso) DPI 1 Inhalation 1 Inhalation Inhalation <User Schedule>  trimethoprim  160 mG/sulfamethoxazole 800 mG 1 Tablet(s) Oral daily    MEDICATIONS  (PRN):  acetaminophen     Tablet .. 650 milliGRAM(s) Oral every 6 hours PRN Mild Pain (1 - 3)  dextrose Oral Gel 15 Gram(s) Oral once PRN Blood Glucose LESS THAN 70 milliGRAM(s)/deciliter  melatonin 5 milliGRAM(s) Oral at bedtime PRN Insomnia      5'5''  pounds +-10%  Wt 198 pounds BMI 33 %[id number]     Weight Change:   11/23: 216 pounds  11/24: 209.8 pounds  11/27: 197.7 pounds  11/30: 191.8 pounds  **Suspect ~25 pound weight change r/t fluid shifts, diuresis. Pt admitted with edema 3+ to L/R leg, L/R ankle, L/R foot. Pt edema status improved and currently with edema 1+ L/R leg.    Estimated energy needs:   Pt exceeds 120% IBW (%), thus pt's IBW used for all calculations. Needs adjusted for HF, infection, pressure ulcer; Fluids per team - pt noted to be hyponatremic/CHF.  Kcal (25-30 kcal/kg): 4139-9907 kcal  Protein (1.2-1.4 g/kg pro): 74-88 g protein    Subjective:   62 yo M w/ PMH HLD, pre-DM, and pulmonary fibrosis 2/2 COVID-19 infection (wheelchair bound at baseline, on 4L home O2) who presented to [facility] for several days of worsening SOB and generalized weakness with increasing O2 requirements, found to have acute hypoxic respiratory failure 2/2 pulmonary fibrosis with superimposed bacterial pneumonia and acute decompensated pulmonary HTN, transferred to [facility] for RHC i/s/o severe pulmonary disease. MIREYA 11/23 shows decompensated R-sided HF, most likely iso worsening PHTN. Pt underwent RHC on 11/28 which showed CO 8.2, CI 4.2, CVP 2, PA pressure 44/14 (25), wedge pressure 6, RV 30/5. TTE on 11/28 showed improved RV systolic function from initial presentation. Pt likely has Group 3 PHTN 2/2 intrinsic lung disease, is being followed by PHTN team with plans to evaluate for possible lung transplant. 12/1: Weaned off milrinone. 12/2: Transferred to [facility]. 12/6: Patient desatted to 84% w air hunger on 6 LNC, increased to HFNC. 12/7: weaned to HFNC. 12/9: Weaned to NC; overnight desatted thus placed back on HFNC. 12/10: Weaned back to 6LNC. 12/13: weaned from 6L to 5L NC. 12/13; overnight attempted to wean to 4-5L but pt desatted so placed back on 6L. 12/14: On NRB; team tried to remove NRB but desatted so switched to HFNC. 12/14: Ordered CXR to reevaluate lungs given new worsening O2 sats. 12/14: CXR largely unchanged from prior on 12/3, so getting CT chest.    Pt seen on NRB at bedside for follow up. Remains off all drips. Currently ordered for DASH/TLC diet. Pt endorses eating most of his breakfast this morning and having a good appetite. Labs reviewed pt noted w/ hyponatremia. Fingersticks 12/13-12/14: 101-167 mg/dL; ordered for steroids and sliding scale. Please see nutrition recommendations below. Recs made with team.     Previous Nutrition Diagnosis: Increased Nutrient Needs (protein) RT hypermetabolic demands AEB HF, bacterial pneumonia    Active [ x  ]  Resolved [  ]    If resolved, new PES:     Goal: Pt to meet at least 75% nutrition needs during hospital stay.     Recommendations:  - Continue DASH/TLC Diet.   - Monitor Diet tolerance and %PO intake, encourage as able. If PO intake declines, monitor need for ONS.  - Monitor BG, electrolytes; replete PRN  - Monitor weights, GI distress, skin  - Pain and bowel regimen per team  - MVI, VitC 500mg/day, Zinc 220mg [id number] use   - RD to provide diet edu PRN  - Monitor risk for malnutrition    Education: Encouraged pt to meet nutritional needs as able    Risk Level: High [   ] Moderate [ x  ] Low [   ].

## 2022-12-14 NOTE — PROGRESS NOTE ADULT - PROBLEM SELECTOR PLAN 2
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  Weaned off milrinone and Florentin  Plan:  - On 6L NC. Saturating well. Wean as tolerated. Was comfortable on NC yesterday, will inform RN to keep on NC, and use brief NRBM for coughing fits.  - PT, OOBTC.   - C/w Bowel regimen  - Pulm following, appreciate recs, slow prednisone taper, 20mg qd. Next wean to 10mg on 12/15/22. 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  Weaned off milrinone and Florentin  Plan:  - On 6L NC, HFNC for desaturation during coughing. Wean as tolerated.  - PT, OOBTC.   - C/w Bowel regimen  - Pulm following, appreciate recs, slow prednisone taper, 20mg qd. Next wean to 10mg on 12/15/22, pending chest imaging

## 2022-12-15 NOTE — PROGRESS NOTE ADULT - PROBLEM SELECTOR PLAN 6
RESOLVED  Pt developed thrush while in CCU 2/2 to inhalation, started on nystatin swish and swallow, 50,000U TID, course concluded 12/3.  - Continue to monitor

## 2022-12-15 NOTE — PROGRESS NOTE ADULT - PROBLEM SELECTOR PLAN 1
Post COVID ILD/ pulmonary fibrosis, now presenting with worsening cough and elevated WBC count with elevated procalcitonin and CT chest performed at OSH with concern for newly developing ggos when compared to prior CT scan done in october. Completed course of zosyn.   Plan:  - C/w prednisone 20mg qd. Next taper to 10mg planned 12/15, however will reevaluate taper after chest imaging. Once tapered to 10mg qd will discontinue bactrim prophylaxis.   - CXR and CT chest ordered 12/14 for worsening O2 requirements  - Bactrim for PCP ppx.  - Tyvaso 4 times a day, specialty RN reported q4, 8AM, 12PM, 4PM, 8PM  - outpatient follow-up with Dr. Ng    # F/u lung transplant   F/u pre-lung transplant ID recs: CMV IgG, EBV, serum QFT, Strongyloides IgG, hepatitis panel, HIV, varicella IgG, MMR IgG, toxoplasma IgG, influenza vaccine, Prevnar-20  - Quantiferon, and CMV titers sent for transplant, CMV IgG +, IgM negative. Quantiferon indeterminate.  - Encourage OOBTC Post COVID ILD/ pulmonary fibrosis, now presenting with worsening cough and elevated WBC count with elevated procalcitonin and CT chest performed at OSH with concern for newly developing ggos when compared to prior CT scan done in october. Completed course of zosyn.   - CXR and CT chest ordered 12/14 for worsening O2 requirements: CT chest stable from previous scan, started solumedrol  Plan:  - solumedrol 40mg IV q6 (previously prednisone 20mg qd), c/w bactrim ppx  - Bactrim for PCP ppx.  - Tyvaso 4 times a day, specialty RN reported q4, 8AM, 12PM, 4PM, 8PM  - outpatient follow-up with Dr. Ng    # F/u lung transplant   F/u pre-lung transplant ID recs: CMV IgG, EBV, serum QFT, Strongyloides IgG, hepatitis panel, HIV, varicella IgG, MMR IgG, toxoplasma IgG, influenza vaccine, Prevnar-20  - Quantiferon, and CMV titers sent for transplant, CMV IgG +, IgM negative. Quantiferon indeterminate.  - Encourage OOBTC

## 2022-12-15 NOTE — PROGRESS NOTE ADULT - SUBJECTIVE AND OBJECTIVE BOX
***INCOMPLETE NOTE    SUBJECTIVE / INTERVAL HPI: Patient seen and examined at bedside. No overnight events.    VITAL SIGNS:  Vital Signs Last 24 Hrs  T(C): 36.9 (15 Dec 2022 06:25), Max: 37.3 (15 Dec 2022 01:00)  T(F): 98.5 (15 Dec 2022 06:25), Max: 99.1 (15 Dec 2022 01:00)  HR: 98 (15 Dec 2022 06:15) (96 - 123)  BP: 123/84 (15 Dec 2022 04:10) (101/73 - 139/74)  BP(mean): 99 (15 Dec 2022 04:10) (82 - 99)  RR: 16 (15 Dec 2022 06:15) (16 - 20)  SpO2: 97% (15 Dec 2022 06:15) (90% - 97%)    Parameters below as of 15 Dec 2022 06:15  Patient On (Oxygen Delivery Method): nasal cannula, high flow  O2 Flow (L/min): 40  O2 Concentration (%): 70    PHYSICAL EXAM:    General: Resting comfortably in bed; NAD  HEENT: NC/AT, EOMI, anicteric sclera, MMM, neck supple, no nasal discharge  Cardiac: RRR; normal S1/S2, no MRG, no LE edema, no JVD  Respiratory: CTAB; no wheezes, ronchi, increased work of breathing, retractions  Gastrointestinal: +BSx4, abdomen soft, NT/ND; no rebound or guarding  Genitourinary: no suprapubic tenderness; pardo*; normal external genitalia  Extremities: WWP, no clubbing or cyanosis; no peripheral edema  Vascular: 2+ radial and DP pulses bilaterally  Dermatologic: skin warm, dry and intact; no rashes, open wounds  Neurologic: AAOx3; no focal deficits  Psychiatric: affect and characteristics of appearance, verbalizations, behaviors are appropriate    MEDICATIONS:  MEDICATIONS  (STANDING):  AQUAPHOR (petrolatum Ointment) 1 Application(s) Topical two times a day  benzocaine 15 mG/menthol 3.6 mG Lozenge 2 Lozenge Oral every 6 hours  benzonatate 200 milliGRAM(s) Oral every 8 hours  dextrose 5%. 1000 milliLiter(s) (100 mL/Hr) IV Continuous <Continuous>  dextrose 5%. 1000 milliLiter(s) (50 mL/Hr) IV Continuous <Continuous>  dextrose 50% Injectable 25 Gram(s) IV Push once  dextrose 50% Injectable 12.5 Gram(s) IV Push once  dextrose 50% Injectable 25 Gram(s) IV Push once  enoxaparin Injectable 40 milliGRAM(s) SubCutaneous every 24 hours  fluticasone propionate 50 MICROgram(s)/spray Nasal Spray 2 Spray(s) Both Nostrils two times a day  glucagon  Injectable 1 milliGRAM(s) IntraMuscular once  guaiFENesin Oral Liquid (Sugar-Free) 200 milliGRAM(s) Oral every 6 hours  influenza   Vaccine 0.5 milliLiter(s) IntraMuscular once  insulin lispro (ADMELOG) corrective regimen sliding scale   SubCutaneous Before meals and at bedtime  ipratropium    for Nebulization 500 MICROGram(s) Nebulizer every 6 hours  methylPREDNISolone sodium succinate Injectable 40 milliGRAM(s) IV Push every 6 hours  pantoprazole    Tablet 40 milliGRAM(s) Oral before breakfast  polyethylene glycol 3350 17 Gram(s) Oral daily  senna 2 Tablet(s) Oral at bedtime  tamsulosin 0.4 milliGRAM(s) Oral at bedtime  Treprostinil (Tyvaso) DPI 1 Inhalation 1 Inhalation Inhalation <User Schedule>  trimethoprim  160 mG/sulfamethoxazole 800 mG 1 Tablet(s) Oral every 24 hours    MEDICATIONS  (PRN):  acetaminophen     Tablet .. 650 milliGRAM(s) Oral every 6 hours PRN Mild Pain (1 - 3)  dextrose Oral Gel 15 Gram(s) Oral once PRN Blood Glucose LESS THAN 70 milliGRAM(s)/deciliter  melatonin 5 milliGRAM(s) Oral at bedtime PRN Insomnia      ALLERGIES:  Allergies    No Known Allergies    Intolerances        LABS:                        12.1   11.59 )-----------( 145      ( 14 Dec 2022 05:30 )             37.9     12-14    134<L>  |  94<L>  |  13  ----------------------------<  107<H>  4.0   |  35<H>  |  0.77    Ca    8.8      14 Dec 2022 05:30  Phos  3.1     12-14  Mg     2.0     12-14          CAPILLARY BLOOD GLUCOSE      POCT Blood Glucose.: 154 mg/dL (15 Dec 2022 06:25)      RADIOLOGY & ADDITIONAL TESTS: Reviewed.   SUBJECTIVE / INTERVAL HPI: Patient seen and examined at bedside. No overnight events. Patient feeling better this morning compared to yesterday. Denies shortness of breath, chest pain. Cough is improved, only 2 episodes last night. Patient saturating mid 90s on HFNC.     VITAL SIGNS:  Vital Signs Last 24 Hrs  T(C): 36.9 (15 Dec 2022 06:25), Max: 37.3 (15 Dec 2022 01:00)  T(F): 98.5 (15 Dec 2022 06:25), Max: 99.1 (15 Dec 2022 01:00)  HR: 98 (15 Dec 2022 06:15) (96 - 123)  BP: 123/84 (15 Dec 2022 04:10) (101/73 - 139/74)  BP(mean): 99 (15 Dec 2022 04:10) (82 - 99)  RR: 16 (15 Dec 2022 06:15) (16 - 20)  SpO2: 97% (15 Dec 2022 06:15) (90% - 97%)    Parameters below as of 15 Dec 2022 06:15  Patient On (Oxygen Delivery Method): nasal cannula, high flow  O2 Flow (L/min): 40  O2 Concentration (%): 70    PHYSICAL EXAM:    General: Resting comfortably in bed; NAD; HFNC  HEENT: NC/AT, EOMI, anicteric sclera, neck supple, no nasal discharge  Cardiac: regular rhythm, mildly tachycardic; normal S1/S2, no MRG  Respiratory: BL diffuse ronchi, more prominent in basilar regions, improved from yesterday; no increased work of breathing, retractions; on HFNC  Gastrointestinal: +BSx4, abdomen soft, NT/ND; no rebound or guarding  Extremities: WWPx4; no peripheral edema; clubbing in fingers and toes BL  Vascular: 2+ radial pulses bilaterally  Dermatologic: skin warm, dry and intact; no rashes, open wounds  Neurologic: AAOx3; no focal deficits    MEDICATIONS:  MEDICATIONS  (STANDING):  AQUAPHOR (petrolatum Ointment) 1 Application(s) Topical two times a day  benzocaine 15 mG/menthol 3.6 mG Lozenge 2 Lozenge Oral every 6 hours  benzonatate 200 milliGRAM(s) Oral every 8 hours  dextrose 5%. 1000 milliLiter(s) (100 mL/Hr) IV Continuous <Continuous>  dextrose 5%. 1000 milliLiter(s) (50 mL/Hr) IV Continuous <Continuous>  dextrose 50% Injectable 25 Gram(s) IV Push once  dextrose 50% Injectable 12.5 Gram(s) IV Push once  dextrose 50% Injectable 25 Gram(s) IV Push once  enoxaparin Injectable 40 milliGRAM(s) SubCutaneous every 24 hours  fluticasone propionate 50 MICROgram(s)/spray Nasal Spray 2 Spray(s) Both Nostrils two times a day  glucagon  Injectable 1 milliGRAM(s) IntraMuscular once  guaiFENesin Oral Liquid (Sugar-Free) 200 milliGRAM(s) Oral every 6 hours  influenza   Vaccine 0.5 milliLiter(s) IntraMuscular once  insulin lispro (ADMELOG) corrective regimen sliding scale   SubCutaneous Before meals and at bedtime  ipratropium    for Nebulization 500 MICROGram(s) Nebulizer every 6 hours  methylPREDNISolone sodium succinate Injectable 40 milliGRAM(s) IV Push every 6 hours  pantoprazole    Tablet 40 milliGRAM(s) Oral before breakfast  polyethylene glycol 3350 17 Gram(s) Oral daily  senna 2 Tablet(s) Oral at bedtime  tamsulosin 0.4 milliGRAM(s) Oral at bedtime  Treprostinil (Tyvaso) DPI 1 Inhalation 1 Inhalation Inhalation <User Schedule>  trimethoprim  160 mG/sulfamethoxazole 800 mG 1 Tablet(s) Oral every 24 hours    MEDICATIONS  (PRN):  acetaminophen     Tablet .. 650 milliGRAM(s) Oral every 6 hours PRN Mild Pain (1 - 3)  dextrose Oral Gel 15 Gram(s) Oral once PRN Blood Glucose LESS THAN 70 milliGRAM(s)/deciliter  melatonin 5 milliGRAM(s) Oral at bedtime PRN Insomnia      ALLERGIES:  Allergies    No Known Allergies    Intolerances        LABS:                        12.1   11.59 )-----------( 145      ( 14 Dec 2022 05:30 )             37.9     12-14    134<L>  |  94<L>  |  13  ----------------------------<  107<H>  4.0   |  35<H>  |  0.77    Ca    8.8      14 Dec 2022 05:30  Phos  3.1     12-14  Mg     2.0     12-14          CAPILLARY BLOOD GLUCOSE      POCT Blood Glucose.: 154 mg/dL (15 Dec 2022 06:25)      RADIOLOGY & ADDITIONAL TESTS: Reviewed.

## 2022-12-15 NOTE — PROGRESS NOTE ADULT - PROBLEM SELECTOR PLAN 2
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  Weaned off milrinone and Florentin  Plan:  - On 6L NC, HFNC for desaturation during coughing. Wean as tolerated.  - PT, OOBTC.   - C/w Bowel regimen  - Pulm following, appreciate recs, slow prednisone taper, 20mg qd. Next wean to 10mg on 12/15/22, pending chest imaging Mild precapillary pulmonary hypertension with normal CO/CI by Td was on Florentin, low dose milrinone, normal filling pressures suggesting precapillary PH 2/2 underlying severe fibrotic ILD and adequate volume status.  Weaned off milrinone and Florentin  Plan:  - On HFNC for desaturation during coughing. Wean as tolerated.  - PT, OOBTC.   - C/w Bowel regimen  - Pulm following, appreciate recs (previously on slow prednisone taper, 20mg qd, however desaturated on 12/14, so started on pulse steroids)

## 2022-12-15 NOTE — PROGRESS NOTE ADULT - ASSESSMENT
60 yo M w/ PMH HLD, pre-DM, and pulmonary fibrosis 2/2 COVID-19 infection (wheelchair bound at baseline, on 4L home O2) who presented to [facility] for several days of worsening SOB and generalized weakness with increasing O2 requirements, found to have acute hypoxic respiratory failure 2/2 pulmonary fibrosis with superimposed bacterial pneumonia and acute decompensated pulmonary HTN, transferred to [facility] for RHC i/s/o severe pulmonary disease and evaluation for potential lung transplant. Respiratory status improving, weaned to 6LNC. Tyvazo added 12/9.

## 2022-12-16 NOTE — PROGRESS NOTE ADULT - PROBLEM SELECTOR PLAN 7
F: Off IVF  E: Replete K>4, Mg>2  N: DASH/TLC    DVT prophylaxis: lovenox 40 qd F: Off IVF  E: Replete K>4, Mg>2  N: DASH/TLC  DVT prophylaxis: lovenox 40 qd

## 2022-12-16 NOTE — PROGRESS NOTE ADULT - PROBLEM SELECTOR PLAN 1
Post COVID ILD/ pulmonary fibrosis, now presenting with worsening cough and elevated WBC count with elevated procalcitonin and CT chest performed at OSH with concern for newly developing ggos when compared to prior CT scan done in october. Completed course of zosyn.   - CXR and CT chest ordered 12/14 for worsening O2 requirements: CT chest stable from previous scan, started solumedrol  Plan:  - solumedrol 40mg IV q6 (previously prednisone 20mg qd), c/w bactrim ppx  - Bactrim for PCP ppx.  - Tyvaso 4 times a day, specialty RN reported q4, 8AM, 12PM, 4PM, 8PM  - outpatient follow-up with Dr. Ng    # F/u lung transplant   F/u pre-lung transplant ID recs: CMV IgG, EBV, serum QFT, Strongyloides IgG, hepatitis panel, HIV, varicella IgG, MMR IgG, toxoplasma IgG, influenza vaccine, Prevnar-20  - Quantiferon, and CMV titers sent for transplant, CMV IgG +, IgM negative. Quantiferon indeterminate.  - Encourage OOBTC Post COVID ILD/ pulmonary fibrosis, now presenting with worsening cough and elevated WBC count with elevated procalcitonin and CT chest performed at OSH with concern for newly developing ggos when compared to prior CT scan done in october. Completed course of zosyn.   - CXR and CT chest ordered 12/14 for worsening O2 requirements: CT chest stable from previous scan, started solumedrol  Plan:  - solumedrol 40mg IV q6 (previously prednisone 20mg qd), c/w bactrim ppx  - Bactrim for PCP ppx.  - Tyvaso 4 times a day, specialty RN reported q4, 8AM, 12PM, 4PM, 8PM  - outpatient follow-up with Dr. Ng    # F/u lung transplant   F/u pre-lung transplant ID recs: CMV IgG, EBV, serum QFT, Strongyloides IgG, hepatitis panel, HIV, varicella IgG, MMR IgG, toxoplasma IgG, influenza vaccine, Prevnar-20  - Quantiferon, and CMV titers sent for transplant, CMV IgG +, IgM negative. Quantiferon indeterminate.  - Encourage OOBTC  - f/u w/ pulm about pt getting listed for lung/heart transplant

## 2022-12-16 NOTE — PROGRESS NOTE ADULT - SUBJECTIVE AND OBJECTIVE BOX
***INCOMPLETE NOTE    SUBJECTIVE / INTERVAL HPI: Patient seen and examined at bedside. No overnight events.    VITAL SIGNS:  Vital Signs Last 24 Hrs  T(C): 37.2 (16 Dec 2022 01:00), Max: 37.2 (16 Dec 2022 01:00)  T(F): 98.9 (16 Dec 2022 01:00), Max: 98.9 (16 Dec 2022 01:00)  HR: 94 (16 Dec 2022 04:00) (92 - 110)  BP: 123/77 (16 Dec 2022 04:00) (117/71 - 127/73)  BP(mean): 95 (16 Dec 2022 04:00) (85 - 95)  RR: 18 (16 Dec 2022 04:00) (18 - 20)  SpO2: 94% (16 Dec 2022 04:00) (92% - 96%)    Parameters below as of 16 Dec 2022 04:00  Patient On (Oxygen Delivery Method): nasal cannula, high flow  O2 Flow (L/min): 40  O2 Concentration (%): 55    PHYSICAL EXAM:    General: Resting comfortably in bed; NAD  HEENT: NC/AT, EOMI, anicteric sclera, MMM, neck supple, no nasal discharge  Cardiac: RRR; normal S1/S2, no MRG, no LE edema, no JVD  Respiratory: CTAB; no wheezes, ronchi, increased work of breathing, retractions  Gastrointestinal: +BSx4, abdomen soft, NT/ND; no rebound or guarding  Genitourinary: no suprapubic tenderness; pardo*; normal external genitalia  Extremities: WWP, no clubbing or cyanosis; no peripheral edema  Vascular: 2+ radial and DP pulses bilaterally  Dermatologic: skin warm, dry and intact; no rashes, open wounds  Neurologic: AAOx3; no focal deficits  Psychiatric: affect and characteristics of appearance, verbalizations, behaviors are appropriate    MEDICATIONS:  MEDICATIONS  (STANDING):  AQUAPHOR (petrolatum Ointment) 1 Application(s) Topical two times a day  benzocaine 15 mG/menthol 3.6 mG Lozenge 2 Lozenge Oral every 6 hours  benzonatate 200 milliGRAM(s) Oral every 8 hours  dextrose 5%. 1000 milliLiter(s) (50 mL/Hr) IV Continuous <Continuous>  dextrose 5%. 1000 milliLiter(s) (100 mL/Hr) IV Continuous <Continuous>  dextrose 50% Injectable 25 Gram(s) IV Push once  dextrose 50% Injectable 12.5 Gram(s) IV Push once  dextrose 50% Injectable 25 Gram(s) IV Push once  enoxaparin Injectable 40 milliGRAM(s) SubCutaneous every 24 hours  fluticasone propionate 50 MICROgram(s)/spray Nasal Spray 2 Spray(s) Both Nostrils two times a day  glucagon  Injectable 1 milliGRAM(s) IntraMuscular once  guaiFENesin Oral Liquid (Sugar-Free) 200 milliGRAM(s) Oral every 6 hours  influenza   Vaccine 0.5 milliLiter(s) IntraMuscular once  insulin lispro (ADMELOG) corrective regimen sliding scale   SubCutaneous Before meals and at bedtime  ipratropium    for Nebulization 500 MICROGram(s) Nebulizer every 6 hours  methylPREDNISolone sodium succinate Injectable 40 milliGRAM(s) IV Push every 6 hours  pantoprazole    Tablet 40 milliGRAM(s) Oral before breakfast  polyethylene glycol 3350 17 Gram(s) Oral daily  senna 2 Tablet(s) Oral at bedtime  tamsulosin 0.4 milliGRAM(s) Oral at bedtime  Treprostinil (Tyvaso) DPI 1 Inhalation 1 Inhalation Inhalation <User Schedule>  trimethoprim  160 mG/sulfamethoxazole 800 mG 1 Tablet(s) Oral every 24 hours    MEDICATIONS  (PRN):  acetaminophen     Tablet .. 650 milliGRAM(s) Oral every 6 hours PRN Mild Pain (1 - 3)  dextrose Oral Gel 15 Gram(s) Oral once PRN Blood Glucose LESS THAN 70 milliGRAM(s)/deciliter  melatonin 5 milliGRAM(s) Oral at bedtime PRN Insomnia      ALLERGIES:  Allergies    No Known Allergies    Intolerances        LABS:                        11.5   6.83  )-----------( 153      ( 15 Dec 2022 05:30 )             35.6     12-15    134<L>  |  92<L>  |  13  ----------------------------<  144<H>  4.5   |  35<H>  |  0.73    Ca    9.1      15 Dec 2022 05:30  Phos  3.2     12-15  Mg     2.2     12-15    TPro  7.0  /  Alb  3.5  /  TBili  0.5  /  DBili  x   /  AST  15  /  ALT  17  /  AlkPhos  73  12-15        CAPILLARY BLOOD GLUCOSE      POCT Blood Glucose.: 186 mg/dL (16 Dec 2022 06:06)      RADIOLOGY & ADDITIONAL TESTS: Reviewed.   SUBJECTIVE / INTERVAL HPI: Patient seen and examined at bedside. No overnight events.    VITAL SIGNS:  Vital Signs Last 24 Hrs  T(C): 37.2 (16 Dec 2022 01:00), Max: 37.2 (16 Dec 2022 01:00)  T(F): 98.9 (16 Dec 2022 01:00), Max: 98.9 (16 Dec 2022 01:00)  HR: 94 (16 Dec 2022 04:00) (92 - 110)  BP: 123/77 (16 Dec 2022 04:00) (117/71 - 127/73)  BP(mean): 95 (16 Dec 2022 04:00) (85 - 95)  RR: 18 (16 Dec 2022 04:00) (18 - 20)  SpO2: 94% (16 Dec 2022 04:00) (92% - 96%)    Parameters below as of 16 Dec 2022 04:00  Patient On (Oxygen Delivery Method): nasal cannula, high flow  O2 Flow (L/min): 40  O2 Concentration (%): 55    PHYSICAL EXAM:  General: Resting comfortably in bed; NAD; HFNC weaned to 40/50  HEENT: NC/AT, EOMI, anicteric sclera, neck supple, no nasal discharge  Cardiac: regular rhythm, mildly tachycardic; normal S1/S2, no MRG  Respiratory: BL diffuse ronchi, more prominent in basilar regions, stable from yesterday; no increased work of breathing, retractions; on HFNC  Gastrointestinal: +BSx4, abdomen soft, NT/ND; no rebound or guarding  Extremities: WWPx4; no peripheral edema; clubbing in fingers and toes BL  Dermatologic: skin warm, dry and intact; no rashes, open wounds  Neurologic: AAOx3; no focal deficits    MEDICATIONS:  MEDICATIONS  (STANDING):  AQUAPHOR (petrolatum Ointment) 1 Application(s) Topical two times a day  benzocaine 15 mG/menthol 3.6 mG Lozenge 2 Lozenge Oral every 6 hours  benzonatate 200 milliGRAM(s) Oral every 8 hours  dextrose 5%. 1000 milliLiter(s) (50 mL/Hr) IV Continuous <Continuous>  dextrose 5%. 1000 milliLiter(s) (100 mL/Hr) IV Continuous <Continuous>  dextrose 50% Injectable 25 Gram(s) IV Push once  dextrose 50% Injectable 12.5 Gram(s) IV Push once  dextrose 50% Injectable 25 Gram(s) IV Push once  enoxaparin Injectable 40 milliGRAM(s) SubCutaneous every 24 hours  fluticasone propionate 50 MICROgram(s)/spray Nasal Spray 2 Spray(s) Both Nostrils two times a day  glucagon  Injectable 1 milliGRAM(s) IntraMuscular once  guaiFENesin Oral Liquid (Sugar-Free) 200 milliGRAM(s) Oral every 6 hours  influenza   Vaccine 0.5 milliLiter(s) IntraMuscular once  insulin lispro (ADMELOG) corrective regimen sliding scale   SubCutaneous Before meals and at bedtime  ipratropium    for Nebulization 500 MICROGram(s) Nebulizer every 6 hours  methylPREDNISolone sodium succinate Injectable 40 milliGRAM(s) IV Push every 6 hours  pantoprazole    Tablet 40 milliGRAM(s) Oral before breakfast  polyethylene glycol 3350 17 Gram(s) Oral daily  senna 2 Tablet(s) Oral at bedtime  tamsulosin 0.4 milliGRAM(s) Oral at bedtime  Treprostinil (Tyvaso) DPI 1 Inhalation 1 Inhalation Inhalation <User Schedule>  trimethoprim  160 mG/sulfamethoxazole 800 mG 1 Tablet(s) Oral every 24 hours    MEDICATIONS  (PRN):  acetaminophen     Tablet .. 650 milliGRAM(s) Oral every 6 hours PRN Mild Pain (1 - 3)  dextrose Oral Gel 15 Gram(s) Oral once PRN Blood Glucose LESS THAN 70 milliGRAM(s)/deciliter  melatonin 5 milliGRAM(s) Oral at bedtime PRN Insomnia      ALLERGIES:  Allergies    No Known Allergies    Intolerances        LABS:                        11.5   6.83  )-----------( 153      ( 15 Dec 2022 05:30 )             35.6     12-15    134<L>  |  92<L>  |  13  ----------------------------<  144<H>  4.5   |  35<H>  |  0.73    Ca    9.1      15 Dec 2022 05:30  Phos  3.2     12-15  Mg     2.2     12-15    TPro  7.0  /  Alb  3.5  /  TBili  0.5  /  DBili  x   /  AST  15  /  ALT  17  /  AlkPhos  73  12-15        CAPILLARY BLOOD GLUCOSE      POCT Blood Glucose.: 186 mg/dL (16 Dec 2022 06:06)      RADIOLOGY & ADDITIONAL TESTS: Reviewed.

## 2022-12-16 NOTE — PROGRESS NOTE ADULT - PROBLEM SELECTOR PLAN 2
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  Weaned off milrinone and Florentin  Plan:  - On HFNC for desaturation during coughing. Wean as tolerated.  - PT, OOBTC.   - C/w Bowel regimen  - Pulm following, appreciate recs (previously on slow prednisone taper, 20mg qd, however desaturated on 12/14, so started on pulse steroids) Mild precapillary pulmonary hypertension with normal CO/CI by Td was on Florentin, low dose milrinone, normal filling pressures suggesting precapillary PH 2/2 underlying severe fibrotic ILD and adequate volume status.  Weaned off milrinone and Florentin  Plan:  - On HFNC for desaturation during coughing. Wean as tolerated.  - PT, OOBTC.   - c/w Bowel regimen  - Pulm following, appreciate recs (previously on slow prednisone taper, 20mg qd, however desaturated on 12/14, so started on pulse steroids)

## 2022-12-16 NOTE — PROGRESS NOTE ADULT - ASSESSMENT
62 yo M w/ PMH HLD, pre-DM, and pulmonary fibrosis 2/2 COVID-19 infection (wheelchair bound at baseline, on 4L home O2) who presented to [facility] for several days of worsening SOB and generalized weakness with increasing O2 requirements, found to have acute hypoxic respiratory failure 2/2 pulmonary fibrosis with superimposed bacterial pneumonia and acute decompensated pulmonary HTN, transferred to [facility] for RHC i/s/o severe pulmonary disease and evaluation for potential lung transplant. Respiratory status improving, weaned to 6LNC. Tyvazo added 12/9.  60 yo M w/ PMH HLD, pre-DM, and pulmonary fibrosis 2/2 COVID-19 infection (wheelchair bound at baseline, on 4L home O2) who presented to [facility] for several days of worsening SOB and generalized weakness with increasing O2 requirements, found to have acute hypoxic respiratory failure 2/2 pulmonary fibrosis with superimposed bacterial pneumonia and acute decompensated pulmonary HTN, transferred to [facility] for RHC i/s/o severe pulmonary disease and evaluation for potential lung transplant. Respiratory status improving, weaned to 6LNC. Tyvaso added 12/9.

## 2022-12-17 NOTE — PROGRESS NOTE ADULT - PROBLEM SELECTOR PLAN 1
Post COVID ILD/ pulmonary fibrosis, now presenting with worsening cough and elevated WBC count with elevated procalcitonin and CT chest performed at OSH with concern for newly developing ggos when compared to prior CT scan done in october. Completed course of zosyn.   - CXR and CT chest ordered 12/14 for worsening O2 requirements: CT chest stable from previous scan, started solumedrol  Plan:  - solumedrol 40mg IV q6 (previously prednisone 20mg qd), c/w bactrim ppx  - Bactrim for PCP ppx.  - Tyvaso 4 times a day, specialty RN reported q4, 8AM, 12PM, 4PM, 8PM  - outpatient follow-up with Dr. Ng    # F/u lung transplant   F/u pre-lung transplant ID recs: CMV IgG, EBV, serum QFT, Strongyloides IgG, hepatitis panel, HIV, varicella IgG, MMR IgG, toxoplasma IgG, influenza vaccine, Prevnar-20  - Quantiferon, and CMV titers sent for transplant, CMV IgG +, IgM negative. Quantiferon indeterminate.  - Encourage OOBTC  - f/u w/ pulm about pt getting listed for lung/heart transplant

## 2022-12-17 NOTE — PROGRESS NOTE ADULT - ASSESSMENT
60 yo M w/ PMH HLD, pre-DM, and pulmonary fibrosis 2/2 COVID-19 infection (wheelchair bound at baseline, on 4L home O2) who presented to [facility] for several days of worsening SOB and generalized weakness with increasing O2 requirements, found to have acute hypoxic respiratory failure 2/2 pulmonary fibrosis with superimposed bacterial pneumonia and acute decompensated pulmonary HTN, transferred to [facility] for RHC i/s/o severe pulmonary disease and evaluation for potential lung transplant. Respiratory status improving, weaned to 6LNC. Tyvaso added 12/9.

## 2022-12-17 NOTE — PROGRESS NOTE ADULT - SUBJECTIVE AND OBJECTIVE BOX
OVERNIGHT EVENTS: JOSE    SUBJECTIVE / INTERVAL HPI: Patient seen and examined at bedside. No acute complaints, would like to have breakfast.     VITAL SIGNS:  Vital Signs Last 24 Hrs  T(C): 36.7 (17 Dec 2022 14:00), Max: 37 (17 Dec 2022 01:00)  T(F): 98 (17 Dec 2022 14:00), Max: 98.6 (17 Dec 2022 01:00)  HR: 96 (17 Dec 2022 12:24) (80 - 110)  BP: 123/82 (17 Dec 2022 12:24) (118/80 - 139/85)  BP(mean): 97 (17 Dec 2022 12:24) (92 - 106)  RR: 20 (17 Dec 2022 12:24) (17 - 20)  SpO2: 93% (17 Dec 2022 12:24) (87% - 94%)    Parameters below as of 17 Dec 2022 12:24  Patient On (Oxygen Delivery Method): nasal cannula, high flow  O2 Flow (L/min): 40  O2 Concentration (%): 50  I&O's Summary    16 Dec 2022 07:01  -  17 Dec 2022 07:00  --------------------------------------------------------  IN: 0 mL / OUT: 700 mL / NET: -700 mL    17 Dec 2022 07:01  -  17 Dec 2022 16:35  --------------------------------------------------------  IN: 0 mL / OUT: 300 mL / NET: -300 mL        PHYSICAL EXAM:    General: NAD, resting in bed with HFNC  HEENT: NC/AT, EOMI, anicteric sclera, neck supple, no nasal discharge  Cardiac: regular rhythm, mildly tachycardic; normal S1/S2, no MRG  Respiratory: BL diffuse ronchi present at bases, no accessory muscle use  Gastrointestinal: +BSx4, abdomen soft, NT/ND; no rebound or guarding  Extremities: WWPx4, no edema  Neurologic: AAOx3; no focal deficits    MEDICATIONS:  MEDICATIONS  (STANDING):  AQUAPHOR (petrolatum Ointment) 1 Application(s) Topical two times a day  benzocaine 15 mG/menthol 3.6 mG Lozenge 2 Lozenge Oral every 6 hours  benzonatate 200 milliGRAM(s) Oral every 8 hours  dextrose 5%. 1000 milliLiter(s) (100 mL/Hr) IV Continuous <Continuous>  dextrose 5%. 1000 milliLiter(s) (50 mL/Hr) IV Continuous <Continuous>  dextrose 50% Injectable 25 Gram(s) IV Push once  dextrose 50% Injectable 12.5 Gram(s) IV Push once  dextrose 50% Injectable 25 Gram(s) IV Push once  enoxaparin Injectable 40 milliGRAM(s) SubCutaneous every 24 hours  fluticasone propionate 50 MICROgram(s)/spray Nasal Spray 2 Spray(s) Both Nostrils two times a day  glucagon  Injectable 1 milliGRAM(s) IntraMuscular once  guaiFENesin Oral Liquid (Sugar-Free) 200 milliGRAM(s) Oral every 6 hours  influenza   Vaccine 0.5 milliLiter(s) IntraMuscular once  insulin lispro (ADMELOG) corrective regimen sliding scale   SubCutaneous Before meals and at bedtime  ipratropium    for Nebulization 500 MICROGram(s) Nebulizer every 6 hours  methylPREDNISolone sodium succinate Injectable 40 milliGRAM(s) IV Push every 6 hours  pantoprazole    Tablet 40 milliGRAM(s) Oral before breakfast  polyethylene glycol 3350 17 Gram(s) Oral daily  senna 2 Tablet(s) Oral at bedtime  tamsulosin 0.4 milliGRAM(s) Oral at bedtime  Treprostinil (Tyvaso) DPI 1 Inhalation 1 Inhalation Inhalation <User Schedule>  trimethoprim  160 mG/sulfamethoxazole 800 mG 1 Tablet(s) Oral every 24 hours    MEDICATIONS  (PRN):  acetaminophen     Tablet .. 650 milliGRAM(s) Oral every 6 hours PRN Mild Pain (1 - 3)  dextrose Oral Gel 15 Gram(s) Oral once PRN Blood Glucose LESS THAN 70 milliGRAM(s)/deciliter  melatonin 5 milliGRAM(s) Oral at bedtime PRN Insomnia      ALLERGIES:  Allergies    No Known Allergies    Intolerances        LABS:                        11.5   8.66  )-----------( 152      ( 16 Dec 2022 05:30 )             35.7     12-16    134<L>  |  94<L>  |  18  ----------------------------<  161<H>  4.8   |  34<H>  |  0.70    Ca    9.0      16 Dec 2022 05:30  Phos  3.5     12-16  Mg     2.2     12-16          CAPILLARY BLOOD GLUCOSE      POCT Blood Glucose.: 294 mg/dL (17 Dec 2022 11:19)      RADIOLOGY & ADDITIONAL TESTS: Reviewed.

## 2022-12-18 NOTE — PROGRESS NOTE ADULT - SUBJECTIVE AND OBJECTIVE BOX
***INCOMPLETE NOTE    SUBJECTIVE / INTERVAL HPI: Patient seen and examined at bedside. No overnight events.    VITAL SIGNS:  Vital Signs Last 24 Hrs  T(C): 36.9 (18 Dec 2022 06:02), Max: 36.9 (18 Dec 2022 01:35)  T(F): 98.4 (18 Dec 2022 06:02), Max: 98.5 (18 Dec 2022 01:35)  HR: 98 (18 Dec 2022 04:55) (80 - 108)  BP: 130/90 (18 Dec 2022 04:26) (118/80 - 132/69)  BP(mean): 105 (18 Dec 2022 04:26) (93 - 105)  RR: 20 (18 Dec 2022 04:55) (18 - 20)  SpO2: 94% (18 Dec 2022 04:55) (87% - 94%)    Parameters below as of 18 Dec 2022 04:55  Patient On (Oxygen Delivery Method): nasal cannula, high flow  O2 Flow (L/min): 40  O2 Concentration (%): 50    PHYSICAL EXAM:    General: Resting comfortably in bed; NAD  HEENT: NC/AT, EOMI, anicteric sclera, MMM, neck supple, no nasal discharge  Cardiac: RRR; normal S1/S2, no MRG, no LE edema, no JVD  Respiratory: CTAB; no wheezes, ronchi, increased work of breathing, retractions  Gastrointestinal: +BSx4, abdomen soft, NT/ND; no rebound or guarding  Genitourinary: no suprapubic tenderness; pardo*; normal external genitalia  Extremities: WWP, no clubbing or cyanosis; no peripheral edema  Vascular: 2+ radial and DP pulses bilaterally  Dermatologic: skin warm, dry and intact; no rashes, open wounds  Neurologic: AAOx3; no focal deficits  Psychiatric: affect and characteristics of appearance, verbalizations, behaviors are appropriate    MEDICATIONS:  MEDICATIONS  (STANDING):  AQUAPHOR (petrolatum Ointment) 1 Application(s) Topical two times a day  benzocaine 15 mG/menthol 3.6 mG Lozenge 2 Lozenge Oral every 6 hours  benzonatate 200 milliGRAM(s) Oral every 8 hours  dextrose 5%. 1000 milliLiter(s) (50 mL/Hr) IV Continuous <Continuous>  dextrose 5%. 1000 milliLiter(s) (100 mL/Hr) IV Continuous <Continuous>  dextrose 50% Injectable 25 Gram(s) IV Push once  dextrose 50% Injectable 12.5 Gram(s) IV Push once  dextrose 50% Injectable 25 Gram(s) IV Push once  enoxaparin Injectable 40 milliGRAM(s) SubCutaneous every 24 hours  fluticasone propionate 50 MICROgram(s)/spray Nasal Spray 2 Spray(s) Both Nostrils two times a day  glucagon  Injectable 1 milliGRAM(s) IntraMuscular once  guaiFENesin Oral Liquid (Sugar-Free) 200 milliGRAM(s) Oral every 6 hours  influenza   Vaccine 0.5 milliLiter(s) IntraMuscular once  insulin lispro (ADMELOG) corrective regimen sliding scale   SubCutaneous Before meals and at bedtime  ipratropium    for Nebulization 500 MICROGram(s) Nebulizer every 6 hours  methylPREDNISolone sodium succinate Injectable 40 milliGRAM(s) IV Push every 6 hours  pantoprazole    Tablet 40 milliGRAM(s) Oral before breakfast  polyethylene glycol 3350 17 Gram(s) Oral daily  senna 2 Tablet(s) Oral at bedtime  tamsulosin 0.4 milliGRAM(s) Oral at bedtime  Treprostinil (Tyvaso) DPI 1 Inhalation 1 Inhalation Inhalation <User Schedule>  trimethoprim  160 mG/sulfamethoxazole 800 mG 1 Tablet(s) Oral every 24 hours    MEDICATIONS  (PRN):  acetaminophen     Tablet .. 650 milliGRAM(s) Oral every 6 hours PRN Mild Pain (1 - 3)  dextrose Oral Gel 15 Gram(s) Oral once PRN Blood Glucose LESS THAN 70 milliGRAM(s)/deciliter  melatonin 5 milliGRAM(s) Oral at bedtime PRN Insomnia      ALLERGIES:  Allergies    No Known Allergies    Intolerances        LABS:              CAPILLARY BLOOD GLUCOSE      POCT Blood Glucose.: 151 mg/dL (18 Dec 2022 06:20)      RADIOLOGY & ADDITIONAL TESTS: Reviewed.   SUBJECTIVE / INTERVAL HPI: Patient seen and examined at bedside. Patient feeling okay today, no complaints. States cough is about the same. Gets short of breath when moving around, but states that it is stable.    VITAL SIGNS:  Vital Signs Last 24 Hrs  T(C): 36.9 (18 Dec 2022 06:02), Max: 36.9 (18 Dec 2022 01:35)  T(F): 98.4 (18 Dec 2022 06:02), Max: 98.5 (18 Dec 2022 01:35)  HR: 98 (18 Dec 2022 04:55) (80 - 108)  BP: 130/90 (18 Dec 2022 04:26) (118/80 - 132/69)  BP(mean): 105 (18 Dec 2022 04:26) (93 - 105)  RR: 20 (18 Dec 2022 04:55) (18 - 20)  SpO2: 94% (18 Dec 2022 04:55) (87% - 94%)    Parameters below as of 18 Dec 2022 04:55  Patient On (Oxygen Delivery Method): nasal cannula, high flow  O2 Flow (L/min): 40  O2 Concentration (%): 50    PHYSICAL EXAM:  General: Resting comfortably in bed; NAD; HFNC 40/50  HEENT: NC/AT, EOMI, anicteric sclera, neck supple, no nasal discharge  Cardiac: regular rhythm, mildly tachycardic; normal S1/S2, no MRG  Respiratory: BL diffuse ronchi, more prominent in basilar regions; no increased work of breathing, retractions; on HFNC  Gastrointestinal: +BSx4, abdomen soft, NT/ND; no rebound or guarding  Extremities: WWPx4; no peripheral edema; clubbing in fingers and toes BL  Dermatologic: skin warm, dry and intact; no rashes, open wounds  Neurologic: AAOx3; no focal deficits    MEDICATIONS:  MEDICATIONS  (STANDING):  AQUAPHOR (petrolatum Ointment) 1 Application(s) Topical two times a day  benzocaine 15 mG/menthol 3.6 mG Lozenge 2 Lozenge Oral every 6 hours  benzonatate 200 milliGRAM(s) Oral every 8 hours  dextrose 5%. 1000 milliLiter(s) (50 mL/Hr) IV Continuous <Continuous>  dextrose 5%. 1000 milliLiter(s) (100 mL/Hr) IV Continuous <Continuous>  dextrose 50% Injectable 25 Gram(s) IV Push once  dextrose 50% Injectable 12.5 Gram(s) IV Push once  dextrose 50% Injectable 25 Gram(s) IV Push once  enoxaparin Injectable 40 milliGRAM(s) SubCutaneous every 24 hours  fluticasone propionate 50 MICROgram(s)/spray Nasal Spray 2 Spray(s) Both Nostrils two times a day  glucagon  Injectable 1 milliGRAM(s) IntraMuscular once  guaiFENesin Oral Liquid (Sugar-Free) 200 milliGRAM(s) Oral every 6 hours  influenza   Vaccine 0.5 milliLiter(s) IntraMuscular once  insulin lispro (ADMELOG) corrective regimen sliding scale   SubCutaneous Before meals and at bedtime  ipratropium    for Nebulization 500 MICROGram(s) Nebulizer every 6 hours  methylPREDNISolone sodium succinate Injectable 40 milliGRAM(s) IV Push every 6 hours  pantoprazole    Tablet 40 milliGRAM(s) Oral before breakfast  polyethylene glycol 3350 17 Gram(s) Oral daily  senna 2 Tablet(s) Oral at bedtime  tamsulosin 0.4 milliGRAM(s) Oral at bedtime  Treprostinil (Tyvaso) DPI 1 Inhalation 1 Inhalation Inhalation <User Schedule>  trimethoprim  160 mG/sulfamethoxazole 800 mG 1 Tablet(s) Oral every 24 hours    MEDICATIONS  (PRN):  acetaminophen     Tablet .. 650 milliGRAM(s) Oral every 6 hours PRN Mild Pain (1 - 3)  dextrose Oral Gel 15 Gram(s) Oral once PRN Blood Glucose LESS THAN 70 milliGRAM(s)/deciliter  melatonin 5 milliGRAM(s) Oral at bedtime PRN Insomnia      ALLERGIES:  Allergies    No Known Allergies    Intolerances        LABS:              CAPILLARY BLOOD GLUCOSE      POCT Blood Glucose.: 151 mg/dL (18 Dec 2022 06:20)      RADIOLOGY & ADDITIONAL TESTS: Reviewed.

## 2022-12-18 NOTE — PROGRESS NOTE ADULT - PROBLEM SELECTOR PLAN 1
Post COVID ILD/ pulmonary fibrosis, now presenting with worsening cough and elevated WBC count with elevated procalcitonin and CT chest performed at OSH with concern for newly developing ggos when compared to prior CT scan done in october. Completed course of zosyn.   - CXR and CT chest ordered 12/14 for worsening O2 requirements: CT chest stable from previous scan, started solumedrol  Plan:  - solumedrol 40mg IV q6 (previously prednisone 20mg qd), c/w bactrim ppx  - Bactrim for PCP ppx.  - Tyvaso 4 times a day, specialty RN reported q4, 8AM, 12PM, 4PM, 8PM  - outpatient follow-up with Dr. Ng    # F/u lung transplant   F/u pre-lung transplant ID recs: CMV IgG, EBV, serum QFT, Strongyloides IgG, hepatitis panel, HIV, varicella IgG, MMR IgG, toxoplasma IgG, influenza vaccine, Prevnar-20  - Quantiferon, and CMV titers sent for transplant, CMV IgG +, IgM negative. Quantiferon indeterminate.  - Encourage OOBTC  - f/u w/ pulm about pt getting listed for lung/heart transplant Post COVID ILD/ pulmonary fibrosis, now presenting with worsening cough and elevated WBC count with elevated procalcitonin and CT chest performed at OSH with concern for newly developing ggos when compared to prior CT scan done in october. Completed course of zosyn.   - CXR and CT chest ordered 12/14 for worsening O2 requirements: CT chest stable from previous scan, started solumedrol  Plan:  - solumedrol 40mg IV q6 (previously prednisone 20mg qd), c/w bactrim ppx  - Bactrim for PCP ppx.  - Tyvaso 4 times a day, specialty RN reported q4, 8AM, 12PM, 4PM, 8PM  - outpatient follow-up with Dr. Ng  - f/u TTE  - OOBTC/PT    # F/u lung transplant   F/u pre-lung transplant ID recs: CMV IgG, EBV, serum QFT, Strongyloides IgG, hepatitis panel, HIV, varicella IgG, MMR IgG, toxoplasma IgG, influenza vaccine, Prevnar-20  - Quantiferon, and CMV titers sent for transplant, CMV IgG +, IgM negative. Quantiferon indeterminate.  - Encourage OOBTC  - f/u w/ pulm about pt getting listed for lung/heart transplant

## 2022-12-18 NOTE — PROGRESS NOTE ADULT - PROBLEM SELECTOR PROBLEM 6
[facility]  Vascular Neurology  Comprehensive Stroke Center  Progress Note    Assessment/Plan:     * Acute arterial ischemic stroke, multifocal, multiple vascular territories  This is an 85 year old male with PMH chronic Afib (not on AC), CAD (S/P stent), HTN, combined CHF, and hypothyroidism who received IV-tPA at OSH on 10/10 after presenting with right-sided weakness, aphasia, and left gaze deviation. CTA MP showed a chronically occluded left ICA from it's origin to the supraclinoid segment with filling of the MCA via the anterior communicating artery, along with substantial burden of intracranial and extracranial atherosclerotic disease, including occluded V4 with an intermittently occluded basilar. No EVT was pursued. TTE on 10/11 showed an EF of 35% and severe left atrial enlargement,however no thrombus. MRI brain on 10/11 demonstrated scattered areas of infarct in the bilateral hemispheres, predominantly in the right frontal lobe. Etiology suspected likely hypoperfusion/watershed in the setting of vascular abnormalities, though differential also includes cardioembolic in the setting of AF and ischemic cardiomyopathy. NIHSS of 8 on admission. PT and SLP consulted. Stable for discharge and pending placement.     Antithrombotics for secondary stroke prevention:  Eliquis 2.5 mg BID     Statins for secondary stroke prevention and hyperlipidemia, if present:   Statins: Atorvastatin- 40 mg daily     Aggressive risk factor modification: HTN, HLD, Diet, Exercise, A-Fib, CAD     Rehab efforts: The patient has been evaluated by a stroke team provider and the therapy needs have been fully considered based off the presenting complaints and exam findings. The following therapy evaluations are needed: PT evaluate and treat, OT evaluate and treat, SLP evaluate and treat, PM&R evaluate for appropriate placement     Diagnostics ordered/pending: None    VTE prophylaxis: Eliquis 2.5 mg BID, SCDs    BP  parameters: SBP <160.     Disposition: SNF; placement pending.     AMRIT (acute kidney injury)  -BUN/Cr elevated on admission, initially improved, then worsening again. Likely at baseline per outside chart review of [facility] (Cr range 1.5 - 2.6)  -Appears euvolemic on exam  -Urine Na and Cr normal.   -Cr back to baseline.     Plan:   -Lasix restarted at reduced dose.   -Continue to monitor volume status.   -Renally dose all medications.     Person awaiting admission to adequate facility elsewhere  -Difficult placement due to patient living in [place] and visiting daughter when stroke occurred - therapy teams recommending inpatient rehab  -Patient is unable to fly back to [place]. He is not capable of flying due to inability to ambulate without trained medical assistance, impaired functional mobility, impaired cardiopulmonary response to activity, urine and bowel incontinence, and decreased mental capacity related to stroke and underlying diagnosis of dementia.   -Daughter is unable to provide care.     Plan:   -Patient accepted to [facility]; needs insurance authorization.       Erythema of hand  -- Patient previously with erythema of R hand from IV infiltration.  -- Bactroban cream TID X 10 days (end date 10/26)  -- Elevation of R hand   -- Pt denied complaints of pain on exam today.    SIADH (syndrome of inappropriate ADH production)  -Noted onset of hyponatremia on 10/12, decreasing to 131 at the lowest.   -SIADH suspected due to euvolemic appearance and no contributing hyperglycemia, reason to suspect pseudohyponatremia, use of thiazide diuretic, or signs volume overload on exam.   -No TSH abnormality or signs of glucocorticoid deficiency.   -Suspect onset secondary to stroke.   -Sodium improved status post fluid restriction and Na tablets.     Plan:   -Continue 1L fluid restriction daily.   RESOLVED    Dementia  Plan:   -Continue Donepezil 5 mg daily.      11/5: patient agitated overnight.  Acute confusion vs baseline dementia. UA ordered to rule out confusion r/t UTI    Intracranial atherosclerosis  See assessment for occlusion of left ICA.     Occlusion of left carotid artery  -Seen on CTA on 10/11.   -Suspect chronic occlusion.     Plan:   -Continue current secondary stroke prevention.     Nodule of right lung  -1 cm nodule of right lung seen on CTA; no prior history of tobacco use.     Plan:   -For a part solid nodule 6 mm or greater, Fleischner Society 2017 guidelines recommend follow up with non-contrast chest CT at 3-6 months to confirm persistence. If this nodule is unchanged and the solid component remains <6 mm, annual follow up CT should be performed for 5 years  -Previously discussed with daughter.     Cytotoxic cerebral edema  -Area of cytotoxic cerebral edema identified when reviewing brain imaging in the territory of the R/L middle cerebral artery. There is not mass effect associated with it.   -The pattern is suggestive of cardioembolic etiology.    Plan:   -We will continue to monitor the patients clinical exam for any worsening of symptoms which may indicate expansion of the stroke or the area of the edema resulting in the clinical change.     Coronary artery disease involving native coronary artery of native heart without angina pectoris  -Stroke risk factor  -Noted on Care Everywhere; S/P RCA stent in 7/2018; was prescribed Plavix, Statin, and Ranexa,  but unclear if patient was taking them.    Plan:   -Continue high-intensity Statin daily and holding ASA with daily use of Eliquis for AF.     Paroxysmal atrial fibrillation  -Stroke risk factor  -CHADSVASC: 7.   -HASBLED: 2  -Per Care Everywhere, not previously on AC; Dr. Menjivar (Cardiologist) had report patient not a good candidate due to risk of falls, non-compliance, and dementia.  -Remains rate-controlled at this time.    Plan:   -Continue beta-blocker and Eliquis 2.5 mg BID.   -Telemetry ordered.     Chronic combined systolic  and diastolic congestive heart failure  -Stroke risk factor  -Most recent TTE on 10/11 significant for EF of 35%, diastolic dysfunction, wall motion abnormalities, severe left atrial enlargement, and moderate AS.   -Likely ischemic etiology with history of CAD.   -Home regimen: Lasix 20 mg and Spironolactone.   -Pro-BNP from Feb 2019 >3000.   -Appears euvolemic on exam.     Plan:   -Initiated Coreg 3.125mg BID.   -Holding Spironolactone    -Initially held Lasix for AMRIT, however reviewed outside records from [facility], and Cr range 1.5 -2.6. Patient likely at baseline. Restarted Lasix on 10/15 at reduced-dose 20 mg daily.    -Daily weights and strict I/O's.     Acquired hypothyroidism  -Stroke risk factor  -TSH 3.032    Plan:   -Continue Synthroid 25 mcg daily.     Essential hypertension  -Stroke risk factor.  -Home regimen: Spironolactone.     Plan:   -Goal SBP < 160  -At goal on current regimen: Coreg 3.125 mg BID, and Norvasc 10 mg daily.          10/12: Etiology likely cardioembolic. Will start Eliquis 2.5 mg BID. Creatinine trending down - may start patient on Eliquis 5 mg BID if continue to decrease. Left ICA chronically occluded; no need to vasculare intervention.   10/13: Spoke with PT; patient would benefit from inpatient rehab placement. Norvasc 5 mg ordered. Urine studies ordered for hyponatremia - suspect to be due to dehydration.   10/14: Patient with SIADH but sodium stable; 1 L fluid restriction ordered  10/15: Irritation around right hand peripheral IV site. Sodium 131 today. Discussed with daughter that he is on fluid restriction and she will stop providing him with outside drinks.     10/16: Sodium increasing to 132. Bactroban ordered for right hand IV infiltration. Working on placement.   10/17: Sodium continues to improve. Creatinine 1.5 likely near baseline. Decreased Eliquis to 2.5 mg. Right hand erythema stable.   10/19: Sodium 134. Awake and alert but remains confused. No pain.  Pending placement  10/20: Complained of chest discomfort overnight. EKG showed no ST wave changes; troponin normal.   10/21: Renal function improving. Hyponatremic, continue 1L fluid restriction. Will likely restart lasix tomorrow; no current signs of fluid overload. Neurological exam unchanged.   10/22: Reviewed outside imaging, renal function likely at baseline; restarted home Lasix at reduced dose of 20 mg daily and continue to monitor daily weights and strict I/O's.   10/23: Attempting to transfer patient to a bed on [facility] floor. Staff physician to contact patient's insurance company to discuss further details regarding -SNF placement.  10/24: Staff physician contacted patient's insurance company yesterday, however no answer; left voicemail. Will re-attempt phone call today. Remains medically and neurologically stable for discharge.   10/25: Called Dr. Krishna; no answer. Will re-attempt tomorrow. Patient's daughter contacted by SW/MALINA. She is unable to provided 24 hour care. Patient accepted to [facility], need insurance authorization  10/26-10/30: No acute events overnight. Neurologically unchanged. Awaiting placement.   10/31: No acute events overnight. CM informed patient will have insurance as of 11/01.   11/04: Patient agitated today. Once time dose of seroquel ordered. Will monitor for effect and consider adding day time dose if necessary.   11/05: Patient agitated overnight. UA ordered to rule out confusion r/t UTI.     STROKE DOCUMENTATION   Acute Stroke Times   Last Known Normal Date: 10/10/19  Last Known Normal Time: 1905  Symptom Onset Date: 10/10/19  Symptom Onset Time: 1905  Stroke Team Called Date: 10/10/19  Stroke Team Called Time: 2120  Stroke Team Arrival Date: 10/10/19  Stroke Team Arrival Time: 2122    NIH Scale:  1a. Level of Consciousness: 0-->Alert, keenly responsive  1b. LOC Questions: 2-->Answers neither question correctly  1c. LOC Commands: 0-->Performs both tasks correctly  2. Best Gaze:  0-->Normal  3. Visual: 0-->No visual loss  4. Facial Palsy: 1-->Minor paralysis (flattened nasolabial fold, asymmetry on smiling)  5a. Motor Arm, Left: 1-->Drift, limb holds 90 (or 45) degrees, but drifts down before full 10 seconds, does not hit bed or other support  5b. Motor Arm, Right: 0-->No drift, limb holds 90 (or 45) degrees for full 10 secs  6a. Motor Leg, Left: 1-->Drift, leg falls by the end of the 5-sec period but does not hit bed  6b. Motor Leg, Right: 0-->No drift, leg holds 30 degree position for full 5 secs  7. Limb Ataxia: 0-->Absent  8. Sensory: 0-->Normal, no sensory loss  9. Best Language: 0-->No aphasia, normal  10. Dysarthria: 1-->Mild-to-moderate dysarthria, patient slurs at least some words and, at worst, can be understood with some difficulty  11. Extinction and Inattention (formerly Neglect): 0-->No abnormality  Total (NIH Stroke Scale): 6       Modified [place] Score: 1  Holley Coma Scale:    ABCD2 Score:    [id number] Score:   HAS -BLED Score:   ICH Score:   Hunt & Stroud Classification:      Hemorrhagic change of an Ischemic Stroke: Does this patient have an ischemic stroke with hemorrhagic changes? No     Neurologic Chief Complaint: R-sided weakness and aphasia    Subjective:     Interval History: Patient is seen for follow-up neurological assessment and treatment recommendations:      Patient agitated overnight. UA ordered to rule out confusion r/t UTI.     HPI, Past Medical, Family, and Social History remains the same as documented in the initial encounter.     Review of Systems   Constitutional: Negative for fatigue and fever.   Gastrointestinal: Negative for nausea and vomiting.   Neurological: Positive for speech difficulty and weakness.   Psychiatric/Behavioral: Positive for agitation. Negative for confusion and decreased concentration.     Scheduled Meds:   amLODIPine  10 mg Oral Daily    apixaban  2.5 mg Oral BID    atorvastatin  40 mg Oral Daily    carvedilol  3.125 mg Oral  BID    donepezil  5 mg Oral QHS    furosemide  20 mg Oral Daily    levothyroxine  25 mcg Oral Daily    QUEtiapine  12.5 mg Oral QHS    senna  8.6 mg Oral Daily     Continuous Infusions:  PRN Meds:acetaminophen, hydrALAZINE, influenza, ondansetron, sodium chloride 0.9%    Objective:     Vital Signs (Most Recent):  Temp: 97.3 °F (36.3 °C) (11/05/19 1229)  Pulse: 66 (11/05/19 1229)  Resp: 18 (11/05/19 1229)  BP: 129/72 (11/05/19 1229)  SpO2: 96 % (11/05/19 1229)  BP Location: Right arm    Vital Signs Range (Last 24H):  Temp:  [96.4 °F (35.8 °C)-98 °F (36.7 °C)]   Pulse:  [42-72]   Resp:  [12-18]   BP: (129-155)/(62-76)   SpO2:  [91 %-96 %]   BP Location: Right arm    Physical Exam   Constitutional: He appears well-developed and well-nourished. No distress.   HENT:   Head: Normocephalic and atraumatic.   Eyes: Conjunctivae and EOM are normal.   Cardiovascular: Normal rate.   Pulmonary/Chest: Effort normal. No respiratory distress.   Musculoskeletal: He exhibits no edema or deformity.   Neurological: He is alert. No sensory deficit. He exhibits normal muscle tone.   Skin: Skin is warm and dry.   Psychiatric: He expresses impulsivity. He is attentive.       Neurological Exam:   LOC: alert  Attention Span: Good   Language: No aphasia, questionable baseline mental status  Articulation: Dysarthria  Orientation: Oriented to person; Not oriented to age, time  Visual Fields: Full  EOM (CN III, IV, VI): Full/intact  Facial Movement (CN VII): Symmetric  Motor: Arm left  Paresis: 4/5  Leg left  Paresis: 4/5  Arm right  Normal 5/5  Leg right Normal 5/5  Sensation: Intact to light touch, temperature and vibration  Tone: Normal tone throughout    Laboratory:  CMP:   No results for input(s): GLUCOSE, CALCIUM, ALBUMIN, PROT, NA, K, CO2, CL, BUN, CREATININE, ALKPHOS, ALT, AST, BILITOT in the last 24 hours.  BMP:   Recent Labs   Lab 11/01/19  0558      K 4.3      CO2 27   BUN 35*   CREATININE 1.5*   CALCIUM 8.6*     CBC:    Recent Labs   Lab 10/31/19  0543   WBC 6.86   RBC 4.69   HGB 12.4*   HCT 40.2      MCV 86   MCH 26.4*   MCHC 30.8*     Lipid Panel: No results for input(s): CHOL, LDLCALC, HDL, TRIG in the last 168 hours.  Coagulation: No results for input(s): PT, INR, APTT in the last 168 hours.  Platelet Aggregation Study: No results for input(s): PLTAGG, PLTAGINTERP, PLTAGREGLACO, ADPPLTAGGREG in the last 168 hours.  Hgb A1C: No results for input(s): HGBA1C in the last 168 hours.  TSH: No results for input(s): TSH in the last 168 hours.      Diagnostic Results     Brain/Vessel Imaging:     MRI Brain WO Contrast (10/11/2019) -         Areas of abnormal T2/FLAIR/diffusion signal abnormality consistent with areas of acute ischemia/infarct involving the cerebral hemispheres bilaterally.  There is signal abnormality associated with the left internal carotid artery likely corresponding to the appearance of occlusion on the CTA examination.     CTA Multiphase (10/10/2019) -      Occluded left ICA with reconstitution of the distal/supraclinoid segment by retrograde flow through [id number].  Saccular aneurysm arising from the supraclinoid segment of the right ICA    Occluded intracranial segment of the left vertebral terminating at the level of left PICA origin.  Right vertebral artery supply the basilar artery.  Basilar artery is small in caliber with wall irregularity and intermittent occlusions.  Partially calcified hyperattenuating lesion in the lateral aspect of the cavernous sinus abutting the distal right ICA, likely representing calcified meningioma.  Generalized cerebral volume loss and remote lacunar type infarct in the right caudate head.       Cardiac Imaging:     TTE (10/11/2019) -   · Moderately decreased left ventricular systolic function. The estimated ejection fraction is 35%.  · Concentric left ventricular remodeling.  · Left ventricular diastolic dysfunction.  · Local segmental wall motion  abnormalities.  · Severe left atrial enlargement.  · Mild right atrial enlargement.  · Mild aortic regurgitation.  · Moderate aortic valve stenosis but difficult to appreciate in the setting of AF, depressed EF, and low stroke volume.  · Aortic valve area is 1.39 cm2; peak velocity is 1.28 m/s; mean gradient is 4 mmHg.    Elevated central venous pressure (15 mm Hg).      Martínez Lawton NP  [facility]  Department of Vascular Neurology   [facility]       [place]

## 2022-12-18 NOTE — PROGRESS NOTE ADULT - PROBLEM SELECTOR PLAN 2
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  Weaned off milrinone and Florentin  Plan:  - On HFNC for desaturation during coughing. Wean as tolerated.  - PT, OOBTC.   - c/w Bowel regimen  - Pulm following, appreciate recs (previously on slow prednisone taper, 20mg qd, however desaturated on 12/14, so started on pulse steroids)

## 2022-12-18 NOTE — PROGRESS NOTE ADULT - ASSESSMENT
62 yo M w/ PMH HLD, pre-DM, and pulmonary fibrosis 2/2 COVID-19 infection (wheelchair bound at baseline, on 4L home O2) who presented to [facility] for several days of worsening SOB and generalized weakness with increasing O2 requirements, found to have acute hypoxic respiratory failure 2/2 pulmonary fibrosis with superimposed bacterial pneumonia and acute decompensated pulmonary HTN, transferred to [facility] for RHC i/s/o severe pulmonary disease and evaluation for potential lung transplant. Respiratory status improving, weaned to 6LNC. Tyvaso added 12/9.

## 2022-12-19 NOTE — PROGRESS NOTE ADULT - PROBLEM SELECTOR PLAN 1
Post COVID ILD/ pulmonary fibrosis, now presenting with worsening cough and elevated WBC count with elevated procalcitonin and CT chest performed at OSH with concern for newly developing ggos when compared to prior CT scan done in october. Completed course of zosyn.   - CXR and CT chest ordered 12/14 for worsening O2 requirements: CT chest stable from previous scan, started solumedrol  Plan:  - solumedrol 40mg IV q6 (previously prednisone 20mg qd), c/w bactrim ppx  - Bactrim for PCP ppx.  - Tyvaso 4 times a day, specialty RN reported q4, 8AM, 12PM, 4PM, 8PM  - outpatient follow-up with Dr. Ng  - f/u TTE  - OOBTC/PT    # F/u lung transplant   F/u pre-lung transplant ID recs: CMV IgG, EBV, serum QFT, Strongyloides IgG, hepatitis panel, HIV, varicella IgG, MMR IgG, toxoplasma IgG, influenza vaccine, Prevnar-20  - Quantiferon, and CMV titers sent for transplant, CMV IgG +, IgM negative. Quantiferon indeterminate.  - Encourage OOBTC  - f/u w/ pulm about pt getting listed for lung/heart transplant Post COVID ILD/ pulmonary fibrosis, now presenting with worsening cough and elevated WBC count with elevated procalcitonin and CT chest performed at OSH with concern for newly developing ggos when compared to prior CT scan done in october. Completed course of zosyn.   - CXR and CT chest ordered 12/14 for worsening O2 requirements: CT chest stable from previous scan, started solumedrol  Plan:  - solumedrol 40mg IV q6 (previously prednisone 20mg qd), c/w bactrim ppx  - Bactrim for PCP ppx.  - Tyvaso 4 times a day dose 32mcg, specialty RN reported q4, 8AM, 12PM, 4PM, 8PM  - outpatient follow-up with Dr. Ng  - f/u TTE  - OOBTC/PT    # F/u lung transplant   F/u pre-lung transplant ID recs: CMV IgG, EBV, serum QFT, Strongyloides IgG, hepatitis panel, HIV, varicella IgG, MMR IgG, toxoplasma IgG, influenza vaccine, Prevnar-20  - Quantiferon, and CMV titers sent for transplant, CMV IgG +, IgM negative. Quantiferon indeterminate.  - Encourage OOBTC  - f/u w/ pulm about pt getting listed for lung/heart transplant

## 2022-12-19 NOTE — PROGRESS NOTE ADULT - ASSESSMENT
62 yo M w/ PMH HLD, pre-DM, and pulmonary fibrosis 2/2 COVID-19 infection (wheelchair bound at baseline, on 4L home O2) who presented to [facility] for several days of worsening SOB and generalized weakness with increasing O2 requirements, found to have acute hypoxic respiratory failure 2/2 pulmonary fibrosis with superimposed bacterial pneumonia and acute decompensated pulmonary HTN, transferred to [facility] for RHC i/s/o severe pulmonary disease and evaluation for potential lung transplant. Respiratory status improving, weaned to 6LNC. Tyvaso added 12/9.

## 2022-12-19 NOTE — PROGRESS NOTE ADULT - SUBJECTIVE AND OBJECTIVE BOX
***INCOMPLETE NOTE    SUBJECTIVE / INTERVAL HPI: Patient seen and examined at bedside. No overnight events.    VITAL SIGNS:  Vital Signs Last 24 Hrs  T(C): 36.9 (19 Dec 2022 06:22), Max: 36.9 (18 Dec 2022 17:00)  T(F): 98.5 (19 Dec 2022 06:22), Max: 98.5 (18 Dec 2022 17:00)  HR: 96 (19 Dec 2022 04:09) (82 - 111)  BP: 134/79 (19 Dec 2022 04:09) (121/77 - 144/85)  BP(mean): 98 (19 Dec 2022 04:09) (95 - 110)  RR: 18 (19 Dec 2022 04:09) (18 - 24)  SpO2: 96% (19 Dec 2022 04:09) (91% - 96%)    Parameters below as of 19 Dec 2022 04:09  Patient On (Oxygen Delivery Method): nasal cannula, high flow  O2 Flow (L/min): 50  O2 Concentration (%): 50    PHYSICAL EXAM:    General: Resting comfortably in bed; NAD  HEENT: NC/AT, EOMI, anicteric sclera, MMM, neck supple, no nasal discharge  Cardiac: RRR; normal S1/S2, no MRG, no LE edema, no JVD  Respiratory: CTAB; no wheezes, ronchi, increased work of breathing, retractions  Gastrointestinal: +BSx4, abdomen soft, NT/ND; no rebound or guarding  Genitourinary: no suprapubic tenderness; pardo*; normal external genitalia  Extremities: WWP, no clubbing or cyanosis; no peripheral edema  Vascular: 2+ radial and DP pulses bilaterally  Dermatologic: skin warm, dry and intact; no rashes, open wounds  Neurologic: AAOx3; no focal deficits  Psychiatric: affect and characteristics of appearance, verbalizations, behaviors are appropriate    MEDICATIONS:  MEDICATIONS  (STANDING):  AQUAPHOR (petrolatum Ointment) 1 Application(s) Topical two times a day  benzocaine 15 mG/menthol 3.6 mG Lozenge 2 Lozenge Oral every 6 hours  benzonatate 200 milliGRAM(s) Oral every 8 hours  dextrose 5%. 1000 milliLiter(s) (50 mL/Hr) IV Continuous <Continuous>  dextrose 5%. 1000 milliLiter(s) (100 mL/Hr) IV Continuous <Continuous>  dextrose 50% Injectable 25 Gram(s) IV Push once  dextrose 50% Injectable 12.5 Gram(s) IV Push once  dextrose 50% Injectable 25 Gram(s) IV Push once  enoxaparin Injectable 40 milliGRAM(s) SubCutaneous every 24 hours  fluticasone propionate 50 MICROgram(s)/spray Nasal Spray 2 Spray(s) Both Nostrils two times a day  glucagon  Injectable 1 milliGRAM(s) IntraMuscular once  guaiFENesin Oral Liquid (Sugar-Free) 200 milliGRAM(s) Oral every 6 hours  influenza   Vaccine 0.5 milliLiter(s) IntraMuscular once  insulin lispro (ADMELOG) corrective regimen sliding scale   SubCutaneous Before meals and at bedtime  ipratropium    for Nebulization 500 MICROGram(s) Nebulizer every 6 hours  methylPREDNISolone sodium succinate Injectable 40 milliGRAM(s) IV Push every 6 hours  pantoprazole    Tablet 40 milliGRAM(s) Oral before breakfast  polyethylene glycol 3350 17 Gram(s) Oral daily  senna 2 Tablet(s) Oral at bedtime  tamsulosin 0.4 milliGRAM(s) Oral at bedtime  Treprostinil (Tyvaso) DPI 1 Inhalation 1 Inhalation Inhalation <User Schedule>    MEDICATIONS  (PRN):  acetaminophen     Tablet .. 650 milliGRAM(s) Oral every 6 hours PRN Mild Pain (1 - 3)  dextrose Oral Gel 15 Gram(s) Oral once PRN Blood Glucose LESS THAN 70 milliGRAM(s)/deciliter  melatonin 5 milliGRAM(s) Oral at bedtime PRN Insomnia      ALLERGIES:  Allergies    No Known Allergies    Intolerances        LABS:                        11.4   7.96  )-----------( 152      ( 18 Dec 2022 05:30 )             35.3     12-18    134<L>  |  94<L>  |  21  ----------------------------<  168<H>  4.7   |  35<H>  |  0.71    Ca    9.0      18 Dec 2022 05:30  Phos  2.7     12-18  Mg     2.2     12-18    TPro  6.5  /  Alb  3.4  /  TBili  0.3  /  DBili  x   /  AST  10  /  ALT  16  /  AlkPhos  64  12-18        CAPILLARY BLOOD GLUCOSE      POCT Blood Glucose.: 188 mg/dL (19 Dec 2022 06:03)      RADIOLOGY & ADDITIONAL TESTS: Reviewed.   SUBJECTIVE / INTERVAL HPI: Patient seen and examined at bedside. No overnight events. Patient feeling okay this morning. Still has cough, though it is improved. No chest pain, SOB, fever, sweats, abdominal pain.    VITAL SIGNS:  Vital Signs Last 24 Hrs  T(C): 36.9 (19 Dec 2022 06:22), Max: 36.9 (18 Dec 2022 17:00)  T(F): 98.5 (19 Dec 2022 06:22), Max: 98.5 (18 Dec 2022 17:00)  HR: 96 (19 Dec 2022 04:09) (82 - 111)  BP: 134/79 (19 Dec 2022 04:09) (121/77 - 144/85)  BP(mean): 98 (19 Dec 2022 04:09) (95 - 110)  RR: 18 (19 Dec 2022 04:09) (18 - 24)  SpO2: 96% (19 Dec 2022 04:09) (91% - 96%)    Parameters below as of 19 Dec 2022 04:09  Patient On (Oxygen Delivery Method): nasal cannula, high flow  O2 Flow (L/min): 50  O2 Concentration (%): 50    PHYSICAL EXAM:  General: Resting comfortably in bed; NAD; HFNC 40/50  HEENT: NC/AT, EOMI, anicteric sclera, neck supple, no nasal discharge  Cardiac: regular rhythm, mildly tachycardic; normal S1/S2, no MRG  Respiratory: BL diffuse ronchi, more prominent in basilar regions; no increased work of breathing, retractions; on HFNC  Gastrointestinal: +BSx4, abdomen soft, NT/ND; no rebound or guarding  Extremities: WWPx4; no peripheral edema; clubbing in fingers and toes BL  Dermatologic: skin warm, dry and intact; no rashes, open wounds  Neurologic: AAOx3; no focal deficits    MEDICATIONS:  MEDICATIONS  (STANDING):  AQUAPHOR (petrolatum Ointment) 1 Application(s) Topical two times a day  benzocaine 15 mG/menthol 3.6 mG Lozenge 2 Lozenge Oral every 6 hours  benzonatate 200 milliGRAM(s) Oral every 8 hours  dextrose 5%. 1000 milliLiter(s) (50 mL/Hr) IV Continuous <Continuous>  dextrose 5%. 1000 milliLiter(s) (100 mL/Hr) IV Continuous <Continuous>  dextrose 50% Injectable 25 Gram(s) IV Push once  dextrose 50% Injectable 12.5 Gram(s) IV Push once  dextrose 50% Injectable 25 Gram(s) IV Push once  enoxaparin Injectable 40 milliGRAM(s) SubCutaneous every 24 hours  fluticasone propionate 50 MICROgram(s)/spray Nasal Spray 2 Spray(s) Both Nostrils two times a day  glucagon  Injectable 1 milliGRAM(s) IntraMuscular once  guaiFENesin Oral Liquid (Sugar-Free) 200 milliGRAM(s) Oral every 6 hours  influenza   Vaccine 0.5 milliLiter(s) IntraMuscular once  insulin lispro (ADMELOG) corrective regimen sliding scale   SubCutaneous Before meals and at bedtime  ipratropium    for Nebulization 500 MICROGram(s) Nebulizer every 6 hours  methylPREDNISolone sodium succinate Injectable 40 milliGRAM(s) IV Push every 6 hours  pantoprazole    Tablet 40 milliGRAM(s) Oral before breakfast  polyethylene glycol 3350 17 Gram(s) Oral daily  senna 2 Tablet(s) Oral at bedtime  tamsulosin 0.4 milliGRAM(s) Oral at bedtime  Treprostinil (Tyvaso) DPI 1 Inhalation 1 Inhalation Inhalation <User Schedule>    MEDICATIONS  (PRN):  acetaminophen     Tablet .. 650 milliGRAM(s) Oral every 6 hours PRN Mild Pain (1 - 3)  dextrose Oral Gel 15 Gram(s) Oral once PRN Blood Glucose LESS THAN 70 milliGRAM(s)/deciliter  melatonin 5 milliGRAM(s) Oral at bedtime PRN Insomnia      ALLERGIES:  Allergies    No Known Allergies    Intolerances        LABS:                        11.4   7.96  )-----------( 152      ( 18 Dec 2022 05:30 )             35.3     12-18    134<L>  |  94<L>  |  21  ----------------------------<  168<H>  4.7   |  35<H>  |  0.71    Ca    9.0      18 Dec 2022 05:30  Phos  2.7     12-18  Mg     2.2     12-18    TPro  6.5  /  Alb  3.4  /  TBili  0.3  /  DBili  x   /  AST  10  /  ALT  16  /  AlkPhos  64  12-18        CAPILLARY BLOOD GLUCOSE      POCT Blood Glucose.: 188 mg/dL (19 Dec 2022 06:03)      RADIOLOGY & ADDITIONAL TESTS: Reviewed.

## 2022-12-19 NOTE — PROGRESS NOTE ADULT - PROBLEM SELECTOR PLAN 2
Attending Attestation (For Attendings USE Only)... 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  Weaned off milrinone and Florentin  Plan:  - On HFNC for desaturation during coughing. Wean as tolerated.  - PT, OOBTC.   - c/w Bowel regimen  - Pulm following, appreciate recs (previously on slow prednisone taper, 20mg qd, however desaturated on 12/14, so started on pulse steroids)

## 2022-12-20 NOTE — PROGRESS NOTE ADULT - PROBLEM SELECTOR PLAN 5
A1C of 6.0, BSG while inpatient, 140s-low 300s.  Plan:  - mISS  - DASH/TLC A1C of 6.0, BSG while inpatient, 140s-low 300s.  Plan:  - mISS  - regular diet RESOLVED,  Chronic hypoNa likely i/s/o o CHF 2/2 to RHF and pHTN  Plan:  - Continue to monitor, improving

## 2022-12-20 NOTE — PROGRESS NOTE ADULT - PROBLEM SELECTOR PLAN 3
Leukocytosis to 13k, stable, slowly downtrending, likely elevated i/s/o acute sickness, afebrile, no s/s of infxn, CXR  Plan:  - Stable leukocytosis, likely 2/2 to steroid use.  - Continue to monitor, CXR w poor airway entry, elevated b/l hemidaphragms Patient w/ chronic respiratory failure 2/2 pulmonary fibrosis, as above. Patient w/ prolonged hospital stay due to shortness of breath and inability to wean patient off HFNC.   - ABG today 12/20 to see if qualifies for [id number]

## 2022-12-20 NOTE — PROGRESS NOTE ADULT - PROBLEM SELECTOR PLAN 1
Post COVID ILD/ pulmonary fibrosis, now presenting with worsening cough and elevated WBC count with elevated procalcitonin and CT chest performed at OSH with concern for newly developing ggos when compared to prior CT scan done in october. Completed course of zosyn.   - CXR and CT chest ordered 12/14 for worsening O2 requirements: CT chest stable from previous scan, started solumedrol  Plan:  - solumedrol 40mg IV q6 (previously prednisone 20mg qd), c/w bactrim ppx  - Bactrim for PCP ppx.  - Tyvaso 4 times a day dose 32mcg, specialty RN reported q4, 8AM, 12PM, 4PM, 8PM  - outpatient follow-up with Dr. Ng  - f/u TTE  - OOBTC/PT    # F/u lung transplant   F/u pre-lung transplant ID recs: CMV IgG, EBV, serum QFT, Strongyloides IgG, hepatitis panel, HIV, varicella IgG, MMR IgG, toxoplasma IgG, influenza vaccine, Prevnar-20  - Quantiferon, and CMV titers sent for transplant, CMV IgG +, IgM negative. Quantiferon indeterminate.  - Encourage OOBTC  - f/u w/ pulm about pt getting listed for lung/heart transplant Post COVID ILD/ pulmonary fibrosis, now presenting with worsening cough and elevated WBC count with elevated procalcitonin and CT chest performed at OSH with concern for newly developing ggos when compared to prior CT scan done in october. Completed course of zosyn.   - CXR and CT chest ordered 12/14 for worsening O2 requirements: CT chest stable from previous scan, started solumedrol  Plan:  - solumedrol 40mg IV q6 (previously prednisone 20mg qd), c/w bactrim ppx  - Bactrim for PCP ppx  - Tyvaso 32mcg QID, specialty RN reported q4, 8AM, 12PM, 4PM, 8PM  - outpatient follow-up with Dr. Ng  - OOBTC/PT  - continue to work w/ PT    # F/u lung transplant   F/u pre-lung transplant ID recs: CMV IgG, EBV, serum QFT, Strongyloides IgG, hepatitis panel, HIV, varicella IgG, MMR IgG, toxoplasma IgG, influenza vaccine, Prevnar-20  - Quantiferon, and CMV titers sent for transplant, CMV IgG +, IgM negative. Quantiferon indeterminate.  - Encourage OOBTC  - f/u w/ pulm about pt getting listed for lung/heart transplant; process in place Post COVID ILD/ pulmonary fibrosis, now presenting with worsening cough and elevated WBC count with elevated procalcitonin and CT chest performed at OSH with concern for newly developing ggos when compared to prior CT scan done in october. Completed course of zosyn.   - CXR and CT chest ordered 12/14 for worsening O2 requirements: CT chest stable from previous scan, started solumedrol  Plan:  - solumedrol 40mg IV q6 (previously prednisone 20mg qd), c/w bactrim ppx  - Bactrim for PCP ppx  - Tyvaso 32mcg QID, specialty RN reported q4, 8AM, 12PM, 4PM, 8PM  - ABG today 12/20 to see if qualifies for [id number]  - outpatient follow-up with Dr. Ng  - OOBTC/PT  - continue to work w/ PT    # F/u lung transplant   F/u pre-lung transplant ID recs: CMV IgG, EBV, serum QFT, Strongyloides IgG, hepatitis panel, HIV, varicella IgG, MMR IgG, toxoplasma IgG, influenza vaccine, Prevnar-20  - Quantiferon, and CMV titers sent for transplant, CMV IgG +, IgM negative. Quantiferon indeterminate.  - Encourage OOBTC  - f/u w/ pulm about pt getting listed for lung/heart transplant; process in place

## 2022-12-20 NOTE — CHART NOTE - NSCHARTNOTEFT_GEN_A_CORE
Patient will need noninvasive ventilation (NIV) due to chronic respiratory failure, without the NIV the patient is at risk for medical harm. BiPAP setting 10/5 has failed to control the patient's CO2 level and the patient will need volume-augmented ventilation which is not on a standard BiPAP, so BiPAP has been tried and ruled out.

## 2022-12-20 NOTE — PROGRESS NOTE ADULT - PROBLEM SELECTOR PLAN 6
RESOLVED  Pt developed thrush while in CCU 2/2 to inhalation, started on nystatin swish and swallow, 50,000U TID, course concluded 12/3.  - Continue to monitor A1C of 6.0, BSG while inpatient, 140s-low 300s.  Plan:  - mISS  - regular diet

## 2022-12-20 NOTE — PROGRESS NOTE ADULT - SUBJECTIVE AND OBJECTIVE BOX
***INCOMPLETE NOTE    SUBJECTIVE / INTERVAL HPI: Patient seen and examined at bedside. No overnight events.    VITAL SIGNS:  Vital Signs Last 24 Hrs  T(C): 36.5 (20 Dec 2022 06:29), Max: 37.1 (20 Dec 2022 01:00)  T(F): 97.7 (20 Dec 2022 06:29), Max: 98.7 (20 Dec 2022 01:00)  HR: 102 (20 Dec 2022 04:08) (91 - 114)  BP: 150/86 (20 Dec 2022 04:08) (117/69 - 150/86)  BP(mean): 104 (20 Dec 2022 04:08) (81 - 104)  RR: 18 (20 Dec 2022 04:08) (17 - 20)  SpO2: 93% (20 Dec 2022 04:08) (89% - 96%)    Parameters below as of 20 Dec 2022 04:08  Patient On (Oxygen Delivery Method): nasal cannula, high flow  O2 Flow (L/min): 50  O2 Concentration (%): 40    PHYSICAL EXAM:    General: Resting comfortably in bed; NAD  HEENT: NC/AT, EOMI, anicteric sclera, MMM, neck supple, no nasal discharge  Cardiac: RRR; normal S1/S2, no MRG, no LE edema, no JVD  Respiratory: CTAB; no wheezes, ronchi, increased work of breathing, retractions  Gastrointestinal: +BSx4, abdomen soft, NT/ND; no rebound or guarding  Genitourinary: no suprapubic tenderness; pardo*; normal external genitalia  Extremities: WWP, no clubbing or cyanosis; no peripheral edema  Vascular: 2+ radial and DP pulses bilaterally  Dermatologic: skin warm, dry and intact; no rashes, open wounds  Neurologic: AAOx3; no focal deficits  Psychiatric: affect and characteristics of appearance, verbalizations, behaviors are appropriate    MEDICATIONS:  MEDICATIONS  (STANDING):  AQUAPHOR (petrolatum Ointment) 1 Application(s) Topical two times a day  benzocaine 15 mG/menthol 3.6 mG Lozenge 2 Lozenge Oral every 6 hours  benzonatate 200 milliGRAM(s) Oral every 8 hours  dextrose 5%. 1000 milliLiter(s) (50 mL/Hr) IV Continuous <Continuous>  dextrose 5%. 1000 milliLiter(s) (100 mL/Hr) IV Continuous <Continuous>  dextrose 50% Injectable 25 Gram(s) IV Push once  dextrose 50% Injectable 12.5 Gram(s) IV Push once  dextrose 50% Injectable 25 Gram(s) IV Push once  enoxaparin Injectable 40 milliGRAM(s) SubCutaneous every 24 hours  fluticasone propionate 50 MICROgram(s)/spray Nasal Spray 2 Spray(s) Both Nostrils two times a day  glucagon  Injectable 1 milliGRAM(s) IntraMuscular once  guaiFENesin Oral Liquid (Sugar-Free) 200 milliGRAM(s) Oral every 6 hours  influenza   Vaccine 0.5 milliLiter(s) IntraMuscular once  insulin lispro (ADMELOG) corrective regimen sliding scale   SubCutaneous Before meals and at bedtime  ipratropium    for Nebulization 500 MICROGram(s) Nebulizer every 6 hours  methylPREDNISolone sodium succinate Injectable 40 milliGRAM(s) IV Push every 6 hours  pantoprazole    Tablet 40 milliGRAM(s) Oral before breakfast  polyethylene glycol 3350 17 Gram(s) Oral daily  senna 2 Tablet(s) Oral at bedtime  tamsulosin 0.4 milliGRAM(s) Oral at bedtime  Treprostinil (Tyvaso) DPI 1 Inhalation 1 Inhalation Inhalation <User Schedule>    MEDICATIONS  (PRN):  acetaminophen     Tablet .. 650 milliGRAM(s) Oral every 6 hours PRN Mild Pain (1 - 3)  dextrose Oral Gel 15 Gram(s) Oral once PRN Blood Glucose LESS THAN 70 milliGRAM(s)/deciliter  melatonin 5 milliGRAM(s) Oral at bedtime PRN Insomnia      ALLERGIES:  Allergies    No Known Allergies    Intolerances        LABS:              CAPILLARY BLOOD GLUCOSE      POCT Blood Glucose.: 166 mg/dL (20 Dec 2022 06:04)      RADIOLOGY & ADDITIONAL TESTS: Reviewed.   SUBJECTIVE / INTERVAL HPI: Patient seen and examined at bedside. Yesterday weaned from HFNC 50/45 to 50/40. TTE also performed yesterday showing PASP 57. Tyvaso also adjusted. No events overnight. Patient tired this morning. States he has not been sleeping at nighttime due to his coughing fits and noises. States his breathing feels about the same.    VITAL SIGNS:  Vital Signs Last 24 Hrs  T(C): 36.5 (20 Dec 2022 06:29), Max: 37.1 (20 Dec 2022 01:00)  T(F): 97.7 (20 Dec 2022 06:29), Max: 98.7 (20 Dec 2022 01:00)  HR: 102 (20 Dec 2022 04:08) (91 - 114)  BP: 150/86 (20 Dec 2022 04:08) (117/69 - 150/86)  BP(mean): 104 (20 Dec 2022 04:08) (81 - 104)  RR: 18 (20 Dec 2022 04:08) (17 - 20)  SpO2: 93% (20 Dec 2022 04:08) (89% - 96%)    Parameters below as of 20 Dec 2022 04:08  Patient On (Oxygen Delivery Method): nasal cannula, high flow  O2 Flow (L/min): 50  O2 Concentration (%): 40    PHYSICAL EXAM:  General: Resting comfortably in bed; NAD; HFNC 50/40  HEENT: NC/AT, EOMI, anicteric sclera, neck supple, no nasal discharge  Cardiac: regular rhythm, mildly tachycardic; normal S1/S2, no MRG  Respiratory: BL diffuse ronchi, more prominent in basilar regions; no increased work of breathing, retractions; on HFNC  Gastrointestinal: +BSx4, abdomen soft, NT/ND; no rebound or guarding  Extremities: WWPx4; no peripheral edema; clubbing in fingers and toes BL  Dermatologic: skin warm, dry and intact; no rashes, open wounds  Neurologic: AAOx3; no focal deficits    MEDICATIONS:  MEDICATIONS  (STANDING):  AQUAPHOR (petrolatum Ointment) 1 Application(s) Topical two times a day  benzocaine 15 mG/menthol 3.6 mG Lozenge 2 Lozenge Oral every 6 hours  benzonatate 200 milliGRAM(s) Oral every 8 hours  dextrose 5%. 1000 milliLiter(s) (50 mL/Hr) IV Continuous <Continuous>  dextrose 5%. 1000 milliLiter(s) (100 mL/Hr) IV Continuous <Continuous>  dextrose 50% Injectable 25 Gram(s) IV Push once  dextrose 50% Injectable 12.5 Gram(s) IV Push once  dextrose 50% Injectable 25 Gram(s) IV Push once  enoxaparin Injectable 40 milliGRAM(s) SubCutaneous every 24 hours  fluticasone propionate 50 MICROgram(s)/spray Nasal Spray 2 Spray(s) Both Nostrils two times a day  glucagon  Injectable 1 milliGRAM(s) IntraMuscular once  guaiFENesin Oral Liquid (Sugar-Free) 200 milliGRAM(s) Oral every 6 hours  influenza   Vaccine 0.5 milliLiter(s) IntraMuscular once  insulin lispro (ADMELOG) corrective regimen sliding scale   SubCutaneous Before meals and at bedtime  ipratropium    for Nebulization 500 MICROGram(s) Nebulizer every 6 hours  methylPREDNISolone sodium succinate Injectable 40 milliGRAM(s) IV Push every 6 hours  pantoprazole    Tablet 40 milliGRAM(s) Oral before breakfast  polyethylene glycol 3350 17 Gram(s) Oral daily  senna 2 Tablet(s) Oral at bedtime  tamsulosin 0.4 milliGRAM(s) Oral at bedtime  Treprostinil (Tyvaso) DPI 1 Inhalation 1 Inhalation Inhalation <User Schedule>    MEDICATIONS  (PRN):  acetaminophen     Tablet .. 650 milliGRAM(s) Oral every 6 hours PRN Mild Pain (1 - 3)  dextrose Oral Gel 15 Gram(s) Oral once PRN Blood Glucose LESS THAN 70 milliGRAM(s)/deciliter  melatonin 5 milliGRAM(s) Oral at bedtime PRN Insomnia      ALLERGIES:  Allergies    No Known Allergies    Intolerances        LABS:              CAPILLARY BLOOD GLUCOSE      POCT Blood Glucose.: 166 mg/dL (20 Dec 2022 06:04)      RADIOLOGY & ADDITIONAL TESTS: Reviewed.

## 2022-12-20 NOTE — PROGRESS NOTE ADULT - PROBLEM SELECTOR PLAN 2
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  Weaned off milrinone and Florentin  Plan:  - On HFNC for desaturation during coughing. Wean as tolerated.  - PT, OOBTC.   - c/w Bowel regimen  - Pulm following, appreciate recs (previously on slow prednisone taper, 20mg qd, however desaturated on 12/14, so started on pulse steroids) Mild precapillary pulmonary hypertension with normal CO/CI by Td was on Florentin, low dose milrinone, normal filling pressures suggesting precapillary PH 2/2 underlying severe fibrotic ILD and adequate volume status.  Weaned off milrinone and Florentin  - TTE 12/19: PASP 57mmHg (prior 44mmHg 11/28/22), septal "knuckle" w/o LVOT obstruction, grossly normal-appearing LV function, possible diastolic dysfunction, trace MR/TN, mild TR, trivial pericardial effusion  Plan:  - On HFNC, wean as tolerated.  - PT, OOBTC  - c/w bowel regimen  - pulm following, appreciate recs (previously on slow prednisone taper, 20mg qd, however desaturated on 12/14, so started on pulse steroids)  - lasix 20IV x1 today 12/20, monitor UOP Mild precapillary pulmonary hypertension with normal CO/CI by Td was on Florentin, low dose milrinone, normal filling pressures suggesting precapillary PH 2/2 underlying severe fibrotic ILD and adequate volume status.  Weaned off milrinone and Florentin  - TTE 12/19: PASP 57mmHg (prior 44mmHg 11/28/22), septal "knuckle" w/o LVOT obstruction, grossly normal-appearing LV function, possible diastolic dysfunction, trace MR/HI, mild TR, trivial pericardial effusion  Plan:  - On HFNC, wean as tolerated.  - PT, OOBTC  - c/w bowel regimen  - tyvaso as above, f/u outpt w/ Dr. Ng  - pulm following, appreciate recs (previously on slow prednisone taper, 20mg qd, however desaturated on 12/14, so started on pulse steroids)  - lasix 20IV x1 today 12/20, monitor UOP

## 2022-12-20 NOTE — PROGRESS NOTE ADULT - PROBLEM SELECTOR PLAN 4
RESOLVED,  Chronic hypoNa likely i/s/o o CHF 2/2 to RHF and pHTN  Plan:  - Continue to monitor, improving Leukocytosis to 13k, stable, slowly downtrending, likely elevated i/s/o acute sickness, afebrile, no s/s of infxn, CXR  Plan:  - Stable leukocytosis, likely 2/2 to steroid use.  - Continue to monitor, CXR w poor airway entry, elevated b/l hemidaphragms

## 2022-12-20 NOTE — PROGRESS NOTE ADULT - ASSESSMENT
60 yo M w/ PMH HLD, pre-DM, and pulmonary fibrosis 2/2 COVID-19 infection (wheelchair bound at baseline, on 4L home O2) who presented to [facility] for several days of worsening SOB and generalized weakness with increasing O2 requirements, found to have acute hypoxic respiratory failure 2/2 pulmonary fibrosis with superimposed bacterial pneumonia and acute decompensated pulmonary HTN, transferred to [facility] for RHC i/s/o severe pulmonary disease and evaluation for potential lung transplant. Respiratory status improving, weaned to 6LNC. Tyvaso added 12/9.  60 yo M w/ PMH HLD, pre-DM, and pulmonary fibrosis 2/2 COVID-19 infection (wheelchair bound at baseline, on 4L home O2) who presented to [facility] for several days of worsening SOB and generalized weakness with increasing O2 requirements, found to have acute hypoxic respiratory failure 2/2 pulmonary fibrosis with superimposed bacterial pneumonia and acute decompensated pulmonary HTN, transferred to [facility] for RHC i/s/o severe pulmonary disease and evaluation for potential lung transplant. Respiratory status improving but slowly. Tyvaso added 12/9.

## 2022-12-20 NOTE — PROGRESS NOTE ADULT - PROBLEM SELECTOR PLAN 7
F: Off IVF  E: Replete K>4, Mg>2  N: DASH/TLC  DVT prophylaxis: lovenox 40 qd F: none  E: Replete K>4, Mg>2  N: regular  DVT prophylaxis: lovenox 40 qd RESOLVED  Pt developed thrush while in CCU 2/2 to inhalation, started on nystatin swish and swallow, 50,000U TID, course concluded 12/3.  - Continue to monitor

## 2022-12-20 NOTE — CHART NOTE - NSCHARTNOTEFT_GEN_A_CORE
Admitting Diagnosis:   Patient is a 61y old  Male who presents with a chief complaint of SOB (20 Dec 2022 06:48)      PAST MEDICAL & SURGICAL HISTORY:  Dyslipidemia      Borderline diabetes      Pulmonary fibrosis      S/P knee surgery    Current Nutrition Order:   Diet, Regular (12-20-22 @ 11:18)    PO Intake: Good (%) [ x ]  Fair (50-75%) [   ] Poor (<25%) [   ]    GI Issues: Abdomen ND/NT, +BS x4, LBM 12/18    Pain: 0 per chart review     Skin Integrity: WDI, +1 gen. edema     Labs:   12-20    131<L>  |  88<L>  |  21  ----------------------------<  354<H>  4.6   |  33<H>  |  0.67    Ca    8.4      20 Dec 2022 09:30      CAPILLARY BLOOD GLUCOSE      POCT Blood Glucose.: 310 mg/dL (20 Dec 2022 11:06)  POCT Blood Glucose.: 166 mg/dL (20 Dec 2022 06:04)  POCT Blood Glucose.: 177 mg/dL (19 Dec 2022 21:20)  POCT Blood Glucose.: 203 mg/dL (19 Dec 2022 16:55)      Medications:  MEDICATIONS  (STANDING):  AQUAPHOR (petrolatum Ointment) 1 Application(s) Topical two times a day  benzocaine 15 mG/menthol 3.6 mG Lozenge 2 Lozenge Oral every 6 hours  benzonatate 200 milliGRAM(s) Oral every 8 hours  dextrose 5%. 1000 milliLiter(s) (50 mL/Hr) IV Continuous <Continuous>  dextrose 5%. 1000 milliLiter(s) (100 mL/Hr) IV Continuous <Continuous>  dextrose 50% Injectable 25 Gram(s) IV Push once  dextrose 50% Injectable 12.5 Gram(s) IV Push once  dextrose 50% Injectable 25 Gram(s) IV Push once  enoxaparin Injectable 40 milliGRAM(s) SubCutaneous every 24 hours  fluticasone propionate 50 MICROgram(s)/spray Nasal Spray 2 Spray(s) Both Nostrils two times a day  glucagon  Injectable 1 milliGRAM(s) IntraMuscular once  guaiFENesin Oral Liquid (Sugar-Free) 200 milliGRAM(s) Oral every 6 hours  influenza   Vaccine 0.5 milliLiter(s) IntraMuscular once  insulin lispro (ADMELOG) corrective regimen sliding scale   SubCutaneous Before meals and at bedtime  ipratropium    for Nebulization 500 MICROGram(s) Nebulizer every 6 hours  methylPREDNISolone sodium succinate Injectable 40 milliGRAM(s) IV Push every 6 hours  pantoprazole    Tablet 40 milliGRAM(s) Oral before breakfast  polyethylene glycol 3350 17 Gram(s) Oral daily  senna 2 Tablet(s) Oral at bedtime  tamsulosin 0.4 milliGRAM(s) Oral at bedtime  Treprostinil (Tyvaso) DPI 1 Inhalation 1 Inhalation Inhalation <User Schedule>    MEDICATIONS  (PRN):  acetaminophen     Tablet .. 650 milliGRAM(s) Oral every 6 hours PRN Mild Pain (1 - 3)  dextrose Oral Gel 15 Gram(s) Oral once PRN Blood Glucose LESS THAN 70 milliGRAM(s)/deciliter  melatonin 5 milliGRAM(s) Oral at bedtime PRN Insomnia    5'5''  pounds +-10%  Wt 198 pounds BMI 33 [id number]     Weight Change:   11/23: 216 pounds  11/24: 209.8 pounds  11/27: 197.7 pounds  11/30: 191.8 pounds  12/5: 195.1 pounds   -Wt trend stabilizing 11/30-12/5; edema noted to be improving      Estimated energy needs:   Pt exceeds 120% IBW (%), thus pt's IBW used for all calculations. Needs adjusted for HF, infection, pressure ulcer; Fluids per team - pt noted to be hyponatremic/CHF.  Kcal (25-30 kcal/kg): 1529-5969 kcal  Protein (1.2-1.4 g/kg pro): 74-88 g protein    Subjective:   62 yo M w/ PMH HLD, pre-DM, and pulmonary fibrosis 2/2 COVID-19 infection (wheelchair bound at baseline, on 4L home O2) who presented to [facility] for several days of worsening SOB and generalized weakness with increasing O2 requirements, found to have acute hypoxic respiratory failure 2/2 pulmonary fibrosis with superimposed bacterial pneumonia and acute decompensated pulmonary HTN, transferred to [facility] for RHC i/s/o severe pulmonary disease. MIREYA 11/23 shows decompensated R-sided HF, most likely iso worsening PHTN. Pt underwent RHC on 11/28 which showed CO 8.2, CI 4.2, CVP 2, PA pressure 44/14 (25), wedge pressure 6, RV 30/5. TTE on 11/28 showed improved RV systolic function from initial presentation. Pt likely has Group 3 PHTN 2/2 intrinsic lung disease, is being followed by PHTN team with plans to evaluate for possible lung transplant. 12/1: Weaned off milrinone. 12/2: Transferred to [facility]. 12/6: Patient desatted to 84% w air hunger on 6 LNC, increased to HFNC. 12/7: weaned to HFNC. 12/9: Weaned to NC; overnight desatted thus placed back on HFNC. 12/10: Weaned back to 6LNC. 12/13: weaned from 6L to 5L NC. 12/13; overnight attempted to wean to 4-5L but pt desatted so placed back on 6L. 12/14: On NRB; team tried to remove NRB but desatted so switched to HFNC. 12/14: Ordered CXR to reevaluate lungs given new worsening O2 sats. 12/14: CXR largely unchanged from prior on 12/3, so getting CT chest.     Pt assessed at bedside. Rx and labs reviewed. Pt reports a good appetite today and reflective PO intake; meal tray at bedside observed ~50% consumed. Pt states he feels good and intends to eat more of his meal. No new c/o NVCD or difficult chew/swallow. Clinically, pt continues to await lung transplant. RDN will continue to reassess, intervene, and monitor as appropriate.     Previous Nutrition Diagnosis: Increased Nutrient Needs (protein) RT hypermetabolic demands AEB HF, bacterial pneumonia    Active [ x  ]  Resolved [  ]    If resolved, new PES:     Goal: Pt to meet at least 75% nutrition needs during hospital stay.     Recommendations:  - Continue DASH/TLC Diet.   - Monitor Diet tolerance and %PO intake, encourage as able. If PO intake declines, monitor need for ONS.  - Monitor BG, electrolytes; replete PRN  - Monitor weights, GI distress, skin  - Pain and bowel regimen per team  - MVI, VitC 500mg/day, Zinc 220mg [id number] use   - RD to provide diet edu PRN  - Monitor risk for malnutrition    Education: Encouraged pt to meet nutritional needs as able    Risk Level: High [   ] Moderate [ x ] Low [   ].

## 2022-12-21 NOTE — PROGRESS NOTE ADULT - PROBLEM SELECTOR PLAN 2
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  Weaned off milrinone and Florentin  - TTE 12/19: PASP 57mmHg (prior 44mmHg 11/28/22), septal "knuckle" w/o LVOT obstruction, grossly normal-appearing LV function, possible diastolic dysfunction, trace MR/DE, mild TR, trivial pericardial effusion  Plan:  - On HFNC, wean as tolerated.  - PT, OOBTC  - c/w bowel regimen  - tyvaso as above, f/u outpt w/ Dr. Ng  - pulm following, appreciate recs (previously on slow prednisone taper, 20mg qd, however desaturated on 12/14, so started on pulse steroids)  - lasix 20IV x1 today 12/20, monitor UOP Mild precapillary pulmonary hypertension with normal CO/CI by Td was on Florentin, low dose milrinone, normal filling pressures suggesting precapillary PH 2/2 underlying severe fibrotic ILD and adequate volume status.  Weaned off milrinone and Florentin  - TTE 12/19: PASP 57mmHg (prior 44mmHg 11/28/22), septal "knuckle" w/o LVOT obstruction, grossly normal-appearing LV function, possible diastolic dysfunction, trace MR/AL, mild TR, trivial pericardial effusion  - s/p lasix 20IV x1 12/20, w/ good UOP  Plan:  - On HFNC, wean as tolerated  - PT, OOBTC  - c/w bowel regimen  - tyvaso as above, f/u outpt w/ Dr. Ng  - pulm following, appreciate recs (previously on slow prednisone taper, 20mg qd, however desaturated on 12/14, so started on pulse steroids)  - trial BiPAP overnight

## 2022-12-21 NOTE — PROGRESS NOTE ADULT - PROBLEM SELECTOR PLAN 1
Post COVID ILD/ pulmonary fibrosis, now presenting with worsening cough and elevated WBC count with elevated procalcitonin and CT chest performed at OSH with concern for newly developing ggos when compared to prior CT scan done in october. Completed course of zosyn.   - CXR and CT chest ordered 12/14 for worsening O2 requirements: CT chest stable from previous scan, started solumedrol  Plan:  - solumedrol 40mg IV q6 (previously prednisone 20mg qd), c/w bactrim ppx  - Bactrim for PCP ppx  - Tyvaso 32mcg QID, specialty RN reported q4, 8AM, 12PM, 4PM, 8PM  - outpatient follow-up with Dr. Ng  - OOBTC/PT  - continue to work w/ PT    # F/u lung transplant   F/u pre-lung transplant ID recs: CMV IgG, EBV, serum QFT, Strongyloides IgG, hepatitis panel, HIV, varicella IgG, MMR IgG, toxoplasma IgG, influenza vaccine, Prevnar-20  - Quantiferon, and CMV titers sent for transplant, CMV IgG +, IgM negative. Quantiferon indeterminate.  - Encourage OOBTC  - f/u w/ pulm about pt getting listed for lung/heart transplant; process in place

## 2022-12-21 NOTE — PROGRESS NOTE ADULT - PROBLEM SELECTOR PLAN 3
Patient w/ chronic respiratory failure 2/2 pulmonary fibrosis, as above. Patient w/ prolonged hospital stay due to shortness of breath and inability to wean patient off HFNC.   - ABG today 12/20 to see if qualifies for [id number] Patient w/ chronic respiratory failure 2/2 pulmonary fibrosis, as above. Patient w/ prolonged hospital stay due to shortness of breath and inability to wean patient off HFNC. Repeat ABG to see if pt qualifies for [id number].  - f/u [id number] process

## 2022-12-21 NOTE — PROGRESS NOTE ADULT - ASSESSMENT
60 yo M w/ PMH HLD, pre-DM, and pulmonary fibrosis 2/2 COVID-19 infection (wheelchair bound at baseline, on 4L home O2) who presented to [facility] for several days of worsening SOB and generalized weakness with increasing O2 requirements, found to have acute hypoxic respiratory failure 2/2 pulmonary fibrosis with superimposed bacterial pneumonia and acute decompensated pulmonary HTN, transferred to [facility] for RHC i/s/o severe pulmonary disease and evaluation for potential lung transplant. Respiratory status improving but slowly. Tyvaso added 12/9.  60 yo M w/ PMH HLD, pre-DM, and pulmonary fibrosis 2/2 COVID-19 infection (wheelchair bound at baseline, on 4L home O2) who presented to [facility] for several days of worsening SOB and generalized weakness with increasing O2 requirements, found to have acute hypoxic respiratory failure 2/2 pulmonary fibrosis with superimposed bacterial pneumonia and acute decompensated pulmonary HTN, transferred to [facility] for RHC i/s/o severe pulmonary disease and evaluation for potential lung transplant. Respiratory status improved from admission but plateauing. Tyvaso added 12/9.

## 2022-12-21 NOTE — PROGRESS NOTE ADULT - SUBJECTIVE AND OBJECTIVE BOX
***INCOMPLETE NOTE    SUBJECTIVE / INTERVAL HPI: Patient seen and examined at bedside. No overnight events.    VITAL SIGNS:  Vital Signs Last 24 Hrs  T(C): 36.7 (21 Dec 2022 06:30), Max: 37 (20 Dec 2022 14:00)  T(F): 98.1 (21 Dec 2022 06:30), Max: 98.6 (20 Dec 2022 14:00)  HR: 98 (21 Dec 2022 05:16) (92 - 112)  BP: 135/83 (21 Dec 2022 04:21) (124/72 - 147/90)  BP(mean): 104 (21 Dec 2022 04:21) (91 - 113)  RR: 18 (21 Dec 2022 04:21) (16 - 20)  SpO2: 95% (21 Dec 2022 05:16) (91% - 95%)    Parameters below as of 21 Dec 2022 05:16  Patient On (Oxygen Delivery Method): nasal cannula, high flow  O2 Flow (L/min): 50  O2 Concentration (%): 40    PHYSICAL EXAM:    General: Resting comfortably in bed; NAD  HEENT: NC/AT, EOMI, anicteric sclera, MMM, neck supple, no nasal discharge  Cardiac: RRR; normal S1/S2, no MRG, no LE edema, no JVD  Respiratory: CTAB; no wheezes, ronchi, increased work of breathing, retractions  Gastrointestinal: +BSx4, abdomen soft, NT/ND; no rebound or guarding  Genitourinary: no suprapubic tenderness; pardo*; normal external genitalia  Extremities: WWP, no clubbing or cyanosis; no peripheral edema  Vascular: 2+ radial and DP pulses bilaterally  Dermatologic: skin warm, dry and intact; no rashes, open wounds  Neurologic: AAOx3; no focal deficits  Psychiatric: affect and characteristics of appearance, verbalizations, behaviors are appropriate    MEDICATIONS:  MEDICATIONS  (STANDING):  AQUAPHOR (petrolatum Ointment) 1 Application(s) Topical two times a day  benzocaine 15 mG/menthol 3.6 mG Lozenge 2 Lozenge Oral every 6 hours  benzonatate 200 milliGRAM(s) Oral every 8 hours  dextrose 5%. 1000 milliLiter(s) (50 mL/Hr) IV Continuous <Continuous>  dextrose 5%. 1000 milliLiter(s) (100 mL/Hr) IV Continuous <Continuous>  dextrose 50% Injectable 25 Gram(s) IV Push once  dextrose 50% Injectable 12.5 Gram(s) IV Push once  dextrose 50% Injectable 25 Gram(s) IV Push once  enoxaparin Injectable 40 milliGRAM(s) SubCutaneous every 24 hours  fluticasone propionate 50 MICROgram(s)/spray Nasal Spray 2 Spray(s) Both Nostrils two times a day  glucagon  Injectable 1 milliGRAM(s) IntraMuscular once  guaiFENesin Oral Liquid (Sugar-Free) 200 milliGRAM(s) Oral every 6 hours  influenza   Vaccine 0.5 milliLiter(s) IntraMuscular once  insulin lispro (ADMELOG) corrective regimen sliding scale   SubCutaneous Before meals and at bedtime  ipratropium    for Nebulization 500 MICROGram(s) Nebulizer every 6 hours  methylPREDNISolone sodium succinate Injectable 40 milliGRAM(s) IV Push every 6 hours  pantoprazole    Tablet 40 milliGRAM(s) Oral before breakfast  polyethylene glycol 3350 17 Gram(s) Oral daily  senna 2 Tablet(s) Oral at bedtime  tamsulosin 0.4 milliGRAM(s) Oral at bedtime  Treprostinil (Tyvaso) DPI 1 Inhalation 1 Inhalation Inhalation <User Schedule>    MEDICATIONS  (PRN):  acetaminophen     Tablet .. 650 milliGRAM(s) Oral every 6 hours PRN Mild Pain (1 - 3)  dextrose Oral Gel 15 Gram(s) Oral once PRN Blood Glucose LESS THAN 70 milliGRAM(s)/deciliter  melatonin 5 milliGRAM(s) Oral at bedtime PRN Insomnia      ALLERGIES:  Allergies    No Known Allergies    Intolerances        LABS:                        12.5   9.54  )-----------( 169      ( 21 Dec 2022 05:30 )             38.6     12-21    136  |  94<L>  |  25<H>  ----------------------------<  196<H>  4.6   |  38<H>  |  0.64    Ca    8.9      21 Dec 2022 05:30  Phos  3.4     12-21  Mg     2.1     12-21          CAPILLARY BLOOD GLUCOSE      POCT Blood Glucose.: 190 mg/dL (21 Dec 2022 07:05)      RADIOLOGY & ADDITIONAL TESTS: Reviewed.   SUBJECTIVE / INTERVAL HPI: Patient seen and examined at bedside. No overnight events. Per patient, he has not been on BiPAP these past two nights as no one put it on him. States he slept a bit more yesterday night and the cough is a little bit better. No chest pain, still short of breath when he moves around. Yesterday received 20 IV lasix, pt with good urine output.    VITAL SIGNS:  Vital Signs Last 24 Hrs  T(C): 36.7 (21 Dec 2022 06:30), Max: 37 (20 Dec 2022 14:00)  T(F): 98.1 (21 Dec 2022 06:30), Max: 98.6 (20 Dec 2022 14:00)  HR: 98 (21 Dec 2022 05:16) (92 - 112)  BP: 135/83 (21 Dec 2022 04:21) (124/72 - 147/90)  BP(mean): 104 (21 Dec 2022 04:21) (91 - 113)  RR: 18 (21 Dec 2022 04:21) (16 - 20)  SpO2: 95% (21 Dec 2022 05:16) (91% - 95%)    Parameters below as of 21 Dec 2022 05:16  Patient On (Oxygen Delivery Method): nasal cannula, high flow  O2 Flow (L/min): 50  O2 Concentration (%): 40    PHYSICAL EXAM:  General: Resting comfortably in bed; NAD; HFNC 50/40  HEENT: NC/AT, EOMI, anicteric sclera, neck supple, no nasal discharge  Cardiac: regular rhythm, mildly tachycardic; normal S1/S2, no MRG  Respiratory: BL diffuse ronchi, more prominent in basilar regions; no increased work of breathing, retractions; on HFNC  Gastrointestinal: +BSx4, abdomen soft, NT/ND; no rebound or guarding  Extremities: WWPx4; no peripheral edema; clubbing in fingers and toes BL  Dermatologic: skin warm, dry and intact; no rashes, open wounds  Neurologic: AAOx3; no focal deficits    MEDICATIONS:  MEDICATIONS  (STANDING):  AQUAPHOR (petrolatum Ointment) 1 Application(s) Topical two times a day  benzocaine 15 mG/menthol 3.6 mG Lozenge 2 Lozenge Oral every 6 hours  benzonatate 200 milliGRAM(s) Oral every 8 hours  dextrose 5%. 1000 milliLiter(s) (50 mL/Hr) IV Continuous <Continuous>  dextrose 5%. 1000 milliLiter(s) (100 mL/Hr) IV Continuous <Continuous>  dextrose 50% Injectable 25 Gram(s) IV Push once  dextrose 50% Injectable 12.5 Gram(s) IV Push once  dextrose 50% Injectable 25 Gram(s) IV Push once  enoxaparin Injectable 40 milliGRAM(s) SubCutaneous every 24 hours  fluticasone propionate 50 MICROgram(s)/spray Nasal Spray 2 Spray(s) Both Nostrils two times a day  glucagon  Injectable 1 milliGRAM(s) IntraMuscular once  guaiFENesin Oral Liquid (Sugar-Free) 200 milliGRAM(s) Oral every 6 hours  influenza   Vaccine 0.5 milliLiter(s) IntraMuscular once  insulin lispro (ADMELOG) corrective regimen sliding scale   SubCutaneous Before meals and at bedtime  ipratropium    for Nebulization 500 MICROGram(s) Nebulizer every 6 hours  methylPREDNISolone sodium succinate Injectable 40 milliGRAM(s) IV Push every 6 hours  pantoprazole    Tablet 40 milliGRAM(s) Oral before breakfast  polyethylene glycol 3350 17 Gram(s) Oral daily  senna 2 Tablet(s) Oral at bedtime  tamsulosin 0.4 milliGRAM(s) Oral at bedtime  Treprostinil (Tyvaso) DPI 1 Inhalation 1 Inhalation Inhalation <User Schedule>    MEDICATIONS  (PRN):  acetaminophen     Tablet .. 650 milliGRAM(s) Oral every 6 hours PRN Mild Pain (1 - 3)  dextrose Oral Gel 15 Gram(s) Oral once PRN Blood Glucose LESS THAN 70 milliGRAM(s)/deciliter  melatonin 5 milliGRAM(s) Oral at bedtime PRN Insomnia      ALLERGIES:  Allergies    No Known Allergies    Intolerances        LABS:                        12.5   9.54  )-----------( 169      ( 21 Dec 2022 05:30 )             38.6     12-21    136  |  94<L>  |  25<H>  ----------------------------<  196<H>  4.6   |  38<H>  |  0.64    Ca    8.9      21 Dec 2022 05:30  Phos  3.4     12-21  Mg     2.1     12-21          CAPILLARY BLOOD GLUCOSE      POCT Blood Glucose.: 190 mg/dL (21 Dec 2022 07:05)      RADIOLOGY & ADDITIONAL TESTS: Reviewed.

## 2022-12-22 NOTE — PROGRESS NOTE ADULT - ASSESSMENT
62 yo M w/ PMH HLD, pre-DM, and pulmonary fibrosis 2/2 COVID-19 infection (wheelchair bound at baseline, on 4L home O2) who presented to [facility] for several days of worsening SOB and generalized weakness with increasing O2 requirements, found to have acute hypoxic respiratory failure 2/2 pulmonary fibrosis with superimposed bacterial pneumonia and acute decompensated pulmonary HTN, transferred to [facility] for RHC i/s/o severe pulmonary disease and evaluation for potential lung transplant. Respiratory status improved from admission but plateauing. Tyvaso added 12/9.     INCOMPLETE 60 yo M w/ PMH HLD, pre-DM, and pulmonary fibrosis 2/2 COVID-19 infection (wheelchair bound at baseline, on 4L home O2) who presented to [facility] for several days of worsening SOB and generalized weakness with increasing O2 requirements, found to have acute hypoxic respiratory failure 2/2 pulmonary fibrosis with superimposed bacterial pneumonia and acute decompensated pulmonary HTN, transferred to [facility] for RHC i/s/o severe pulmonary disease and evaluation for potential lung transplant. Respiratory status improved from admission but plateauing. Tyvaso added 12/9.

## 2022-12-22 NOTE — PROGRESS NOTE ADULT - PROBLEM SELECTOR PLAN 1
Post COVID ILD/ pulmonary fibrosis, now presenting with worsening cough and elevated WBC count with elevated procalcitonin and CT chest performed at OSH with concern for newly developing ggos when compared to prior CT scan done in october. Completed course of zosyn.   - CXR and CT chest ordered 12/14 for worsening O2 requirements: CT chest stable from previous scan, started solumedrol  Plan:  - solumedrol 40mg IV q6 (previously prednisone 20mg qd), c/w bactrim ppx  - Bactrim for PCP ppx  - Tyvaso 32mcg QID, specialty RN reported q4, 8AM, 12PM, 4PM, 8PM  - outpatient follow-up with Dr. Ng  - OOBTC/PT  - continue to work w/ PT    # F/u lung transplant   F/u pre-lung transplant ID recs: CMV IgG, EBV, serum QFT, Strongyloides IgG, hepatitis panel, HIV, varicella IgG, MMR IgG, toxoplasma IgG, influenza vaccine, Prevnar-20  - Quantiferon, and CMV titers sent for transplant, CMV IgG +, IgM negative. Quantiferon indeterminate.  - Encourage OOBTC  - f/u w/ pulm about pt getting listed for lung/heart transplant; process in place Post COVID ILD/ pulmonary fibrosis, now presenting with worsening cough and elevated WBC count with elevated procalcitonin and CT chest performed at OSH with concern for newly developing ggos when compared to prior CT scan done in october. Completed course of zosyn. Cannot confirm cause of ILD at this time, will send lab workup.  - CXR and CT chest ordered 12/14 for worsening O2 requirements: CT chest stable from previous scan, started solumedrol  Plan:  - d/c solumedrol, start prednisone 60mg PO qD  - Bactrim for PCP ppx  - Tyvaso 32mcg QID, specialty RN reported q4, 8AM, 12PM, 4PM, 8PM  - outpatient follow-up with Dr. Ng  - OOBTC/PT  - continue to work w/ PT  - f/u DIANA, anti CCP, RF  - start lasix 20mg PO qD    # F/u lung transplant   F/u pre-lung transplant ID recs: CMV IgG, EBV, serum QFT, Strongyloides IgG, hepatitis panel, HIV, varicella IgG, MMR IgG, toxoplasma IgG, influenza vaccine, Prevnar-20  - Quantiferon, and CMV titers sent for transplant, CMV IgG +, IgM negative. Quantiferon indeterminate.  - Encourage OOBTC  - f/u w/ pulm about pt getting listed for lung/heart transplant; process in place

## 2022-12-22 NOTE — PROGRESS NOTE ADULT - PROBLEM SELECTOR PLAN 3
Patient w/ chronic respiratory failure 2/2 pulmonary fibrosis, as above. Patient w/ prolonged hospital stay due to shortness of breath and inability to wean patient off HFNC. Repeat ABG to see if pt qualifies for [id number].  - f/u [id number] process Patient w/ chronic respiratory failure 2/2 pulmonary fibrosis, as above. Patient w/ prolonged hospital stay due to shortness of breath and inability to wean patient off HFNC. Repeat ABG to see if pt qualifies for [id number].  - f/u [id number] process, plan to d/c Tuesday when respiratory can meet pt and family at home

## 2022-12-22 NOTE — OCCUPATIONAL THERAPY INITIAL EVALUATION ADULT - ADDITIONAL COMMENTS
Pt resides in private home with 4 steps to negotiate, uses w/c mostly for functional mobility, able to ambulate few steps with RW. Pt has a regular bed, shower chair and toilet bars. Pt states that his sons is able to help him as needed, and he is able to transfer to w/c independently.

## 2022-12-22 NOTE — PROGRESS NOTE ADULT - SUBJECTIVE AND OBJECTIVE BOX
***INCOMPLETE NOTE    SUBJECTIVE / INTERVAL HPI: Patient seen and examined at bedside. No overnight events.    VITAL SIGNS:  Vital Signs Last 24 Hrs  T(C): 36.7 (22 Dec 2022 13:26), Max: 36.9 (21 Dec 2022 17:41)  T(F): 98 (22 Dec 2022 13:26), Max: 98.5 (21 Dec 2022 17:41)  HR: 96 (22 Dec 2022 15:58) (88 - 110)  BP: 149/83 (22 Dec 2022 15:58) (118/75 - 149/83)  BP(mean): 109 (22 Dec 2022 15:58) (90 - 109)  RR: 16 (22 Dec 2022 15:58) (15 - 33)  SpO2: 94% (22 Dec 2022 15:58) (93% - 99%)    Parameters below as of 22 Dec 2022 15:58  Patient On (Oxygen Delivery Method): nasal cannula  O2 Flow (L/min): 6      PHYSICAL EXAM:    General: Resting comfortably in bed; NAD  HEENT: NC/AT, EOMI, anicteric sclera, MMM, neck supple, no nasal discharge  Cardiac: RRR; normal S1/S2, no MRG, no LE edema, no JVD  Respiratory: CTAB; no wheezes, ronchi, increased work of breathing, retractions  Gastrointestinal: +BSx4, abdomen soft, NT/ND; no rebound or guarding  Genitourinary: no suprapubic tenderness; pardo*; normal external genitalia  Extremities: WWP, no clubbing or cyanosis; no peripheral edema  Vascular: 2+ radial and DP pulses bilaterally  Dermatologic: skin warm, dry and intact; no rashes, open wounds  Neurologic: AAOx3; no focal deficits  Psychiatric: affect and characteristics of appearance, verbalizations, behaviors are appropriate    MEDICATIONS:  MEDICATIONS  (STANDING):  AQUAPHOR (petrolatum Ointment) 1 Application(s) Topical two times a day  benzocaine 15 mG/menthol 3.6 mG Lozenge 2 Lozenge Oral every 6 hours  benzonatate 200 milliGRAM(s) Oral every 8 hours  dextrose 5%. 1000 milliLiter(s) (50 mL/Hr) IV Continuous <Continuous>  dextrose 5%. 1000 milliLiter(s) (100 mL/Hr) IV Continuous <Continuous>  dextrose 50% Injectable 25 Gram(s) IV Push once  dextrose 50% Injectable 12.5 Gram(s) IV Push once  dextrose 50% Injectable 25 Gram(s) IV Push once  enoxaparin Injectable 40 milliGRAM(s) SubCutaneous every 24 hours  fluticasone propionate 50 MICROgram(s)/spray Nasal Spray 2 Spray(s) Both Nostrils two times a day  glucagon  Injectable 1 milliGRAM(s) IntraMuscular once  guaiFENesin Oral Liquid (Sugar-Free) 200 milliGRAM(s) Oral every 6 hours  influenza   Vaccine 0.5 milliLiter(s) IntraMuscular once  insulin lispro (ADMELOG) corrective regimen sliding scale   SubCutaneous Before meals and at bedtime  ipratropium    for Nebulization 500 MICROGram(s) Nebulizer every 6 hours  pantoprazole    Tablet 40 milliGRAM(s) Oral before breakfast  polyethylene glycol 3350 17 Gram(s) Oral daily  senna 2 Tablet(s) Oral at bedtime  tamsulosin 0.4 milliGRAM(s) Oral at bedtime  Treprostinil (Tyvaso) DPI 1 Inhalation 1 Inhalation Inhalation <User Schedule>  trimethoprim  160 mG/sulfamethoxazole 800 mG 1 Tablet(s) Oral every 24 hours    MEDICATIONS  (PRN):  acetaminophen     Tablet .. 650 milliGRAM(s) Oral every 6 hours PRN Mild Pain (1 - 3)  dextrose Oral Gel 15 Gram(s) Oral once PRN Blood Glucose LESS THAN 70 milliGRAM(s)/deciliter  melatonin 5 milliGRAM(s) Oral at bedtime PRN Insomnia      ALLERGIES:  Allergies    No Known Allergies    Intolerances        LABS:                        12.1   9.67  )-----------( 162      ( 22 Dec 2022 07:00 )             37.8     12-22    134<L>  |  93<L>  |  25<H>  ----------------------------<  227<H>  4.8   |  37<H>  |  0.64    Ca    8.6      22 Dec 2022 07:00  Phos  2.9     12-22  Mg     2.1     12-22          CAPILLARY BLOOD GLUCOSE      POCT Blood Glucose.: 239 mg/dL (22 Dec 2022 11:46)      RADIOLOGY & ADDITIONAL TESTS: Reviewed.   SUBJECTIVE / INTERVAL HPI: Patient seen and examined at bedside. On BiPAP overnight, tolerated well. Patient feels okay this morning. Feels his cough and breathing is about the same. Pt w/ better O2 saturations on HFNC today.    VITAL SIGNS:  Vital Signs Last 24 Hrs  T(C): 36.7 (22 Dec 2022 13:26), Max: 36.9 (21 Dec 2022 17:41)  T(F): 98 (22 Dec 2022 13:26), Max: 98.5 (21 Dec 2022 17:41)  HR: 96 (22 Dec 2022 15:58) (88 - 110)  BP: 149/83 (22 Dec 2022 15:58) (118/75 - 149/83)  BP(mean): 109 (22 Dec 2022 15:58) (90 - 109)  RR: 16 (22 Dec 2022 15:58) (15 - 33)  SpO2: 94% (22 Dec 2022 15:58) (93% - 99%)    Parameters below as of 22 Dec 2022 15:58  Patient On (Oxygen Delivery Method): nasal cannula  O2 Flow (L/min): 6      PHYSICAL EXAM:  General: Resting comfortably in bed; NAD; HFNC 40/40  HEENT: NC/AT, EOMI, anicteric sclera, neck supple, no nasal discharge  Cardiac: regular rhythm, mildly tachycardic; normal S1/S2, no MRG  Respiratory: BL diffuse ronchi, more prominent in basilar regions; no increased work of breathing, retractions  Gastrointestinal: +BSx4, abdomen soft, NT/ND; no rebound or guarding  Extremities: WWPx4; no peripheral edema; clubbing in fingers and toes BL  Dermatologic: skin warm, dry and intact; no rashes, open wounds  Neurologic: AAOx3; no focal deficits    MEDICATIONS:  MEDICATIONS  (STANDING):  AQUAPHOR (petrolatum Ointment) 1 Application(s) Topical two times a day  benzocaine 15 mG/menthol 3.6 mG Lozenge 2 Lozenge Oral every 6 hours  benzonatate 200 milliGRAM(s) Oral every 8 hours  dextrose 5%. 1000 milliLiter(s) (50 mL/Hr) IV Continuous <Continuous>  dextrose 5%. 1000 milliLiter(s) (100 mL/Hr) IV Continuous <Continuous>  dextrose 50% Injectable 25 Gram(s) IV Push once  dextrose 50% Injectable 12.5 Gram(s) IV Push once  dextrose 50% Injectable 25 Gram(s) IV Push once  enoxaparin Injectable 40 milliGRAM(s) SubCutaneous every 24 hours  fluticasone propionate 50 MICROgram(s)/spray Nasal Spray 2 Spray(s) Both Nostrils two times a day  glucagon  Injectable 1 milliGRAM(s) IntraMuscular once  guaiFENesin Oral Liquid (Sugar-Free) 200 milliGRAM(s) Oral every 6 hours  influenza   Vaccine 0.5 milliLiter(s) IntraMuscular once  insulin lispro (ADMELOG) corrective regimen sliding scale   SubCutaneous Before meals and at bedtime  ipratropium    for Nebulization 500 MICROGram(s) Nebulizer every 6 hours  pantoprazole    Tablet 40 milliGRAM(s) Oral before breakfast  polyethylene glycol 3350 17 Gram(s) Oral daily  senna 2 Tablet(s) Oral at bedtime  tamsulosin 0.4 milliGRAM(s) Oral at bedtime  Treprostinil (Tyvaso) DPI 1 Inhalation 1 Inhalation Inhalation <User Schedule>  trimethoprim  160 mG/sulfamethoxazole 800 mG 1 Tablet(s) Oral every 24 hours    MEDICATIONS  (PRN):  acetaminophen     Tablet .. 650 milliGRAM(s) Oral every 6 hours PRN Mild Pain (1 - 3)  dextrose Oral Gel 15 Gram(s) Oral once PRN Blood Glucose LESS THAN 70 milliGRAM(s)/deciliter  melatonin 5 milliGRAM(s) Oral at bedtime PRN Insomnia      ALLERGIES:  Allergies    No Known Allergies    Intolerances        LABS:                        12.1   9.67  )-----------( 162      ( 22 Dec 2022 07:00 )             37.8     12-22    134<L>  |  93<L>  |  25<H>  ----------------------------<  227<H>  4.8   |  37<H>  |  0.64    Ca    8.6      22 Dec 2022 07:00  Phos  2.9     12-22  Mg     2.1     12-22          CAPILLARY BLOOD GLUCOSE      POCT Blood Glucose.: 239 mg/dL (22 Dec 2022 11:46)      RADIOLOGY & ADDITIONAL TESTS: Reviewed.

## 2022-12-22 NOTE — OCCUPATIONAL THERAPY INITIAL EVALUATION ADULT - PERTINENT HX OF CURRENT PROBLEM, REHAB EVAL
62 yo M w/ PMH HLD, pre-DM, and pulmonary fibrosis 2/2 COVID-19 infection (wheelchair bound at baseline, on 4L home O2) who presented to [facility] [place] for several days of worsening SOB and generalized weakness with increasing O2 requirements, found to have acute hypoxic respiratory failure 2/2 pulmonary fibrosis with superimposed bacterial pneumonia and acute decompensated pulmonary HTN, transferred to [facility] for RHC i/s/o severe pulmonary disease and evaluation for potential lung transplant. Respiratory status improved from admission but plateauing.

## 2022-12-22 NOTE — PROGRESS NOTE ADULT - PROBLEM SELECTOR PLAN 2
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  Weaned off milrinone and Florentin  - TTE 12/19: PASP 57mmHg (prior 44mmHg 11/28/22), septal "knuckle" w/o LVOT obstruction, grossly normal-appearing LV function, possible diastolic dysfunction, trace MR/IN, mild TR, trivial pericardial effusion  - s/p lasix 20IV x1 12/20, w/ good UOP  Plan:  - On HFNC, wean as tolerated  - PT, OOBTC  - c/w bowel regimen  - tyvaso as above, f/u outpt w/ Dr. Ng  - pulm following, appreciate recs (previously on slow prednisone taper, 20mg qd, however desaturated on 12/14, so started on pulse steroids)  - trial BiPAP overnight Mild precapillary pulmonary hypertension with normal CO/CI by Td was on Florentin, low dose milrinone, normal filling pressures suggesting precapillary PH 2/2 underlying severe fibrotic ILD and adequate volume status.  Weaned off milrinone and Florentin  - TTE 12/19: PASP 57mmHg (prior 44mmHg 11/28/22), septal "knuckle" w/o LVOT obstruction, grossly normal-appearing LV function, possible diastolic dysfunction, trace MR/OR, mild TR, trivial pericardial effusion  - s/p lasix 20IV x1 12/20, w/ good UOP  Plan:  - On HFNC, wean to NC today  - PT, OOBTC  - c/w bowel regimen  - tyvaso as above, f/u outpt w/ Dr. Ng  - pulm following, appreciate recs (previously on slow prednisone taper, 20mg qd, however desaturated on 12/14, so started on pulse steroids)  - c/w BiPAP overnight

## 2022-12-22 NOTE — OCCUPATIONAL THERAPY INITIAL EVALUATION ADULT - GENERAL OBSERVATIONS, REHAB EVAL
Pt's RN Cynthia aware of intent to eval/tx; cleared Pt. Pt received in supine - +telemetry, heplock, 02 via NC 6L. Pt w/ good affect, agreeable to OT.

## 2022-12-22 NOTE — OCCUPATIONAL THERAPY INITIAL EVALUATION ADULT - MD ORDER
Pulmonary Fibrosis, Pulmonary HTN  Post COVID ILD/ pulmonary fibrosis - worsening cough  F/u pre-lung transplant

## 2022-12-23 NOTE — PROGRESS NOTE ADULT - ASSESSMENT
62 yo M w/ PMH HLD, pre-DM, and pulmonary fibrosis 2/2 COVID-19 infection (wheelchair bound at baseline, on 4L home O2) who presented to [facility] for several days of worsening SOB and generalized weakness with increasing O2 requirements, found to have acute hypoxic respiratory failure 2/2 pulmonary fibrosis with superimposed bacterial pneumonia and acute decompensated pulmonary HTN, transferred to [facility] for RHC i/s/o severe pulmonary disease and evaluation for potential lung transplant. Respiratory status improved from admission but plateauing. Tyvaso added 12/9.     INCOMPLETE 62 yo M w/ PMH HLD, pre-DM, and pulmonary fibrosis 2/2 COVID-19 infection (wheelchair bound at baseline, on 4L home O2) who presented to [facility] for several days of worsening SOB and generalized weakness with increasing O2 requirements, found to have acute hypoxic respiratory failure 2/2 pulmonary fibrosis with superimposed bacterial pneumonia and acute decompensated pulmonary HTN, transferred to [facility] for RHC i/s/o severe pulmonary disease and evaluation for potential lung transplant. Respiratory status improved from admission but plateauing. Tyvaso added 12/9.

## 2022-12-23 NOTE — PROGRESS NOTE ADULT - PROBLEM SELECTOR PLAN 2
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  Weaned off milrinone and Florentin  - TTE 12/19: PASP 57mmHg (prior 44mmHg 11/28/22), septal "knuckle" w/o LVOT obstruction, grossly normal-appearing LV function, possible diastolic dysfunction, trace MR/KS, mild TR, trivial pericardial effusion  - s/p lasix 20IV x1 12/20, w/ good UOP  Plan:  - On HFNC, wean to NC today  - PT, OOBTC  - c/w bowel regimen  - tyvaso as above, f/u outpt w/ Dr. Ng  - pulm following, appreciate recs (previously on slow prednisone taper, 20mg qd, however desaturated on 12/14, so started on pulse steroids)  - c/w BiPAP overnight

## 2022-12-23 NOTE — PROGRESS NOTE ADULT - SUBJECTIVE AND OBJECTIVE BOX
***INCOMPLETE NOTE    SUBJECTIVE / INTERVAL HPI: Patient seen and examined at bedside. No overnight events.    VITAL SIGNS:  Vital Signs Last 24 Hrs  T(C): 36.8 (23 Dec 2022 01:00), Max: 36.8 (23 Dec 2022 01:00)  T(F): 98.3 (23 Dec 2022 01:00), Max: 98.3 (23 Dec 2022 01:00)  HR: 96 (23 Dec 2022 05:30) (72 - 118)  BP: 126/81 (23 Dec 2022 03:50) (112/70 - 149/83)  BP(mean): 95 (23 Dec 2022 03:50) (85 - 109)  RR: 22 (23 Dec 2022 05:30) (15 - 35)  SpO2: 92% (23 Dec 2022 05:30) (92% - 99%)    Parameters below as of 23 Dec 2022 05:30  Patient On (Oxygen Delivery Method): nasal cannula, high flow  O2 Flow (L/min): 40  O2 Concentration (%): 45    PHYSICAL EXAM:    General: Resting comfortably in bed; NAD  HEENT: NC/AT, EOMI, anicteric sclera, MMM, neck supple, no nasal discharge  Cardiac: RRR; normal S1/S2, no MRG, no LE edema, no JVD  Respiratory: CTAB; no wheezes, ronchi, increased work of breathing, retractions  Gastrointestinal: +BSx4, abdomen soft, NT/ND; no rebound or guarding  Genitourinary: no suprapubic tenderness; pardo*; normal external genitalia  Extremities: WWP, no clubbing or cyanosis; no peripheral edema  Vascular: 2+ radial and DP pulses bilaterally  Dermatologic: skin warm, dry and intact; no rashes, open wounds  Neurologic: AAOx3; no focal deficits  Psychiatric: affect and characteristics of appearance, verbalizations, behaviors are appropriate    MEDICATIONS:  MEDICATIONS  (STANDING):  AQUAPHOR (petrolatum Ointment) 1 Application(s) Topical two times a day  benzocaine 15 mG/menthol 3.6 mG Lozenge 2 Lozenge Oral every 6 hours  benzonatate 200 milliGRAM(s) Oral every 8 hours  dextrose 5%. 1000 milliLiter(s) (50 mL/Hr) IV Continuous <Continuous>  dextrose 5%. 1000 milliLiter(s) (100 mL/Hr) IV Continuous <Continuous>  dextrose 50% Injectable 25 Gram(s) IV Push once  dextrose 50% Injectable 12.5 Gram(s) IV Push once  dextrose 50% Injectable 25 Gram(s) IV Push once  enoxaparin Injectable 40 milliGRAM(s) SubCutaneous every 24 hours  fluticasone propionate 50 MICROgram(s)/spray Nasal Spray 2 Spray(s) Both Nostrils two times a day  furosemide    Tablet 20 milliGRAM(s) Oral every 24 hours  glucagon  Injectable 1 milliGRAM(s) IntraMuscular once  guaiFENesin Oral Liquid (Sugar-Free) 200 milliGRAM(s) Oral every 6 hours  influenza   Vaccine 0.5 milliLiter(s) IntraMuscular once  insulin lispro (ADMELOG) corrective regimen sliding scale   SubCutaneous Before meals and at bedtime  ipratropium    for Nebulization 500 MICROGram(s) Nebulizer every 6 hours  pantoprazole    Tablet 40 milliGRAM(s) Oral before breakfast  polyethylene glycol 3350 17 Gram(s) Oral daily  predniSONE   Tablet 60 milliGRAM(s) Oral every 24 hours  senna 2 Tablet(s) Oral at bedtime  tamsulosin 0.4 milliGRAM(s) Oral at bedtime  Treprostinil (Tyvaso) DPI 1 Inhalation 1 Inhalation Inhalation <User Schedule>  trimethoprim  160 mG/sulfamethoxazole 800 mG 1 Tablet(s) Oral every 24 hours    MEDICATIONS  (PRN):  acetaminophen     Tablet .. 650 milliGRAM(s) Oral every 6 hours PRN Mild Pain (1 - 3)  dextrose Oral Gel 15 Gram(s) Oral once PRN Blood Glucose LESS THAN 70 milliGRAM(s)/deciliter  melatonin 5 milliGRAM(s) Oral at bedtime PRN Insomnia      ALLERGIES:  Allergies    No Known Allergies    Intolerances        LABS:                        13.3   11.01 )-----------( 175      ( 23 Dec 2022 06:09 )             40.2     12-23    134<L>  |  93<L>  |  23  ----------------------------<  98  4.3   |  34<H>  |  0.66    Ca    8.6      23 Dec 2022 06:09  Phos  3.0     12-23  Mg     2.3     12-23    TPro  6.2  /  Alb  3.4  /  TBili  0.5  /  DBili  x   /  AST  16  /  ALT  28  /  AlkPhos  70  12-23        CAPILLARY BLOOD GLUCOSE      POCT Blood Glucose.: 97 mg/dL (23 Dec 2022 05:57)      RADIOLOGY & ADDITIONAL TESTS: Reviewed.   SUBJECTIVE / INTERVAL HPI: Patient seen and examined at bedside. Patient on BiPAP overnight, tolerated well.     VITAL SIGNS:  Vital Signs Last 24 Hrs  T(C): 36.8 (23 Dec 2022 01:00), Max: 36.8 (23 Dec 2022 01:00)  T(F): 98.3 (23 Dec 2022 01:00), Max: 98.3 (23 Dec 2022 01:00)  HR: 96 (23 Dec 2022 05:30) (72 - 118)  BP: 126/81 (23 Dec 2022 03:50) (112/70 - 149/83)  BP(mean): 95 (23 Dec 2022 03:50) (85 - 109)  RR: 22 (23 Dec 2022 05:30) (15 - 35)  SpO2: 92% (23 Dec 2022 05:30) (92% - 99%)    Parameters below as of 23 Dec 2022 05:30  Patient On (Oxygen Delivery Method): nasal cannula, high flow  O2 Flow (L/min): 40  O2 Concentration (%): 45    PHYSICAL EXAM:    General: Resting comfortably in bed; NAD  HEENT: NC/AT, EOMI, anicteric sclera, MMM, neck supple, no nasal discharge  Cardiac: RRR; normal S1/S2, no MRG, no LE edema, no JVD  Respiratory: CTAB; no wheezes, ronchi, increased work of breathing, retractions  Gastrointestinal: +BSx4, abdomen soft, NT/ND; no rebound or guarding  Genitourinary: no suprapubic tenderness; pardo*; normal external genitalia  Extremities: WWP, no clubbing or cyanosis; no peripheral edema  Vascular: 2+ radial and DP pulses bilaterally  Dermatologic: skin warm, dry and intact; no rashes, open wounds  Neurologic: AAOx3; no focal deficits  Psychiatric: affect and characteristics of appearance, verbalizations, behaviors are appropriate    MEDICATIONS:  MEDICATIONS  (STANDING):  AQUAPHOR (petrolatum Ointment) 1 Application(s) Topical two times a day  benzocaine 15 mG/menthol 3.6 mG Lozenge 2 Lozenge Oral every 6 hours  benzonatate 200 milliGRAM(s) Oral every 8 hours  dextrose 5%. 1000 milliLiter(s) (50 mL/Hr) IV Continuous <Continuous>  dextrose 5%. 1000 milliLiter(s) (100 mL/Hr) IV Continuous <Continuous>  dextrose 50% Injectable 25 Gram(s) IV Push once  dextrose 50% Injectable 12.5 Gram(s) IV Push once  dextrose 50% Injectable 25 Gram(s) IV Push once  enoxaparin Injectable 40 milliGRAM(s) SubCutaneous every 24 hours  fluticasone propionate 50 MICROgram(s)/spray Nasal Spray 2 Spray(s) Both Nostrils two times a day  furosemide    Tablet 20 milliGRAM(s) Oral every 24 hours  glucagon  Injectable 1 milliGRAM(s) IntraMuscular once  guaiFENesin Oral Liquid (Sugar-Free) 200 milliGRAM(s) Oral every 6 hours  influenza   Vaccine 0.5 milliLiter(s) IntraMuscular once  insulin lispro (ADMELOG) corrective regimen sliding scale   SubCutaneous Before meals and at bedtime  ipratropium    for Nebulization 500 MICROGram(s) Nebulizer every 6 hours  pantoprazole    Tablet 40 milliGRAM(s) Oral before breakfast  polyethylene glycol 3350 17 Gram(s) Oral daily  predniSONE   Tablet 60 milliGRAM(s) Oral every 24 hours  senna 2 Tablet(s) Oral at bedtime  tamsulosin 0.4 milliGRAM(s) Oral at bedtime  Treprostinil (Tyvaso) DPI 1 Inhalation 1 Inhalation Inhalation <User Schedule>  trimethoprim  160 mG/sulfamethoxazole 800 mG 1 Tablet(s) Oral every 24 hours    MEDICATIONS  (PRN):  acetaminophen     Tablet .. 650 milliGRAM(s) Oral every 6 hours PRN Mild Pain (1 - 3)  dextrose Oral Gel 15 Gram(s) Oral once PRN Blood Glucose LESS THAN 70 milliGRAM(s)/deciliter  melatonin 5 milliGRAM(s) Oral at bedtime PRN Insomnia      ALLERGIES:  Allergies    No Known Allergies    Intolerances        LABS:                        13.3   11.01 )-----------( 175      ( 23 Dec 2022 06:09 )             40.2     12-23    134<L>  |  93<L>  |  23  ----------------------------<  98  4.3   |  34<H>  |  0.66    Ca    8.6      23 Dec 2022 06:09  Phos  3.0     12-23  Mg     2.3     12-23    TPro  6.2  /  Alb  3.4  /  TBili  0.5  /  DBili  x   /  AST  16  /  ALT  28  /  AlkPhos  70  12-23        CAPILLARY BLOOD GLUCOSE      POCT Blood Glucose.: 97 mg/dL (23 Dec 2022 05:57)      RADIOLOGY & ADDITIONAL TESTS: Reviewed.   SUBJECTIVE / INTERVAL HPI: Patient seen and examined at bedside. Patient on BiPAP overnight, tolerated well. This morning feels okay, breathing is about the same. States that he can't breathe in well through his nose and this has been going on for a long time. When breathing through nose, patient becomes tachypneic and needs to breathe through his mouth.     VITAL SIGNS:  Vital Signs Last 24 Hrs  T(C): 36.8 (23 Dec 2022 01:00), Max: 36.8 (23 Dec 2022 01:00)  T(F): 98.3 (23 Dec 2022 01:00), Max: 98.3 (23 Dec 2022 01:00)  HR: 96 (23 Dec 2022 05:30) (72 - 118)  BP: 126/81 (23 Dec 2022 03:50) (112/70 - 149/83)  BP(mean): 95 (23 Dec 2022 03:50) (85 - 109)  RR: 22 (23 Dec 2022 05:30) (15 - 35)  SpO2: 92% (23 Dec 2022 05:30) (92% - 99%)    Parameters below as of 23 Dec 2022 05:30  Patient On (Oxygen Delivery Method): nasal cannula, high flow  O2 Flow (L/min): 40  O2 Concentration (%): 45    PHYSICAL EXAM:  General: Resting comfortably in bed; NAD; HFNC 40/45 saturating 93  HEENT: NC/AT, EOMI, anicteric sclera, neck supple, no nasal discharge  Cardiac: RR; normal S1/S2, no MRG  Respiratory: BL diffuse ronchi, more prominent in basilar regions; no increased work of breathing, retractions; becomes tachypneic with prolonged nose breathing  Gastrointestinal: +BSx4, abdomen soft, NT/ND; no rebound or guarding  Extremities: WWPx4; no peripheral edema; clubbing in fingers and toes BL  Dermatologic: skin warm, dry and intact; no rashes, open wounds  Neurologic: AAOx3; no focal deficits    MEDICATIONS:  MEDICATIONS  (STANDING):  AQUAPHOR (petrolatum Ointment) 1 Application(s) Topical two times a day  benzocaine 15 mG/menthol 3.6 mG Lozenge 2 Lozenge Oral every 6 hours  benzonatate 200 milliGRAM(s) Oral every 8 hours  dextrose 5%. 1000 milliLiter(s) (50 mL/Hr) IV Continuous <Continuous>  dextrose 5%. 1000 milliLiter(s) (100 mL/Hr) IV Continuous <Continuous>  dextrose 50% Injectable 25 Gram(s) IV Push once  dextrose 50% Injectable 12.5 Gram(s) IV Push once  dextrose 50% Injectable 25 Gram(s) IV Push once  enoxaparin Injectable 40 milliGRAM(s) SubCutaneous every 24 hours  fluticasone propionate 50 MICROgram(s)/spray Nasal Spray 2 Spray(s) Both Nostrils two times a day  furosemide    Tablet 20 milliGRAM(s) Oral every 24 hours  glucagon  Injectable 1 milliGRAM(s) IntraMuscular once  guaiFENesin Oral Liquid (Sugar-Free) 200 milliGRAM(s) Oral every 6 hours  influenza   Vaccine 0.5 milliLiter(s) IntraMuscular once  insulin lispro (ADMELOG) corrective regimen sliding scale   SubCutaneous Before meals and at bedtime  ipratropium    for Nebulization 500 MICROGram(s) Nebulizer every 6 hours  pantoprazole    Tablet 40 milliGRAM(s) Oral before breakfast  polyethylene glycol 3350 17 Gram(s) Oral daily  predniSONE   Tablet 60 milliGRAM(s) Oral every 24 hours  senna 2 Tablet(s) Oral at bedtime  tamsulosin 0.4 milliGRAM(s) Oral at bedtime  Treprostinil (Tyvaso) DPI 1 Inhalation 1 Inhalation Inhalation <User Schedule>  trimethoprim  160 mG/sulfamethoxazole 800 mG 1 Tablet(s) Oral every 24 hours    MEDICATIONS  (PRN):  acetaminophen     Tablet .. 650 milliGRAM(s) Oral every 6 hours PRN Mild Pain (1 - 3)  dextrose Oral Gel 15 Gram(s) Oral once PRN Blood Glucose LESS THAN 70 milliGRAM(s)/deciliter  melatonin 5 milliGRAM(s) Oral at bedtime PRN Insomnia      ALLERGIES:  Allergies    No Known Allergies    Intolerances        LABS:                        13.3   11.01 )-----------( 175      ( 23 Dec 2022 06:09 )             40.2     12-23    134<L>  |  93<L>  |  23  ----------------------------<  98  4.3   |  34<H>  |  0.66    Ca    8.6      23 Dec 2022 06:09  Phos  3.0     12-23  Mg     2.3     12-23    TPro  6.2  /  Alb  3.4  /  TBili  0.5  /  DBili  x   /  AST  16  /  ALT  28  /  AlkPhos  70  12-23        CAPILLARY BLOOD GLUCOSE      POCT Blood Glucose.: 97 mg/dL (23 Dec 2022 05:57)      RADIOLOGY & ADDITIONAL TESTS: Reviewed.

## 2022-12-23 NOTE — PROGRESS NOTE ADULT - PROBLEM SELECTOR PLAN 3
Patient w/ chronic respiratory failure 2/2 pulmonary fibrosis, as above. Patient w/ prolonged hospital stay due to shortness of breath and inability to wean patient off HFNC. Repeat ABG to see if pt qualifies for [id number].  - f/u [id number] process, plan to d/c Tuesday when respiratory can meet pt and family at home

## 2022-12-23 NOTE — PROGRESS NOTE ADULT - PROBLEM SELECTOR PLAN 1
Post COVID ILD/ pulmonary fibrosis, now presenting with worsening cough and elevated WBC count with elevated procalcitonin and CT chest performed at OSH with concern for newly developing ggos when compared to prior CT scan done in october. Completed course of zosyn. Cannot confirm cause of ILD at this time, will send lab workup.  - CXR and CT chest ordered 12/14 for worsening O2 requirements: CT chest stable from previous scan, started solumedrol  Plan:  - d/c solumedrol, start prednisone 60mg PO qD  - Bactrim for PCP ppx  - Tyvaso 32mcg QID, specialty RN reported q4, 8AM, 12PM, 4PM, 8PM  - outpatient follow-up with Dr. Ng  - OOBTC/PT  - continue to work w/ PT  - f/u DIANA, anti CCP, RF  - start lasix 20mg PO qD    # F/u lung transplant   F/u pre-lung transplant ID recs: CMV IgG, EBV, serum QFT, Strongyloides IgG, hepatitis panel, HIV, varicella IgG, MMR IgG, toxoplasma IgG, influenza vaccine, Prevnar-20  - Quantiferon, and CMV titers sent for transplant, CMV IgG +, IgM negative. Quantiferon indeterminate.  - Encourage OOBTC  - f/u w/ pulm about pt getting listed for lung/heart transplant; process in place Post COVID ILD/ pulmonary fibrosis, now presenting with worsening cough and elevated WBC count with elevated procalcitonin and CT chest performed at OSH with concern for newly developing ggos when compared to prior CT scan done in october. Completed course of zosyn. Cannot confirm cause of ILD at this time, will send lab workup.  - CXR and CT chest ordered 12/14 for worsening O2 requirements: CT chest stable from previous scan, started solumedrol  Plan:  - c/w prednisone 60mg PO qD  - bactrim for PCP ppx  - Tyvaso 32mcg QID, specialty RN reported q4, 8AM, 12PM, 4PM, 8PM  - outpatient follow-up with Dr. Ng  - OOBTC/PT  - continue to work w/ PT  - f/u DIANA, anti CCP, RF  - lasix 20mg PO qD    # F/u lung transplant   F/u pre-lung transplant ID recs: CMV IgG, EBV, serum QFT, Strongyloides IgG, hepatitis panel, HIV, varicella IgG, MMR IgG, toxoplasma IgG, influenza vaccine, Prevnar-20  - Quantiferon, and CMV titers sent for transplant, CMV IgG +, IgM negative. Quantiferon indeterminate.  - Encourage OOBTC  - f/u w/ pulm about pt getting listed for lung/heart transplant; process in place

## 2022-12-24 NOTE — PROGRESS NOTE ADULT - PROBLEM SELECTOR PLAN 3
Patient w/ chronic respiratory failure 2/2 pulmonary fibrosis, as above. Patient w/ prolonged hospital stay due to shortness of breath and inability to wean patient off HFNC. Repeat ABG to see if pt qualifies for [id number].  - f/u [id number] process, plan to d/c Tuesday when respiratory can meet pt and family at home

## 2022-12-24 NOTE — PROGRESS NOTE ADULT - PROBLEM SELECTOR PLAN 1
Post COVID ILD/ pulmonary fibrosis, now presenting with worsening cough and elevated WBC count with elevated procalcitonin and CT chest performed at OSH with concern for newly developing ggos when compared to prior CT scan done in october. Completed course of zosyn. Cannot confirm cause of ILD at this time, will send lab workup.  - CXR and CT chest ordered 12/14 for worsening O2 requirements: CT chest stable from previous scan, started solumedrol  Plan:  - c/w prednisone 60mg PO qD  - bactrim for PCP ppx  - Tyvaso 32mcg QID, specialty RN reported q4, 8AM, 12PM, 4PM, 8PM  - outpatient follow-up with Dr. Ng  - OOBTC/PT  - continue to work w/ PT  - f/u DIANA, anti CCP, RF  - lasix 20mg PO qD    # F/u lung transplant   F/u pre-lung transplant ID recs: CMV IgG, EBV, serum QFT, Strongyloides IgG, hepatitis panel, HIV, varicella IgG, MMR IgG, toxoplasma IgG, influenza vaccine, Prevnar-20  - Quantiferon, and CMV titers sent for transplant, CMV IgG +, IgM negative. Quantiferon indeterminate.  - Encourage OOBTC  - f/u w/ pulm about pt getting listed for lung/heart transplant; process in place Post COVID ILD/ pulmonary fibrosis, now presenting with worsening cough and elevated WBC count with elevated procalcitonin and CT chest performed at OSH with concern for newly developing ggos when compared to prior CT scan done in october. Completed course of zosyn. Cannot confirm cause of ILD at this time, will send lab workup.  - CXR and CT chest ordered 12/14 for worsening O2 requirements: CT chest stable from previous scan, started solumedrol  Plan:  - start prednisone taper (decrease prednisone by 10mg every 3 days, stop bactrim ppx when prednisone course finished): prednisone 60mg PO qD last dose 12/24, prednisone 50mg PO qD x3d (12/25-12/27), prednisone 40mg PO qD x3d (12/28-30)...  - bactrim for PCP ppx, d/c when steroid taper finished  - Tyvaso 32mcg QID, specialty RN reported q4, 8AM, 12PM, 4PM, 8PM  - outpatient follow-up with Dr. Ng  - OOBTC/PT  - continue to work w/ PT  - f/u DIANA, anti CCP, RF  - lasix 20mg PO qD    # F/u lung transplant   F/u pre-lung transplant ID recs: CMV IgG, EBV, serum QFT, Strongyloides IgG, hepatitis panel, HIV, varicella IgG, MMR IgG, toxoplasma IgG, influenza vaccine, Prevnar-20  - Quantiferon, and CMV titers sent for transplant, CMV IgG +, IgM negative. Quantiferon indeterminate.  - Encourage OOBTC  - f/u w/ pulm about pt getting listed for lung/heart transplant; process in place

## 2022-12-24 NOTE — PROGRESS NOTE ADULT - PROBLEM SELECTOR PLAN 2
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  Weaned off milrinone and Florentin  - TTE 12/19: PASP 57mmHg (prior 44mmHg 11/28/22), septal "knuckle" w/o LVOT obstruction, grossly normal-appearing LV function, possible diastolic dysfunction, trace MR/GA, mild TR, trivial pericardial effusion  - s/p lasix 20IV x1 12/20, w/ good UOP  Plan:  - On HFNC, wean to NC today  - PT, OOBTC  - c/w bowel regimen  - tyvaso as above, f/u outpt w/ Dr. Ng  - pulm following, appreciate recs (previously on slow prednisone taper, 20mg qd, however desaturated on 12/14, so started on pulse steroids)  - c/w BiPAP overnight Mild precapillary pulmonary hypertension with normal CO/CI by Td was on Florentin, low dose milrinone, normal filling pressures suggesting precapillary PH 2/2 underlying severe fibrotic ILD and adequate volume status.  Weaned off milrinone and Florentin  - TTE 12/19: PASP 57mmHg (prior 44mmHg 11/28/22), septal "knuckle" w/o LVOT obstruction, grossly normal-appearing LV function, possible diastolic dysfunction, trace MR/IL, mild TR, trivial pericardial effusion  - s/p lasix 20IV x1 12/20, w/ good UOP  Plan:  - c/w 6L NC and BiPAP at night, HFNC as needed for increased WOB or O2  - PT, OOBTC  - c/w bowel regimen  - tyvaso as above, f/u outpt w/ Dr. Hernandez Medina pulbridgette following, appreciate recs (previously on slow prednisone taper, 20mg qd, however desaturated on 12/14, so started on pulse steroids)  - c/w BiPAP overnight

## 2022-12-24 NOTE — PROGRESS NOTE ADULT - SUBJECTIVE AND OBJECTIVE BOX
SUBJECTIVE / INTERVAL HPI: Patient seen and examined at bedside. Patient on BiPAP for 4hrs overnight, pt requested to remove it because could not tolerate. Pt's [birth year] today. Denies SOB, CP.     VITAL SIGNS:  Vital Signs Last 24 Hrs  T(C): 36.9 (24 Dec 2022 18:00), Max: 36.9 (24 Dec 2022 01:00)  T(F): 98.5 (24 Dec 2022 18:00), Max: 98.5 (24 Dec 2022 18:00)  HR: 103 (24 Dec 2022 18:05) (100 - 110)  BP: 121/69 (24 Dec 2022 18:05) (116/76 - 131/73)  BP(mean): 88 (24 Dec 2022 18:05) (88 - 99)  RR: 20 (24 Dec 2022 18:05) (18 - 20)  SpO2: 94% (24 Dec 2022 18:05) (89% - 96%)    Parameters below as of 24 Dec 2022 18:05  Patient On (Oxygen Delivery Method): nasal cannula w/ humidification  O2 Flow (L/min): 6      PHYSICAL EXAM:  General: Resting comfortably in bed; NAD  HEENT: NC/AT, EOMI, anicteric sclera, neck supple, no nasal discharge  Cardiac: RR; normal S1/S2, no MRG  Respiratory: BL diffuse ronchi, more prominent in basilar regions; no increased work of breathing, retractions; on 6L NC 90% O2  Gastrointestinal: +BSx4, abdomen soft, NT/ND; no rebound or guarding  Extremities: WWPx4; no peripheral edema; clubbing in fingers and toes BL  Dermatologic: skin warm, dry and intact; no rashes, open wounds  Neurologic: AAOx3; no focal deficits    MEDICATIONS:  MEDICATIONS  (STANDING):  AQUAPHOR (petrolatum Ointment) 1 Application(s) Topical two times a day  benzocaine 15 mG/menthol 3.6 mG Lozenge 2 Lozenge Oral every 6 hours  benzonatate 200 milliGRAM(s) Oral every 8 hours  dextrose 5%. 1000 milliLiter(s) (50 mL/Hr) IV Continuous <Continuous>  dextrose 5%. 1000 milliLiter(s) (100 mL/Hr) IV Continuous <Continuous>  dextrose 50% Injectable 25 Gram(s) IV Push once  dextrose 50% Injectable 12.5 Gram(s) IV Push once  dextrose 50% Injectable 25 Gram(s) IV Push once  enoxaparin Injectable 40 milliGRAM(s) SubCutaneous every 24 hours  fluticasone propionate 50 MICROgram(s)/spray Nasal Spray 2 Spray(s) Both Nostrils two times a day  furosemide    Tablet 20 milliGRAM(s) Oral every 24 hours  glucagon  Injectable 1 milliGRAM(s) IntraMuscular once  guaiFENesin Oral Liquid (Sugar-Free) 200 milliGRAM(s) Oral every 6 hours  influenza   Vaccine 0.5 milliLiter(s) IntraMuscular once  insulin lispro (ADMELOG) corrective regimen sliding scale   SubCutaneous Before meals and at bedtime  ipratropium    for Nebulization 500 MICROGram(s) Nebulizer every 6 hours  pantoprazole    Tablet 40 milliGRAM(s) Oral before breakfast  polyethylene glycol 3350 17 Gram(s) Oral daily  predniSONE   Tablet 60 milliGRAM(s) Oral every 24 hours  senna 2 Tablet(s) Oral at bedtime  tamsulosin 0.4 milliGRAM(s) Oral at bedtime  Treprostinil (Tyvaso) DPI 1 Inhalation 1 Inhalation Inhalation <User Schedule>  trimethoprim  160 mG/sulfamethoxazole 800 mG 1 Tablet(s) Oral every 24 hours    MEDICATIONS  (PRN):  acetaminophen     Tablet .. 650 milliGRAM(s) Oral every 6 hours PRN Mild Pain (1 - 3)  bisacodyl 5 milliGRAM(s) Oral every 12 hours PRN Constipation  dextrose Oral Gel 15 Gram(s) Oral once PRN Blood Glucose LESS THAN 70 milliGRAM(s)/deciliter  melatonin 5 milliGRAM(s) Oral at bedtime PRN Insomnia      ALLERGIES:  Allergies    No Known Allergies    Intolerances        LABS:                        13.1   11.18 )-----------( 177      ( 24 Dec 2022 06:06 )             40.3     12-24    132<L>  |  90<L>  |  25<H>  ----------------------------<  137<H>  4.6   |  35<H>  |  0.77    Ca    8.7      24 Dec 2022 06:06  Phos  3.1     12-24  Mg     2.1     12-24    TPro  6.2  /  Alb  3.4  /  TBili  0.5  /  DBili  x   /  AST  16  /  ALT  28  /  AlkPhos  70  12-23        CAPILLARY BLOOD GLUCOSE      POCT Blood Glucose.: 202 mg/dL (24 Dec 2022 18:11)      RADIOLOGY & ADDITIONAL TESTS: Reviewed.

## 2022-12-25 NOTE — PROGRESS NOTE ADULT - SUBJECTIVE AND OBJECTIVE BOX
OVERNIGHT EVENTS: JOSE    SUBJECTIVE / INTERVAL HPI: Patient seen and examined at bedside. Tolerated BiPAP overnight, now with HFNC, comfortable in bed. No acute complaints at this time.     VITAL SIGNS:  Vital Signs Last 24 Hrs  T(C): 36.8 (25 Dec 2022 10:04), Max: 36.9 (24 Dec 2022 18:00)  T(F): 98.3 (25 Dec 2022 10:04), Max: 98.5 (24 Dec 2022 18:00)  HR: 109 (25 Dec 2022 08:20) (99 - 110)  BP: 113/76 (25 Dec 2022 08:20) (109/67 - 131/73)  BP(mean): 89 (25 Dec 2022 08:20) (82 - 99)  RR: 20 (25 Dec 2022 08:20) (18 - 22)  SpO2: 92% (25 Dec 2022 08:20) (88% - 94%)    Parameters below as of 25 Dec 2022 08:20  Patient On (Oxygen Delivery Method): nasal cannula, high flow  O2 Flow (L/min): 40  O2 Concentration (%): 50  I&O's Summary    24 Dec 2022 07:01  -  25 Dec 2022 07:00  --------------------------------------------------------  IN: 0 mL / OUT: 500 mL / NET: -500 mL    25 Dec 2022 07:01  -  25 Dec 2022 11:26  --------------------------------------------------------  IN: 225 mL / OUT: 900 mL / NET: -675 mL        PHYSICAL EXAM:    General: Resting comfortably in bed; NAD; HFNC 40/50  HEENT: NC/AT, EOMI, anicteric sclera, neck supple, no nasal discharge  Cardiac: RR; normal S1/S2, no MRG  Respiratory: BL diffuse ronchi, no increased work of breathing, retractions  Gastrointestinal: +BSx4, abdomen soft, NT/ND; no rebound or guarding  Extremities: WWPx4; no peripheral edema; clubbing in fingers and toes BL  Dermatologic: skin warm, dry and intact; no rashes, open wounds  Neurologic: AAOx3; no focal deficits    MEDICATIONS:  MEDICATIONS  (STANDING):  AQUAPHOR (petrolatum Ointment) 1 Application(s) Topical two times a day  benzocaine 15 mG/menthol 3.6 mG Lozenge 2 Lozenge Oral every 6 hours  benzonatate 200 milliGRAM(s) Oral every 8 hours  dextrose 5%. 1000 milliLiter(s) (100 mL/Hr) IV Continuous <Continuous>  dextrose 5%. 1000 milliLiter(s) (50 mL/Hr) IV Continuous <Continuous>  dextrose 50% Injectable 25 Gram(s) IV Push once  dextrose 50% Injectable 12.5 Gram(s) IV Push once  dextrose 50% Injectable 25 Gram(s) IV Push once  enoxaparin Injectable 40 milliGRAM(s) SubCutaneous every 24 hours  fluticasone propionate 50 MICROgram(s)/spray Nasal Spray 2 Spray(s) Both Nostrils two times a day  furosemide    Tablet 20 milliGRAM(s) Oral every 24 hours  glucagon  Injectable 1 milliGRAM(s) IntraMuscular once  guaiFENesin Oral Liquid (Sugar-Free) 200 milliGRAM(s) Oral every 6 hours  influenza   Vaccine 0.5 milliLiter(s) IntraMuscular once  insulin lispro (ADMELOG) corrective regimen sliding scale   SubCutaneous Before meals and at bedtime  ipratropium    for Nebulization 500 MICROGram(s) Nebulizer every 6 hours  pantoprazole    Tablet 40 milliGRAM(s) Oral before breakfast  polyethylene glycol 3350 17 Gram(s) Oral daily  predniSONE   Tablet 50 milliGRAM(s) Oral every 24 hours  senna 2 Tablet(s) Oral at bedtime  tamsulosin 0.4 milliGRAM(s) Oral at bedtime  Treprostinil (Tyvaso) DPI 1 Inhalation 1 Inhalation Inhalation <User Schedule>  trimethoprim  160 mG/sulfamethoxazole 800 mG 1 Tablet(s) Oral every 24 hours    MEDICATIONS  (PRN):  acetaminophen     Tablet .. 650 milliGRAM(s) Oral every 6 hours PRN Mild Pain (1 - 3)  bisacodyl 5 milliGRAM(s) Oral every 12 hours PRN Constipation  dextrose Oral Gel 15 Gram(s) Oral once PRN Blood Glucose LESS THAN 70 milliGRAM(s)/deciliter  melatonin 5 milliGRAM(s) Oral at bedtime PRN Insomnia      ALLERGIES:  Allergies    No Known Allergies    Intolerances        LABS:                        12.9   10.52 )-----------( 167      ( 25 Dec 2022 05:48 )             39.4     12-25    134<L>  |  92<L>  |  20  ----------------------------<  128<H>  3.8   |  36<H>  |  0.66    Ca    8.6      25 Dec 2022 05:48  Phos  3.1     12-25  Mg     2.1     12-25    TPro  6.2  /  Alb  3.4  /  TBili  0.7  /  DBili  x   /  AST  13  /  ALT  22  /  AlkPhos  71  12-25        CAPILLARY BLOOD GLUCOSE      POCT Blood Glucose.: 260 mg/dL (25 Dec 2022 11:22)      RADIOLOGY & ADDITIONAL TESTS: Reviewed.

## 2022-12-25 NOTE — PROGRESS NOTE ADULT - PROBLEM SELECTOR PLAN 2
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  Weaned off milrinone and Florentin  - TTE 12/19: PASP 57mmHg (prior 44mmHg 11/28/22), septal "knuckle" w/o LVOT obstruction, grossly normal-appearing LV function, possible diastolic dysfunction, trace MR/ND, mild TR, trivial pericardial effusion  - s/p lasix 20IV x1 12/20, w/ good UOP  Plan:  - c/w 6L NC and BiPAP at night, HFNC as needed for increased WOB or O2  - PT, OOBTC  - c/w bowel regimen  - tyvaso as above, f/u outpt w/ Dr. Hernandez Medina pulbridgette following, appreciate recs (previously on slow prednisone taper, 20mg qd, however desaturated on 12/14, so started on pulse steroids)  - c/w BiPAP overnight

## 2022-12-25 NOTE — PROGRESS NOTE ADULT - PROBLEM SELECTOR PLAN 3
Patient w/ chronic respiratory failure 2/2 pulmonary fibrosis, as above. Patient w/ prolonged hospital stay due to shortness of breath and inability to wean patient off HFNC. Repeat ABG to see if pt qualifies for [id number].  - f/u [id number] process, plan to d/c Tuesday when respiratory can meet pt and family at home

## 2022-12-25 NOTE — PROGRESS NOTE ADULT - PROBLEM SELECTOR PLAN 1
Post COVID ILD/ pulmonary fibrosis, now presenting with worsening cough and elevated WBC count with elevated procalcitonin and CT chest performed at OSH with concern for newly developing ggos when compared to prior CT scan done in october. Completed course of zosyn. Cannot confirm cause of ILD at this time, will send lab workup.  - CXR and CT chest ordered 12/14 for worsening O2 requirements: CT chest stable from previous scan, started solumedrol  Plan:  - c/w prednisone taper (decrease prednisone by 10mg every 3 days, stop bactrim ppx when prednisone course finished): prednisone 60mg PO qD last dose 12/24, prednisone 50mg PO qD x3d (12/25-12/27), prednisone 40mg PO qD x3d (12/28-30)...  - bactrim for PCP ppx, d/c when steroid taper finished  - Tyvaso 32mcg QID, specialty RN reported q4, 8AM, 12PM, 4PM, 8PM  - outpatient follow-up with Dr. Ng  - OOBTC/PT  - continue to work w/ PT  - f/u DIANA, anti CCP, RF  - lasix 20mg PO qD    # F/u lung transplant   F/u pre-lung transplant ID recs: CMV IgG, EBV, serum QFT, Strongyloides IgG, hepatitis panel, HIV, varicella IgG, MMR IgG, toxoplasma IgG, influenza vaccine, Prevnar-20  - Quantiferon, and CMV titers sent for transplant, CMV IgG +, IgM negative. Quantiferon indeterminate.  - Encourage OOBTC  - f/u w/ pulm about pt getting listed for lung/heart transplant; process in place

## 2022-12-25 NOTE — PROGRESS NOTE ADULT - ASSESSMENT
60 yo M w/ PMH HLD, pre-DM, and pulmonary fibrosis 2/2 COVID-19 infection (wheelchair bound at baseline, on 4L home O2) who presented to [facility] for several days of worsening SOB and generalized weakness with increasing O2 requirements, found to have acute hypoxic respiratory failure 2/2 pulmonary fibrosis with superimposed bacterial pneumonia and acute decompensated pulmonary HTN, transferred to [facility] for RHC i/s/o severe pulmonary disease and evaluation for potential lung transplant. Respiratory status improved from admission but plateauing. Tyvaso added 12/9.

## 2022-12-26 NOTE — PROGRESS NOTE ADULT - PROBLEM SELECTOR PLAN 2
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  Weaned off milrinone and Florentin  - TTE 12/19: PASP 57mmHg (prior 44mmHg 11/28/22), septal "knuckle" w/o LVOT obstruction, grossly normal-appearing LV function, possible diastolic dysfunction, trace MR/CT, mild TR, trivial pericardial effusion  - s/p lasix 20IV x1 12/20, w/ good UOP  Plan:  - c/w 6L NC and BiPAP at night, HFNC as needed for increased WOB or O2  - PT, OOBTC  - c/w bowel regimen  - tyvaso as above, f/u outpt w/ Dr. Hernandez Medina pulbridgette following, appreciate recs (previously on slow prednisone taper, 20mg qd, however desaturated on 12/14, so started on pulse steroids)  - c/w BiPAP overnight

## 2022-12-26 NOTE — PROGRESS NOTE ADULT - PROBLEM SELECTOR PLAN 3
Patient w/ chronic respiratory failure 2/2 pulmonary fibrosis, as above. Patient w/ prolonged hospital stay due to shortness of breath and inability to wean patient off HFNC. Repeat ABG to see if pt qualifies for [id number].  - f/u [id number] process, plan to d/c Tuesday when respiratory can meet pt and family at home

## 2022-12-26 NOTE — PROGRESS NOTE ADULT - SUBJECTIVE AND OBJECTIVE BOX
***INCOMPLETE NOTE    SUBJECTIVE / INTERVAL HPI: Patient seen and examined at bedside. No overnight events.    VITAL SIGNS:  Vital Signs Last 24 Hrs  T(C): 36.6 (26 Dec 2022 05:18), Max: 36.8 (25 Dec 2022 10:04)  T(F): 97.8 (26 Dec 2022 05:18), Max: 98.3 (25 Dec 2022 10:04)  HR: 103 (26 Dec 2022 05:08) (102 - 113)  BP: 113/66 (26 Dec 2022 05:08) (104/71 - 130/71)  BP(mean): 85 (26 Dec 2022 05:08) (83 - 91)  RR: 18 (26 Dec 2022 05:08) (18 - 22)  SpO2: 95% (26 Dec 2022 05:08) (92% - 97%)    Parameters below as of 26 Dec 2022 05:08  Patient On (Oxygen Delivery Method): nasal cannula, high flow  O2 Flow (L/min): 40  O2 Concentration (%): 50    PHYSICAL EXAM:    General: Resting comfortably in bed; NAD  HEENT: NC/AT, EOMI, anicteric sclera, MMM, neck supple, no nasal discharge  Cardiac: RRR; normal S1/S2, no MRG, no LE edema, no JVD  Respiratory: CTAB; no wheezes, ronchi, increased work of breathing, retractions  Gastrointestinal: +BSx4, abdomen soft, NT/ND; no rebound or guarding  Genitourinary: no suprapubic tenderness; pardo*; normal external genitalia  Extremities: WWP, no clubbing or cyanosis; no peripheral edema  Vascular: 2+ radial and DP pulses bilaterally  Dermatologic: skin warm, dry and intact; no rashes, open wounds  Neurologic: AAOx3; no focal deficits  Psychiatric: affect and characteristics of appearance, verbalizations, behaviors are appropriate    MEDICATIONS:  MEDICATIONS  (STANDING):  AQUAPHOR (petrolatum Ointment) 1 Application(s) Topical two times a day  benzocaine 15 mG/menthol 3.6 mG Lozenge 2 Lozenge Oral every 6 hours  benzonatate 200 milliGRAM(s) Oral every 8 hours  dextrose 5%. 1000 milliLiter(s) (50 mL/Hr) IV Continuous <Continuous>  dextrose 5%. 1000 milliLiter(s) (100 mL/Hr) IV Continuous <Continuous>  dextrose 50% Injectable 25 Gram(s) IV Push once  dextrose 50% Injectable 12.5 Gram(s) IV Push once  dextrose 50% Injectable 25 Gram(s) IV Push once  enoxaparin Injectable 40 milliGRAM(s) SubCutaneous every 24 hours  fluticasone propionate 50 MICROgram(s)/spray Nasal Spray 2 Spray(s) Both Nostrils two times a day  furosemide    Tablet 20 milliGRAM(s) Oral every 24 hours  glucagon  Injectable 1 milliGRAM(s) IntraMuscular once  guaiFENesin Oral Liquid (Sugar-Free) 200 milliGRAM(s) Oral every 6 hours  influenza   Vaccine 0.5 milliLiter(s) IntraMuscular once  insulin lispro (ADMELOG) corrective regimen sliding scale   SubCutaneous Before meals and at bedtime  ipratropium    for Nebulization 500 MICROGram(s) Nebulizer every 6 hours  pantoprazole    Tablet 40 milliGRAM(s) Oral before breakfast  polyethylene glycol 3350 17 Gram(s) Oral daily  predniSONE   Tablet 50 milliGRAM(s) Oral every 24 hours  senna 2 Tablet(s) Oral at bedtime  tamsulosin 0.4 milliGRAM(s) Oral at bedtime  Treprostinil (Tyvaso) DPI 1 Inhalation 1 Inhalation Inhalation <User Schedule>  trimethoprim  160 mG/sulfamethoxazole 800 mG 1 Tablet(s) Oral every 24 hours    MEDICATIONS  (PRN):  acetaminophen     Tablet .. 650 milliGRAM(s) Oral every 6 hours PRN Mild Pain (1 - 3)  bisacodyl 5 milliGRAM(s) Oral every 12 hours PRN Constipation  dextrose Oral Gel 15 Gram(s) Oral once PRN Blood Glucose LESS THAN 70 milliGRAM(s)/deciliter  melatonin 5 milliGRAM(s) Oral at bedtime PRN Insomnia      ALLERGIES:  Allergies    No Known Allergies    Intolerances        LABS:                        12.9   10.52 )-----------( 167      ( 25 Dec 2022 05:48 )             39.4     12-25    134<L>  |  92<L>  |  20  ----------------------------<  128<H>  3.8   |  36<H>  |  0.66    Ca    8.6      25 Dec 2022 05:48  Phos  3.1     12-25  Mg     2.1     12-25    TPro  6.2  /  Alb  3.4  /  TBili  0.7  /  DBili  x   /  AST  13  /  ALT  22  /  AlkPhos  71  12-25        CAPILLARY BLOOD GLUCOSE      POCT Blood Glucose.: 119 mg/dL (26 Dec 2022 06:52)      RADIOLOGY & ADDITIONAL TESTS: Reviewed.   SUBJECTIVE / INTERVAL HPI: Patient seen and examined at bedside. Pt on BiPAP overnight, tolerated well. He had a good weekend with his family visiting for his birthday and [place] and is overall feeling more hopeful about the future. Feels his breathing and cough is about the same. No chest pain.     VITAL SIGNS:  Vital Signs Last 24 Hrs  T(C): 36.6 (26 Dec 2022 05:18), Max: 36.8 (25 Dec 2022 10:04)  T(F): 97.8 (26 Dec 2022 05:18), Max: 98.3 (25 Dec 2022 10:04)  HR: 103 (26 Dec 2022 05:08) (102 - 113)  BP: 113/66 (26 Dec 2022 05:08) (104/71 - 130/71)  BP(mean): 85 (26 Dec 2022 05:08) (83 - 91)  RR: 18 (26 Dec 2022 05:08) (18 - 22)  SpO2: 95% (26 Dec 2022 05:08) (92% - 97%)    Parameters below as of 26 Dec 2022 05:08  Patient On (Oxygen Delivery Method): nasal cannula, high flow  O2 Flow (L/min): 40  O2 Concentration (%): 50    PHYSICAL EXAM:    General: Resting comfortably in bed; NAD  HEENT: NC/AT, EOMI, anicteric sclera, neck supple, no nasal discharge  Cardiac: RR; normal S1/S2, no MRG  Respiratory: bibasilar crackles (more fine compared to previous ronchi); no increased work of breathing, retractions; on HFNC  Gastrointestinal: +BSx4, abdomen soft, NT/ND; no rebound or guarding  Extremities: WWPx4; no peripheral edema; clubbing in fingers and toes BL  Dermatologic: skin warm, dry and intact; no rashes, open wounds  Neurologic: AAOx3; no focal deficits    MEDICATIONS:  MEDICATIONS  (STANDING):  AQUAPHOR (petrolatum Ointment) 1 Application(s) Topical two times a day  benzocaine 15 mG/menthol 3.6 mG Lozenge 2 Lozenge Oral every 6 hours  benzonatate 200 milliGRAM(s) Oral every 8 hours  dextrose 5%. 1000 milliLiter(s) (50 mL/Hr) IV Continuous <Continuous>  dextrose 5%. 1000 milliLiter(s) (100 mL/Hr) IV Continuous <Continuous>  dextrose 50% Injectable 25 Gram(s) IV Push once  dextrose 50% Injectable 12.5 Gram(s) IV Push once  dextrose 50% Injectable 25 Gram(s) IV Push once  enoxaparin Injectable 40 milliGRAM(s) SubCutaneous every 24 hours  fluticasone propionate 50 MICROgram(s)/spray Nasal Spray 2 Spray(s) Both Nostrils two times a day  furosemide    Tablet 20 milliGRAM(s) Oral every 24 hours  glucagon  Injectable 1 milliGRAM(s) IntraMuscular once  guaiFENesin Oral Liquid (Sugar-Free) 200 milliGRAM(s) Oral every 6 hours  influenza   Vaccine 0.5 milliLiter(s) IntraMuscular once  insulin lispro (ADMELOG) corrective regimen sliding scale   SubCutaneous Before meals and at bedtime  ipratropium    for Nebulization 500 MICROGram(s) Nebulizer every 6 hours  pantoprazole    Tablet 40 milliGRAM(s) Oral before breakfast  polyethylene glycol 3350 17 Gram(s) Oral daily  predniSONE   Tablet 50 milliGRAM(s) Oral every 24 hours  senna 2 Tablet(s) Oral at bedtime  tamsulosin 0.4 milliGRAM(s) Oral at bedtime  Treprostinil (Tyvaso) DPI 1 Inhalation 1 Inhalation Inhalation <User Schedule>  trimethoprim  160 mG/sulfamethoxazole 800 mG 1 Tablet(s) Oral every 24 hours    MEDICATIONS  (PRN):  acetaminophen     Tablet .. 650 milliGRAM(s) Oral every 6 hours PRN Mild Pain (1 - 3)  bisacodyl 5 milliGRAM(s) Oral every 12 hours PRN Constipation  dextrose Oral Gel 15 Gram(s) Oral once PRN Blood Glucose LESS THAN 70 milliGRAM(s)/deciliter  melatonin 5 milliGRAM(s) Oral at bedtime PRN Insomnia      ALLERGIES:  Allergies    No Known Allergies    Intolerances        LABS:                        12.9   10.52 )-----------( 167      ( 25 Dec 2022 05:48 )             39.4     12-25    134<L>  |  92<L>  |  20  ----------------------------<  128<H>  3.8   |  36<H>  |  0.66    Ca    8.6      25 Dec 2022 05:48  Phos  3.1     12-25  Mg     2.1     12-25    TPro  6.2  /  Alb  3.4  /  TBili  0.7  /  DBili  x   /  AST  13  /  ALT  22  /  AlkPhos  71  12-25        CAPILLARY BLOOD GLUCOSE      POCT Blood Glucose.: 119 mg/dL (26 Dec 2022 06:52)      RADIOLOGY & ADDITIONAL TESTS: Reviewed.

## 2022-12-26 NOTE — PROGRESS NOTE ADULT - ATTENDING SUPERVISION STATEMENT
Fellow
Fellow
Resident
Resident/Fellow
Fellow
Fellow
Resident
Fellow
Resident
Resident/Fellow
Resident/Fellow
Resident

## 2022-12-26 NOTE — PROGRESS NOTE ADULT - PROBLEM SELECTOR PLAN 1
Post COVID ILD/ pulmonary fibrosis, now presenting with worsening cough and elevated WBC count with elevated procalcitonin and CT chest performed at OSH with concern for newly developing ggos when compared to prior CT scan done in october. Completed course of zosyn. Cannot confirm cause of ILD at this time, will send lab workup.  - CXR and CT chest ordered 12/14 for worsening O2 requirements: CT chest stable from previous scan, started solumedrol  Plan:  - c/w prednisone taper (decrease prednisone by 10mg every 3 days, stop bactrim ppx when prednisone course finished): prednisone 60mg PO qD last dose 12/24, prednisone 50mg PO qD x3d (12/25-12/27), prednisone 40mg PO qD x3d (12/28-30)...  - bactrim for PCP ppx, d/c when steroid taper finished  - Tyvaso 32mcg QID, specialty RN reported q4, 8AM, 12PM, 4PM, 8PM  - outpatient follow-up with Dr. Ng  - OOBTC/PT  - continue to work w/ PT  - f/u [facility], anti CCP, RF  - lasix 20mg PO qD    # F/u lung transplant   F/u pre-lung transplant ID recs: CMV IgG, EBV, serum QFT, Strongyloides IgG, hepatitis panel, HIV, varicella IgG, MMR IgG, toxoplasma IgG, influenza vaccine, Prevnar-20  - Quantiferon, and CMV titers sent for transplant, CMV IgG +, IgM negative. Quantiferon indeterminate.  - Encourage OOBTC  - f/u w/ pulm about pt getting listed for lung/heart transplant; process in place Post COVID ILD/ pulmonary fibrosis, now presenting with worsening cough and elevated WBC count with elevated procalcitonin and CT chest performed at OSH with concern for newly developing ggos when compared to prior CT scan done in october. Completed course of zosyn. Cannot confirm cause of ILD at this time, will send lab workup.  - CXR and CT chest ordered 12/14 for worsening O2 requirements: CT chest stable from previous scan, started solumedrol  - RF mildly elevated to 30; f/u outpt for repeat levels  Plan:  - c/w prednisone taper (decrease prednisone by 10mg every 3 days, stop bactrim ppx when prednisone course finished): prednisone 60mg PO qD last dose 12/24, prednisone 50mg PO qD x3d (12/25-12/27), prednisone 40mg PO qD x3d (12/28-30)...  - bactrim for PCP ppx, d/c when steroid taper finished  - Tyvaso 32mcg QID, specialty RN reported q4, 8AM, 12PM, 4PM, 8PM  - outpatient follow-up with Dr. Ng  - OOBTC/PT  - continue to work w/ PT  - f/u [facility], anti CCP, RF  - lasix 20mg PO qD  - 1 additional lasix 20 IV x1    # F/u lung transplant   F/u pre-lung transplant ID recs: CMV IgG, EBV, serum QFT, Strongyloides IgG, hepatitis panel, HIV, varicella IgG, MMR IgG, toxoplasma IgG, influenza vaccine, Prevnar-20  - Quantiferon, and CMV titers sent for transplant, CMV IgG +, IgM negative. Quantiferon indeterminate.  - Encourage OOBTC  - f/u w/ pulm about pt getting listed for lung/heart transplant; process in place

## 2022-12-26 NOTE — PROGRESS NOTE ADULT - ASSESSMENT
60 yo M w/ PMH HLD, pre-DM, and pulmonary fibrosis 2/2 COVID-19 infection (wheelchair bound at baseline, on 4L home O2) who presented to [facility] for several days of worsening SOB and generalized weakness with increasing O2 requirements, found to have acute hypoxic respiratory failure 2/2 pulmonary fibrosis with superimposed bacterial pneumonia and acute decompensated pulmonary HTN, transferred to [facility] for RHC i/s/o severe pulmonary disease and evaluation for potential lung transplant. Respiratory status improved from admission but plateauing. Tyvaso added 12/9.

## 2022-12-26 NOTE — PROGRESS NOTE ADULT - ATTENDING COMMENTS
62 yo man ABO type A, BMI-36 with severe pulmonary fibrosis secondary to COVID-19. Poor functional capacity due to BURGESS, mostly wheelchair bound on 4L NC O2 at home. Seen at [facility] for pulmonary txp eval, but BMI above cutoff.     Now admitted to [facility] [place] with worsening dyspnea and syncopal episodes. Found to be severely hypoxic, placed on IABP. TTE showed dilated and severely dilated RV with normotension, normal Cr and normal LFTs.     Transferred to [facility] for ongoing care. Placed on HFNC, Florentin 20ppm and Milrinone 0.125. Received 2x doses of IV Lasix with good UOP. Net neg fluid balance ~3L. Received stress dose steroids as he was previously on steroids at home. Also treated with broad spectrum antibiotics for suspected superimposed PNA.    CXR shows diffuse bilateral interstitial infiltrates.   TTE showed severe RVD and dilation with some septum flattening. Underfilled LV with normal systolic function. Grade 1 diastolic dysfunction.    This AM -120/60-70, O2sat~96% on HFNC 60/60 and Florentin 20 and Mil 0.125    - Lasix 20 mg IV once today  - Continue low dose Milrinone for RV failure   - Wean Florentin slowly today   - Methylpred 1mg/kg daily   - Continue Vanc and Zosyn for now  - Plan for RHC Monday for long-term planning  - If hemodynamic deterioration occurs, consideration for [facility] should be made. Patient could be candidate for lung/heart-lung txp    Camden Houston MD .
Covid related chronic respiratory failure with fibrotic lung disease with group 3 PH.  Continue weaning off HF, tyvaso training over next 2 days.  Please add azelastine to fluticasone for nasal congestion.
PAH-ILD, now off Florentin but remains on HFNC and low dose milrinone. Will wean milrinone today and follow markers of perfusion while awaiting Tyvaso - spoke with specialty pharmacy, to reach out to patient and/or daughter today to discuss delivery and education. Remains significantly hypoxic with exertion, but improved hypoxia at rest. Has episodes of significant coughing still a/w hypoxia - encourage hycodan use.  Completed abx, continue with steroid taper, decrease by 10mg every 3-4 days as tolerated, would switch to prednisone 50mg tomorrow. OOBTC, aggressive bowel regimen, PT/OT. Repeat TTE reviewed - RV remains improved, PASP now of 90mmHg likely over estimated given recent cardiac cath evaluation.
Covid related chronic respiratory failure with fibrotic lung disease with group 3 PH.  Continue weaning off HF, tyvaso training over next 2 days.
PAH-ILD, tolerating Florentin wean, remains on HFNC and low dose milrinone. Continue to wean off Florentin then attempt to wean HFNC. Remains significantly hypoxic with exertion, but improved hypoxia at rest. Prior Auth for inh Tyvaso obtained, awaiting clinical review by specialty pharmacy, will be done urgently given new in patient start. Completed abx, continue with steroid taper, decrease by 10mg every 3-4 days as tolerated. OOBTC, aggressive bowel regimen, PT/OT. Recommend repeat echo to re-evaluate RV - bedside POCUS improved. Consider transfer to medicine telemetry once weaned off inotropes.
acute on chronic hypoxic respiratory failure in a patient with underlying fibrotic ILD post covid-19 infection 2/2 RV failure. Seen in lung transplant clinic and because of high BMI and poor functional status deemed to not be a candidate at that time with continued f/u. after talking to the patient today, he has been basically wheelchair bound for almost 2 years and fully dependant on his ADLS. With diuretics, wieght is down and BMI is now 33. agree with broad spectrum abx, steroids for IDL flare. He is on Oscar, and milrinone for right heart failure which is being weaned in consultation with the pulmonary hypertension team. will monitor his status and clinical course/
acute on chronic hypoxic respiratory failure in the setting of progressive fibrotic ILD 2/2covid-19 and group II pH. wean prednisone as above, awaiting initiation of tyvaso.
61M w/ pmh of HTN, HLD, ILD 2/2 Covid PNA currently on home 02 c/b pulmonary hyertension w/ RV dysfunction, initially presented to [facility] [place] [place], found to be in cardiogenic shock 2/2 acute on chronic RV failure, tx'd to [facility] CCU for further mgmt    -RV Failure - acute on chronic RV Failure w/ cardiogenic shock - Currently on Milrinone 0.125mcg and underwent significant IV diuresis, net negative ~7L - euvolemic on exam, HD stable, WWP w/ nml Hepatic and Renal function; s/p RHC (11/28): RA 2, RV 30/5, PA 44/14(25) PCWP 6, CO/CI 8/4 - Mild pre-capillary PH; Euvolemic/hypovolemic and Normal CO/CI; c/w Milrinone, wean Florentin - currently at 2-3ppm down from 20ppm yesterday, no further diuresis at this time; Pulmonary HTN team following, appreciate recs  -Pulm HTN - Group III PH 2/2 ILD, WHO Functional class IV on home 02 4L w/ RV dysfunction - p/w cardiogenic shock 2/2 RV failure now significantly improved w/ Milrinone assisted diuresis, s/p RHC(as above) - Mild Pre-cap PH 2/2 chronic hypoxemia; currently on HFNC 50% Fi02, Florentin now weaned down to 2-3ppm from 20ppm yesterday, currently tolerating well, plan to d/c tmrw and transition to Tyvaso; Pulm HTN following closely; Will begin transplant evaluation  -DASH diet  -OOB to chair / PT  -DVT PPx  -Dispo: CCU    Fredis Craven MD  CCU Attending
61M w/ pmh of HTN, HLD, ILD 2/2 Covid PNA currently on home 02 c/b pulmonary hyertension w/ RV dysfunction, [place] [facility] [place] to [place] in cardiogenic shock 2/2 acute on chronic RV failure, tx'd to [facility] CCU for further mgmt    -RV Failure - acute on chronic RV Failure w/ cardiogenic shock - Currently on Milrinone 0.125mcg and underwent significant IV diuresis, net negative ~7L - euvolemic on exam, HD stable, WWP w/ nml Hepatic and Renal function; s/p RHC (11/28): RA 2, RV 30/5, PA 44/14(25) PCWP 6, CO/CI 8/4 - Mild pre-capillary PH; Euvolemic/hypovolemic and Normal CO/CI; c/w Milrinone, wean Florentin, no further diuresis at this time; Pulmonary HTN team following, appreciate recs  -Pulm HTN - Group III PH 2/2 ILD, WHO Functional class IV on home 02 4L w/ RV dysfunction - p/w cardiogenic shock 2/2 RV failure now significantly improved w/ Milrinone assisted diuresis, s/p RHC(as above) - Mild Pre-cap PH 2/2 chronic hypoxemia; currently on HFNC and Florentin 20ppm - plan to wean to 15 and continue as tolerated; Pulm HTN following closely, may require Tyvaso overlap; Will begin transplant evaluation  -DASH diet  -OOB to chair / PT  -DVT PPx  -Dispo: CCU    Fredis Craven MD  CCU Attending
62 yo man ABO type A, BMI-36 with severe pulmonary fibrosis secondary to COVID-19. Poor functional capacity due to BURGESS, mostly wheelchair bound on 4L NC O2 at home. Seen at [facility] for pulmonary txp eval, but BMI above cutoff.     [place] [facility] [place] and syncopal episodes. Found to be severely hypoxic, placed on IABP. TTE showed dilated and severely dilated RV with normotension, normal Cr and normal LFTs.     Transferred to [facility] for ongoing care. Placed on HFNC, Florentin 20ppm and Milrinone 0.125. Received 2x doses of IV Lasix with good UOP. Net neg fluid balance ~3L first day. Received stress dose steroids as he was previously on steroids at home. Also treated with broad spectrum antibiotics for suspected superimposed PNA.    CXR shows diffuse bilateral interstitial infiltrates.   TTE showed severe RVD and dilation with some septum flattening. Underfilled LV with normal systolic function. Grade 1 diastolic dysfunction.    This AM -120/60-70, O2sat~96% on HFNC 60/60 and Mil 0.125  Weaned off Florentin this AM, tolerating well    ABG obtained yesterday with hypercapnia and O2 sat 99% on current FiO2    - Lasix 20 mg IV once today again  - Continue low dose Milrinone for RV failure   - Methylpred 40 mg per day  - Continue Vanc and Zosyn for now. ID consulted.  - Plan for RHC Monday for long-term planning. To be performed by pHTN team.  - If hemodynamic deterioration occurs, consideration for [facility]-ECMO should be made. Patient could be candidate for lung/heart-lung txp    Camden Houston MD .
acute on chronic hypoxic respiratory failure in a patient with underlying fibrotic ILD post covid-19 infection 2/2 RV failure. s/p rhc on 11-28 which showed mild pre cappillary Ph likely 2/2 ILD (group III disease). he is being weaned off Florentin, once Florentin has been weaned plan to wean off HFNC. nilirnone wean per HF team. complete 7 days of abx, taper methylpred to 30mg BID starting tomorrow. he has extremely poor baseline functional status and will ideally need to undergo rehab, and continue f/u with the transplant team outpatient.
61M w/ pmh of HTN, HLD, ILD 2/2 Covid PNA currently on home 02 c/b pulmonary hyertension w/ RV dysfunction, [place] [facility] [place] found to be in cardiogenic shock 2/2 acute on chronic RV failure, tx'd to [facility] CCU for further mgmt    -RV Failure - acute on chronic RV Failure w/ cardiogenic shock - - Has now undergone significant IV diuresis w/ assistance of Milrinone. s/p RHC (11/28): RA 2, RV 30/5, PA 44/14(25) PCWP 6, CO/CI 8/4 - Mild pre-capillary PH. He has since been wean off of Florentin(dc'd on 11/30)  Currently on Milrinone 0.125mcg - euvolemic on exam, HD stable, WWP w/ nml Hepatic and Renal function; Euvolemic/hypovolemic and Lactate WNL; Plan to d/c Milrinone, start Torsemide 10 PO daily and will f/u repeat perfusion markers  -Pulm HTN - Group III PH 2/2 ILD, WHO Functional class IV on home 02 4L w/ RV dysfunction - p/w cardiogenic shock 2/2 RV failure now significantly improved w/ Milrinone assisted diuresis, s/p RHC(as above) - Mild Pre-cap PH 2/2 chronic hypoxemia; currently on HFNC 50% Fi02, Florentin now weaned off, currently tolerating well, plan to initiate Tyvaso while inpatient; Pulm HTN following closely; Transplant evaluation has been initiated  -DASH diet  -OOB to chair / PT  -DVT PPx  -Dispo: CCU    Fredis Craven MD  CCU Attending
62 yo man ABO type A, BMI-36 with severe pulmonary fibrosis secondary to COVID-19. Poor functional capacity due to BURGESS, mostly wheelchair bound on 4L NC O2 at home. [facility], but BMI above cutoff.     Now admitted to [facility] [place] with worsening dyspnea and syncopal episodes. Found to be severely hypoxic, placed on IABP. TTE showed dilated and severely dilated RV with normotension, normal Cr and normal LFTs.     Transferred to [facility] for ongoing care. Placed on HFNC, Florentin 20ppm and Milrinone 0.125. Received 2x doses of IV Lasix with good UOP. Net neg fluid balance ~3L first day. Received stress dose steroids as he was previously on steroids at home. Also treated with broad spectrum antibiotics for suspected superimposed PNA.    CXR shows diffuse bilateral interstitial infiltrates.   TTE showed severe RVD and dilation with some septum flattening. Underfilled LV with normal systolic function. Grade 1 diastolic dysfunction.    This AM -120/60-70, O2sat~96% on HFNC 60/60 and Mil 0.125  Weaned off Florentin this AM, tolerating well    ABG obtained yesterday with hypercapnia and O2 sat 99% on current FiO2    BMI today <35    - Lasix 40 mg IV once today   - Continue low dose Milrinone for RV failure   - Methylpred 40 mg per day  - Continue Vanc and Zosyn for now. ID consulted. PCP being ruled-out.  - Plan for RHC Monday for long-term planning. To be performed by pHTN team.  - If hemodynamic deterioration occurs, consideration for [facility]-ECMO should be made. Patient could be candidate for lung/heart-lung txp    Camden Houston MD .
Acute hypoxic respiratory failure with ILD with Pulmonary HTN (waiting for Tyvaso). Clinical improvement, HFNC changed to nasal cannula. Rest as above.
Acute on chronic HRF 2/2 fibrotic ILD from previous covid infection, likely ILD flare 2/2 pneumonia and progression of PH-ILD. Continue to wean HFNC, on intermittent diuresis, appears euvolemic. Awaiting Tyvaso delivery and instruction which may aid in weaning oxygenation. Goal O2 sat >88%, goal to transition to 6L NC. PT/OT, OOBTC, IS.
COVID related ILD on home O2 with WHO class III PH. RV failure resolved. Continue to wean HFNC. Lung transplant workup in progress. Slow taper of prednisone. Check abg; may need NIV as serum bicarb is increasing. Rest of plan as above.
Patient seen and examined with house-staff during bedside rounds.  Resident note read, including vitals, physical findings, laboratory data, and radiological reports.   Revisions included below.  Direct personal management at bed side and extensive interpretation of the data.  Plan was outlined and discussed in details with the housestaff.  Decision making of high complexity  Action taken for acute disease activity to reflect the level of care provided:  - medication reconciliation  - review laboratory data  He is stable/.  I decreased the oxygen to 5 l/min and tolerated well.  Continue Rx for PH.  OOB.
Acute on chronic HRF 2/2 fibrotic ILD from previous covid infection, likely ILD flare 2/2 pneumonia and progression of PH-ILD. Started on tyvaso and weaned to nasal cannula.  Goal O2 sat >88%, continue to titrate down oxygen. OOBTC, PT/OT, discharge planning. Will need home oxygen of 5-6L given requirements during this admission.
COVID related ILD on home O2 with WHO class III PH. RV failure resolved. Has not been able to titrate off HFNC; has high O2 demands with ambulation. Started on Tyvaso with no significant improvement at this point. Continue to wean HFNC to baseline O2 requirement. Lung transplant workup in progress. Consider increasing steroid dose to see if that helps with decreasing FiO2 requirements. Rest of plan as per above.
Acute on chronic HRF 2/2 fibrotic ILD from previous covid infection, likely ILD flare 2/2 pneumonia and progression of PH-ILD. Continue to wean HFNC, has not required diuresis this week. Received Tyvaso and teaching, first dose given today, tolerated well. CT sinuses for severe nasal congestion with no acute pathology, will c/w flonase and 3 days of Affrin.   Goal O2 sat >88%, can likely transition to 6L NC. PT/OT, OOBTC, IS.
COVID related ILD on home O2 with WHO class III PH. RV failure resolved. Continue to wean HFNC to baseline O2 requirement. Lung transplant workup in progress. Slow taper of prednisone. Rest of plan as above.
Acute hypoxic respiratory failure with ILD with Pulmonary HTN (on Tyvaso). Continue diuresis. Plan to titrate from HFNC to nasal cannula. Plan for LIFE 2000 before discharge as he is unable to come off high flow.
Acute on chronic HRF 2/2 fibrotic ILD from previous covid infection, likely ILD flare 2/2 pneumonia and progression of PH-ILD. Continue to wean HFNC, on intermittent diuresis, appears euvolemic. Confirmed w/ specialty pharmacy Tyvaso delivery planned for today, nurse to coordinate instruction with daughter and patient. To start inhouse. Pt c/o significant nasal congestion, not responsive to flonase or nasal rinses. Has tried Afrin, will give tiral here and obtain CT sinuses.  Goal O2 sat >88%, goal to transition to 6L NC. PT/OT, OOBTC, IS.
Acute on chronic hypoxic respiratory failure and PH 2/2 ILD (presumed COVID fibrosis), approved for [id number] and to be d/c home on Tuesday with NIPPV. Tolerating 6LNC intermittently w/ HFNC, c/w OOBTC, PT/OT, IS, cough suppressants. On Tyvaso for PH-ILD, tolerating 32mcg QID. Plan to increase every 2 weeks as tolerated. Started on lasix 20mg daily for PH, improving oxygenation. On steroid taper, completed abx.
COVID related ILD on home O2 with WHO class III PH. RV failure resolved. Has not been able to titrate off HFNC; has high O2 demands with ambulation. Started on Tyvaso with no significant improvement at this point. Continue to wean HFNC to baseline O2 requirement. Lung transplant workup in progress. Consider increasing steroid dose to see if that helps with decreasing FiO2 requirements. Encourage PT and OOB. Rest of plan as per above.
Acute on chronic hypoxic respiratory failure and PH 2/2 ILD (presumed COVID fibrosis), approved for [id number] and to be d/c home on Tuesday with NIPPV. However, PT may now be recommending SKYLER. Would benefit from participating in PT w/ [id number] to assess if he can ambulate without severe SOB (currently limiting PT full assessment). Will try to arrange tomorrow. Tolerating 6LNC intermittently w/ HFNC, c/w OOBTC, PT/OT, IS, cough suppressants. On Tyvaso for PH-ILD, tolerating 32mcg QID. Plan to increase every 2 weeks as tolerated. Started on lasix 20mg daily for PH, improving oxygenation. Increased rales noted today, given extra dose IV lasix. On steroid taper, completed abx.
COVID related ILD on home O2 with WHO class III PH. Has not been able to titrate off HFNC; has high O2 demands with ambulation. Started on Tyvaso with no significant improvement at this point. Continue to wean HFNC to baseline O2 requirement. Lung transplant workup in progress. Will attempt to get him portable NIV ([id number]) due to hypercarbic respiratory failure. Decrease prednisone to 40mg BID and then will taper. Lasix as needed. NIV at night, 15/8 cm H2O. Encourage PT and OOB. Rest of plan as per above.
Acute on chronic hypoxic respiratory failure and PH 2/2 ILD (presumed COVID fibrosis), approved for [id number] and to be d/c home on Tuesday with NIPPV. Tolerating 6L NC well, able to complete PT without significant desaturation yesterday. On Tyvaso for PH-ILD, tolerating 32mcg QID. Plan to increase every 2 weeks as tolerated. Started on lasix 20mg daily for PH, improving oxygenation. On steroid taper, completed abx. OOBTC, IS.
Acute on chronic hypoxic respiratory failure and PH 2/2 ILD (presumed COVID fibrosis), approved for [id number] and to be d/c home on Tuesday with NIPPV. Tolerating 6L NC well, able to complete PT without significant desaturation yesterday. On Tyvaso for PH-ILD, tolerating 32mcg QID. Plan to increase every 2 weeks as tolerated. Started on lasix 20mg daily for PH, improving oxygenation. On steroid taper, completed abx. OOBTC, IS.
COVID related ILD on home O2 with WHO class III PH. Has not been able to titrate off HFNC; has high O2 demands with ambulation. Started on Tyvaso with no significant improvement at this point. Continue to wean HFNC to baseline O2 requirement. Lung transplant workup in progress. Will attempt to get him portable NIV ([id number]) due to hypercarbic respiratory failure. Encourage PT and OOB. Rest of plan as per above.

## 2022-12-27 NOTE — CHART NOTE - NSCHARTNOTESELECT_GEN_ALL_CORE
Follow Up/Nutrition Services
Nutrition Services
Event Note
Follow Up/Nutrition Services
Nutrition Services
Nutrition Services

## 2022-12-27 NOTE — CHART NOTE - NSCHARTNOTEFT_GEN_A_CORE
Admitting Diagnosis:   Patient is a 62y old  Male who presents with a chief complaint of SOB (27 Dec 2022 07:19)      PAST MEDICAL & SURGICAL HISTORY:  Dyslipidemia      Borderline diabetes      Pulmonary fibrosis      S/P knee surgery    Current Nutrition Order: Regular diet    PO Intake: Good (%) [   ]  Fair (50-75%) [   ] Poor (<25%) [   ] [x] unable to assess; previously noted w/ good PO intake    GI Issues: No nausea/vomiting documented at this time. Last documented bowel movement 12/25.    Pain: Unable to assess at this time.    Skin Integrity: Dependent, generalized edema 1+. Connor score: 20.    Labs:         CAPILLARY BLOOD GLUCOSE      POCT Blood Glucose.: 196 mg/dL (27 Dec 2022 11:37)  POCT Blood Glucose.: 116 mg/dL (27 Dec 2022 06:51)  POCT Blood Glucose.: 125 mg/dL (26 Dec 2022 21:43)  POCT Blood Glucose.: 173 mg/dL (26 Dec 2022 16:54)      Medications:  MEDICATIONS  (STANDING):  AQUAPHOR (petrolatum Ointment) 1 Application(s) Topical two times a day  benzocaine 15 mG/menthol 3.6 mG Lozenge 2 Lozenge Oral every 6 hours  benzonatate 200 milliGRAM(s) Oral every 8 hours  dextrose 5%. 1000 milliLiter(s) (50 mL/Hr) IV Continuous <Continuous>  dextrose 5%. 1000 milliLiter(s) (100 mL/Hr) IV Continuous <Continuous>  dextrose 50% Injectable 25 Gram(s) IV Push once  dextrose 50% Injectable 12.5 Gram(s) IV Push once  dextrose 50% Injectable 25 Gram(s) IV Push once  enoxaparin Injectable 40 milliGRAM(s) SubCutaneous every 24 hours  fluticasone propionate 50 MICROgram(s)/spray Nasal Spray 2 Spray(s) Both Nostrils two times a day  furosemide    Tablet 20 milliGRAM(s) Oral every 24 hours  glucagon  Injectable 1 milliGRAM(s) IntraMuscular once  guaiFENesin Oral Liquid (Sugar-Free) 200 milliGRAM(s) Oral every 6 hours  influenza   Vaccine 0.5 milliLiter(s) IntraMuscular once  insulin lispro (ADMELOG) corrective regimen sliding scale   SubCutaneous Before meals and at bedtime  ipratropium    for Nebulization 500 MICROGram(s) Nebulizer every 6 hours  pantoprazole    Tablet 40 milliGRAM(s) Oral before breakfast  polyethylene glycol 3350 17 Gram(s) Oral daily  senna 2 Tablet(s) Oral at bedtime  tamsulosin 0.4 milliGRAM(s) Oral at bedtime  Treprostinil (Tyvaso) DPI 1 Inhalation 1 Inhalation Inhalation <User Schedule>  trimethoprim  160 mG/sulfamethoxazole 800 mG 1 Tablet(s) Oral every 24 hours    MEDICATIONS  (PRN):  acetaminophen     Tablet .. 650 milliGRAM(s) Oral every 6 hours PRN Mild Pain (1 - 3)  bisacodyl 5 milliGRAM(s) Oral every 12 hours PRN Constipation  dextrose Oral Gel 15 Gram(s) Oral once PRN Blood Glucose LESS THAN 70 milliGRAM(s)/deciliter  melatonin 5 milliGRAM(s) Oral at bedtime PRN Insomnia    5'5''  pounds +-10%  Wt 198 pounds BMI 33 %[id number]     Weight Change:   11/23: 216 pounds  11/24: 209.8 pounds  11/27: 197.7 pounds  11/30: 191.8 pounds  12/5: 195.1 pounds   -Wt trend stabilizing 11/30-12/5; edema noted to be improving      Estimated energy needs:   Pt exceeds 120% IBW (%), thus pt's IBW used for all calculations. Needs adjusted for HF, infection, pressure ulcer; Fluids per team - pt noted to be hyponatremic/CHF.  Kcal (25-30 kcal/kg): 8800-0491 kcal  Protein (1.2-1.4 g/kg pro): 74-88 g protein    Subjective:   62 yo M w/ PMH HLD, pre-DM, and pulmonary fibrosis 2/2 COVID-19 infection (wheelchair bound at baseline, on 4L home O2) who presented to [facility] for several days of worsening SOB and generalized weakness with increasing O2 requirements, found to have acute hypoxic respiratory failure 2/2 pulmonary fibrosis with superimposed bacterial pneumonia and acute decompensated pulmonary HTN, transferred to [facility] for RHC i/s/o severe pulmonary disease. MIREYA 11/23 shows decompensated R-sided HF, most likely iso worsening PHTN. Pt underwent RHC on 11/28 which showed CO 8.2, CI 4.2, CVP 2, PA pressure 44/14 (25), wedge pressure 6, RV 30/5. TTE on 11/28 showed improved RV systolic function from initial presentation. Pt likely has Group 3 PHTN 2/2 intrinsic lung disease, is being followed by PHTN team with plans to evaluate for possible lung transplant. 12/1: Weaned off milrinone. 12/2: Transferred to [facility]. 12/6: Patient desatted to 84% w air hunger on 6 LNC, increased to HFNC. 12/7: weaned to HFNC. 12/9: Weaned to NC; overnight desatted thus placed back on HFNC. 12/10: Weaned back to 6LNC. 12/13: weaned from 6L to 5L NC. 12/13; overnight attempted to wean to 4-5L but pt desatted so placed back on 6L. 12/14: On NRB; team tried to remove NRB but desatted so switched to HFNC. 12/14: Ordered CXR to reevaluate lungs given new worsening O2 sats. 12/14: CXR largely unchanged from prior on 12/3, so getting CT chest. 12/20: Aspiration episode from [employer] orange juice w/ quick recovery. 12/22: Started lasix given some improvement in O2 requirements. 12/23: Pt requested additional agent for constipation, started dulcolax. 12/24: pt moved rooms to [facility] d/t bedboard issues. 12/26: Gave lasix 20mg IVP x1 for worse crackles on exam.     Attempted to visit pt at bedside, however, RNs providing care thus unable to assess PO intake at this time. Rx and labs reviewed; pt noted as hyponatremic. Throughout hospital stay, PO intake has been good, able to complete>75% of meals. No noted change in PO intake per EMR. RDN will continue to reassess, intervene, and monitor as appropriate.     Previous Nutrition Diagnosis: Increased Nutrient Needs (protein) RT hypermetabolic demands AEB HF, bacterial pneumonia    Active [ x  ]  Resolved [  ]    If resolved, new PES:     Goal: Pt to meet at least 75% nutrition needs during hospital stay.     Recommendations:  - Continue Regular Diet.   - Monitor Diet tolerance and %PO intake, encourage as able. If PO intake declines, monitor need for ONS.  - Monitor BG, electrolytes; replete PRN  - Monitor weights, GI distress, skin  - Pain and bowel regimen per team  - MVI, VitC 500mg/day, Zinc 220mg [id number] use   - RD to provide diet edu PRN  - Monitor risk for malnutrition    Education: N/A    Risk Level: High [   ] Moderate [ x ] Low [   ].

## 2022-12-27 NOTE — PROGRESS NOTE ADULT - PROBLEM SELECTOR PLAN 3
Patient w/ chronic respiratory failure 2/2 pulmonary fibrosis, as above. Patient w/ prolonged hospital stay due to shortness of breath and inability to wean patient off HFNC. Repeat ABG to see if pt qualifies for [id number].  - f/u [id number] process, plan to d/c Tuesday when respiratory can meet pt and family at home  - work w/ PT today w/ [id number] device

## 2022-12-27 NOTE — PROGRESS NOTE ADULT - PROBLEM SELECTOR PLAN 1
Post COVID ILD/ pulmonary fibrosis, now presenting with worsening cough and elevated WBC count with elevated procalcitonin and CT chest performed at OSH with concern for newly developing ggos when compared to prior CT scan done in october. Completed course of zosyn. Cannot confirm cause of ILD at this time, will send lab workup.  - CXR and CT chest ordered 12/14 for worsening O2 requirements: CT chest stable from previous scan, started solumedrol  - RF mildly elevated to 30; f/u outpt for repeat levels  Plan:  - c/w prednisone taper (decrease prednisone by 10mg every 3 days, stop bactrim ppx when prednisone course finished): prednisone 60mg PO qD last dose 12/24, prednisone 50mg PO qD x3d (12/25-12/27), prednisone 40mg PO qD x3d (12/28-30)...  - bactrim for PCP ppx, d/c when steroid taper finished  - Tyvaso 32mcg QID, specialty RN reported q4, 8AM, 12PM, 4PM, 8PM  - outpatient follow-up with Dr. Ng  - OOBTC/PT  - continue to work w/ PT  - f/u DIANA, anti CCP, RF  - lasix 20mg PO qD  - 1 additional lasix 20 IV x1    # F/u lung transplant   F/u pre-lung transplant ID recs: CMV IgG, EBV, serum QFT, Strongyloides IgG, hepatitis panel, HIV, varicella IgG, MMR IgG, toxoplasma IgG, influenza vaccine, Prevnar-20  - Quantiferon, and CMV titers sent for transplant, CMV IgG +, IgM negative. Quantiferon indeterminate.  - Encourage OOBTC  - f/u w/ pulm about pt getting listed for lung/heart transplant; process in place

## 2022-12-27 NOTE — PROGRESS NOTE ADULT - PROBLEM SELECTOR PLAN 2
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  Weaned off milrinone and Florentin  - TTE 12/19: PASP 57mmHg (prior 44mmHg 11/28/22), septal "knuckle" w/o LVOT obstruction, grossly normal-appearing LV function, possible diastolic dysfunction, trace MR/WY, mild TR, trivial pericardial effusion  - s/p lasix 20IV x1 12/20, w/ good UOP  Plan:  - c/w 6L NC and BiPAP at night, HFNC as needed for increased WOB or O2  - PT, OOBTC  - c/w bowel regimen  - tyvaso as above, f/u outpt w/ Dr. Hernandez Medina pulbridgette following, appreciate recs (previously on slow prednisone taper, 20mg qd, however desaturated on 12/14, so started on pulse steroids)  - c/w BiPAP overnight

## 2022-12-27 NOTE — PROGRESS NOTE ADULT - SUBJECTIVE AND OBJECTIVE BOX
***INCOMPLETE NOTE    SUBJECTIVE / INTERVAL HPI: Patient seen and examined at bedside. No overnight events.    VITAL SIGNS:  Vital Signs Last 24 Hrs  T(C): 36.7 (27 Dec 2022 05:19), Max: 36.8 (26 Dec 2022 14:51)  T(F): 98.1 (27 Dec 2022 05:19), Max: 98.3 (26 Dec 2022 22:30)  HR: 98 (27 Dec 2022 06:23) (97 - 128)  BP: 104/72 (27 Dec 2022 06:23) (104/68 - 116/65)  BP(mean): 85 (27 Dec 2022 06:23) (80 - 92)  RR: 18 (27 Dec 2022 06:23) (18 - 22)  SpO2: 94% (27 Dec 2022 06:23) (85% - 97%)    Parameters below as of 27 Dec 2022 06:23  Patient On (Oxygen Delivery Method): nasal cannula w/ humidification  O2 Flow (L/min): 6      PHYSICAL EXAM:    General: Resting comfortably in bed; NAD  HEENT: NC/AT, EOMI, anicteric sclera, MMM, neck supple, no nasal discharge  Cardiac: RRR; normal S1/S2, no MRG, no LE edema, no JVD  Respiratory: CTAB; no wheezes, ronchi, increased work of breathing, retractions  Gastrointestinal: +BSx4, abdomen soft, NT/ND; no rebound or guarding  Genitourinary: no suprapubic tenderness; pardo*; normal external genitalia  Extremities: WWP, no clubbing or cyanosis; no peripheral edema  Vascular: 2+ radial and DP pulses bilaterally  Dermatologic: skin warm, dry and intact; no rashes, open wounds  Neurologic: AAOx3; no focal deficits  Psychiatric: affect and characteristics of appearance, verbalizations, behaviors are appropriate    MEDICATIONS:  MEDICATIONS  (STANDING):  AQUAPHOR (petrolatum Ointment) 1 Application(s) Topical two times a day  benzocaine 15 mG/menthol 3.6 mG Lozenge 2 Lozenge Oral every 6 hours  benzonatate 200 milliGRAM(s) Oral every 8 hours  dextrose 5%. 1000 milliLiter(s) (50 mL/Hr) IV Continuous <Continuous>  dextrose 5%. 1000 milliLiter(s) (100 mL/Hr) IV Continuous <Continuous>  dextrose 50% Injectable 25 Gram(s) IV Push once  dextrose 50% Injectable 12.5 Gram(s) IV Push once  dextrose 50% Injectable 25 Gram(s) IV Push once  enoxaparin Injectable 40 milliGRAM(s) SubCutaneous every 24 hours  fluticasone propionate 50 MICROgram(s)/spray Nasal Spray 2 Spray(s) Both Nostrils two times a day  furosemide    Tablet 20 milliGRAM(s) Oral every 24 hours  glucagon  Injectable 1 milliGRAM(s) IntraMuscular once  guaiFENesin Oral Liquid (Sugar-Free) 200 milliGRAM(s) Oral every 6 hours  influenza   Vaccine 0.5 milliLiter(s) IntraMuscular once  insulin lispro (ADMELOG) corrective regimen sliding scale   SubCutaneous Before meals and at bedtime  ipratropium    for Nebulization 500 MICROGram(s) Nebulizer every 6 hours  pantoprazole    Tablet 40 milliGRAM(s) Oral before breakfast  polyethylene glycol 3350 17 Gram(s) Oral daily  senna 2 Tablet(s) Oral at bedtime  tamsulosin 0.4 milliGRAM(s) Oral at bedtime  Treprostinil (Tyvaso) DPI 1 Inhalation 1 Inhalation Inhalation <User Schedule>  trimethoprim  160 mG/sulfamethoxazole 800 mG 1 Tablet(s) Oral every 24 hours    MEDICATIONS  (PRN):  acetaminophen     Tablet .. 650 milliGRAM(s) Oral every 6 hours PRN Mild Pain (1 - 3)  bisacodyl 5 milliGRAM(s) Oral every 12 hours PRN Constipation  dextrose Oral Gel 15 Gram(s) Oral once PRN Blood Glucose LESS THAN 70 milliGRAM(s)/deciliter  melatonin 5 milliGRAM(s) Oral at bedtime PRN Insomnia      ALLERGIES:  Allergies    No Known Allergies    Intolerances        LABS:              CAPILLARY BLOOD GLUCOSE      POCT Blood Glucose.: 116 mg/dL (27 Dec 2022 06:51)      RADIOLOGY & ADDITIONAL TESTS: Reviewed.   SUBJECTIVE / INTERVAL HPI: Patient seen and examined at bedside. No overnight events. Pt feeling slightly worse with his breathing this morning. Cough is not as good today. Refused BiPAP overnight.    VITAL SIGNS:  Vital Signs Last 24 Hrs  T(C): 36.7 (27 Dec 2022 05:19), Max: 36.8 (26 Dec 2022 14:51)  T(F): 98.1 (27 Dec 2022 05:19), Max: 98.3 (26 Dec 2022 22:30)  HR: 98 (27 Dec 2022 06:23) (97 - 128)  BP: 104/72 (27 Dec 2022 06:23) (104/68 - 116/65)  BP(mean): 85 (27 Dec 2022 06:23) (80 - 92)  RR: 18 (27 Dec 2022 06:23) (18 - 22)  SpO2: 94% (27 Dec 2022 06:23) (85% - 97%)    Parameters below as of 27 Dec 2022 06:23  Patient On (Oxygen Delivery Method): nasal cannula w/ humidification  O2 Flow (L/min): 6      PHYSICAL EXAM:  General: Resting comfortably in bed; NAD  HEENT: NC/AT, EOMI, anicteric sclera, neck supple, no nasal discharge  Cardiac: RR; normal S1/S2, no MRG  Respiratory: bibasilar crackles (more fine compared to previous ronchi); no increased work of breathing, retractions; on 6L NC  Gastrointestinal: +BSx4, abdomen soft, NT/ND; no rebound or guarding  Extremities: WWPx4; no peripheral edema; clubbing in fingers and toes BL  Dermatologic: skin warm, dry and intact; no rashes, open wounds  Neurologic: AAOx3; no focal deficits    MEDICATIONS:  MEDICATIONS  (STANDING):  AQUAPHOR (petrolatum Ointment) 1 Application(s) Topical two times a day  benzocaine 15 mG/menthol 3.6 mG Lozenge 2 Lozenge Oral every 6 hours  benzonatate 200 milliGRAM(s) Oral every 8 hours  dextrose 5%. 1000 milliLiter(s) (50 mL/Hr) IV Continuous <Continuous>  dextrose 5%. 1000 milliLiter(s) (100 mL/Hr) IV Continuous <Continuous>  dextrose 50% Injectable 25 Gram(s) IV Push once  dextrose 50% Injectable 12.5 Gram(s) IV Push once  dextrose 50% Injectable 25 Gram(s) IV Push once  enoxaparin Injectable 40 milliGRAM(s) SubCutaneous every 24 hours  fluticasone propionate 50 MICROgram(s)/spray Nasal Spray 2 Spray(s) Both Nostrils two times a day  furosemide    Tablet 20 milliGRAM(s) Oral every 24 hours  glucagon  Injectable 1 milliGRAM(s) IntraMuscular once  guaiFENesin Oral Liquid (Sugar-Free) 200 milliGRAM(s) Oral every 6 hours  influenza   Vaccine 0.5 milliLiter(s) IntraMuscular once  insulin lispro (ADMELOG) corrective regimen sliding scale   SubCutaneous Before meals and at bedtime  ipratropium    for Nebulization 500 MICROGram(s) Nebulizer every 6 hours  pantoprazole    Tablet 40 milliGRAM(s) Oral before breakfast  polyethylene glycol 3350 17 Gram(s) Oral daily  senna 2 Tablet(s) Oral at bedtime  tamsulosin 0.4 milliGRAM(s) Oral at bedtime  Treprostinil (Tyvaso) DPI 1 Inhalation 1 Inhalation Inhalation <User Schedule>  trimethoprim  160 mG/sulfamethoxazole 800 mG 1 Tablet(s) Oral every 24 hours    MEDICATIONS  (PRN):  acetaminophen     Tablet .. 650 milliGRAM(s) Oral every 6 hours PRN Mild Pain (1 - 3)  bisacodyl 5 milliGRAM(s) Oral every 12 hours PRN Constipation  dextrose Oral Gel 15 Gram(s) Oral once PRN Blood Glucose LESS THAN 70 milliGRAM(s)/deciliter  melatonin 5 milliGRAM(s) Oral at bedtime PRN Insomnia      ALLERGIES:  Allergies    No Known Allergies    Intolerances        LABS:              CAPILLARY BLOOD GLUCOSE      POCT Blood Glucose.: 116 mg/dL (27 Dec 2022 06:51)      RADIOLOGY & ADDITIONAL TESTS: Reviewed.   HOSPITAL COURSE: 62 yo M w/ PMH HLD, pre-DM, and pulmonary fibrosis 2/2 COVID-19 infection c/b pulmonary HTN (uses wheelchair at baseline for SOB, on 4L home O2) who initially presented to [facility] on 11/22/2022 for several days of worsening dyspnea, productive cough, wheezing, and generalized weakness with increasing O2 requirements (up to 6L) and persistent dyspnea at rest. On day of admission, pt had syncopal event due to SOB. He was brought to [place] ED in respiratory distress where he was placed on BiPAP. CXR showed diffuse bilateral airspace opacities c/w multilobar pneumonia. CTA showed no evidence of PE, however did show extensive diffuse interstitial and ground glass infiltrates bilaterally with associated hilar and mediastinal adenopathy, suspected multilobar atypical pneumonia. Patient was started on broad spectrum antibiotics due to concern for pneumonia. He was admitted to  MICU for acute hypoxic respiratory failure 2/2 pulmonary fibrosis with superimposed bacterial pneumonia and acute decompensated pulmonary HTN. TTE showed mild TR, severely dilated RV, moderate-to-severe elevated RV systolic pressure, moderate-to severe pHTN (58 mmHg), normal LV size and systolic function (EF 58%), and grade II diastolic dysfunction. Pt was transferred to [facility] for further management and RHC given severity of pulmonary disease.     On admission to [facility] CCU, pt was weaned off BiPAP to HFNC and started on inhaled NO for pulmonary vasodilation and milrinone gtt for inotropic support. Pt was also started on high dose steroids for pulmonary fibrosis and given multiple rounds of diuresis i/s/o fluid overload from R-sided HF. Completed course of vanc & zosyn for superimposed bacterial PNA. Initial attempts at weaning Florentin and HFNC were c/b desaturation. Pt underwent RHC on 11/28 which showed CO 8.2, CI 4.2, CVP 2, PA pressure 44/14 (25), wedge pressure 6, RV 30/5. TTE on 11/28 showed improved RV systolic function from initial presentation. Patient was followed by the pulmonary hypertension team and started Tyvaso on 12/9. Process to start evaluation for lung and heart transplanted were started. Weaned off Florentin, and milrinone and pt was stepped down to telemetry, where he was continued on regimen of tyvaso, atrovent and steroids with intermittent diuresis. Pt unable to be weaned from HFNC, now pending dispo with home O2 and [employer] device.     SUBJECTIVE / INTERVAL HPI: Patient seen and examined at bedside. No overnight events. Pt feeling slightly worse with his breathing this morning. Cough is not as good today. Refused BiPAP overnight.    VITAL SIGNS:  Vital Signs Last 24 Hrs  T(C): 36.7 (27 Dec 2022 05:19), Max: 36.8 (26 Dec 2022 14:51)  T(F): 98.1 (27 Dec 2022 05:19), Max: 98.3 (26 Dec 2022 22:30)  HR: 98 (27 Dec 2022 06:23) (97 - 128)  BP: 104/72 (27 Dec 2022 06:23) (104/68 - 116/65)  BP(mean): 85 (27 Dec 2022 06:23) (80 - 92)  RR: 18 (27 Dec 2022 06:23) (18 - 22)  SpO2: 94% (27 Dec 2022 06:23) (85% - 97%)    Parameters below as of 27 Dec 2022 06:23  Patient On (Oxygen Delivery Method): nasal cannula w/ humidification  O2 Flow (L/min): 6      PHYSICAL EXAM:  General: Resting comfortably in bed; NAD  HEENT: NC/AT, EOMI, anicteric sclera, neck supple, no nasal discharge  Cardiac: RR; normal S1/S2, no MRG  Respiratory: bibasilar crackles (more fine compared to previous ronchi); no increased work of breathing, retractions; on 6L NC  Gastrointestinal: +BSx4, abdomen soft, NT/ND; no rebound or guarding  Extremities: WWPx4; no peripheral edema; clubbing in fingers and toes BL  Dermatologic: skin warm, dry and intact; no rashes, open wounds  Neurologic: AAOx3; no focal deficits    MEDICATIONS:  MEDICATIONS  (STANDING):  AQUAPHOR (petrolatum Ointment) 1 Application(s) Topical two times a day  benzocaine 15 mG/menthol 3.6 mG Lozenge 2 Lozenge Oral every 6 hours  benzonatate 200 milliGRAM(s) Oral every 8 hours  dextrose 5%. 1000 milliLiter(s) (50 mL/Hr) IV Continuous <Continuous>  dextrose 5%. 1000 milliLiter(s) (100 mL/Hr) IV Continuous <Continuous>  dextrose 50% Injectable 25 Gram(s) IV Push once  dextrose 50% Injectable 12.5 Gram(s) IV Push once  dextrose 50% Injectable 25 Gram(s) IV Push once  enoxaparin Injectable 40 milliGRAM(s) SubCutaneous every 24 hours  fluticasone propionate 50 MICROgram(s)/spray Nasal Spray 2 Spray(s) Both Nostrils two times a day  furosemide    Tablet 20 milliGRAM(s) Oral every 24 hours  glucagon  Injectable 1 milliGRAM(s) IntraMuscular once  guaiFENesin Oral Liquid (Sugar-Free) 200 milliGRAM(s) Oral every 6 hours  influenza   Vaccine 0.5 milliLiter(s) IntraMuscular once  insulin lispro (ADMELOG) corrective regimen sliding scale   SubCutaneous Before meals and at bedtime  ipratropium    for Nebulization 500 MICROGram(s) Nebulizer every 6 hours  pantoprazole    Tablet 40 milliGRAM(s) Oral before breakfast  polyethylene glycol 3350 17 Gram(s) Oral daily  senna 2 Tablet(s) Oral at bedtime  tamsulosin 0.4 milliGRAM(s) Oral at bedtime  Treprostinil (Tyvaso) DPI 1 Inhalation 1 Inhalation Inhalation <User Schedule>  trimethoprim  160 mG/sulfamethoxazole 800 mG 1 Tablet(s) Oral every 24 hours    MEDICATIONS  (PRN):  acetaminophen     Tablet .. 650 milliGRAM(s) Oral every 6 hours PRN Mild Pain (1 - 3)  bisacodyl 5 milliGRAM(s) Oral every 12 hours PRN Constipation  dextrose Oral Gel 15 Gram(s) Oral once PRN Blood Glucose LESS THAN 70 milliGRAM(s)/deciliter  melatonin 5 milliGRAM(s) Oral at bedtime PRN Insomnia      ALLERGIES:  Allergies    No Known Allergies    Intolerances        LABS:              CAPILLARY BLOOD GLUCOSE      POCT Blood Glucose.: 116 mg/dL (27 Dec 2022 06:51)      RADIOLOGY & ADDITIONAL TESTS: Reviewed.

## 2022-12-27 NOTE — PROGRESS NOTE ADULT - ASSESSMENT
60 yo M w/ PMH HLD, pre-DM, and pulmonary fibrosis 2/2 COVID-19 infection (wheelchair bound at baseline, on 4L home O2) who presented to [facility] for several days of worsening SOB and generalized weakness with increasing O2 requirements, found to have acute hypoxic respiratory failure 2/2 pulmonary fibrosis with superimposed bacterial pneumonia and acute decompensated pulmonary HTN, transferred to [facility] for RHC i/s/o severe pulmonary disease and evaluation for potential lung transplant. Respiratory status improved from admission but plateauing. Tyvaso added 12/9.

## 2022-12-28 NOTE — PROGRESS NOTE ADULT - ASSESSMENT
62 yo M w/ PMH HLD, pre-DM, and pulmonary fibrosis 2/2 COVID-19 infection (wheelchair bound at baseline, on 4L home O2) who presented to [facility] for several days of worsening SOB and generalized weakness with increasing O2 requirements, found to have acute hypoxic respiratory failure 2/2 pulmonary fibrosis with superimposed bacterial pneumonia and acute decompensated pulmonary HTN, transferred to [facility] for RHC i/s/o severe pulmonary disease and evaluation for potential lung transplant. Respiratory status improved from admission but plateauing. Tyvaso added 12/9.

## 2022-12-28 NOTE — PROGRESS NOTE ADULT - PROBLEM SELECTOR PROBLEM 1
Pulmonary fibrosis

## 2022-12-28 NOTE — PROGRESS NOTE ADULT - PROBLEM SELECTOR PROBLEM 8
Need for prophylactic measure
R/O Pulmonary fibrosis
Need for prophylactic measure
R/O Pulmonary fibrosis
Need for prophylactic measure

## 2022-12-28 NOTE — PROGRESS NOTE ADULT - SUBJECTIVE AND OBJECTIVE BOX
O/N Events: aneesh overnight, patient did not sleep well due to increased sputum, changed robitussin to PRN.     Subjective/ROS: Patient seen and examined at bedside.     Denies Fever/Chills, HA, CP, SOB, n/v, changes in bowel/urinary habits.  12pt ROS otherwise negative.    VITALS  Vital Signs Last 24 Hrs  T(C): 36.5 (28 Dec 2022 09:21), Max: 36.9 (27 Dec 2022 13:57)  T(F): 97.7 (28 Dec 2022 09:21), Max: 98.5 (27 Dec 2022 13:57)  HR: 100 (28 Dec 2022 10:25) (99 - 117)  BP: 99/67 (28 Dec 2022 08:23) (99/67 - 116/72)  BP(mean): 78 (28 Dec 2022 08:23) (78 - 92)  RR: 24 (28 Dec 2022 10:25) (15 - 39)  SpO2: 92% (28 Dec 2022 08:23) (87% - 95%)    Parameters below as of 28 Dec 2022 10:25  Patient On (Oxygen Delivery Method): nasal cannula w/ humidification  O2 Flow (L/min): 6    CAPILLARY BLOOD GLUCOSE    POCT Blood Glucose.: 235 mg/dL (28 Dec 2022 11:42)  POCT Blood Glucose.: 138 mg/dL (28 Dec 2022 06:35)  POCT Blood Glucose.: 171 mg/dL (27 Dec 2022 21:31)  POCT Blood Glucose.: 164 mg/dL (27 Dec 2022 17:42)    PHYSICAL EXAM  General: sitting up in bed; NAD, but attempting to cough up increased sputum  HEENT: NC/AT, anicteric sclera  Cardiac: RR; normal S1/S2, no MRG  Respiratory: bibasilar crackles, on 6L NC  Gastrointestinal: +BSx4, abdomen soft, NT/ND; no rebound or guarding  Extremities: WWPx4; no peripheral edema; clubbing in fingers and toes BL  Dermatologic: skin warm, dry and intact; no rashes, open wounds  Neurologic: AAOx3; no focal deficits    MEDICATIONS  (STANDING):  AQUAPHOR (petrolatum Ointment) 1 Application(s) Topical two times a day  benzocaine 15 mG/menthol 3.6 mG Lozenge 2 Lozenge Oral every 6 hours  benzonatate 200 milliGRAM(s) Oral every 8 hours  dextrose 5%. 1000 milliLiter(s) (50 mL/Hr) IV Continuous <Continuous>  dextrose 5%. 1000 milliLiter(s) (100 mL/Hr) IV Continuous <Continuous>  dextrose 50% Injectable 25 Gram(s) IV Push once  dextrose 50% Injectable 12.5 Gram(s) IV Push once  dextrose 50% Injectable 25 Gram(s) IV Push once  enoxaparin Injectable 40 milliGRAM(s) SubCutaneous every 24 hours  fluticasone propionate 50 MICROgram(s)/spray Nasal Spray 2 Spray(s) Both Nostrils two times a day  furosemide    Tablet 20 milliGRAM(s) Oral every 24 hours  glucagon  Injectable 1 milliGRAM(s) IntraMuscular once  influenza   Vaccine 0.5 milliLiter(s) IntraMuscular once  insulin lispro (ADMELOG) corrective regimen sliding scale   SubCutaneous Before meals and at bedtime  ipratropium    for Nebulization 500 MICROGram(s) Nebulizer every 6 hours  pantoprazole    Tablet 40 milliGRAM(s) Oral before breakfast  polyethylene glycol 3350 17 Gram(s) Oral daily  senna 2 Tablet(s) Oral at bedtime  tamsulosin 0.4 milliGRAM(s) Oral at bedtime  Treprostinil (Tyvaso) DPI 1 Inhalation 1 Inhalation Inhalation <User Schedule>  trimethoprim  160 mG/sulfamethoxazole 800 mG 1 Tablet(s) Oral every 24 hours    MEDICATIONS  (PRN):  acetaminophen     Tablet .. 650 milliGRAM(s) Oral every 6 hours PRN Mild Pain (1 - 3)  bisacodyl 5 milliGRAM(s) Oral every 12 hours PRN Constipation  dextrose Oral Gel 15 Gram(s) Oral once PRN Blood Glucose LESS THAN 70 milliGRAM(s)/deciliter  guaiFENesin Oral Liquid (Sugar-Free) 200 milliGRAM(s) Oral every 6 hours PRN Cough  melatonin 5 milliGRAM(s) Oral at bedtime PRN Insomnia      No Known Allergies      LABS                      IMAGING/EKG/ETC   O/N Events: aneesh overnight, patient did not sleep well due to increased sputum, changed robitussin to PRN.     Subjective/ROS: Patient seen and examined at bedside.     Denies Fever/Chills, HA, CP, SOB, n/v, changes in bowel/urinary habits.  12pt ROS otherwise negative.    VITALS  Vital Signs Last 24 Hrs  T(C): 36.5 (28 Dec 2022 09:21), Max: 36.9 (27 Dec 2022 13:57)  T(F): 97.7 (28 Dec 2022 09:21), Max: 98.5 (27 Dec 2022 13:57)  HR: 100 (28 Dec 2022 10:25) (99 - 117)  BP: 99/67 (28 Dec 2022 08:23) (99/67 - 116/72)  BP(mean): 78 (28 Dec 2022 08:23) (78 - 92)  RR: 24 (28 Dec 2022 10:25) (15 - 39)  SpO2: 92% (28 Dec 2022 08:23) (87% - 95%)    Parameters below as of 28 Dec 2022 10:25  Patient On (Oxygen Delivery Method): nasal cannula w/ humidification  O2 Flow (L/min): 6    CAPILLARY BLOOD GLUCOSE    POCT Blood Glucose.: 235 mg/dL (28 Dec 2022 11:42)  POCT Blood Glucose.: 138 mg/dL (28 Dec 2022 06:35)  POCT Blood Glucose.: 171 mg/dL (27 Dec 2022 21:31)  POCT Blood Glucose.: 164 mg/dL (27 Dec 2022 17:42)    PHYSICAL EXAM  General: sitting up in bed; NAD, but attempting to cough up increased sputum  HEENT: NC/AT, anicteric sclera  Cardiac: RR; normal S1/S2, no MRG  Respiratory: bibasilar crackles, on 6L NC  Gastrointestinal: +BSx4, abdomen soft, NT/ND; no rebound or guarding  Extremities: WWPx4; no peripheral edema; clubbing in fingers and toes BL  Dermatologic: skin warm, dry and intact  Neurologic: AAOx3; no focal deficits    MEDICATIONS  (STANDING):  AQUAPHOR (petrolatum Ointment) 1 Application(s) Topical two times a day  benzocaine 15 mG/menthol 3.6 mG Lozenge 2 Lozenge Oral every 6 hours  benzonatate 200 milliGRAM(s) Oral every 8 hours  dextrose 5%. 1000 milliLiter(s) (50 mL/Hr) IV Continuous <Continuous>  dextrose 5%. 1000 milliLiter(s) (100 mL/Hr) IV Continuous <Continuous>  dextrose 50% Injectable 25 Gram(s) IV Push once  dextrose 50% Injectable 12.5 Gram(s) IV Push once  dextrose 50% Injectable 25 Gram(s) IV Push once  enoxaparin Injectable 40 milliGRAM(s) SubCutaneous every 24 hours  fluticasone propionate 50 MICROgram(s)/spray Nasal Spray 2 Spray(s) Both Nostrils two times a day  furosemide    Tablet 20 milliGRAM(s) Oral every 24 hours  glucagon  Injectable 1 milliGRAM(s) IntraMuscular once  influenza   Vaccine 0.5 milliLiter(s) IntraMuscular once  insulin lispro (ADMELOG) corrective regimen sliding scale   SubCutaneous Before meals and at bedtime  ipratropium    for Nebulization 500 MICROGram(s) Nebulizer every 6 hours  pantoprazole    Tablet 40 milliGRAM(s) Oral before breakfast  polyethylene glycol 3350 17 Gram(s) Oral daily  senna 2 Tablet(s) Oral at bedtime  tamsulosin 0.4 milliGRAM(s) Oral at bedtime  Treprostinil (Tyvaso) DPI 1 Inhalation 1 Inhalation Inhalation <User Schedule>  trimethoprim  160 mG/sulfamethoxazole 800 mG 1 Tablet(s) Oral every 24 hours    MEDICATIONS  (PRN):  acetaminophen     Tablet .. 650 milliGRAM(s) Oral every 6 hours PRN Mild Pain (1 - 3)  bisacodyl 5 milliGRAM(s) Oral every 12 hours PRN Constipation  dextrose Oral Gel 15 Gram(s) Oral once PRN Blood Glucose LESS THAN 70 milliGRAM(s)/deciliter  guaiFENesin Oral Liquid (Sugar-Free) 200 milliGRAM(s) Oral every 6 hours PRN Cough  melatonin 5 milliGRAM(s) Oral at bedtime PRN Insomnia      No Known Allergies      LABS    IMAGING/EKG/ETC

## 2022-12-28 NOTE — PROGRESS NOTE ADULT - PROBLEM SELECTOR PROBLEM 2
Pulmonary hypertension

## 2022-12-28 NOTE — PROGRESS NOTE ADULT - PROBLEM SELECTOR PLAN 8
F: none  E: Replete K>4, Mg>2  N: regular  DVT prophylaxis: lovenox 40 qd

## 2022-12-28 NOTE — PROGRESS NOTE ADULT - PROBLEM SELECTOR PLAN 1
Post COVID ILD/ pulmonary fibrosis, now presenting with worsening cough and elevated WBC count with elevated procalcitonin and CT chest performed at OSH with concern for newly developing ground glass opacities when compared to prior CT scan done in october. Completed course of zosyn. Cannot confirm cause of ILD at this time, will send lab workup.  - CXR and CT chest ordered 12/14 for worsening O2 requirements: CT chest stable from previous scan, started solumedrol  - RF mildly elevated to 30; f/u outpt for repeat levels  Plan:  - c/w prednisone taper (decrease prednisone by 10mg every 3 days, stop bactrim ppx when prednisone course finished): prednisone 60mg PO qD last dose 12/24, prednisone 50mg PO qD x3d (12/25-12/27), prednisone 40mg PO qD x3d (12/28-30)...  - bactrim for PCP ppx, d/c when steroid taper finished  - Tyvaso 32mcg QID, specialty RN reported q4, 8AM, 12PM, 4PM, 8PM  - outpatient follow-up with Dr. Ng  - OOBTC/PT  - continue to work w/ PT  - f/u DIANA, anti CCP, RF  - lasix 20mg PO qD  - 1 additional lasix 20 IV x1    # F/u lung transplant   F/u pre-lung transplant ID recs: CMV IgG, EBV, serum QFT, Strongyloides IgG, hepatitis panel, HIV, varicella IgG, MMR IgG, toxoplasma IgG, influenza vaccine, Prevnar-20  - Quantiferon, and CMV titers sent for transplant, CMV IgG +, IgM negative. Quantiferon indeterminate.  - Encourage OOBTC  - f/u w/ pulm about pt getting listed for lung/heart transplant; process in place Post COVID ILD/ pulmonary fibrosis, now presenting with worsening cough and elevated WBC count with elevated procalcitonin and CT chest performed at OSH with concern for newly developing ground glass opacities when compared to prior CT scan done in october. Completed course of zosyn. Cannot confirm cause of ILD at this time, will send lab workup.  - CXR and CT chest ordered 12/14 for worsening O2 requirements: CT chest stable from previous scan, started solumedrol  - RF mildly elevated to 30; f/u outpt for repeat levels  Plan:  - c/w prednisone taper (decrease prednisone by 10mg every 3-4 days, as per pulm. stop bactrim ppx when prednisone course finished): prednisone 60mg PO qD last dose 12/24, prednisone 50mg PO qD x3d (12/25-12/27), prednisone 40mg PO qD 12/28-30--> 30mg qD 12/31-1/3-->20mg qD 1/4-1/7--> 10mg qD 1/8-1/11, then stop.  - bactrim for PCP ppx, d/c when steroid taper finished  - Tyvaso 32mcg QID, specialty RN reported q4, 8AM, 12PM, 4PM, 8PM  - outpatient follow-up with Dr. Ng  - OOBTC/PT, and continue to work w/ PT  - f/u DIANA, anti CCP, RF  - lasix 20mg PO qD  - 1 additional lasix 20 IV x1  - patient will be going home with [id number] vest, respiratory therapist will meet patient at home 12/29 at 10AM to help patient set up vest.     # F/u lung transplant   F/u pre-lung transplant ID recs: CMV IgG, EBV, serum QFT, Strongyloides IgG, hepatitis panel, HIV, varicella IgG, MMR IgG, toxoplasma IgG, influenza vaccine, Prevnar-20  - Quantiferon, and CMV titers sent for transplant, CMV IgG +, IgM negative. Quantiferon indeterminate.  - Encourage OOBTC  - f/u w/ pulm about pt getting listed for lung/heart transplant; process in place

## 2022-12-28 NOTE — PROGRESS NOTE ADULT - PROBLEM SELECTOR PLAN 2
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  Weaned off milrinone and Florentin  - TTE 12/19: PASP 57mmHg (prior 44mmHg 11/28/22), septal "knuckle" w/o LVOT obstruction, grossly normal-appearing LV function, possible diastolic dysfunction, trace MR/NC, mild TR, trivial pericardial effusion  - s/p lasix 20IV x1 12/20, w/ good UOP  Plan:  - c/w 6L NC and BiPAP at night, HFNC as needed for increased WOB or O2  - PT, OOBTC  - c/w bowel regimen  - tyvaso as above, f/u outpt w/ Dr. Hernandez Medina pulbridgette following, appreciate recs (previously on slow prednisone taper, 20mg qd, however desaturated on 12/14, so started on pulse steroids)  - c/w BiPAP overnight

## 2022-12-28 NOTE — PROGRESS NOTE ADULT - PROBLEM SELECTOR PLAN 3
Patient w/ chronic respiratory failure 2/2 pulmonary fibrosis, as above. Patient w/ prolonged hospital stay due to shortness of breath and inability to wean patient off HFNC. Repeat ABG to see if pt qualifies for [id number].  - f/u [id number] process, plan to d/c Tuesday when respiratory can meet pt and family at home  - work w/ PT today w/ [id number] device Patient w/ chronic respiratory failure 2/2 pulmonary fibrosis, as above. Patient w/ prolonged hospital stay due to shortness of breath and inability to wean patient off HFNC. Repeat ABG to see if pt qualifies for [id number].  - f/u [id number] process, plan to d/c Thursday 12/29 when respiratory can meet patient and family at home

## 2022-12-28 NOTE — PROGRESS NOTE ADULT - TIME BILLING
Patient seen and examined with house-staff during bedside rounds.  Resident note read, including vitals, physical findings, laboratory data, and radiological reports.   Revisions included below.  Direct personal management at bed side and extensive interpretation of the data.  Plan was outlined and discussed in details with the housestaff.  Decision making of high complexity  Action taken for acute disease activity to reflect the level of care provided:  - medication reconciliation  - review laboratory data  Able.  The patient is on Tyvaso.  The patient tolerated nasal cannula.  The patient has a life 2000.  We will plan for discharge.
Advise complete empiric course of Zosyn today for ?HAP; advise against redosing vancomycin tonight. f/u serum Fungitell and sputum Pneumocystis PCR--if negative, then would commence TMP/SMX 1DS daily for PCP prophylaxis for the expected duration of corticosteroid exposure. On nystatin for presumed OP candidiasis.
Management of HAP
case complexity as above
TMP/SMX for primary PCP pneumonia prophylaxis as above.    Pre-lung transplant ID recommendations:  - CMV IgG--if seropositive (or if donor found to be seropositive), then will need valganciclovir post transplant prophylaxis  - EBV serology  - serum QFT--if positive, then would commence LTB treatment prior to transplant (can reconsult ID if this is the case)  - serum Strongyloides IgG  - hepatitis B SAb, CAb, SAg--if non-immune then advise vaccination series   - HIV screen  - varicella IgG--if non immune, then vaccinate  - MMR IgG--if non immune, then vaccinate  - serum toxoplasma IgG  - Influenza vaccine  - Prevnar-20 if has previously not completed pneumococcal vaccination  - post-transplant antifungal ppx as per institutional protocol  - post-transplant would continue indefinite TMP/SMX ppx (regardless of steroid exposure)  - if planned for heart transplant as well, then would also check Trypanosoma cruzii IgG    Please reconsult with ?
case complexity as above
Patient seen and examined with house-staff during bedside rounds.  Resident note read, including vitals, physical findings, laboratory data, and radiological reports.   Revisions included below.  Direct personal management at bed side and extensive interpretation of the data.  Plan was outlined and discussed in details with the housestaff.  Decision making of high complexity  Action taken for acute disease activity to reflect the level of care provided:  - medication reconciliation  - review laboratory data  Condition deteriorated the patient oxygen requirement increased.  Will change to high flow.  Static chest x-ray.  Patient might need CT scan of the chest to evaluate the worsening of his acute hypoxic respiratory failure whether there is secondary infection which is unlikely as the white count is normal and the patient is afebrile and no clinical evidence of infection versus eczema pneumonitis and might need pulse steroids.  Patient is on prostacyclin and whether this is VQ scan mismatch or shunting from the prostacyclin or patient need to be on more than 1 drug for his pulmonary hypertension.  Continue high flow and will evaluate the chest x-ray and further recommendation will
Patient seen and examined with house-staff during bedside rounds.  Resident note read, including vitals, physical findings, laboratory data, and radiological reports.   Revisions included below.  Direct personal management at bed side and extensive interpretation of the data.  Plan was outlined and discussed in details with the housestaff.  Decision making of high complexity  Action taken for acute disease activity to reflect the level of care provided:  - medication reconciliation  - review laboratory data  Patient is stable.  I decreased FiO2 to 45%.  Continue flutter at 50 L/min.  Continue steroids.  Seems to slowly improving.  Await echocardiogram.  Will reconsult lung transplant team
Patient seen and examined with house-staff during bedside rounds.  Resident note read, including vitals, physical findings, laboratory data, and radiological reports.   Revisions included below.  Direct personal management at bed side and extensive interpretation of the data.  Plan was outlined and discussed in details with the housestaff.  Decision making of high complexity  Action taken for acute disease activity to reflect the level of care provided:  - medication reconciliation  - review laboratory data  The patient is still symptomatic with a cough which has not changed from yesterday.  The ox requirement is slowly improving with the steroids.  The patient is treating empirically for acute pneumonitis.  Decrease the FiO2 to 50% and wean as tolerated.  Patient on PCP prophylaxis.  Adequate fluid overload.  No evidence of underlying acute infection.  Will discuss regarding the lung transplant evaluation.  Patient is also on treatment for pulmonary hypertension.  Will follow-up on echocardiogram on Monday
Patient seen and examined with house-staff during bedside rounds.  Resident note read, including vitals, physical findings, laboratory data, and radiological reports.   Revisions included below.  Direct personal management at bed side and extensive interpretation of the data.  Plan was outlined and discussed in details with the housestaff.  Decision making of high complexity  Action taken for acute disease activity to reflect the level of care provided:  - medication reconciliation  - review laboratory data  CT scan of the chest was reviewed yesterday and the patient was started on high-dose of steroids.  The patient responded to the current regimen.  The FiO2 was decreased from 70% to 60% and is still on 40 L/min flow.  The patient is less symptomatic than yesterday and less dyspneic.  Continue steroids.  No evidence of underlying infectious process nor thromboembolic disease.  Continue Lovenox for prophylaxis.  Out of bed in chair as tolerated
case complexity as above
Patient seen and examined with house-staff during bedside rounds.  Resident note read, including vitals, physical findings, laboratory data, and radiological reports.   Revisions included below.  Direct personal management at bed side and extensive interpretation of the data.  Plan was outlined and discussed in details with the housestaff.  Decision making of high complexity  Action taken for acute disease activity to reflect the level of care provided:  - medication reconciliation  - review laboratory data  Steroids.  Continue on the medication for pulmonary hypertension.  The pulmonary status is slowly improving.  The patient noninvasive ventilation at night.  The patient was changed to nasal cannula during the day out of bed in chair.
case complexity as above

## 2022-12-28 NOTE — PROGRESS NOTE ADULT - PROVIDER SPECIALTY LIST ADULT
CCU
Internal Medicine
CCU
Infectious Disease
Infectious Disease
Internal Medicine
Pulmonology
CCU
Pulmonology
Infectious Disease
Pulmonology
Internal Medicine

## 2022-12-29 NOTE — DISCHARGE NOTE NURSING/CASE MANAGEMENT/SOCIAL WORK - NSDCPEFALRISK_GEN_ALL_CORE
For information on Fall & Injury Prevention, visit: [email or web address] OR  [email or web address] OR  https://www.cdc.gov/steadi/patient.html

## 2022-12-29 NOTE — DISCHARGE NOTE NURSING/CASE MANAGEMENT/SOCIAL WORK - PATIENT PORTAL LINK FT
You can access the FollowMyHealth Patient Portal offered by [facility] by registering at the following website: [email or web address]. By joining [facility]’s FollowMyHealth portal, you will also be able to view your health information using other applications (apps) compatible with our system.

## 2023-01-01 ENCOUNTER — APPOINTMENT (OUTPATIENT)
Dept: PULMONOLOGY | Facility: CLINIC | Age: 63
End: 2023-01-01

## 2023-01-01 ENCOUNTER — APPOINTMENT (OUTPATIENT)
Dept: PULMONOLOGY | Facility: CLINIC | Age: 63
End: 2023-01-01
Payer: MEDICAID

## 2023-01-01 ENCOUNTER — NON-APPOINTMENT (OUTPATIENT)
Age: 63
End: 2023-01-01

## 2023-01-01 DIAGNOSIS — J84.10 PULMONARY FIBROSIS, UNSPECIFIED: ICD-10-CM

## 2023-01-01 DIAGNOSIS — Z79.52 LONG TERM (CURRENT) USE OF SYSTEMIC STEROIDS: ICD-10-CM

## 2023-01-01 DIAGNOSIS — U09.9 POST COVID-19 CONDITION, UNSPECIFIED: ICD-10-CM

## 2023-01-01 DIAGNOSIS — R06.02 SHORTNESS OF BREATH: ICD-10-CM

## 2023-01-01 DIAGNOSIS — I27.23 PULMONARY HYPERTENSION DUE TO LUNG DISEASES AND HYPOXIA: ICD-10-CM

## 2023-01-01 DIAGNOSIS — R73.03 PREDIABETES.: ICD-10-CM

## 2023-01-01 DIAGNOSIS — J15.9 UNSPECIFIED BACTERIAL PNEUMONIA: ICD-10-CM

## 2023-01-01 DIAGNOSIS — E78.5 HYPERLIPIDEMIA, UNSPECIFIED: ICD-10-CM

## 2023-01-01 DIAGNOSIS — Z20.822 CONTACT WITH AND (SUSPECTED) EXPOSURE TO COVID-19: ICD-10-CM

## 2023-01-01 DIAGNOSIS — J96.21 ACUTE AND CHRONIC RESPIRATORY FAILURE WITH HYPOXIA: ICD-10-CM

## 2023-01-01 DIAGNOSIS — D72.829 ELEVATED WHITE BLOOD CELL COUNT, UNSPECIFIED: ICD-10-CM

## 2023-01-01 DIAGNOSIS — T38.0X5A ADVERSE EFFECT OF GLUCOCORTICOIDS AND SYNTHETIC ANALOGUES, INITIAL ENCOUNTER: ICD-10-CM

## 2023-01-01 DIAGNOSIS — E87.1 HYPO-OSMOLALITY AND HYPONATREMIA: ICD-10-CM

## 2023-01-01 DIAGNOSIS — Z99.81 DEPENDENCE ON SUPPLEMENTAL OXYGEN: ICD-10-CM

## 2023-01-01 DIAGNOSIS — J96.22 ACUTE AND CHRONIC RESPIRATORY FAILURE WITH HYPERCAPNIA: ICD-10-CM

## 2023-01-01 DIAGNOSIS — B37.0 CANDIDAL STOMATITIS: ICD-10-CM

## 2023-01-01 DIAGNOSIS — I08.1 RHEUMATIC DISORDERS OF BOTH MITRAL AND TRICUSPID VALVES: ICD-10-CM

## 2023-01-01 DIAGNOSIS — R73.03 PREDIABETES: ICD-10-CM

## 2023-01-01 DIAGNOSIS — Z99.3 DEPENDENCE ON WHEELCHAIR: ICD-10-CM

## 2023-01-01 DIAGNOSIS — E87.3 ALKALOSIS: ICD-10-CM

## 2023-01-01 DIAGNOSIS — I50.811 ACUTE RIGHT HEART FAILURE: ICD-10-CM

## 2023-01-01 PROCEDURE — 99204 OFFICE O/P NEW MOD 45 MIN: CPT | Mod: 95

## 2023-01-01 RX ORDER — ALBUTEROL SULFATE 90 UG/1
108 (90 BASE) INHALANT RESPIRATORY (INHALATION) EVERY 4 HOURS
Qty: 27 | Refills: 0 | Status: ACTIVE | COMMUNITY
Start: 2023-01-01 | End: 1900-01-01

## 2023-01-01 RX ORDER — SENNOSIDES 8.6 MG TABLETS 8.6 MG/1
TABLET ORAL
Refills: 0 | Status: ACTIVE | COMMUNITY

## 2023-01-01 RX ORDER — BUDESONIDE 0.5 MG/2ML
0.5 INHALANT ORAL TWICE DAILY
Qty: 1 | Refills: 3 | Status: ACTIVE | COMMUNITY
Start: 2023-01-01 | End: 1900-01-01

## 2023-01-01 RX ORDER — BENZONATATE 100 MG/1
100 CAPSULE ORAL
Refills: 0 | Status: ACTIVE | COMMUNITY

## 2023-01-01 RX ORDER — SULFAMETHOXAZOLE AND TRIMETHOPRIM 800; 160 MG/1; MG/1
800-160 TABLET ORAL
Refills: 0 | Status: ACTIVE | COMMUNITY

## 2023-01-01 RX ORDER — PANTOPRAZOLE 40 MG/1
40 TABLET, DELAYED RELEASE ORAL
Refills: 0 | Status: ACTIVE | COMMUNITY

## 2023-01-01 RX ORDER — BENZONATATE 200 MG/1
200 CAPSULE ORAL
Qty: 90 | Refills: 0 | Status: ACTIVE | COMMUNITY
Start: 2023-01-01 | End: 1900-01-01

## 2023-01-01 RX ORDER — PREDNISONE 10 MG/1
10 TABLET ORAL TWICE DAILY
Refills: 0 | Status: ACTIVE | COMMUNITY

## 2023-01-01 RX ORDER — LORATADINE 10 MG
17 TABLET,DISINTEGRATING ORAL
Refills: 0 | Status: ACTIVE | COMMUNITY

## 2023-01-03 PROBLEM — R73.03 PREDIABETES: Status: ACTIVE | Noted: 2023-01-01

## 2023-01-06 PROBLEM — I27.23: Status: ACTIVE | Noted: 2022-01-01

## 2023-01-13 NOTE — REASON FOR VISIT
[Home] : at home, [unfilled] , at the time of the visit. [Medical Office: ([facility])___] : at the medical office located in  [Family Member] : family member [Patient] : the patient

## 2023-01-13 NOTE — ASSESSMENT
[FreeTextEntry1] : 61 yo M w/ hx of pulmonary fibrosis/ILD 2/2 COVID with severe progression of disease, hypoxic respiratory failure on home O2 and mostly wheelchair bound for the last 6 months. Admitted to [facility] 11/2022, prolonged hospital course c/b persistent hypoxic respiratory failure and difficulty weaning from HFNC, cor pulmonale 2/2 PH-ILD, s/p RHC with mild precapillary PAH. \par \par #PH-ILD - Advanced fibrosis, started on Tyvaso given PVR of 3.5WU and profound hypoxia with some improvement, able to wean off HFNC. Still requiring 6-7LPM and [id number], remains homebound with limited mobility, weakness, fatigue, muscle wasting. No further vasodilator therapies indicated, but can continue to try to increase Tyvaso dose. He is c/o cough w/ use, instructed to use albuterol inhaler prior to treatments and try either warm or cold water just before use. If tolerating 48mcg well can increase to 64 next week. Treatment options remain limited, prognosis grave. He has not followed up with his pulmonologist, encouraged to make virtual appointment w/ Dr. Daly who has been managing his ILD. \par - F/U in 1 month to evaluate Tyvaso tolerance\par - F/U w/ Dr. Daly\par \par \par #Lung transplant candidate- Discussed that this would remain the only treatment option available. Had a fern discussion that if he is unable to improve his functional capacity and/or continues to worsen, would be beneficial to have open conversation with family (daughter present during visit) about his goals of care. Since we are unable to reverse his fibrosis, developing respiratory failure and requiring aggressive support such as mechanical ventilation may not be appropriate. Palliative approach may be reasonable if he has any worsening.\par \par Patient started pre-lung transplant studies while hospitalized. Multiple ABGs, echocardiograms, and CT chests. CMV IgG, EBV, serum QFT, Strongyloides IgG, hepatitis panel, HIV, varicella IgG, MMR IgG, toxoplasma IgG sent. \par - f/u w/ transplant team for continued transplant workup \par - Encouraged daily exercises including: Standing from sitting (with assistance) multiple times/day, leg raises in chair, and light arm weights while seated. Would try to increase number of PT visits left as his weakness remains his biggest barrier to ultimate treatment - lung tx. \par \par RTC 1 month for virtual visit

## 2023-01-13 NOTE — HISTORY OF PRESENT ILLNESS
[TextBox_4] : 63 yo M w/ PMH HLD, pre-DM, and pulmonary fibrosis 2/2 COVID-19 infection c/b pulmonary HTN (uses wheelchair at baseline for SOB, on 4L home O2) who initially presented to [facility] on 11/22/2022 for several days of worsening dyspnea, productive cough, wheezing, and generalized weakness with increasing O2 requirements (up to 6L) and persistent dyspnea at rest. On day of admission, pt had syncopal event due to SOB. He was brought to [facility] in respiratory distress where he was placed on BiPAP. CXR showed diffuse bilateral \par airspace opacities c/w multilobar pneumonia. CTA showed no evidence of PE, however did show extensive diffuse interstitial and ground glass infiltrates bilaterally with associated hilar and mediastinal adenopathy, suspected multilobar atypical pneumonia. Patient was started on broad spectrum antibiotics due to concern for pneumonia. He was admitted to  MICU for acute hypoxic respiratory failure 2/2 pulmonary fibrosis with superimposed bacterial pneumonia and acute decompensated pulmonary HTN. TTE showed mild TR, severely dilated RV, moderate-to-severe elevated RV systolic pressure, moderate-to severe pHTN (58 mmHg), normal LV size and systolic function (EF 58%), and grade II diastolic dysfunction. Pt was transferred to [facility] for further management and RHC given severity of pulmonary disease. \par \par On admission to [facility] CCU, pt was weaned off BiPAP to HFNC and started on inhaled NO for pulmonary vasodilation and milrinone gtt for inotropic support. Pt was also started on high dose steroids for pulmonary fibrosis and given multiple rounds of diuresis i/s/o fluid overload from R-sided HF. Completed course of vanc & zosyn for superimposed bacterial PNA. Patient w/ chronic respiratory failure 2/2 pulmonary fibrosis.  ABG showing hypercarbia (pCO2 73).  Initial attempts at weaning Florentin and HFNC were c/b desaturation. Pt underwent RHC on 11/28 which showed CO 5.43 CI 2.76 (Daniela), RAP 5, PA pressure 43/18/27, PAWP 8, TPG 19, DPG 10, PVR 3.5WU. TTE on 11/28 showed improved RV systolic function from initial presentation. Patient was started Tyvaso on 12/9. Process to start evaluation for lung and heart transplant were started. Weaned off Florentin, and milrinone and pt was stepped down to telemetry, where he was continued on regimen of Tyvaso, atrovent and steroids with intermittent diuresis. Pt was unable to be weaned from HFNC therefore he was discharged with Life 2000 device and BiPAP.\par \par PH Regimen:\par Tyvaso DPI 32mcg QID (started December 2022)\par \par PH Risk factors:\par FH sudden cardiac death: (-)\par IVDU or substance use including alcohol :(-)\par Anorexigens :(-)\par Amphetamines :(-)\par Rheumatological dx :(-)\par Congenital heart dx :(-)\par HTN,Afib, diastolic dysfunction, large LA, valvular dx :(+ diastolic dysfunction)\par Metabolic syndrome including obesity :(+ prediabetes)\par Parenchymal lung dx :(+ pulmonary fibrosis)\par FREEMAN :(-)\par Hx of DVT or PE: (-)\par Hx of splenectomy :(-)\par Liver dx :(-)\par Kidney dx :(-)\par Hematological/Malignancy/Anemia :(-)\par HIV:(-)\par  \par \par 1/11/23: Not using Life 2000 at home much, only when ambulating distances. Is able to tolerate 6-7LPM at rest. He still feels incredibly weak which has been limiting his activity. He has low energy and he's afraid to try to walk. On 48mcg of Tyvaso since Friday of last week (~ 5 days). Having coughing with higher dose, not using inhaler. Has not followed up with Dr. Daly as his daughter was unsure if he does virtual visits. Will run out of PT home visits after 3 more sessions, but is trying to obtain more. Unable to leave the house because of his severe hypoxia. We had a fern discussion re: the severity of his disease. Introduced the idea of palliative care especially in the event that we are unable to improve his functional status enough to enable him to be a transplant candidate. He admits he has been basically dependent on wheelchair for mobility for the last 6 months, even before the hospitalization.

## 2023-01-13 NOTE — PROCEDURE
[FreeTextEntry1] : 12/19/22 ECHO\par 1. Left ventricular endocardium is not well visualized. Grossly, left ventricular function appears normal.\par  2. In some views, the right ventricle appears mildly dilated.\par  3. Mildly reduced right ventricular systolic function.\par  4. No significant valvular disease.\par  5. Pulmonary hypertension present, pulmonary artery systolic pressure is 57 mmHg.\par  6. Trivial pericardial effusion without echocardiographic evidence of cardiac tamponade physiology.\par  7. Compared to the previous TTE performed on 11/28/2022, the pulmonary artery systolic pressure is slightly higher.\par *****\par 11/28/22 RHC\par RAP - 5mmHg \par RV - 30/8 \par PA - 43/18/27 \par PAWP - 8mmHg \par TPG - 19mmHg, DPG - 10mmHg \par CO/CI (Td) - 5.43/2.76 \par CO/CI (F) - 8.22/4.17 \par PVR (Td) - 3.5WU \par SVR - 1242dsc \par \par Impression - mild precapillary pulmonary hypertension with normal CO/CI by Td on low dose milrinone, Daniela CO/CI overestimated in setting of HFNC and would favor Td. High TPG, high DPG, normal filling pressures suggesting precapillary PH 2/2 underlying severe fibrotic ILD and adequate volume status. Qualifies for inhaled prostacyclin therapy for PH-ILD, will initiate\par process for inhaled Tyvaso in patient start.  \par *****\par 11/23/22 ECHO\par 1. Normal left ventricular size and systolic function.\par  2. Mild symmetric left ventricular hypertrophy.\par  3. Grade I left ventricular diastolic dysfunction.\par  4. Severely dilated right ventricular size.\par  5. Moderately-to-severely reduced right ventricular systolic function.\par  6. Dilated right atrium.\par  7. Mild-to-moderate tricuspid regurgitation.\par  8. Pulmonary hypertension present, pulmonary artery systolic pressure is \par 48 mmHg.\par  9. Trivial pericardial effusion.\par *****\par 10/12/22 ECHO\par 1. Mitral annular calcification, otherwise normal mitral valve. Minimal mitral regurgitation.\par 2. Normal left ventricular internal dimensions and wall thicknesses.\par 3. Endocardium not well visualized; grossly normal left ventricular systolic function.  Endocardial visualization enhanced with intravenous injection of echo contrast (Definity).\par 4. The right ventricle is not well visualized.\par *****\par 10/10/22 CT CHEST\par Reticulation, architectural distortion, and traction bronchiectasis \par compatible with history of fibrotic lung disease.\par \par Some opacities may represent superimposed infection given the patient's \par septic presentation.\par *****\par 9/16/22 PFT\par FVC 0.60 (15)\par FEV1 0.47 (16)\par FEV1/FVC 78\par FEF 25-75% 0.53 (22)\par Pt unable to perform lung volumes and DLCO\par \par Resting SpO2 84% on room air\par End test SpO2 89% on 4L O2\par Pt walked for 5 minutes before stopping due to severe SOB (distance not recorded)\par

## 2023-01-21 PROBLEM — J84.9 ILD (INTERSTITIAL LUNG DISEASE): Status: ACTIVE | Noted: 2022-01-01

## 2023-01-21 PROBLEM — R06.02 SOB (SHORTNESS OF BREATH): Status: ACTIVE | Noted: 2022-01-01

## 2023-01-21 NOTE — DISCUSSION/SUMMARY
[FreeTextEntry1] : 61 yo male with ILD with chronic hypoxemic respiratory failure with recent viral URI. He is to continue to taper oral steroids with nebulized meds and supplemental oxygen. Codeine containing cough suppressant was prescribed. Continued evaluation with the transplant team stressed. He is to follow up with his PMD as before.

## 2023-01-21 NOTE — HISTORY OF PRESENT ILLNESS
[Never] : never [TextBox_4] : 61 yo male with hx of ILD presents for follow up. The patient was recently admitted to the hospital ([facility]) due to SOB and nasal symptoms, positive for rhinovirus. He is presently on nasal and nebulized steroids with tapering oral steroids. He feels "better"on supplemental oxygen. He denies fever, chest pain or hemoptysis. [TextBox_29] : Denies snoring, daytime somnolence, apneic episodes, AM headaches

## 2023-01-27 ENCOUNTER — APPOINTMENT (OUTPATIENT)
Dept: PULMONOLOGY | Facility: CLINIC | Age: 63
End: 2023-01-27

## 2023-02-08 ENCOUNTER — APPOINTMENT (OUTPATIENT)
Dept: PULMONOLOGY | Facility: CLINIC | Age: 63
End: 2023-02-08

## 2023-09-14 NOTE — PHYSICAL EXAM
MAX LEZAMA 9/14 to sched a next avail follow up with Dr. Kay via video with CT done prior.    [No Acute Distress] : no acute distress [Normal Appearance] : normal appearance [Normal Rate/Rhythm] : normal rate/rhythm [Normal S1, S2] : normal s1, s2 [No Resp Distress] : no resp distress [No Abnormalities] : no abnormalities [Benign] : benign [Normal Gait] : normal gait [TextBox_2] : overweight

## 2023-09-22 NOTE — PROGRESS NOTE ADULT - ASSESSMENT
Subjective:  15 year old female presents with complaint of vaginal discharge. No itching or smell.  Reports this has been happening on and off for the past year.  She is on MICKEY. Previous Swabone negative.  Not sexually active.     Also had some pelvic pain which is improving.  Had a black stool yesterday.     Past medical history, allergies and medications reviewed with the patient.   Social History     Tobacco Use   Smoking Status Never   • Passive exposure: Never   Smokeless Tobacco Never       Objective:  Visit Vitals  /68 (BP Location: RUE - Right upper extremity, Patient Position: Sitting, Cuff Size: Regular)   Pulse 90   Temp 97.7 °F (36.5 °C) (Temporal)   Resp 18   Wt 63.8 kg (140 lb 11.2 oz)   LMP 09/06/2023 (Exact Date)   SpO2 98%     Physical Exam   Vitals reviewed.   Constitutional: Oriented to person, place, and time, appears well-developed and well-nourished.   : Normal external female genitalia.  Pt uncomfortable with attempted speculum exam so a blind swab of the vagina was taken.    Assessment/Plan:  15 year old female with:    1. Vaginal discharge: wet mount pending    2. Melena: check cbc and cmp    RTC prn   60 yo male with pulmonary fibrosis 2/2 COVID-19, recent steroid taper, admission OSH 10/10 where he was found to have Enterovirus respiratory tract infection, then presented to another OSH 11/22 with progressive SOB (told he had pneumonia and started empirically on antibiotics (flu/RSV testing reportedly negative there)), transferred here for ?cardiogenic shock, b/l pulmonary infiltrates ?HAP; also at increased risk for PCP pneumonia given recent steroid exposure. Sputum culture rejected x2 2/2 contamination.    - f/u sputum Pneumocystis PCR  - f/u serum Fungitell  - would discontinue vancomycin  - would continue zosyn (final dose today), for completion of 7d course    ID team 1 will continue to follow. 62 yo male with pulmonary fibrosis 2/2 COVID-19, recent steroid taper, admission OSH 10/10 where he was found to have Enterovirus respiratory tract infection, then presented to another OSH 11/22 with progressive SOB (told he had pneumonia and started empirically on antibiotics (flu/RSV testing reportedly negative there)), transferred here for ?cardiogenic shock, b/l pulmonary infiltrates ?HAP; also at increased risk for PCP pneumonia given recent steroid exposure. Sputum culture rejected x2 2/2 contamination.    - f/u sputum Pneumocystis PCR        - if negative, then would start TMP/SMX 1DS daily for PCP ppx while on steroids  - f/u serum Fungitell  - would discontinue vancomycin, given patient has received sufficient treatment for HAP  - would continue zosyn (final dose today), for completion of 7d course    ID team 1 will continue to follow.

## 2023-11-07 NOTE — ED ADULT NURSE NOTE - NSFALLRSKASSESSDT_ED_ALL_ED
ASSESSMENT & PLAN:     Gram Negative Bacterial Pneumonia  -Patient with productive purulent sputum, hoarse cough, and imaging findings consistent with pneumonia at time of presentation.  -Procalcitonin elevated, 0.85, consistent with bacterial pneumonia     Plan:  -We will continue ceftriaxone/azithromycin as patient appears to be improving  -Sputum cx pending  -Strep urine/Legionella antigen negative.      Acute Gout Flare  -Presenting with b/l LE pain/swelling.  Most significant in left ankle.  Given renal function will treat with prednisone taper and monitor.  -Agree with nephro, would likely benefit from allopurinol initiation once gout flare resolves.      TOMA on CKD 3a/3b  -Presenting with Cr of 2.2 with b/l of approx 1.5. Unclear etiology. Nephro consulted for evaluation, suspect volume depletion as etiology. Will continue cautious IV fluids at this time. Given heart failure diagnosis.      UTI  -On ATB as per above, will follow cx     HFrEF  -Appears compensated. Most recent echo reviewed, revealed EF of 25-30%. BNP indeterminate and patient does not appear grossly overloaded.  Will monitor     Hx Afib, CAD, HTN, HLD, Arthritis  -Home meds resumed as applicable     DVT Ppx:  -On Eliquis     Shaun Norwood MD    SUBJECTIVE     Patient had no acute events overnight.  Endorsing improvement in cough but states is harder to cough phlegm up.  Denies any fevers chills or chest pain.  Endorsing persistent intermittent left ankle pain.  Further ROS is unremarkable.      OBJECTIVE:       Last Recorded Vitals:  Vitals:    11/07/23 0407 11/07/23 0424 11/07/23 0735 11/07/23 1148   BP: 107/59  96/52 110/55   BP Location: Left arm  Left arm Left arm   Patient Position: Lying  Lying Sitting   Pulse: 67  64 79   Resp: 17  18 18   Temp: 36.3 °C (97.3 °F)  36.6 °C (97.9 °F) 36.4 °C (97.5 °F)   TempSrc: Temporal  Temporal Temporal   SpO2: 94%  93% 97%   Weight:  93.1 kg (205 lb 4 oz)     Height:           Last I/O:  I/O last 3  completed shifts:  In: 1062.5 (11.4 mL/kg) [P.O.:240; I.V.:772.5 (8.3 mL/kg); IV Piggyback:50]  Out: 400 (4.3 mL/kg) [Urine:400 (0.1 mL/kg/hr)]  Weight: 93.1 kg     Physical Exam:  General: Well-developed elderly male in mild distress  HEENT: Clear sclera, EOMI, trachea midline, moist mucous membranes  Respiratory: Equal chest rise, no retractions, diffuse rhonchi  Cardiovascular: S1 and S2 auscultated, no murmurs clicks or rubs  Abdomen: Soft, nontender, nondistended  Extremities: Bilateral lower extremity swelling, nonpitting, left ankle exquisitely tender to touch and ROM  Neurological: Spontaneously moves all extremities, no dysarthria, cranial nerves grossly intact  Psychiatric: Appropriate mood and affect  Skin: Warm, dry    Inpatient Medications:  apixaban, 2.5 mg, oral, q12h  atorvastatin, 40 mg, oral, Nightly  azithromycin, 500 mg, intravenous, Daily  cefTRIAXone, 1 g, intravenous, q24h  empagliflozin, 12.5 mg, oral, Daily  levothyroxine, 75 mcg, oral, Daily before breakfast  [Held by provider] losartan, 12.5 mg, oral, Daily  metoprolol succinate XL, 50 mg, oral, Daily  predniSONE, 40 mg, oral, Daily   Followed by  [START ON 11/11/2023] predniSONE, 20 mg, oral, Daily   Followed by  [START ON 11/14/2023] predniSONE, 10 mg, oral, Daily   Followed by  [START ON 11/17/2023] predniSONE, 5 mg, oral, Daily  psyllium, 1 packet, oral, Daily  sennosides, 2 tablet, oral, BID  [Held by provider] spironolactone, 25 mg, oral, Daily        PRN Medications  PRN medications: furosemide, guaiFENesin, nitroglycerin, traMADol    Continuous Medications:  lactated Ringer's, 75 mL/hr, Last Rate: 75 mL/hr (11/07/23 1056)          LABS AND IMAGING:     Labs:  Results for orders placed or performed during the hospital encounter of 11/06/23 (from the past 24 hour(s))   Urine electrolytes   Result Value Ref Range    Sodium, Urine Random 36 mmol/L    Sodium/Creatinine Ratio 27 Not established. mmol/g Creat    Potassium, Urine Random 47  mmol/L    Potassium/Creatinine Ratio 35 Not established mmol/g Creat    Chloride, Urine Random 19 mmol/L    Chloride/Creatinine Ratio 14 (L) 23 - 275 mmol/g creat    Creatinine, Urine Random 133.9 20.0 - 370.0 mg/dL   Urea Nitrogen, Urine Random   Result Value Ref Range    Urea Nitrogen, Urine Random 1,185 mg/dL    Creatinine, Urine Random 133.9 20.0 - 370.0 mg/dL    Urea Nitrogen/Creatinine Ratio 8.8 Not established. g/g creat   Streptococcus pneumoniae Antigen, Urine    Specimen: Urine   Result Value Ref Range    Streptococcus pneumoniae Ag, Urine Negative Negative   Legionella Antigen, Urine    Specimen: Urine   Result Value Ref Range    L. pneumophila Urine Ag Negative Negative   Procalcitonin   Result Value Ref Range    Procalcitonin 0.85 (H) <=0.07 ng/mL   Magnesium   Result Value Ref Range    Magnesium 2.68 (H) 1.60 - 2.40 mg/dL   Basic Metabolic Panel   Result Value Ref Range    Glucose 142 (H) 74 - 99 mg/dL    Sodium 134 (L) 136 - 145 mmol/L    Potassium 4.1 3.5 - 5.3 mmol/L    Chloride 103 98 - 107 mmol/L    Bicarbonate 20 (L) 21 - 32 mmol/L    Anion Gap 15 10 - 20 mmol/L    Urea Nitrogen 53 (H) 6 - 23 mg/dL    Creatinine 2.12 (H) 0.50 - 1.30 mg/dL    eGFR 29 (L) >60 mL/min/1.73m*2    Calcium 9.0 8.6 - 10.3 mg/dL   CBC and Auto Differential   Result Value Ref Range    WBC 9.7 4.4 - 11.3 x10*3/uL    nRBC 0.0 0.0 - 0.0 /100 WBCs    RBC 3.29 (L) 4.50 - 5.90 x10*6/uL    Hemoglobin 10.5 (L) 13.5 - 17.5 g/dL    Hematocrit 32.0 (L) 41.0 - 52.0 %    MCV 97 80 - 100 fL    MCH 31.9 26.0 - 34.0 pg    MCHC 32.8 32.0 - 36.0 g/dL    RDW 13.5 11.5 - 14.5 %    Platelets 221 150 - 450 x10*3/uL    Immature Granulocytes %, Automated 0.5 0.0 - 0.9 %    Immature Granulocytes Absolute, Automated 0.05 0.00 - 0.50 x10*3/uL   SST TOP   Result Value Ref Range    Extra Tube Hold for add-ons.    Manual Differential   Result Value Ref Range    Neutrophils %, Manual 81.0 40.0 - 80.0 %    Bands %, Manual 10.0 0.0 - 5.0 %    Lymphocytes  %, Manual 6.0 13.0 - 44.0 %    Monocytes %, Manual 3.0 2.0 - 10.0 %    Eosinophils %, Manual 0.0 0.0 - 6.0 %    Basophils %, Manual 0.0 0.0 - 2.0 %    Seg Neutrophils Absolute, Manual 7.86 (H) 1.60 - 5.00 x10*3/uL    Bands Absolute, Manual 0.97 (H) 0.00 - 0.50 x10*3/uL    Lymphocytes Absolute, Manual 0.58 (L) 0.80 - 3.00 x10*3/uL    Monocytes Absolute, Manual 0.29 0.05 - 0.80 x10*3/uL    Eosinophils Absolute, Manual 0.00 0.00 - 0.40 x10*3/uL    Basophils Absolute, Manual 0.00 0.00 - 0.10 x10*3/uL    Total Cells Counted 100     Neutrophils Absolute, Manual 8.83 (H) 1.60 - 5.50 x10*3/uL    RBC Morphology See Below     [place]eyster Cells Few         Imaging:  US renal complete  Narrative: Interpreted By:  Mc Bernard,   STUDY:  US RENAL COMPLETE;  11/6/2023 9:10 pm      INDICATION:  Signs/Symptoms:robert.      COMPARISON:  None.      ACCESSION NUMBER(S):  [id number]      ORDERING CLINICIAN:  CASSANDRA STEWART      TECHNIQUE:  Real-time sonographic evaluation of the kidneys and urinary bladder  was performed.      FINDINGS:  RIGHT KIDNEY:  Right kidney measures  7.9 cm.  No shadowing calculus or right  hydronephrosis is visualized.  No discrete renal lesion is visualized.      LEFT KIDNEY:  Left kidney measures  10.2 cm.  No shadowing calculus or left  hydronephrosis is visualized.  A left renal lower pole small ovoid  hypoechoic lesion likely is a small cyst measuring 1 x 0.80.8 cm.      BLADDER:  Urinary bladder is partially distended.      No intraluminal mass or shadowing calculus is demonstrated.      Impression: No hydronephrosis bilaterally.      No focal urinary bladder abnormality identified.      MACRO:  None.      Signed by: Mc Bernard 11/7/2023 8:32 AM  Dictation workstation:   [id number]        10-Oct-2022 00:59

## 2024-04-16 NOTE — DISCHARGE NOTE NURSING/CASE MANAGEMENT/SOCIAL WORK - NSDCPETBCESMAN_GEN_ALL_CORE
Called pt and scheduled for a my chart visit tomorrow at 11:15 4/17/2024   If you are a smoker, it is important for your health to stop smoking. Please be aware that second hand smoke is also harmful.

## 2024-12-30 NOTE — PROGRESS NOTE ADULT - ASSESSMENT
12/29/24 0850   Avoidable Days   Community/External       Dialysis   Payor SNF Auth       Respectfully submitted,    Vannessa SONI, ERIN  [facility]   [phone]    Electronically signed by NAE Sims, LSW on 12/30/2024 at 8:50 AM     61M with known pulmonary fibrosis on home oxygen presented with progressive dyspnea now transferred from outside hospital with concern for RV failure in the setting of pulmonary hypertension for which pulmonary hypertension service has been consulted    #Pulmonary hypertension  Patient presented to outside hospital with concern for acute on chronic hypoxic respiratory failure likely due to multifactorial etiology due to possible pneumonia with associated heart failure with concern for RV failure in the setting of pulmonary hypertension with possible component of diastolic dysfunction. Patient with no formal diagnosis of pulmonary hypertension with echo on 10/22 unable to visualize the right side. No known risk factors for WHO group 1 pHTN. Possible component of group 2 given LV wall thickness seen on echo with diastolic dysfunction noted on both echo at OSH and here. 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 Patient currently is mentating, and warm to touch, with labs that indicated adequate end organ perfusion (Cr is in normal limits with urine visualized in bladder on US, LFTS WNL) and currently does not appear to be in acute decompensated RHF. His bedside echo performed by our team is notable for some diastolic/systolic septal wall flattening consistent with pressure and volume overload of right, his tapse is reduced (11), with an elevated TR JET > 2.8 consistent with pulmonary hypertension-possibly precipitated by bacterial pneumonia given elevated wbc and procal with productive cough.   - agree with continued diuresis   - c/w HFNC   - c/w Florentin for pHTN  - patient evaluated by transplant service, will likely require inpatient evaluation. BMI previously not at goal.   - pending RHC    other: labs from 1/22: Anti centromere negative, milton negative, ds-dna negative, rf negative

## 2025-02-04 NOTE — PATIENT PROFILE ADULT - FUNCTIONAL SCREEN CURRENT LEVEL: SWALLOWING (IF SCORE 2 OR MORE FOR ANY ITEM, CONSULT REHAB SERVICES), MLM)
diabetes mellitus      BUN 02/04/2025 14  8 - 23 MG/DL Final    Creatinine 02/04/2025 0.79  0.60 - 1.10 MG/DL Final    Est, Irvin Filt Rate 02/04/2025 77  >60 ml/min/1.73m2 Final    Comment:    Pediatric calculator link: https://www.kidney.org/professionals/kdoqi/gfr_calculatorped     These results are not intended for use in patients <18 years of age.     eGFR results are calculated without a race factor using  the 2021 CKD-EPI equation. Careful clinical correlation is recommended, particularly when comparing to results calculated using previous equations.  The CKD-EPI equation is less accurate in patients with extremes of muscle mass, extra-renal metabolism of creatinine, excessive creatine ingestion, or following therapy that affects renal tubular secretion.      Calcium 02/04/2025 9.7  8.8 - 10.2 MG/DL Final    Total Bilirubin 02/04/2025 0.4  0.0 - 1.2 MG/DL Final    ALT 02/04/2025 25  8 - 45 U/L Final    AST 02/04/2025 36  15 - 37 U/L Final    Alk Phosphatase 02/04/2025 92  35 - 104 U/L Final    Total Protein 02/04/2025 7.1  6.3 - 8.2 g/dL Final    Albumin 02/04/2025 3.7  3.2 - 4.6 g/dL Final    Globulin 02/04/2025 3.4  2.3 - 3.5 g/dL Final    Albumin/Globulin Ratio 02/04/2025 1.1  1.0 - 1.9   Final       
0 = swallows foods/liquids without difficulty

## 2025-02-13 NOTE — CONSULT NOTE ADULT - ASSESSMENT
61M with known pulmonary fibrosis on home oxygen presented with progressive dyspnea now transferred from outside hospital with concern for RV failure in the setting of pulmonary hypertension for which pulmonary hypertension service has been consulted 61M with known pulmonary fibrosis on home oxygen presented with progressive dyspnea now transferred from outside hospital with concern for RV failure in the setting of pulmonary hypertension for which pulmonary hypertension service has been consulted    #Pulmonary hypertension  Patient presented to outside hospital with concern for acute on chronic hypoxic respiratory failure likely due to multifactorial etiology due to possible pneumonia with associated heart failure with concern for RV failure in the setting of pulmonary hypertension with possible component of diastolic dysfunction. Patient with no formal diagnosis of pulmonary hypertension with echo on 10/22 unable to visualize the right side. No known risk factors for WHO group 1 pHTN. Possible component of group 2 given LV wall thickness seen on echo with diastolic dysfunction noted on both echo at OSH and here. 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 Patient currently is mentating, and warm to touch, with labs that indicated adequate end organ perfusion (Cr is in normal limits with urine visualized in bladder on US, LFTS WNL) and currently does not appear to be in acute decompensated RHF. His bedside echo performed by our team is notable for some diastolic/systolic septal wall flattening consistent with pressure and volume overload of right, his tapse is reduced (11), with an elevated TR JET > 2.8 consistent with pulmonary hypertension-possibly precipitated by bacterial pneumonia given elevated wbc and procal with productive cough.   - agree with continued diuresis   - would avoid positive pressure ventilation, and trial patient on HFNC  - would start Florentin  - defer milerinone to ccu team   - agree with plan to perform RHC    other: labs from 1/22: Anti centromere negative, milton negative, ds-dna negative, rf negative 61M with known pulmonary fibrosis on home oxygen presented with progressive dyspnea now transferred from outside hospital with concern for RV failure in the setting of pulmonary hypertension for which pulmonary hypertension service has been consulted    #Pulmonary hypertension  Patient presented to outside hospital with concern for acute on chronic hypoxic respiratory failure likely due to multifactorial etiology due to possible pneumonia with associated heart failure with concern for RV failure in the setting of pulmonary hypertension with possible component of diastolic dysfunction. Patient with no formal diagnosis of pulmonary hypertension with echo on 10/22 unable to visualize the right side. No known risk factors for WHO group 1 pHTN. Possible component of group 2 given LV wall thickness seen on echo with diastolic dysfunction noted on both echo at OSH and here. 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 Patient currently is mentating, and warm to touch, with labs that indicated adequate end organ perfusion (Cr is in normal limits with urine visualized in bladder on US, LFTS WNL) and currently does not appear to be in acute decompensated RHF. His bedside echo performed by our team is notable for some diastolic/systolic septal wall flattening consistent with pressure and volume overload of right, his tapse is reduced (11), with an elevated TR JET > 2.8 consistent with pulmonary hypertension-possibly precipitated by bacterial pneumonia given elevated wbc and procal with productive cough.   - agree with continued diuresis   - would avoid positive pressure ventilation, and trial patient on HFNC  - would start Florentin  - defer milrinone to ccu team   - agree with plan to perform RHC    other: labs from 1/22: Anti centromere negative, milton negative, ds-dna negative, rf negative     61M with known pulmonary fibrosis on home oxygen presented with progressive dyspnea now transferred from outside hospital with concern for RV failure in the setting of pulmonary hypertension for which pulmonary hypertension service has been consulted    #Pulmonary hypertension - most consistent with PH-ILD  Patient presented to outside hospital with concern for acute on chronic hypoxic respiratory failure likely due to multifactorial etiology due to possible pneumonia with associated heart failure with concern for RV failure in the setting of pulmonary hypertension with possible component of diastolic dysfunction. Patient with no formal diagnosis of pulmonary hypertension with echo on 10/22 unable to visualize the right side. No known risk factors for WHO group 1 pHTN. Possible component of group 2 given LV wall thickness seen on echo with diastolic dysfunction noted on both echo at OSH and here. 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 Patient currently is mentating, and warm to touch, with labs that indicated adequate end organ perfusion (Cr is in normal limits with urine visualized in bladder on US, LFTS WNL) and currently does not appear to be in acute decompensated RHF. His bedside echo performed by our team is notable for some diastolic/systolic septal wall flattening consistent with pressure and volume overload of right, his tapse is reduced (11), with an elevated TR JET > 2.8 consistent with pulmonary hypertension-possibly precipitated by bacterial pneumonia given elevated wbc and procal with productive cough.   - agree with continued diuresis   - would avoid positive pressure ventilation, and trial patient on HFNC  - would start Florentin  - defer milrinone to ccu team   - agree with plan to perform RHC, likely will initiate Tyvaso for PH ILD pending results  - broad spectrum antibiotics given WBC, worsening cough and sputum production, F/U procal    other: labs from 1/22: Anti centromere negative, milton negative, ds-dna negative, rf negative     No
